# Patient Record
Sex: MALE | Race: ASIAN | Employment: OTHER | URBAN - METROPOLITAN AREA
[De-identification: names, ages, dates, MRNs, and addresses within clinical notes are randomized per-mention and may not be internally consistent; named-entity substitution may affect disease eponyms.]

---

## 2017-01-12 RX ORDER — TAMSULOSIN HYDROCHLORIDE 0.4 MG/1
CAPSULE ORAL
Qty: 90 CAP | Refills: 0 | Status: SHIPPED | OUTPATIENT
Start: 2017-01-12 | End: 2017-04-15 | Stop reason: SDUPTHER

## 2017-03-09 ENCOUNTER — OFFICE VISIT (OUTPATIENT)
Dept: FAMILY MEDICINE CLINIC | Age: 67
End: 2017-03-09

## 2017-03-09 VITALS
WEIGHT: 180.4 LBS | DIASTOLIC BLOOD PRESSURE: 72 MMHG | RESPIRATION RATE: 18 BRPM | TEMPERATURE: 97.6 F | HEIGHT: 70 IN | BODY MASS INDEX: 25.83 KG/M2 | OXYGEN SATURATION: 98 % | SYSTOLIC BLOOD PRESSURE: 109 MMHG | HEART RATE: 71 BPM

## 2017-03-09 DIAGNOSIS — I25.10 ATHEROSCLEROSIS OF NATIVE CORONARY ARTERY OF NATIVE HEART WITHOUT ANGINA PECTORIS: ICD-10-CM

## 2017-03-09 DIAGNOSIS — E11.9 TYPE 2 DIABETES MELLITUS WITHOUT COMPLICATION, WITHOUT LONG-TERM CURRENT USE OF INSULIN (HCC): Primary | ICD-10-CM

## 2017-03-09 DIAGNOSIS — Z96.659 INFECTION OF PROSTHETIC KNEE JOINT, SUBSEQUENT ENCOUNTER: ICD-10-CM

## 2017-03-09 DIAGNOSIS — E78.2 MIXED DYSLIPIDEMIA: ICD-10-CM

## 2017-03-09 DIAGNOSIS — T84.59XD INFECTION OF PROSTHETIC KNEE JOINT, SUBSEQUENT ENCOUNTER: ICD-10-CM

## 2017-03-09 DIAGNOSIS — I10 ESSENTIAL HYPERTENSION, BENIGN: ICD-10-CM

## 2017-03-09 RX ORDER — CEPHALEXIN 500 MG/1
500 CAPSULE ORAL 4 TIMES DAILY
COMMUNITY
End: 2017-05-16

## 2017-03-09 RX ORDER — MELOXICAM 15 MG/1
15 TABLET ORAL DAILY
COMMUNITY
End: 2017-05-16

## 2017-03-09 RX ORDER — CLOTRIMAZOLE AND BETAMETHASONE DIPROPIONATE 10; .64 MG/G; MG/G
CREAM TOPICAL
Qty: 45 G | Refills: 3 | Status: SHIPPED | OUTPATIENT
Start: 2017-03-09 | End: 2017-05-16

## 2017-03-09 NOTE — MR AVS SNAPSHOT
Visit Information Date & Time Provider Department Dept. Phone Encounter #  
 3/9/2017 11:20 AM Clem Novoa MD [address] [place] [address] [id number] Follow-up Instructions Return in about 4 months (around 7/9/2017) for Follow-up, Fasting, DM, HTN, CHOL, Arthritis. Your Appointments 4/20/2017 10:00 AM  
ESTABLISHED PATIENT with Jacinta Du MD  
[facility] ([facility]) Appt Note: 1 yr fu appt  sll  
 [address] [place] [address]  
[address] [id number] Upcoming Health Maintenance Date Due Hepatitis C Screening 1950 DTaP/Tdap/Td series (1 - Tdap) 5/30/1970 FOBT Q 1 YEAR AGE 50-75 5/30/1999 ZOSTER VACCINE AGE 60> 5/30/2009 Pneumococcal 65+ Low/Medium Risk (1 of 2 - PCV13) 5/30/2014 MEDICARE YEARLY EXAM 5/30/2014 FOOT EXAM Q1 2/12/2016 INFLUENZA AGE 9 TO ADULT 8/1/2016 MICROALBUMIN Q1 11/3/2016 EYE EXAM RETINAL OR DILATED Q1 11/25/2016 HEMOGLOBIN A1C Q6M 4/7/2017 LIPID PANEL Q1 10/7/2017 GLAUCOMA SCREENING Q2Y 11/25/2017 Allergies as of 3/9/2017  Review Complete On: 3/9/2017 By: Clem Novoa MD  
  
 Severity Noted Reaction Type Reactions Levofloxacin Medium 11/03/2015   Systemic Rash Pcn [Penicillins] Medium 06/23/2011   Systemic Rash Tape [Adhesive]  10/27/2016   Intolerance Rash Medipore tape caused large blisters when removed. Current Immunizations  Never Reviewed No immunizations on file. Not reviewed this visit You Were Diagnosed With   
  
 Codes Comments Type 2 diabetes mellitus without complication, without long-term current use of insulin (HCC)    -  Primary ICD-10-CM: E11.9 ICD-9-CM: 250.00 Essential hypertension, benign     ICD-10-CM: I10 
ICD-9-CM: 401.1 Mixed dyslipidemia     ICD-10-CM: E78.2 ICD-9-CM: 272.2 Infection of prosthetic knee joint, subsequent encounter     ICD-10-CM: T84.59XD, F07.972 ICD-9-CM: V58.89 Atherosclerosis of native coronary artery of native heart without angina pectoris     ICD-10-CM: I25.10 ICD-9-CM: 414.01 Vitals BP Pulse Temp Resp Height(growth percentile) Weight(growth percentile) 109/72 (BP 1 Location: Right arm, BP Patient Position: Sitting) 71 97.6 °F (36.4 °C) (Oral) 18 5' 10\" (1.778 m) 180 lb 6.4 oz (81.8 kg) SpO2 BMI Smoking Status 98% 25.88 kg/m2 Never Smoker Vitals History BMI and BSA Data Body Mass Index Body Surface Area  
 25.88 kg/m 2 2.01 m 2 Preferred Pharmacy Pharmacy Name Phone [facility] [address] [place] [address] [phone] Your Updated Medication List  
  
   
This list is accurate as of: 3/9/17 12:12 PM.  Always use your most recent med list.  
  
  
  
  
 acetaminophen 325 mg tablet Commonly known as:  TYLENOL Take 2 Tabs by mouth every six (6) hours as needed for Fever. amLODIPine 5 mg tablet Commonly known as:  Delight Jean TAKE ONE TABLET BY MOUTH ONCE DAILY  
  
 atorvastatin 10 mg tablet Commonly known as:  LIPITOR  
TAKE ONE TABLET BY MOUTH ONCE DAILY. (NEEDS APPOINTMENT FOR ADDITIONAL REFILLS) cephALEXin 500 mg capsule Commonly known as:  Clay Balboa Take 500 mg by mouth four (4) times daily. esomeprazole 20 mg capsule Commonly known as:  NexIUM Take 1 Cap by mouth daily. fenofibrate 160 mg tablet Commonly known as:  LOFIBRA TAKE ONE TABLET BY MOUTH ONCE DAILY * glucose blood VI test strips strip Commonly known as:  ACCU-CHEK RED Diagnosis E11.9 Testing  once a day * glucose blood VI test strips strip Commonly known as:  ACCU-CHEK RED PLUS TEST STRP Diagnosis E11.9 . Testing once daily  
  
 meloxicam 15 mg tablet Commonly known as:  MOBIC Take 15 mg by mouth daily. metFORMIN 850 mg tablet Commonly known as:  GLUCOPHAGE  
TAKE ONE TABLET BY MOUTH TWICE DAILY WITH MEALS  
  
 metoprolol succinate 50 mg XL tablet Commonly known as:  TOPROL XL Take 1 Tab by mouth daily. nitroglycerin 0.4 mg SL tablet Commonly known as:  NITROSTAT  
DISSOLVE ONE TABLET UNDER THE TONGUE EVERY 5 MINUTES AS NEEDED FOR CHEST PAIN. UP TO 3 DOSES  
  
 rifAMPin 300 mg capsule Commonly known as:  RIFADIN Take 2 Caps by mouth daily. tamsulosin 0.4 mg capsule Commonly known as:  FLOMAX TAKE ONE CAPSULE BY MOUTH ONCE DAILY * Notice: This list has 2 medication(s) that are the same as other medications prescribed for you. Read the directions carefully, and ask your doctor or other care provider to review them with you. We Performed the Following C REACTIVE PROTEIN, QT [73037 CPT(R)] CBC WITH AUTOMATED DIFF [34568 CPT(R)] HEMOGLOBIN A1C WITH EAG [65632 CPT(R)] LIPID PANEL [95930 CPT(R)] METABOLIC PANEL, COMPREHENSIVE [00727 CPT(R)] MICROALBUMIN, UR, RAND W/ MICROALBUMIN/CREA RATIO [id number] CPT(R)] SED RATE (ESR) [id number] CPT(R)] Follow-up Instructions Return in about 4 months (around 7/9/2017) for Follow-up, Fasting, DM, HTN, CHOL, Arthritis. Patient Instructions Learning About Diabetes Food Guidelines Your Care Instructions Meal planning is important to manage diabetes. It helps keep your blood sugar at a target level (which you set with your doctor). You don't have to eat special foods. You can eat what your family eats, including sweets once in a while. But you do have to pay attention to how often you eat and how much you eat of certain foods. You may want to work with a dietitian or a certified diabetes educator (CDE) to help you plan meals and snacks. A dietitian or CDE can also help you lose weight if that is one of your goals. What should you know about eating carbs? Managing the amount of carbohydrate (carbs) you eat is an important part of healthy meals when you have diabetes. Carbohydrate is found in many foods. · Learn which foods have carbs. And learn the amounts of carbs in different foods. ¨ Bread, cereal, pasta, and rice have about 15 grams of carbs in a serving. A serving is 1 slice of bread (1 ounce), ½ cup of cooked cereal, or 1/3 cup of cooked pasta or rice. ¨ Fruits have 15 grams of carbs in a serving. A serving is 1 small fresh fruit, such as an apple or orange; ½ of a banana; ½ cup of cooked or canned fruit; ½ cup of fruit juice; 1 cup of melon or raspberries; or 2 tablespoons of dried fruit. ¨ Milk and no-sugar-added yogurt have 15 grams of carbs in a serving. A serving is 1 cup of milk or 2/3 cup of no-sugar-added yogurt. ¨ Starchy vegetables have 15 grams of carbs in a serving. A serving is ½ cup of mashed potatoes or sweet potato; 1 cup winter squash; ½ of a small baked potato; ½ cup of cooked beans; or ½ cup cooked corn or green peas. · Learn how much carbs to eat each day and at each meal. A dietitian or CDE can teach you how to keep track of the amount of carbs you eat. This is called carbohydrate counting. · If you are not sure how to count carbohydrate grams, use the Plate Method to plan meals. It is a good, quick way to make sure that you have a balanced meal. It also helps you spread carbs throughout the day. ¨ Divide your plate by types of foods. Put non-starchy vegetables on half the plate, meat or other protein food on one-quarter of the plate, and a grain or starchy vegetable in the final quarter of the plate. To this you can add a small piece of fruit and 1 cup of milk or yogurt, depending on how many carbs you are supposed to eat at a meal. 
· Try to eat about the same amount of carbs at each meal. Do not \"save up\" your daily allowance of carbs to eat at one meal. 
· Proteins have very little or no carbs per serving.  Examples of proteins are beef, chicken, turkey, fish, eggs, tofu, cheese, cottage cheese, and peanut butter. A serving size of meat is 3 ounces, which is about the size of a deck of cards. Examples of meat substitute serving sizes (equal to 1 ounce of meat) are 1/4 cup of cottage cheese, 1 egg, 1 tablespoon of peanut butter, and ½ cup of tofu. How can you eat out and still eat healthy? · Learn to estimate the serving sizes of foods that have carbohydrate. If you measure food at home, it will be easier to estimate the amount in a serving of restaurant food. · If the meal you order has too much carbohydrate (such as potatoes, corn, or baked beans), ask to have a low-carbohydrate food instead. Ask for a salad or green vegetables. · If you use insulin, check your blood sugar before and after eating out to help you plan how much to eat in the future. · If you eat more carbohydrate at a meal than you had planned, take a walk or do other exercise. This will help lower your blood sugar. What else should you know? · Limit saturated fat, such as the fat from meat and dairy products. This is a healthy choice because people who have diabetes are at higher risk of heart disease. So choose lean cuts of meat and nonfat or low-fat dairy products. Use olive or canola oil instead of butter or shortening when cooking. · Don't skip meals. Your blood sugar may drop too low if you skip meals and take insulin or certain medicines for diabetes. · Check with your doctor before you drink alcohol. Alcohol can cause your blood sugar to drop too low. Alcohol can also cause a bad reaction if you take certain diabetes medicines. Follow-up care is a key part of your treatment and safety. Be sure to make and go to all appointments, and call your doctor if you are having problems. It's also a good idea to know your test results and keep a list of the medicines you take. Where can you learn more? Go to http://araseli-lv.info/. Enter O893 in the search box to learn more about \"Learning About Diabetes Food Guidelines. \" Current as of: May 23, 2016 Content Version: 11.1 © 9918-8489 [employer]. Care instructions adapted under license by [employer] (which disclaims liability or warranty for this information). If you have questions about a medical condition or this instruction, always ask your healthcare professional. [address] any warranty or liability for your use of this information. Introducing \[facility]\"" & HEALTH SERVICES! Karen Stephenson introduces Hashable patient portal. Now you can access parts of your medical record, email your doctor's office, and request medication refills online. 1. In your internet browser, go to [email or web address] 2. Click on the First Time User? Click Here link in the Sign In box. You will see the New Member Sign Up page. 3. Enter your Hashable Access Code exactly as it appears below. You will not need to use this code after youve completed the sign-up process. If you do not sign up before the expiration date, you must request a new code. · Hashable Access Code: [email or web address] Expires: 6/7/2017 11:21 AM 
 
4. Enter the last four digits of your Social Security Number (xxxx) and Date of Birth (mm/dd/yyyy) as indicated and click Submit. You will be taken to the next sign-up page. 5. Create a Hashable ID. This will be your Hashable login ID and cannot be changed, so think of one that is secure and easy to remember. 6. Create a Hashable password. You can change your password at any time. 7. Enter your Password Reset Question and Answer. This can be used at a later time if you forget your password. 8. Enter your e-mail address. You will receive e-mail notification when new information is available in [address] 9. Click Sign Up. You can now view and download portions of your medical record. 10. Click the Download Summary menu link to download a portable copy of your medical information. If you have questions, please visit the Frequently Asked Questions section of the Speedshape website. Remember, Speedshape is NOT to be used for urgent needs. For medical emergencies, dial 911. Now available from your iPhone and Android! Please provide this summary of care documentation to your next provider. Your primary care clinician is listed as Jackie Nuno. If you have any questions after today's visit, please call [phone].

## 2017-03-09 NOTE — PATIENT INSTRUCTIONS

## 2017-03-09 NOTE — PROGRESS NOTES
Hedy Brice is a 78 y.o. male  Chief Complaint   Patient presents with    Hypertension    Diabetes    Cholesterol Problem    Vitamin D Deficiency       1. Have you been to the ER, urgent care clinic since your last visit? Hospitalized since your last visit? No    2. Have you seen or consulted any other health care providers outside of the [address] [place] [address] since your last visit? Include any pap smears or colon screening. Angela Barraganmonakita, last seen 2/8/16.

## 2017-03-09 NOTE — LETTER
3/15/2017 10:28 AM 
 
Mr. Shanae Hudson [facility] [id number] Dear Shanae Hudson: Please find your most recent results below. Resulted Orders METABOLIC PANEL, COMPREHENSIVE Result Value Ref Range Glucose 112 (H) 65 - 99 mg/dL BUN 17 8 - 27 mg/dL Creatinine 1.11 0.76 - 1.27 mg/dL GFR est non-AA 68 >59 mL/min/1.73 GFR est AA 79 >59 mL/min/1.73  
 BUN/Creatinine ratio 15 10 - 22 Sodium 139 134 - 144 mmol/L Potassium 4.9 3.5 - 5.2 mmol/L Chloride 103 96 - 106 mmol/L  
 CO2 21 18 - 29 mmol/L Calcium 9.5 8.6 - 10.2 mg/dL Protein, total 7.1 6.0 - 8.5 g/dL Albumin 4.2 3.6 - 4.8 g/dL GLOBULIN, TOTAL 2.9 1.5 - 4.5 g/dL A-G Ratio 1.4 1.2 - 2.2 Comment: **Please note reference interval change** Bilirubin, total 0.7 0.0 - 1.2 mg/dL Alk. phosphatase 54 39 - 117 IU/L  
 AST (SGOT) 13 0 - 40 IU/L  
 ALT (SGPT) 8 0 - 44 IU/L Narrative Performed at:  [address]  [id number] : eLann Pickering MD, Phone:  [phone] CBC WITH AUTOMATED DIFF Result Value Ref Range WBC 5.5 3.4 - 10.8 x10E3/uL  
 RBC 4.64 4.14 - 5.80 x10E6/uL HGB 12.8 12.6 - 17.7 g/dL HCT 38.0 37.5 - 51.0 % MCV 82 79 - 97 fL  
 MCH 27.6 26.6 - 33.0 pg  
 MCHC 33.7 31.5 - 35.7 g/dL  
 RDW 15.6 (H) 12.3 - 15.4 % PLATELET 534 416 - 361 x10E3/uL NEUTROPHILS 46 % Lymphocytes 42 % MONOCYTES 8 % EOSINOPHILS 4 % BASOPHILS 0 %  
 ABS. NEUTROPHILS 2.5 1.4 - 7.0 x10E3/uL Abs Lymphocytes 2.3 0.7 - 3.1 x10E3/uL  
 ABS. MONOCYTES 0.5 0.1 - 0.9 x10E3/uL  
 ABS. EOSINOPHILS 0.2 0.0 - 0.4 x10E3/uL  
 ABS. BASOPHILS 0.0 0.0 - 0.2 x10E3/uL IMMATURE GRANULOCYTES 0 %  
 ABS. IMM. GRANS. 0.0 0.0 - 0.1 x10E3/uL Narrative Performed at:  [address]  [id number] : Leann Pickering MD, Phone:  [phone] LIPID PANEL  
 Result Value Ref Range Cholesterol, total 121 100 - 199 mg/dL Triglyceride 84 0 - 149 mg/dL HDL Cholesterol 52 >39 mg/dL VLDL, calculated 17 5 - 40 mg/dL LDL, calculated 52 0 - 99 mg/dL Narrative Performed at:  [address]  [id number] : Aurora Sharma MD, Phone:  [phone] HEMOGLOBIN A1C WITH EAG Result Value Ref Range Hemoglobin A1c 6.5 (H) 4.8 - 5.6 % Comment:  
            Pre-diabetes: 5.7 - 6.4 Diabetes: >6.4 Glycemic control for adults with diabetes: <7.0 Estimated average glucose 140 mg/dL Narrative Performed at:  [address]  [id number] : Aurora Sharma MD, Phone:  [phone] MICROALBUMIN, UR, RAND W/ MICROALBUMIN/CREA RATIO Result Value Ref Range Creatinine, urine 145.7 Not Estab. mg/dL Microalbumin, urine 29.5 Not Estab. ug/mL Microalb/Creat ratio (ug/mg creat.) 20.2 0.0 - 30.0 mg/g creat Narrative Performed at:  [address]  [id number] : Aurora Sharma MD, Phone:  [phone] SED RATE (ESR) Result Value Ref Range Sed rate (ESR) 2 0 - 30 mm/hr Narrative Performed at:  [address]  [id number] : Aurora Sharma MD, Phone:  [phone] C REACTIVE PROTEIN, QT Result Value Ref Range C-Reactive Protein, Qt 0.4 0.0 - 4.9 mg/L Narrative Performed at:  [address]  [id number] : Aurora Sharma MD, Phone:  [phone] CVD REPORT Result Value Ref Range INTERPRETATION Note Comment:  
   Supplement report is available. Narrative Performed at:  [address] [place] [address]  [id number] : Elsi Jones PhD, Phone:  [phone] DIABETES PATIENT EDUCATION Result Value Ref Range PDF Image Not applicable Narrative Performed at:  [address] [place] [address]  [id number] : Miranda Pereira PhD, Phone:  [phone] RECOMMENDATIONS: 
 
Complete Blood Count: OK Comprehensive Metabolic Panel: Blood sugar 112, liver and kidneys OK Hemoglobin A1C: 6.5 Diabetes under good control Microalb/Creat ratio:  Normal  
Sed rate:  Normal  
C Reactive protein:  Normal  
Cholesterol:  Normal  
 
Advice Stick to strict diabetic diet, exercise regularly and if on medications be compliant with your medications. Keep your follow-up appointment. Please call me if you have any questions: [phone] Sincerely, Clem Novoa MD

## 2017-03-10 NOTE — PROGRESS NOTES
HISTORY OF PRESENT ILLNESS  Lloyd Hare is a 78 y.o. male. Hypertension   The history is provided by the patient. This is a chronic problem. Progression since onset: BP under control . No headaches or dizziness. Compliant with medications. Diabetes   The history is provided by the patient. This is a chronic problem. Progression since onset: DM has been under control and his last HbA1C was 6.7 . Cholesterol Problem   The history is provided by the patient. This is a chronic problem. Progression since onset: He is on atorvastin and fenofibrate and he has been able to tolerate the medications without any notable side effects. Knee Pain   The history is provided by the patient. This is a chronic problem. Progression since onset: He is S/P  infection  of right knee replacement . He has been  on antibiotic . He still has some pain in knee . No fevers or chills. Heart Problem   The history is provided by the patient. This is a chronic problem. Progression since onset: No chest pain or shortness of breath . Review of Systems   Constitutional: Negative. HENT: Negative. Eyes: Negative. Respiratory: Negative. Cardiovascular: Negative. Gastrointestinal: Negative. Genitourinary: Negative. Musculoskeletal: Negative. Skin: Negative. Neurological: Negative. Endo/Heme/Allergies: Negative. Psychiatric/Behavioral: Negative. Physical Exam  General:   Head: Alert, cooperative, no distress, appears stated age. Normocephalic and atraumatic   Eyes:  Conjunctivae/corneas clear. PERRL, EOMs intact. Ears:  Normal TMs and external ear canals both ears. Nose: Nares normal. Septum midline. Mucosa normal. No drainage or sinus tenderness. Mouth:  Throat: Lips, mucosa, and tongue normal. Teeth and gums normal.  No lesions or exudates. Neck: Supple, symmetrical, trachea midline, no adenopathy, thyroid: no enlargement/tenderness/nodules. Back:   Symmetric, no curvature.  ROM normal. No CVA tenderness. Lungs:   Clear to auscultation bilaterally. Heart:  Regular rate and rhythm, S1, S2 normal, no murmur, click, rub or gallop. Abdomen:   Soft, non-tender. Bowel sounds normal. No masses,  No organomegaly. Extremities: Extremities normal, atraumatic, no cyanosis or edema. Right knee still shows some inflamation . Wound remains well healed . Good movements. Pulses: 2+ and symmetric all extremities. Skin: Skin color, texture, turgor normal. No rashes or lesions   Lymph nodes: Cervical & supraclavicular nodes normal.   Neurologic: CNII-XII intact. Normal strength, sensation and reflexes throughout. Psych:                      Normal mood and affect     ASSESSMENT and PLAN  Encounter Diagnoses   Name Primary?  Type 2 diabetes mellitus without complication, without long-term current use of insulin (HCC) Yes    Essential hypertension, benign     Mixed dyslipidemia     Infection of prosthetic knee joint, subsequent encounter     Atherosclerosis of native coronary artery of native heart without angina pectoris      Orders Placed This Encounter    METABOLIC PANEL, COMPREHENSIVE    CBC WITH AUTOMATED DIFF    LIPID PANEL    HEMOGLOBIN A1C WITH EAG    MICROALBUMIN, UR, RAND W/ MICROALBUMIN/CREA RATIO    SED RATE (ESR)    C REACTIVE PROTEIN, QT    cephALEXin (KEFLEX) 500 mg capsule    meloxicam (MOBIC) 15 mg tablet    clotrimazole-betamethasone (LOTRISONE) topical cream   Follow-up Disposition:  Return in about 4 months (around 7/9/2017) for Follow-up, Fasting, DM, HTN, CHOL, Arthritis. Reviewed and discussed previous lab results. Results of labs requested today will be notified when available. He was advised to continue with current medications. He was given an after visit summary which includes diagnoses, vital signs, current medications, &  authorized prescriptions. Patient verbalized understanding.

## 2017-03-13 ENCOUNTER — HOSPITAL ENCOUNTER (OUTPATIENT)
Dept: LAB | Age: 67
Discharge: HOME | End: 2017-03-13
Payer: MEDICARE

## 2017-03-13 PROCEDURE — 83036 HEMOGLOBIN GLYCOSYLATED A1C: CPT

## 2017-03-13 PROCEDURE — 36415 COLL VENOUS BLD VENIPUNCTURE: CPT

## 2017-03-13 PROCEDURE — 80053 COMPREHEN METABOLIC PANEL: CPT

## 2017-03-13 PROCEDURE — 80061 LIPID PANEL: CPT

## 2017-03-13 PROCEDURE — 85025 COMPLETE CBC W/AUTO DIFF WBC: CPT

## 2017-03-13 PROCEDURE — 85651 RBC SED RATE NONAUTOMATED: CPT

## 2017-03-13 PROCEDURE — 82043 UR ALBUMIN QUANTITATIVE: CPT

## 2017-03-13 PROCEDURE — 86140 C-REACTIVE PROTEIN: CPT

## 2017-03-14 LAB
ALBUMIN SERPL-MCNC: 4.2 G/DL (ref 3.6–4.8)
ALBUMIN/CREAT UR: 20.2 MG/G CREAT (ref 0–30)
ALBUMIN/GLOB SERPL: 1.4 {RATIO} (ref 1.2–2.2)
ALP SERPL-CCNC: 54 IU/L (ref 39–117)
ALT SERPL-CCNC: 8 IU/L (ref 0–44)
AST SERPL-CCNC: 13 IU/L (ref 0–40)
BASOPHILS # BLD AUTO: 0 X10E3/UL (ref 0–0.2)
BASOPHILS NFR BLD AUTO: 0 %
BILIRUB SERPL-MCNC: 0.7 MG/DL (ref 0–1.2)
BUN SERPL-MCNC: 17 MG/DL (ref 8–27)
BUN/CREAT SERPL: 15 (ref 10–22)
CALCIUM SERPL-MCNC: 9.5 MG/DL (ref 8.6–10.2)
CHLORIDE SERPL-SCNC: 103 MMOL/L (ref 96–106)
CHOLEST SERPL-MCNC: 121 MG/DL (ref 100–199)
CO2 SERPL-SCNC: 21 MMOL/L (ref 18–29)
CREAT SERPL-MCNC: 1.11 MG/DL (ref 0.76–1.27)
CREAT UR-MCNC: 145.7 MG/DL
CRP SERPL-MCNC: 0.4 MG/L (ref 0–4.9)
EOSINOPHIL # BLD AUTO: 0.2 X10E3/UL (ref 0–0.4)
EOSINOPHIL NFR BLD AUTO: 4 %
ERYTHROCYTE [DISTWIDTH] IN BLOOD BY AUTOMATED COUNT: 15.6 % (ref 12.3–15.4)
ERYTHROCYTE [SEDIMENTATION RATE] IN BLOOD BY WESTERGREN METHOD: 2 MM/HR (ref 0–30)
EST. AVERAGE GLUCOSE BLD GHB EST-MCNC: 140 MG/DL
GLOBULIN SER CALC-MCNC: 2.9 G/DL (ref 1.5–4.5)
GLUCOSE SERPL-MCNC: 112 MG/DL (ref 65–99)
HBA1C MFR BLD: 6.5 % (ref 4.8–5.6)
HCT VFR BLD AUTO: 38 % (ref 37.5–51)
HDLC SERPL-MCNC: 52 MG/DL
HGB BLD-MCNC: 12.8 G/DL (ref 12.6–17.7)
IMM GRANULOCYTES # BLD: 0 X10E3/UL (ref 0–0.1)
IMM GRANULOCYTES NFR BLD: 0 %
INTERPRETATION, 910389: NORMAL
LDLC SERPL CALC-MCNC: 52 MG/DL (ref 0–99)
LYMPHOCYTES # BLD AUTO: 2.3 X10E3/UL (ref 0.7–3.1)
LYMPHOCYTES NFR BLD AUTO: 42 %
Lab: NORMAL
MCH RBC QN AUTO: 27.6 PG (ref 26.6–33)
MCHC RBC AUTO-ENTMCNC: 33.7 G/DL (ref 31.5–35.7)
MCV RBC AUTO: 82 FL (ref 79–97)
MICROALBUMIN UR-MCNC: 29.5 UG/ML
MONOCYTES # BLD AUTO: 0.5 X10E3/UL (ref 0.1–0.9)
MONOCYTES NFR BLD AUTO: 8 %
NEUTROPHILS # BLD AUTO: 2.5 X10E3/UL (ref 1.4–7)
NEUTROPHILS NFR BLD AUTO: 46 %
PLATELET # BLD AUTO: 228 X10E3/UL (ref 150–379)
POTASSIUM SERPL-SCNC: 4.9 MMOL/L (ref 3.5–5.2)
PROT SERPL-MCNC: 7.1 G/DL (ref 6–8.5)
RBC # BLD AUTO: 4.64 X10E6/UL (ref 4.14–5.8)
SODIUM SERPL-SCNC: 139 MMOL/L (ref 134–144)
TRIGL SERPL-MCNC: 84 MG/DL (ref 0–149)
VLDLC SERPL CALC-MCNC: 17 MG/DL (ref 5–40)
WBC # BLD AUTO: 5.5 X10E3/UL (ref 3.4–10.8)

## 2017-03-15 NOTE — PROGRESS NOTES
Complete Blood Count : OK  Comprehensive Metabolic Panel : Blood sugar 112 , liver and kidneys OK  Hemoglobin A1C : 6.5 Diabetes under good control   Microalb/Creat ratio:  Normal   Sed rate :   Normal   C Reactive protein :   Normal   Cholesterol :  Normal   Adv:   Stick to strict diabetic diet , exercise regularly and if on medications be compliant with your medications. Keep your follow-up appointment.

## 2017-03-15 NOTE — PROGRESS NOTES
Patient came to office to p/u copy of results. Result letter printed with recommendations per Dr. Parish Rodriguez. Pt requested copy of results be faxed to Dr. Brigid Cote.  Results faxed, per request.

## 2017-04-15 RX ORDER — TAMSULOSIN HYDROCHLORIDE 0.4 MG/1
CAPSULE ORAL
Qty: 90 CAP | Refills: 0 | Status: SHIPPED | OUTPATIENT
Start: 2017-04-15 | End: 2017-05-02 | Stop reason: SDUPTHER

## 2017-05-02 RX ORDER — TAMSULOSIN HYDROCHLORIDE 0.4 MG/1
CAPSULE ORAL
Qty: 90 CAP | Refills: 0 | Status: SHIPPED | COMMUNITY
Start: 2017-05-02 | End: 2017-11-09 | Stop reason: SDUPTHER

## 2017-05-02 NOTE — TELEPHONE ENCOUNTER
----- Message from Andi Alejandre sent at 5/2/2017  1:26 PM EDT -----  Regarding: Saad/telephone  Pt is requesting a Rx for Metformin 500mg called to [facility]. Pts number is [phone].

## 2017-05-03 RX ORDER — METFORMIN HYDROCHLORIDE 500 MG/1
500 TABLET ORAL 2 TIMES DAILY WITH MEALS
Qty: 180 TAB | Refills: 1 | Status: SHIPPED | OUTPATIENT
Start: 2017-05-03 | End: 2017-11-09 | Stop reason: SDUPTHER

## 2017-05-03 RX ORDER — METFORMIN HYDROCHLORIDE 850 MG/1
TABLET ORAL
Qty: 180 TAB | Refills: 0 | Status: SHIPPED | OUTPATIENT
Start: 2017-05-03 | End: 2017-05-03 | Stop reason: DRUGHIGH

## 2017-05-03 RX ORDER — FENOFIBRATE 160 MG/1
TABLET ORAL
Qty: 90 TAB | Refills: 0 | Status: SHIPPED | OUTPATIENT
Start: 2017-05-03 | End: 2018-02-02 | Stop reason: SDUPTHER

## 2017-05-11 ENCOUNTER — OFFICE VISIT (OUTPATIENT)
Dept: CARDIOLOGY CLINIC | Age: 67
End: 2017-05-11

## 2017-05-11 VITALS
WEIGHT: 182 LBS | SYSTOLIC BLOOD PRESSURE: 122 MMHG | DIASTOLIC BLOOD PRESSURE: 64 MMHG | HEIGHT: 70 IN | HEART RATE: 72 BPM | OXYGEN SATURATION: 96 % | BODY MASS INDEX: 26.05 KG/M2

## 2017-05-11 DIAGNOSIS — I25.10 ATHEROSCLEROSIS OF NATIVE CORONARY ARTERY OF NATIVE HEART WITHOUT ANGINA PECTORIS: Primary | ICD-10-CM

## 2017-05-11 DIAGNOSIS — Z95.5 S/P CORONARY ARTERY STENT PLACEMENT: ICD-10-CM

## 2017-05-11 DIAGNOSIS — E78.2 MIXED DYSLIPIDEMIA: ICD-10-CM

## 2017-05-11 DIAGNOSIS — I10 ESSENTIAL HYPERTENSION, BENIGN: ICD-10-CM

## 2017-05-11 RX ORDER — AMLODIPINE BESYLATE 5 MG/1
TABLET ORAL
Qty: 90 TAB | Refills: 3 | Status: SHIPPED | OUTPATIENT
Start: 2017-05-11 | End: 2018-05-02 | Stop reason: SDUPTHER

## 2017-05-11 RX ORDER — GUAIFENESIN 100 MG/5ML
81 LIQUID (ML) ORAL DAILY
Qty: 90 TAB | Refills: 3
Start: 2017-05-11 | End: 2017-05-19

## 2017-05-11 RX ORDER — NITROGLYCERIN 0.4 MG/1
TABLET SUBLINGUAL
Qty: 25 TAB | Refills: 3 | Status: SHIPPED | OUTPATIENT
Start: 2017-05-11 | End: 2019-05-14 | Stop reason: SDUPTHER

## 2017-05-11 RX ORDER — ATORVASTATIN CALCIUM 10 MG/1
TABLET, FILM COATED ORAL
Qty: 90 TAB | Refills: 3 | Status: SHIPPED | OUTPATIENT
Start: 2017-05-11 | End: 2017-05-16

## 2017-05-11 RX ORDER — METOPROLOL SUCCINATE 50 MG/1
TABLET, EXTENDED RELEASE ORAL
Qty: 90 TAB | Refills: 3 | Status: SHIPPED | OUTPATIENT
Start: 2017-05-11 | End: 2018-08-02 | Stop reason: SDUPTHER

## 2017-05-11 NOTE — MR AVS SNAPSHOT
Visit Information Date & Time Provider Department Dept. Phone Encounter #  
 5/11/2017 10:40 AM Narayan Guadarrama MD [facility] Makayla Guzman [phone] [id number] Your Appointments 7/6/2017  9:20 AM  
ROUTINE CARE with Chris Blunt MD  
[facility]) Appt Note: 4 month fuv fasting  
 [address] [facility] [address] [address]  
[phone]  
  
   
 [address] [address] Upcoming Health Maintenance Date Due Hepatitis C Screening 1950 DTaP/Tdap/Td series (1 - Tdap) 5/30/1970 FOBT Q 1 YEAR AGE 50-75 5/30/1999 ZOSTER VACCINE AGE 60> 5/30/2009 Pneumococcal 65+ Low/Medium Risk (1 of 2 - PCV13) 5/30/2014 MEDICARE YEARLY EXAM 5/30/2014 FOOT EXAM Q1 2/12/2016 EYE EXAM RETINAL OR DILATED Q1 11/25/2016 INFLUENZA AGE 9 TO ADULT 8/1/2017 HEMOGLOBIN A1C Q6M 9/13/2017 GLAUCOMA SCREENING Q2Y 11/25/2017 MICROALBUMIN Q1 3/13/2018 LIPID PANEL Q1 3/13/2018 Allergies as of 5/11/2017  Review Complete On: 5/11/2017 By: Phyllis Patches, LPN Severity Noted Reaction Type Reactions Levofloxacin Medium 11/03/2015   Systemic Rash Pcn [Penicillins] Medium 06/23/2011   Systemic Rash Tape [Adhesive]  10/27/2016   Intolerance Rash Medipore tape caused large blisters when removed. Current Immunizations  Never Reviewed No immunizations on file. Not reviewed this visit You Were Diagnosed With   
  
 Codes Comments Essential hypertension, benign    -  Primary ICD-10-CM: I10 
ICD-9-CM: 401.1 Vitals BP Pulse Height(growth percentile) Weight(growth percentile) SpO2 BMI  
 122/64 (BP 1 Location: Left arm, BP Patient Position: Sitting) 72 5' 10\" (1.778 m) 182 lb (82.6 kg) 96% 26.11 kg/m2 Smoking Status Never Smoker BMI and BSA Data  Body Mass Index Body Surface Area  
 26.11 kg/m 2 2.02 m 2  
  
  
 Preferred Pharmacy Pharmacy Name Phone [facility] [address] [facility] [phone] Your Updated Medication List  
  
   
This list is accurate as of: 5/11/17 11:00 AM.  Always use your most recent med list.  
  
  
  
  
 acetaminophen 325 mg tablet Commonly known as:  TYLENOL Take 2 Tabs by mouth every six (6) hours as needed for Fever. amLODIPine 5 mg tablet Commonly known as:  Jane Rafter TAKE ONE TABLET BY MOUTH ONCE DAILY  
  
 atorvastatin 10 mg tablet Commonly known as:  LIPITOR  
TAKE ONE TABLET BY MOUTH ONCE DAILY. cephALEXin 500 mg capsule Commonly known as:  Brandon Mago Take 500 mg by mouth four (4) times daily. clotrimazole-betamethasone topical cream  
Commonly known as:  Heyd Ashford Apply two times daily  
  
 esomeprazole 20 mg capsule Commonly known as:  NexIUM Take 1 Cap by mouth daily. fenofibrate 160 mg tablet Commonly known as:  LOFIBRA TAKE ONE TABLET BY MOUTH ONCE DAILY * glucose blood VI test strips strip Commonly known as:  ACCU-CHEK RED PLUS TEST STRP Diagnosis E11.9 . Testing once daily * ACCU-CHEK RED strip Generic drug:  glucose blood VI test strips USE ONE STRIP TO CHECK GLUCOSE ONCE DAILY  
  
 meloxicam 15 mg tablet Commonly known as:  MOBIC Take 15 mg by mouth daily. metFORMIN 500 mg tablet Commonly known as:  GLUCOPHAGE Take 1 Tab by mouth two (2) times daily (with meals). metoprolol succinate 50 mg XL tablet Commonly known as:  TOPROL-XL  
TAKE ONE TABLET BY MOUTH ONCE DAILY  
  
 nitroglycerin 0.4 mg SL tablet Commonly known as:  NITROSTAT  
DISSOLVE ONE TABLET UNDER THE TONGUE EVERY 5 MINUTES AS NEEDED FOR CHEST PAIN. UP TO 3 DOSES  
  
 rifAMPin 300 mg capsule Commonly known as:  RIFADIN Take 2 Caps by mouth daily. tamsulosin 0.4 mg capsule Commonly known as:  FLOMAX TAKE ONE CAPSULE BY MOUTH ONCE DAILY * Notice: This list has 2 medication(s) that are the same as other medications prescribed for you. Read the directions carefully, and ask your doctor or other care provider to review them with you. Prescriptions Sent to Pharmacy Refills  
 atorvastatin (LIPITOR) 10 mg tablet 3 Sig: TAKE ONE TABLET BY MOUTH ONCE DAILY. Class: Normal  
 Pharmacy: [address] [place] [address]. Rd[address] [place] [address] Ph #: [phone]  
 metoprolol succinate (TOPROL-XL) 50 mg XL tablet 3 Sig: TAKE ONE TABLET BY MOUTH ONCE DAILY Class: Normal  
 Pharmacy: [address] [place] [address]. Rd[address] [place] [address] Ph #: [phone]  
 amLODIPine (NORVASC) 5 mg tablet 3 Sig: TAKE ONE TABLET BY MOUTH ONCE DAILY Class: Normal  
 Pharmacy: [address] [place] [address]. Rd[address] [place] [address] Ph #: [phone] We Performed the Following AMB POC EKG ROUTINE W/ 12 LEADS, INTER & REP [60863 CPT(R)] Patient Instructions Follow up with Dr. Monie Rashid in 1 year Introducing [facility]! Ángel Aspen introduces [employer] patient portal. Now you can access parts of your medical record, email your doctor's office, and request medication refills online. 1. In your internet browser, go to [email or web address] 2. Click on the First Time User? Click Here link in the Sign In box. You will see the New Member Sign Up page. 3. Enter your [employer] Access Code exactly as it appears below. You will not need to use this code after youve completed the sign-up process. If you do not sign up before the expiration date, you must request a new code. · [employer] Access Code: [email or web address] Expires: 6/7/2017 12:21 PM 
 
4. Enter the last four digits of your Social Security Number (xxxx) and Date of Birth (mm/dd/yyyy) as indicated and click Submit. You will be taken to the next sign-up page. 5. Create a [employer] ID.  This will be your [employer] login ID and cannot be changed, so think of one that is secure and easy to remember. 6. Create a [employer] password. You can change your password at any time. 7. Enter your Password Reset Question and Answer. This can be used at a later time if you forget your password. 8. Enter your e-mail address. You will receive e-mail notification when new information is available in [address] 9. Click Sign Up. You can now view and download portions of your medical record. 10. Click the Download Summary menu link to download a portable copy of your medical information. If you have questions, please visit the Frequently Asked Questions section of the [employer] website. Remember, [employer] is NOT to be used for urgent needs. For medical emergencies, dial 911. Now available from your iPhone and Android! Please provide this summary of care documentation to your next provider. Your primary care clinician is listed as Monserrat Guzman. If you have any questions after today's visit, please call [phone].

## 2017-05-11 NOTE — PROGRESS NOTES
Lyn Swenson     1950       Mary Cleary MD, [facility]  Date of Visit-5/11/2017   PCP is Antonio Shen MD   [facility] [place]  [facility]  HPI:  Lyn Swenson is a 78 y.o. male     Follow up of CAD. He reports intermittent short duration mild chest pain with shortness of breath occurring every other month. He denies any dizziness. Denies chest edema, syncope or shortness of breath at rest, has no tachycardia, palpitations or sense of arrhythmia. Assessment/Plan:       CAD   -stable  -rare angina  -instructed on use of nitroglycerin     HTN  -at goal on double therapy   He's under a lot of stress with his son's behavioral issues. His dizziness on standing has resolved, felt it was a blood pressure pill effect. He's personally stable from coronary disease, but I can tell that he's suffering mentally. I expressed sympathy to him and sat with him to see if there was anything further I could do for him. He's fairly stoic today. Follow up in 1 year      Impression:   1. Atherosclerosis of native coronary artery of native heart without angina pectoris    2. Essential hypertension, benign    3. Mixed dyslipidemia    4. S/P coronary artery stent placement       Cardiac History:   PCI 7- prox 75% LAD, D1 50% EF 60%; NAN 3 x 15 mm Xience to LAD  Stress echo 5-28-13=  Walked 3 minutes, Exercise stress echo is normal.      ROS-except as noted above. . A complete cardiac and respiratory are reviewed and negative except as above ; Resp-denies wheezing  or productive cough,. Const- No unusual weight loss or fever; Neuro-no recent seizure or CVA ; GI- No BRBPR, abdom pain, bloating ; - no  hematuria   see supplement sheet, initialed and to be scanned by staff  Past Medical History:   Diagnosis Date    Arthritis     KNEES & BACK.     Coronary atherosclerosis of native coronary artery     Diabetes ([address]     Hgb A1C 6-10-11 7.1%; NIDDM    Essential hypertension, benign     GERD (gastroesophageal reflux disease)     Hypertension     Mixed dyslipidemia     6-11:   HDL 33 LDL 95    S/P coronary artery stent placement July 29th, 2011    NAN to LAD      Social Hx= reports that he has never smoked. He has quit using smokeless tobacco. He reports that he drinks alcohol. He reports that he does not use illicit drugs. Exam and Labs:    Visit Vitals    /64 (BP 1 Location: Left arm, BP Patient Position: Sitting)    Pulse 72    Ht 5' 10\" (1.778 m)    Wt 182 lb (82.6 kg)    SpO2 96%    BMI 26.11 kg/m2      Constitutional:  NAD, comfortable  Head: NC,AT. Eyes: No scleral icterus. Neck:  Neck supple. No JVD present. Throat: moist mucous membranes. Chest: Effort normal & normal respiratory excursion . Neurological: alert, conversant and oriented . Skin: Skin is not cold. No obvious systemic rash noted. Not diaphoretic. No erythema. Psychiatric:  Grossly normal mood and affect. Behavior appears normal. Extremities:  no clubbing or cyanosis. Abdomen: non distended    Lungs:slight wheeze at base on left and some upper airway sounds. No stridor. distress, wheezes or  Rales. Heart:    normal rate, regular rhythm, normal S1, S2, no murmurs, rubs, clicks or gallops , PMI non displaced. Edema: Edema is none. Lab Results   Component Value Date/Time    Cholesterol, total 121 03/13/2017 08:51 AM    HDL Cholesterol 52 03/13/2017 08:51 AM    LDL, calculated 52 03/13/2017 08:51 AM    Triglyceride 84 03/13/2017 08:51 AM    CHOL/HDL Ratio 5.8 07/30/2011 04:31 AM     No results found for this or any previous visit.    Lab Results   Component Value Date/Time    Sodium 139 03/13/2017 08:51 AM    Potassium 4.9 03/13/2017 08:51 AM    Chloride 103 03/13/2017 08:51 AM    CO2 21 03/13/2017 08:51 AM    Anion gap 7 10/26/2016 11:43 AM    Glucose 112 03/13/2017 08:51 AM    BUN 17 03/13/2017 08:51 AM    Creatinine 1.11 03/13/2017 08:51 AM    BUN/Creatinine ratio 15 03/13/2017 08:51 AM    GFR est AA 79 03/13/2017 08:51 AM    GFR est non-AA 68 03/13/2017 08:51 AM    Calcium 9.5 03/13/2017 08:51 AM      Wt Readings from Last 3 Encounters:   05/11/17 182 lb (82.6 kg)   03/09/17 180 lb 6.4 oz (81.8 kg)   10/18/16 191 lb (86.6 kg)      BP Readings from Last 3 Encounters:   05/11/17 122/64   03/09/17 109/72   10/27/16 158/82        Current Outpatient Prescriptions   Medication Sig    atorvastatin (LIPITOR) 10 mg tablet TAKE ONE TABLET BY MOUTH ONCE DAILY.  metoprolol succinate (TOPROL-XL) 50 mg XL tablet TAKE ONE TABLET BY MOUTH ONCE DAILY    amLODIPine (NORVASC) 5 mg tablet TAKE ONE TABLET BY MOUTH ONCE DAILY    ACCU-CHEK RED strip USE ONE STRIP TO CHECK GLUCOSE ONCE DAILY    fenofibrate (LOFIBRA) 160 mg tablet TAKE ONE TABLET BY MOUTH ONCE DAILY    metFORMIN (GLUCOPHAGE) 500 mg tablet Take 1 Tab by mouth two (2) times daily (with meals).  tamsulosin (FLOMAX) 0.4 mg capsule TAKE ONE CAPSULE BY MOUTH ONCE DAILY    cephALEXin (KEFLEX) 500 mg capsule Take 500 mg by mouth four (4) times daily.  rifAMPin (RIFADIN) 300 mg capsule Take 2 Caps by mouth daily.  glucose blood VI test strips (ACCU-CHEK RED PLUS TEST STRP) strip Diagnosis E11.9 . Testing once daily    nitroglycerin (NITROSTAT) 0.4 mg SL tablet DISSOLVE ONE TABLET UNDER THE TONGUE EVERY 5 MINUTES AS NEEDED FOR CHEST PAIN. UP TO 3 DOSES    meloxicam (MOBIC) 15 mg tablet Take 15 mg by mouth daily.  clotrimazole-betamethasone (LOTRISONE) topical cream Apply two times daily    acetaminophen (TYLENOL) 325 mg tablet Take 2 Tabs by mouth every six (6) hours as needed for Fever.  esomeprazole (NEXIUM) 20 mg capsule Take 1 Cap by mouth daily. (Patient taking differently: Take 20 mg by mouth as needed.)     No current facility-administered medications for this visit. Impression see above.     Written by Rm Glasgow, as dictated by Ambar Locke MD.

## 2017-05-16 ENCOUNTER — ANESTHESIA EVENT (OUTPATIENT)
Dept: SURGERY | Age: 67
DRG: 465 | End: 2017-05-16
Payer: MEDICARE

## 2017-05-16 ENCOUNTER — HOSPITAL ENCOUNTER (INPATIENT)
Age: 67
LOS: 3 days | Discharge: HOME HEALTH | DRG: 465 | End: 2017-05-19
Attending: EMERGENCY MEDICINE | Admitting: ORTHOPAEDIC SURGERY
Payer: MEDICARE

## 2017-05-16 ENCOUNTER — ANESTHESIA (OUTPATIENT)
Dept: SURGERY | Age: 67
DRG: 465 | End: 2017-05-16
Payer: MEDICARE

## 2017-05-16 ENCOUNTER — APPOINTMENT (OUTPATIENT)
Dept: GENERAL RADIOLOGY | Age: 67
DRG: 465 | End: 2017-05-16
Attending: PHYSICIAN ASSISTANT
Payer: MEDICARE

## 2017-05-16 DIAGNOSIS — T84.50XA JOINT PROSTHESIS INFECTION OR INFLAMMATION, INITIAL ENCOUNTER (HCC): Primary | ICD-10-CM

## 2017-05-16 LAB
ABO + RH BLD: NORMAL
ANION GAP BLD CALC-SCNC: 10 MMOL/L (ref 5–15)
APPEARANCE FLD: ABNORMAL
BASOPHILS # BLD AUTO: 0 K/UL (ref 0–0.1)
BASOPHILS # BLD: 0 % (ref 0–1)
BLOOD GROUP ANTIBODIES SERPL: NORMAL
BODY FLD TYPE: NORMAL
BUN SERPL-MCNC: 10 MG/DL (ref 6–20)
BUN/CREAT SERPL: 8 (ref 12–20)
CALCIUM SERPL-MCNC: 9.3 MG/DL (ref 8.5–10.1)
CHLORIDE SERPL-SCNC: 107 MMOL/L (ref 97–108)
CO2 SERPL-SCNC: 23 MMOL/L (ref 21–32)
COLOR FLD: ABNORMAL
CREAT SERPL-MCNC: 1.28 MG/DL (ref 0.7–1.3)
CRP SERPL-MCNC: 8.68 MG/DL (ref 0–0.6)
CRYSTALS FLD MICRO: NORMAL
EOSINOPHIL # BLD: 0 K/UL (ref 0–0.4)
EOSINOPHIL NFR BLD: 0 % (ref 0–7)
ERYTHROCYTE [DISTWIDTH] IN BLOOD BY AUTOMATED COUNT: 14.8 % (ref 11.5–14.5)
ERYTHROCYTE [SEDIMENTATION RATE] IN BLOOD: 20 MM/HR (ref 0–20)
GLUCOSE BLD STRIP.AUTO-MCNC: 99 MG/DL (ref 65–100)
GLUCOSE BLD STRIP.AUTO-MCNC: 99 MG/DL (ref 65–100)
GLUCOSE SERPL-MCNC: 130 MG/DL (ref 65–100)
HCT VFR BLD AUTO: 40.6 % (ref 36.6–50.3)
HGB BLD-MCNC: 13.4 G/DL (ref 12.1–17)
INR PPP: 1.1 (ref 0.9–1.1)
LACTATE SERPL-SCNC: 1 MMOL/L (ref 0.4–2)
LYMPHOCYTES # BLD AUTO: 11 % (ref 12–49)
LYMPHOCYTES # BLD: 1.1 K/UL (ref 0.8–3.5)
LYMPHOCYTES NFR FLD: 6 %
MCH RBC QN AUTO: 28.6 PG (ref 26–34)
MCHC RBC AUTO-ENTMCNC: 33 G/DL (ref 30–36.5)
MCV RBC AUTO: 86.6 FL (ref 80–99)
MONOCYTES # BLD: 0.9 K/UL (ref 0–1)
MONOCYTES NFR BLD AUTO: 9 % (ref 5–13)
MONOS+MACROS NFR FLD: 8 %
NEUTS SEG # BLD: 7.9 K/UL (ref 1.8–8)
NEUTS SEG NFR BLD AUTO: 80 % (ref 32–75)
NEUTS SEG NFR FLD: 86 %
NUC CELL # FLD: ABNORMAL /CU MM (ref 0–5)
PLATELET # BLD AUTO: 218 K/UL (ref 150–400)
POTASSIUM SERPL-SCNC: 4.3 MMOL/L (ref 3.5–5.1)
PROTHROMBIN TIME: 11.6 SEC (ref 9–11.1)
RBC # BLD AUTO: 4.69 M/UL (ref 4.1–5.7)
RBC # FLD: >100 /CU MM
SERVICE CMNT-IMP: NORMAL
SERVICE CMNT-IMP: NORMAL
SODIUM SERPL-SCNC: 140 MMOL/L (ref 136–145)
SPECIMEN EXP DATE BLD: NORMAL
SPECIMEN SOURCE FLD: ABNORMAL
WBC # BLD AUTO: 9.9 K/UL (ref 4.1–11.1)

## 2017-05-16 PROCEDURE — 86140 C-REACTIVE PROTEIN: CPT | Performed by: EMERGENCY MEDICINE

## 2017-05-16 PROCEDURE — C1776 JOINT DEVICE (IMPLANTABLE): HCPCS | Performed by: ORTHOPAEDIC SURGERY

## 2017-05-16 PROCEDURE — 89050 BODY FLUID CELL COUNT: CPT | Performed by: EMERGENCY MEDICINE

## 2017-05-16 PROCEDURE — 36415 COLL VENOUS BLD VENIPUNCTURE: CPT | Performed by: EMERGENCY MEDICINE

## 2017-05-16 PROCEDURE — C1713 ANCHOR/SCREW BN/BN,TIS/BN: HCPCS | Performed by: ORTHOPAEDIC SURGERY

## 2017-05-16 PROCEDURE — 65270000029 HC RM PRIVATE

## 2017-05-16 PROCEDURE — 86900 BLOOD TYPING SEROLOGIC ABO: CPT | Performed by: PHYSICIAN ASSISTANT

## 2017-05-16 PROCEDURE — 74011250636 HC RX REV CODE- 250/636: Performed by: ANESTHESIOLOGY

## 2017-05-16 PROCEDURE — 77030010783 HC BOWL MX BN CEM J&J -B: Performed by: ORTHOPAEDIC SURGERY

## 2017-05-16 PROCEDURE — 77030035236 HC SUT PDS STRATFX BARB J&J -B: Performed by: ORTHOPAEDIC SURGERY

## 2017-05-16 PROCEDURE — 77030034850: Performed by: ORTHOPAEDIC SURGERY

## 2017-05-16 PROCEDURE — 94761 N-INVAS EAR/PLS OXIMETRY MLT: CPT

## 2017-05-16 PROCEDURE — 76010000171 HC OR TIME 2 TO 2.5 HR INTENSV-TIER 1: Performed by: ORTHOPAEDIC SURGERY

## 2017-05-16 PROCEDURE — 82962 GLUCOSE BLOOD TEST: CPT

## 2017-05-16 PROCEDURE — 74011250636 HC RX REV CODE- 250/636: Performed by: PHYSICIAN ASSISTANT

## 2017-05-16 PROCEDURE — 77030019908 HC STETH ESOPH SIMS -A: Performed by: ANESTHESIOLOGY

## 2017-05-16 PROCEDURE — 83605 ASSAY OF LACTIC ACID: CPT | Performed by: EMERGENCY MEDICINE

## 2017-05-16 PROCEDURE — 96375 TX/PRO/DX INJ NEW DRUG ADDON: CPT

## 2017-05-16 PROCEDURE — 80048 BASIC METABOLIC PNL TOTAL CA: CPT | Performed by: EMERGENCY MEDICINE

## 2017-05-16 PROCEDURE — 87186 SC STD MICRODIL/AGAR DIL: CPT | Performed by: ORTHOPAEDIC SURGERY

## 2017-05-16 PROCEDURE — 0SPC0JZ REMOVAL OF SYNTHETIC SUBSTITUTE FROM RIGHT KNEE JOINT, OPEN APPROACH: ICD-10-PCS | Performed by: ORTHOPAEDIC SURGERY

## 2017-05-16 PROCEDURE — 87205 SMEAR GRAM STAIN: CPT | Performed by: EMERGENCY MEDICINE

## 2017-05-16 PROCEDURE — 87205 SMEAR GRAM STAIN: CPT | Performed by: ORTHOPAEDIC SURGERY

## 2017-05-16 PROCEDURE — 77030026438 HC STYL ET INTUB CARD -A: Performed by: ANESTHESIOLOGY

## 2017-05-16 PROCEDURE — 85025 COMPLETE CBC W/AUTO DIFF WBC: CPT | Performed by: EMERGENCY MEDICINE

## 2017-05-16 PROCEDURE — 76060000035 HC ANESTHESIA 2 TO 2.5 HR: Performed by: ORTHOPAEDIC SURGERY

## 2017-05-16 PROCEDURE — 77030014077 HC TOWER MX CEM J&J -C: Performed by: ORTHOPAEDIC SURGERY

## 2017-05-16 PROCEDURE — 76210000016 HC OR PH I REC 1 TO 1.5 HR: Performed by: ORTHOPAEDIC SURGERY

## 2017-05-16 PROCEDURE — 77030002916 HC SUT ETHLN J&J -A: Performed by: ORTHOPAEDIC SURGERY

## 2017-05-16 PROCEDURE — 85610 PROTHROMBIN TIME: CPT | Performed by: EMERGENCY MEDICINE

## 2017-05-16 PROCEDURE — 77030008684 HC TU ET CUF COVD -B: Performed by: ANESTHESIOLOGY

## 2017-05-16 PROCEDURE — 71010 XR CHEST PORT: CPT

## 2017-05-16 PROCEDURE — 77030008467 HC STPLR SKN COVD -B: Performed by: ORTHOPAEDIC SURGERY

## 2017-05-16 PROCEDURE — 87186 SC STD MICRODIL/AGAR DIL: CPT | Performed by: EMERGENCY MEDICINE

## 2017-05-16 PROCEDURE — 74011000250 HC RX REV CODE- 250: Performed by: EMERGENCY MEDICINE

## 2017-05-16 PROCEDURE — 74011250637 HC RX REV CODE- 250/637: Performed by: PHYSICIAN ASSISTANT

## 2017-05-16 PROCEDURE — 99284 EMERGENCY DEPT VISIT MOD MDM: CPT

## 2017-05-16 PROCEDURE — 87147 CULTURE TYPE IMMUNOLOGIC: CPT | Performed by: EMERGENCY MEDICINE

## 2017-05-16 PROCEDURE — L1830 KO IMMOB CANVAS LONG PRE OTS: HCPCS | Performed by: ORTHOPAEDIC SURGERY

## 2017-05-16 PROCEDURE — 77030006822 HC BLD SAW SAG BRSM -B: Performed by: ORTHOPAEDIC SURGERY

## 2017-05-16 PROCEDURE — 87077 CULTURE AEROBIC IDENTIFY: CPT | Performed by: ORTHOPAEDIC SURGERY

## 2017-05-16 PROCEDURE — 77030033269 HC SLV COMPR SCD KNE2 CARD -B

## 2017-05-16 PROCEDURE — 74011250636 HC RX REV CODE- 250/636: Performed by: EMERGENCY MEDICINE

## 2017-05-16 PROCEDURE — 74011250636 HC RX REV CODE- 250/636: Performed by: ORTHOPAEDIC SURGERY

## 2017-05-16 PROCEDURE — 0SHC08Z INSERTION OF SPACER INTO RIGHT KNEE JOINT, OPEN APPROACH: ICD-10-PCS | Performed by: ORTHOPAEDIC SURGERY

## 2017-05-16 PROCEDURE — 77030012406 HC DRN WND PENRS BARD -A: Performed by: ORTHOPAEDIC SURGERY

## 2017-05-16 PROCEDURE — 96365 THER/PROPH/DIAG IV INF INIT: CPT

## 2017-05-16 PROCEDURE — 74011250636 HC RX REV CODE- 250/636

## 2017-05-16 PROCEDURE — 89060 EXAM SYNOVIAL FLUID CRYSTALS: CPT | Performed by: EMERGENCY MEDICINE

## 2017-05-16 PROCEDURE — 77030011640 HC PAD GRND REM COVD -A: Performed by: ORTHOPAEDIC SURGERY

## 2017-05-16 PROCEDURE — 85652 RBC SED RATE AUTOMATED: CPT | Performed by: EMERGENCY MEDICINE

## 2017-05-16 PROCEDURE — 77030013079 HC BLNKT BAIR HGGR 3M -A: Performed by: ANESTHESIOLOGY

## 2017-05-16 PROCEDURE — 77030018846 HC SOL IRR STRL H20 ICUM -A: Performed by: ORTHOPAEDIC SURGERY

## 2017-05-16 PROCEDURE — 75810000054 HC ARTHOCENTISIS JOINT

## 2017-05-16 PROCEDURE — 74011000250 HC RX REV CODE- 250: Performed by: ORTHOPAEDIC SURGERY

## 2017-05-16 PROCEDURE — 77030031139 HC SUT VCRL2 J&J -A: Performed by: ORTHOPAEDIC SURGERY

## 2017-05-16 PROCEDURE — 74011000250 HC RX REV CODE- 250

## 2017-05-16 PROCEDURE — 77030018836 HC SOL IRR NACL ICUM -A: Performed by: ORTHOPAEDIC SURGERY

## 2017-05-16 PROCEDURE — 87040 BLOOD CULTURE FOR BACTERIA: CPT | Performed by: EMERGENCY MEDICINE

## 2017-05-16 PROCEDURE — 87075 CULTR BACTERIA EXCEPT BLOOD: CPT | Performed by: ORTHOPAEDIC SURGERY

## 2017-05-16 PROCEDURE — 87077 CULTURE AEROBIC IDENTIFY: CPT | Performed by: EMERGENCY MEDICINE

## 2017-05-16 DEVICE — SPACER KNEE L 74X54X80X48X18MM HI REL INTERSPACE: Type: IMPLANTABLE DEVICE | Site: KNEE | Status: FUNCTIONAL

## 2017-05-16 DEVICE — CEMENT BNE 40GM FULL DOSE PMMA W/O ANTIBIO HI VISC N RADPQ: Type: IMPLANTABLE DEVICE | Site: KNEE | Status: FUNCTIONAL

## 2017-05-16 RX ORDER — SODIUM CHLORIDE, SODIUM LACTATE, POTASSIUM CHLORIDE, CALCIUM CHLORIDE 600; 310; 30; 20 MG/100ML; MG/100ML; MG/100ML; MG/100ML
125 INJECTION, SOLUTION INTRAVENOUS CONTINUOUS
Status: DISCONTINUED | OUTPATIENT
Start: 2017-05-16 | End: 2017-05-16 | Stop reason: HOSPADM

## 2017-05-16 RX ORDER — NALOXONE HYDROCHLORIDE 0.4 MG/ML
0.4 INJECTION, SOLUTION INTRAMUSCULAR; INTRAVENOUS; SUBCUTANEOUS AS NEEDED
Status: DISCONTINUED | OUTPATIENT
Start: 2017-05-16 | End: 2017-05-16 | Stop reason: SDUPTHER

## 2017-05-16 RX ORDER — MORPHINE SULFATE 2 MG/ML
2 INJECTION, SOLUTION INTRAMUSCULAR; INTRAVENOUS
Status: ACTIVE | OUTPATIENT
Start: 2017-05-16 | End: 2017-05-17

## 2017-05-16 RX ORDER — HYDROXYZINE HYDROCHLORIDE 10 MG/1
10 TABLET, FILM COATED ORAL
Status: DISCONTINUED | OUTPATIENT
Start: 2017-05-16 | End: 2017-05-19 | Stop reason: HOSPADM

## 2017-05-16 RX ORDER — VANCOMYCIN HYDROCHLORIDE
1250
Status: DISCONTINUED | OUTPATIENT
Start: 2017-05-17 | End: 2017-05-18

## 2017-05-16 RX ORDER — SODIUM CHLORIDE 0.9 % (FLUSH) 0.9 %
5-10 SYRINGE (ML) INJECTION AS NEEDED
Status: DISCONTINUED | OUTPATIENT
Start: 2017-05-16 | End: 2017-05-16 | Stop reason: HOSPADM

## 2017-05-16 RX ORDER — METOPROLOL TARTRATE 5 MG/5ML
INJECTION INTRAVENOUS AS NEEDED
Status: DISCONTINUED | OUTPATIENT
Start: 2017-05-16 | End: 2017-05-16 | Stop reason: HOSPADM

## 2017-05-16 RX ORDER — SODIUM CHLORIDE 0.9 % (FLUSH) 0.9 %
5-10 SYRINGE (ML) INJECTION AS NEEDED
Status: DISCONTINUED | OUTPATIENT
Start: 2017-05-16 | End: 2017-05-19 | Stop reason: HOSPADM

## 2017-05-16 RX ORDER — HYDROGEN PEROXIDE 3 %
20 SOLUTION, NON-ORAL MISCELLANEOUS DAILY
Status: DISCONTINUED | OUTPATIENT
Start: 2017-05-17 | End: 2017-05-16 | Stop reason: CLARIF

## 2017-05-16 RX ORDER — ONDANSETRON 2 MG/ML
4 INJECTION INTRAMUSCULAR; INTRAVENOUS
Status: ACTIVE | OUTPATIENT
Start: 2017-05-16 | End: 2017-05-17

## 2017-05-16 RX ORDER — SODIUM CHLORIDE 0.9 % (FLUSH) 0.9 %
5-10 SYRINGE (ML) INJECTION EVERY 8 HOURS
Status: DISCONTINUED | OUTPATIENT
Start: 2017-05-16 | End: 2017-05-16 | Stop reason: HOSPADM

## 2017-05-16 RX ORDER — FENTANYL CITRATE 50 UG/ML
25 INJECTION, SOLUTION INTRAMUSCULAR; INTRAVENOUS
Status: DISCONTINUED | OUTPATIENT
Start: 2017-05-16 | End: 2017-05-16 | Stop reason: HOSPADM

## 2017-05-16 RX ORDER — HYDROMORPHONE HYDROCHLORIDE 2 MG/ML
INJECTION, SOLUTION INTRAMUSCULAR; INTRAVENOUS; SUBCUTANEOUS AS NEEDED
Status: DISCONTINUED | OUTPATIENT
Start: 2017-05-16 | End: 2017-05-16 | Stop reason: HOSPADM

## 2017-05-16 RX ORDER — MIDAZOLAM HYDROCHLORIDE 1 MG/ML
INJECTION, SOLUTION INTRAMUSCULAR; INTRAVENOUS AS NEEDED
Status: DISCONTINUED | OUTPATIENT
Start: 2017-05-16 | End: 2017-05-16 | Stop reason: HOSPADM

## 2017-05-16 RX ORDER — AMOXICILLIN 250 MG
1 CAPSULE ORAL 2 TIMES DAILY
Status: DISCONTINUED | OUTPATIENT
Start: 2017-05-17 | End: 2017-05-19 | Stop reason: HOSPADM

## 2017-05-16 RX ORDER — SODIUM CHLORIDE 9 MG/ML
125 INJECTION, SOLUTION INTRAVENOUS CONTINUOUS
Status: DISPENSED | OUTPATIENT
Start: 2017-05-16 | End: 2017-05-17

## 2017-05-16 RX ORDER — OXYCODONE HYDROCHLORIDE 5 MG/1
5 TABLET ORAL
Status: DISCONTINUED | OUTPATIENT
Start: 2017-05-16 | End: 2017-05-19 | Stop reason: HOSPADM

## 2017-05-16 RX ORDER — MIDAZOLAM HYDROCHLORIDE 1 MG/ML
1 INJECTION, SOLUTION INTRAMUSCULAR; INTRAVENOUS AS NEEDED
Status: DISCONTINUED | OUTPATIENT
Start: 2017-05-16 | End: 2017-05-16 | Stop reason: HOSPADM

## 2017-05-16 RX ORDER — LIDOCAINE HYDROCHLORIDE 20 MG/ML
INJECTION, SOLUTION EPIDURAL; INFILTRATION; INTRACAUDAL; PERINEURAL AS NEEDED
Status: DISCONTINUED | OUTPATIENT
Start: 2017-05-16 | End: 2017-05-16 | Stop reason: HOSPADM

## 2017-05-16 RX ORDER — ONDANSETRON 4 MG/1
4 TABLET, ORALLY DISINTEGRATING ORAL
Status: DISCONTINUED | OUTPATIENT
Start: 2017-05-16 | End: 2017-05-19 | Stop reason: HOSPADM

## 2017-05-16 RX ORDER — MORPHINE SULFATE 10 MG/ML
2 INJECTION, SOLUTION INTRAMUSCULAR; INTRAVENOUS
Status: DISCONTINUED | OUTPATIENT
Start: 2017-05-16 | End: 2017-05-16 | Stop reason: HOSPADM

## 2017-05-16 RX ORDER — METFORMIN HYDROCHLORIDE 500 MG/1
500 TABLET ORAL 2 TIMES DAILY WITH MEALS
Status: DISCONTINUED | OUTPATIENT
Start: 2017-05-16 | End: 2017-05-19 | Stop reason: HOSPADM

## 2017-05-16 RX ORDER — SODIUM CHLORIDE 0.9 % (FLUSH) 0.9 %
5-10 SYRINGE (ML) INJECTION EVERY 8 HOURS
Status: DISCONTINUED | OUTPATIENT
Start: 2017-05-17 | End: 2017-05-19 | Stop reason: HOSPADM

## 2017-05-16 RX ORDER — OXYCODONE AND ACETAMINOPHEN 5; 325 MG/1; MG/1
1 TABLET ORAL
Status: DISCONTINUED | OUTPATIENT
Start: 2017-05-16 | End: 2017-05-16 | Stop reason: SDUPTHER

## 2017-05-16 RX ORDER — LIDOCAINE HYDROCHLORIDE AND EPINEPHRINE 10; 10 MG/ML; UG/ML
1.5 INJECTION, SOLUTION INFILTRATION; PERINEURAL ONCE
Status: COMPLETED | OUTPATIENT
Start: 2017-05-16 | End: 2017-05-16

## 2017-05-16 RX ORDER — OXYCODONE HYDROCHLORIDE 5 MG/1
10 TABLET ORAL
Status: DISCONTINUED | OUTPATIENT
Start: 2017-05-16 | End: 2017-05-19 | Stop reason: HOSPADM

## 2017-05-16 RX ORDER — CEFAZOLIN SODIUM 1 G/3ML
INJECTION, POWDER, FOR SOLUTION INTRAMUSCULAR; INTRAVENOUS AS NEEDED
Status: DISCONTINUED | OUTPATIENT
Start: 2017-05-16 | End: 2017-05-16 | Stop reason: HOSPADM

## 2017-05-16 RX ORDER — DEXTROSE, SODIUM CHLORIDE, SODIUM LACTATE, POTASSIUM CHLORIDE, AND CALCIUM CHLORIDE 5; .6; .31; .03; .02 G/100ML; G/100ML; G/100ML; G/100ML; G/100ML
125 INJECTION, SOLUTION INTRAVENOUS CONTINUOUS
Status: DISCONTINUED | OUTPATIENT
Start: 2017-05-16 | End: 2017-05-16 | Stop reason: HOSPADM

## 2017-05-16 RX ORDER — NEOSTIGMINE METHYLSULFATE 1 MG/ML
INJECTION INTRAVENOUS AS NEEDED
Status: DISCONTINUED | OUTPATIENT
Start: 2017-05-16 | End: 2017-05-16 | Stop reason: HOSPADM

## 2017-05-16 RX ORDER — ONDANSETRON 2 MG/ML
INJECTION INTRAMUSCULAR; INTRAVENOUS AS NEEDED
Status: DISCONTINUED | OUTPATIENT
Start: 2017-05-16 | End: 2017-05-16 | Stop reason: HOSPADM

## 2017-05-16 RX ORDER — NITROGLYCERIN 0.4 MG/1
0.4 TABLET SUBLINGUAL AS NEEDED
Status: DISCONTINUED | OUTPATIENT
Start: 2017-05-16 | End: 2017-05-19 | Stop reason: HOSPADM

## 2017-05-16 RX ORDER — ROCURONIUM BROMIDE 10 MG/ML
INJECTION, SOLUTION INTRAVENOUS AS NEEDED
Status: DISCONTINUED | OUTPATIENT
Start: 2017-05-16 | End: 2017-05-16 | Stop reason: HOSPADM

## 2017-05-16 RX ORDER — HYDROGEN PEROXIDE 3 %
20 SOLUTION, NON-ORAL MISCELLANEOUS
COMMUNITY
End: 2022-03-29 | Stop reason: SDUPTHER

## 2017-05-16 RX ORDER — SODIUM CHLORIDE 9 MG/ML
125 INJECTION, SOLUTION INTRAVENOUS CONTINUOUS
Status: DISCONTINUED | OUTPATIENT
Start: 2017-05-16 | End: 2017-05-19 | Stop reason: HOSPADM

## 2017-05-16 RX ORDER — POLYETHYLENE GLYCOL 3350 17 G/17G
17 POWDER, FOR SOLUTION ORAL DAILY
Status: DISCONTINUED | OUTPATIENT
Start: 2017-05-17 | End: 2017-05-19 | Stop reason: HOSPADM

## 2017-05-16 RX ORDER — VANCOMYCIN HYDROCHLORIDE 1 G/20ML
INJECTION, POWDER, LYOPHILIZED, FOR SOLUTION INTRAVENOUS AS NEEDED
Status: DISCONTINUED | OUTPATIENT
Start: 2017-05-16 | End: 2017-05-16 | Stop reason: HOSPADM

## 2017-05-16 RX ORDER — ATORVASTATIN CALCIUM 10 MG/1
10 TABLET, FILM COATED ORAL
COMMUNITY
End: 2018-08-02 | Stop reason: SDUPTHER

## 2017-05-16 RX ORDER — PROPOFOL 10 MG/ML
INJECTION, EMULSION INTRAVENOUS AS NEEDED
Status: DISCONTINUED | OUTPATIENT
Start: 2017-05-16 | End: 2017-05-16 | Stop reason: HOSPADM

## 2017-05-16 RX ORDER — VANCOMYCIN 1.75 GRAM/500 ML IN 0.9 % SODIUM CHLORIDE INTRAVENOUS
1750 ONCE
Status: DISCONTINUED | OUTPATIENT
Start: 2017-05-16 | End: 2017-05-16 | Stop reason: HOSPADM

## 2017-05-16 RX ORDER — SODIUM CHLORIDE 0.9 % (FLUSH) 0.9 %
5-10 SYRINGE (ML) INJECTION EVERY 8 HOURS
Status: DISCONTINUED | OUTPATIENT
Start: 2017-05-16 | End: 2017-05-19 | Stop reason: HOSPADM

## 2017-05-16 RX ORDER — FENTANYL CITRATE 50 UG/ML
50 INJECTION, SOLUTION INTRAMUSCULAR; INTRAVENOUS AS NEEDED
Status: DISCONTINUED | OUTPATIENT
Start: 2017-05-16 | End: 2017-05-16 | Stop reason: HOSPADM

## 2017-05-16 RX ORDER — FENTANYL CITRATE 50 UG/ML
INJECTION, SOLUTION INTRAMUSCULAR; INTRAVENOUS AS NEEDED
Status: DISCONTINUED | OUTPATIENT
Start: 2017-05-16 | End: 2017-05-16 | Stop reason: HOSPADM

## 2017-05-16 RX ORDER — WARFARIN 10 MG/1
10 TABLET ORAL ONCE
Status: DISCONTINUED | OUTPATIENT
Start: 2017-05-16 | End: 2017-05-16 | Stop reason: DRUGHIGH

## 2017-05-16 RX ORDER — ACETAMINOPHEN 325 MG/1
650 TABLET ORAL
Status: DISCONTINUED | OUTPATIENT
Start: 2017-05-16 | End: 2017-05-19 | Stop reason: HOSPADM

## 2017-05-16 RX ORDER — PHENYLEPHRINE HCL IN 0.9% NACL 0.4MG/10ML
SYRINGE (ML) INTRAVENOUS AS NEEDED
Status: DISCONTINUED | OUTPATIENT
Start: 2017-05-16 | End: 2017-05-16 | Stop reason: HOSPADM

## 2017-05-16 RX ORDER — ATORVASTATIN CALCIUM 10 MG/1
10 TABLET, FILM COATED ORAL
Status: DISCONTINUED | OUTPATIENT
Start: 2017-05-16 | End: 2017-05-19 | Stop reason: HOSPADM

## 2017-05-16 RX ORDER — DIPHENHYDRAMINE HYDROCHLORIDE 50 MG/ML
12.5 INJECTION, SOLUTION INTRAMUSCULAR; INTRAVENOUS AS NEEDED
Status: DISCONTINUED | OUTPATIENT
Start: 2017-05-16 | End: 2017-05-16 | Stop reason: HOSPADM

## 2017-05-16 RX ORDER — SUCCINYLCHOLINE CHLORIDE 20 MG/ML
INJECTION INTRAMUSCULAR; INTRAVENOUS AS NEEDED
Status: DISCONTINUED | OUTPATIENT
Start: 2017-05-16 | End: 2017-05-16 | Stop reason: HOSPADM

## 2017-05-16 RX ORDER — IBUPROFEN 200 MG
200 TABLET ORAL
COMMUNITY
End: 2017-05-19

## 2017-05-16 RX ORDER — TAMSULOSIN HYDROCHLORIDE 0.4 MG/1
0.4 CAPSULE ORAL DAILY
Status: DISCONTINUED | OUTPATIENT
Start: 2017-05-17 | End: 2017-05-19 | Stop reason: HOSPADM

## 2017-05-16 RX ORDER — HYDROMORPHONE HYDROCHLORIDE 1 MG/ML
0.2 INJECTION, SOLUTION INTRAMUSCULAR; INTRAVENOUS; SUBCUTANEOUS
Status: COMPLETED | OUTPATIENT
Start: 2017-05-16 | End: 2017-05-16

## 2017-05-16 RX ORDER — CEFAZOLIN SODIUM IN 0.9 % NACL 2 G/50 ML
2 INTRAVENOUS SOLUTION, PIGGYBACK (ML) INTRAVENOUS EVERY 8 HOURS
Status: COMPLETED | OUTPATIENT
Start: 2017-05-17 | End: 2017-05-17

## 2017-05-16 RX ORDER — MORPHINE SULFATE 4 MG/ML
4 INJECTION, SOLUTION INTRAMUSCULAR; INTRAVENOUS ONCE
Status: COMPLETED | OUTPATIENT
Start: 2017-05-16 | End: 2017-05-16

## 2017-05-16 RX ORDER — AMLODIPINE BESYLATE 5 MG/1
5 TABLET ORAL DAILY
Status: DISCONTINUED | OUTPATIENT
Start: 2017-05-17 | End: 2017-05-19 | Stop reason: HOSPADM

## 2017-05-16 RX ORDER — MIDAZOLAM HYDROCHLORIDE 1 MG/ML
1 INJECTION, SOLUTION INTRAMUSCULAR; INTRAVENOUS
Status: DISCONTINUED | OUTPATIENT
Start: 2017-05-16 | End: 2017-05-16 | Stop reason: HOSPADM

## 2017-05-16 RX ORDER — MORPHINE SULFATE 2 MG/ML
2 INJECTION, SOLUTION INTRAMUSCULAR; INTRAVENOUS
Status: DISCONTINUED | OUTPATIENT
Start: 2017-05-16 | End: 2017-05-16 | Stop reason: SDUPTHER

## 2017-05-16 RX ORDER — DEXAMETHASONE SODIUM PHOSPHATE 4 MG/ML
4 INJECTION, SOLUTION INTRA-ARTICULAR; INTRALESIONAL; INTRAMUSCULAR; INTRAVENOUS; SOFT TISSUE
Status: DISCONTINUED | OUTPATIENT
Start: 2017-05-16 | End: 2017-05-19 | Stop reason: HOSPADM

## 2017-05-16 RX ORDER — SODIUM CHLORIDE, SODIUM LACTATE, POTASSIUM CHLORIDE, CALCIUM CHLORIDE 600; 310; 30; 20 MG/100ML; MG/100ML; MG/100ML; MG/100ML
INJECTION, SOLUTION INTRAVENOUS
Status: DISCONTINUED | OUTPATIENT
Start: 2017-05-16 | End: 2017-05-16 | Stop reason: HOSPADM

## 2017-05-16 RX ORDER — OXYCODONE HYDROCHLORIDE 5 MG/1
5 TABLET ORAL AS NEEDED
Status: DISCONTINUED | OUTPATIENT
Start: 2017-05-16 | End: 2017-05-16 | Stop reason: HOSPADM

## 2017-05-16 RX ORDER — VANCOMYCIN 2 GRAM/500 ML IN 0.9 % SODIUM CHLORIDE INTRAVENOUS
2000
Status: COMPLETED | OUTPATIENT
Start: 2017-05-16 | End: 2017-05-16

## 2017-05-16 RX ORDER — DOCUSATE SODIUM 100 MG/1
100 CAPSULE, LIQUID FILLED ORAL 2 TIMES DAILY
Status: DISCONTINUED | OUTPATIENT
Start: 2017-05-16 | End: 2017-05-19 | Stop reason: HOSPADM

## 2017-05-16 RX ORDER — ATORVASTATIN CALCIUM 10 MG/1
10 TABLET, FILM COATED ORAL
Status: DISCONTINUED | OUTPATIENT
Start: 2017-05-16 | End: 2017-05-16 | Stop reason: SDUPTHER

## 2017-05-16 RX ORDER — FENOFIBRATE 145 MG/1
145 TABLET, COATED ORAL EVERY EVENING
Status: DISCONTINUED | OUTPATIENT
Start: 2017-05-16 | End: 2017-05-19 | Stop reason: HOSPADM

## 2017-05-16 RX ORDER — METOPROLOL SUCCINATE 50 MG/1
50 TABLET, EXTENDED RELEASE ORAL DAILY
Status: DISCONTINUED | OUTPATIENT
Start: 2017-05-17 | End: 2017-05-19 | Stop reason: HOSPADM

## 2017-05-16 RX ORDER — NALOXONE HYDROCHLORIDE 0.4 MG/ML
0.4 INJECTION, SOLUTION INTRAMUSCULAR; INTRAVENOUS; SUBCUTANEOUS AS NEEDED
Status: DISCONTINUED | OUTPATIENT
Start: 2017-05-16 | End: 2017-05-19 | Stop reason: HOSPADM

## 2017-05-16 RX ORDER — ACETAMINOPHEN 325 MG/1
650 TABLET ORAL EVERY 6 HOURS
Status: DISCONTINUED | OUTPATIENT
Start: 2017-05-17 | End: 2017-05-19 | Stop reason: HOSPADM

## 2017-05-16 RX ORDER — DIPHENHYDRAMINE HYDROCHLORIDE 50 MG/ML
12.5 INJECTION, SOLUTION INTRAMUSCULAR; INTRAVENOUS
Status: DISCONTINUED | OUTPATIENT
Start: 2017-05-16 | End: 2017-05-19 | Stop reason: HOSPADM

## 2017-05-16 RX ORDER — LIDOCAINE HYDROCHLORIDE 10 MG/ML
0.5 INJECTION, SOLUTION EPIDURAL; INFILTRATION; INTRACAUDAL; PERINEURAL AS NEEDED
Status: DISCONTINUED | OUTPATIENT
Start: 2017-05-16 | End: 2017-05-16 | Stop reason: HOSPADM

## 2017-05-16 RX ORDER — ONDANSETRON 2 MG/ML
4 INJECTION INTRAMUSCULAR; INTRAVENOUS AS NEEDED
Status: DISCONTINUED | OUTPATIENT
Start: 2017-05-16 | End: 2017-05-16 | Stop reason: HOSPADM

## 2017-05-16 RX ORDER — GLYCOPYRROLATE 0.2 MG/ML
INJECTION INTRAMUSCULAR; INTRAVENOUS AS NEEDED
Status: DISCONTINUED | OUTPATIENT
Start: 2017-05-16 | End: 2017-05-16 | Stop reason: HOSPADM

## 2017-05-16 RX ORDER — PANTOPRAZOLE SODIUM 40 MG/1
40 TABLET, DELAYED RELEASE ORAL
Status: DISCONTINUED | OUTPATIENT
Start: 2017-05-17 | End: 2017-05-19 | Stop reason: HOSPADM

## 2017-05-16 RX ORDER — FACIAL-BODY WIPES
10 EACH TOPICAL DAILY PRN
Status: DISCONTINUED | OUTPATIENT
Start: 2017-05-18 | End: 2017-05-19 | Stop reason: HOSPADM

## 2017-05-16 RX ADMIN — SUCCINYLCHOLINE CHLORIDE 160 MG: 20 INJECTION INTRAMUSCULAR; INTRAVENOUS at 19:30

## 2017-05-16 RX ADMIN — LIDOCAINE HYDROCHLORIDE,EPINEPHRINE BITARTRATE 15 MG: 10; .01 INJECTION, SOLUTION INFILTRATION; PERINEURAL at 10:10

## 2017-05-16 RX ADMIN — ROCURONIUM BROMIDE 10 MG: 10 INJECTION, SOLUTION INTRAVENOUS at 19:30

## 2017-05-16 RX ADMIN — Medication 4 MG: at 10:17

## 2017-05-16 RX ADMIN — METOPROLOL TARTRATE 1 MG: 5 INJECTION INTRAVENOUS at 19:21

## 2017-05-16 RX ADMIN — VANCOMYCIN HYDROCHLORIDE 2000 MG: 10 INJECTION, POWDER, LYOPHILIZED, FOR SOLUTION INTRAVENOUS at 13:32

## 2017-05-16 RX ADMIN — ACETAMINOPHEN 650 MG: 325 TABLET, FILM COATED ORAL at 18:50

## 2017-05-16 RX ADMIN — SODIUM CHLORIDE 125 ML/HR: 900 INJECTION, SOLUTION INTRAVENOUS at 22:08

## 2017-05-16 RX ADMIN — HYDROMORPHONE HYDROCHLORIDE 0.5 MG: 2 INJECTION, SOLUTION INTRAMUSCULAR; INTRAVENOUS; SUBCUTANEOUS at 20:01

## 2017-05-16 RX ADMIN — ONDANSETRON 4 MG: 2 INJECTION INTRAMUSCULAR; INTRAVENOUS at 20:30

## 2017-05-16 RX ADMIN — SODIUM CHLORIDE, SODIUM LACTATE, POTASSIUM CHLORIDE, CALCIUM CHLORIDE: 600; 310; 30; 20 INJECTION, SOLUTION INTRAVENOUS at 20:01

## 2017-05-16 RX ADMIN — FENTANYL CITRATE 100 MCG: 50 INJECTION, SOLUTION INTRAMUSCULAR; INTRAVENOUS at 19:30

## 2017-05-16 RX ADMIN — MIDAZOLAM HYDROCHLORIDE 1 MG: 1 INJECTION, SOLUTION INTRAMUSCULAR; INTRAVENOUS at 22:02

## 2017-05-16 RX ADMIN — ROCURONIUM BROMIDE 30 MG: 10 INJECTION, SOLUTION INTRAVENOUS at 19:40

## 2017-05-16 RX ADMIN — GLYCOPYRROLATE 0.4 MG: 0.2 INJECTION INTRAMUSCULAR; INTRAVENOUS at 20:52

## 2017-05-16 RX ADMIN — Medication 80 MCG: at 19:38

## 2017-05-16 RX ADMIN — CEFAZOLIN SODIUM 2 G: 1 INJECTION, POWDER, FOR SOLUTION INTRAMUSCULAR; INTRAVENOUS at 19:57

## 2017-05-16 RX ADMIN — Medication 80 MCG: at 19:35

## 2017-05-16 RX ADMIN — MIDAZOLAM HYDROCHLORIDE 2 MG: 1 INJECTION, SOLUTION INTRAMUSCULAR; INTRAVENOUS at 19:21

## 2017-05-16 RX ADMIN — HYDROMORPHONE HYDROCHLORIDE 0.5 MG: 2 INJECTION, SOLUTION INTRAMUSCULAR; INTRAVENOUS; SUBCUTANEOUS at 20:53

## 2017-05-16 RX ADMIN — FENTANYL CITRATE 50 MCG: 50 INJECTION, SOLUTION INTRAMUSCULAR; INTRAVENOUS at 20:12

## 2017-05-16 RX ADMIN — WARFARIN SODIUM 3 MG: 1 TABLET ORAL at 23:00

## 2017-05-16 RX ADMIN — ATORVASTATIN CALCIUM 10 MG: 10 TABLET, FILM COATED ORAL at 23:00

## 2017-05-16 RX ADMIN — Medication 80 MCG: at 19:33

## 2017-05-16 RX ADMIN — LIDOCAINE HYDROCHLORIDE 60 MG: 20 INJECTION, SOLUTION EPIDURAL; INFILTRATION; INTRACAUDAL; PERINEURAL at 19:30

## 2017-05-16 RX ADMIN — HYDROMORPHONE HYDROCHLORIDE 0.2 MG: 1 INJECTION, SOLUTION INTRAMUSCULAR; INTRAVENOUS; SUBCUTANEOUS at 22:40

## 2017-05-16 RX ADMIN — FENTANYL CITRATE 50 MCG: 50 INJECTION, SOLUTION INTRAMUSCULAR; INTRAVENOUS at 20:56

## 2017-05-16 RX ADMIN — FENTANYL CITRATE 50 MCG: 50 INJECTION, SOLUTION INTRAMUSCULAR; INTRAVENOUS at 20:29

## 2017-05-16 RX ADMIN — PROPOFOL 150 MG: 10 INJECTION, EMULSION INTRAVENOUS at 19:30

## 2017-05-16 RX ADMIN — HYDROMORPHONE HYDROCHLORIDE 0.2 MG: 1 INJECTION, SOLUTION INTRAMUSCULAR; INTRAVENOUS; SUBCUTANEOUS at 22:22

## 2017-05-16 RX ADMIN — SODIUM CHLORIDE 125 ML/HR: 900 INJECTION, SOLUTION INTRAVENOUS at 13:32

## 2017-05-16 RX ADMIN — HYDROMORPHONE HYDROCHLORIDE 0.2 MG: 1 INJECTION, SOLUTION INTRAMUSCULAR; INTRAVENOUS; SUBCUTANEOUS at 22:01

## 2017-05-16 RX ADMIN — HYDROMORPHONE HYDROCHLORIDE 0.5 MG: 2 INJECTION, SOLUTION INTRAMUSCULAR; INTRAVENOUS; SUBCUTANEOUS at 20:19

## 2017-05-16 RX ADMIN — HYDROMORPHONE HYDROCHLORIDE 0.5 MG: 2 INJECTION, SOLUTION INTRAMUSCULAR; INTRAVENOUS; SUBCUTANEOUS at 20:36

## 2017-05-16 RX ADMIN — NEOSTIGMINE METHYLSULFATE 3 MG: 1 INJECTION INTRAVENOUS at 20:52

## 2017-05-16 RX ADMIN — HYDROMORPHONE HYDROCHLORIDE 0.2 MG: 1 INJECTION, SOLUTION INTRAMUSCULAR; INTRAVENOUS; SUBCUTANEOUS at 22:30

## 2017-05-16 NOTE — ANESTHESIA PREPROCEDURE EVALUATION
Anesthetic History   No history of anesthetic complications            Review of Systems / Medical History  Patient summary reviewed, nursing notes reviewed and pertinent labs reviewed    Pulmonary  Within defined limits                 Neuro/Psych   Within defined limits           Cardiovascular    Hypertension          CAD         GI/Hepatic/Renal     GERD           Endo/Other    Diabetes    Arthritis     Other Findings              Physical Exam    Airway  Mallampati: II  TM Distance: > 6 cm  Neck ROM: normal range of motion   Mouth opening: Normal     Cardiovascular  Regular rate and rhythm,  S1 and S2 normal,  no murmur, click, rub, or gallop             Dental  No notable dental hx       Pulmonary  Breath sounds clear to auscultation               Abdominal  GI exam deferred       Other Findings            Anesthetic Plan    ASA: 3  Anesthesia type: general          Induction: Intravenous  Anesthetic plan and risks discussed with: Patient

## 2017-05-16 NOTE — ED PROVIDER NOTES
HPI Comments: 78 y.o. male with past medical history significant for coronary stent placement, hypertension, arthritis, DM, GERD and knee replacement who presents from home with chief complaint of knee pain. Patient underwent a knee replacement in October of last year (7 months ago). He states that shortly after the surgery he developed an infection and since then has been on Keflex QID until last Thursday when he finished his course (5 minutes ago). Patient arrives today after developing right knee swelling, pain and warmth over the past 2 days. He rates his knee pain at 3-4/10 at rest and 7-8/10 with any movement. He believes he had a fever last night. He denies chills or diaphoresis. He denies taking any Tylenol, ibuprofen or ASA. Patient denies change in appetite, nausea or vomiting. He is not on blood thinners. There are no other acute medical concerns at this time. Social hx: Nonsmoker. Alcohol use. PCP: Nery Covarrubias MD    Review of records:  Admission to Dr. Kathleen Gross for 10 days in October 2016 for prosthetic knee infection. Sinovial fluid grew out MSSA. Sees Dr. Keira Linn for PCP, last saw 3/9/17. Note written by sachin Shukla, as dictated by Isabel Gilman, DO 10:20 AM      The history is provided by the patient. Past Medical History:   Diagnosis Date    Arthritis     KNEES & BACK.     Coronary atherosclerosis of native coronary artery     [address] [address]     Hgb A1C 6-10-11 7.1%; NIDDM    Essential hypertension, benign     GERD (gastroesophageal reflux disease)     Hypertension     Mixed dyslipidemia     6-11:   HDL 33 LDL 95    S/P coronary artery stent placement July 29th, 2011    NAN to LAD       Past Surgical History:   Procedure Laterality Date    CARDIAC SURG PROCEDURE UNLIST  2009    CATHERIZATION WITH STENT X 1 PLACEMENT    HX GI  2010    COLONOSCOPY    HX GI  2010    ENDOSCOPY    HX HEENT      SEPTOPLASTY    HX HEENT      TONSILLECTOMY    HX KNEE REPLACEMENT Left 2008    DR ROSEN    HX ORTHOPAEDIC Right 2010    KNEE REPLACEMENT    HX ORTHOPAEDIC  2007    RIGHT SHOULDER ROTATOR CUFF REPAIR. Family History:   Problem Relation Age of Onset    Diabetes Mother     Heart Disease Mother     Diabetes Son     Anesth Problems Neg Hx        Social History     Social History    Marital status:      Spouse name: N/A    Number of children: 1    Years of education: N/A     Occupational History    Not on file. Social History Main Topics    Smoking status: Never Smoker    Smokeless tobacco: Former User      Comment: Patient states on and off for chewing tobacco.      Alcohol use Yes      Comment: NOT DRINKING AT THIS TIME    Drug use: No    Sexual activity: Yes     Partners: Female     Other Topics Concern    Not on file     Social History Narrative         ALLERGIES: Levofloxacin; Pcn [penicillins]; and Tape [adhesive]    Review of Systems   Constitutional: Positive for fever. Negative for appetite change, chills and diaphoresis. Gastrointestinal: Negative for nausea and vomiting. Musculoskeletal: Positive for arthralgias and joint swelling. All other systems reviewed and are negative. Vitals:    05/16/17 0944   BP: 148/79   Pulse: 95   Resp: 16   Temp: 98.2 °F (36.8 °C)   SpO2: 99%   Weight: 81.6 kg (180 lb)   Height: 5' 10\" (1.778 m)            Physical Exam      Constitutional: Pt is awake and alert. Pt appears well-developed and well-nourished. NAD. HENT:   Head: Normocephalic and atraumatic. Nose: Nose normal.   Mouth/Throat: Oropharynx is clear and moist. No oropharyngeal exudate. Eyes: Conjunctivae and extraocular motions are normal. Pupils are equal, round, and reactive to light. Right eye exhibits no discharge. Left eye exhibits no discharge. No scleral icterus. Neck: No tracheal deviation present. Supple neck. Cardiovascular: Normal rate, regular rhythm, normal heart sounds and intact distal pulses.   Exam reveals no gallop and no friction rub. No murmur heard. Pulmonary/Chest: Effort normal and breath sounds normal.  Pt  has no wheezes. Pt  has no rales. Abdominal: Soft. Pt  exhibits no distension and no mass. No tenderness. Pt  has no rebound and no guarding. Musculoskeletal:    Ext: Normal ROM in all four extremities;distal pulses are normal. Very large right knee effusion; warm and tender, no cellulitis. Ltd ROM. Neurological:  Pt is alert. nonfocal neuro exam.  Skin: Skin is warm and dry. Pt  is not diaphoretic. Psychiatric:  Pt  has a normal mood and affect. Behavior is normal.               Note written by sachin Adair, as dictated by Julius Mckeon DO 10:20 AM      [facility]  ED Course       Arthrocentesis  Date/Time: 5/16/2017 10:30 AM  Performed by: Braden Abraham  Authorized by: Braden Abraham     Consent:     Consent obtained:  Verbal    Consent given by:  Patient    Risks discussed:  Bleeding, infection, nerve damage, incomplete drainage and pain    Alternatives discussed:  No treatment  Location:     Location:  Knee    Knee:  R knee  Anesthesia (see MAR for exact dosages): Anesthesia method:  Local infiltration    Local anesthetic:  Lidocaine 1% WITH epi  Procedure details:     Preparation: Patient was prepped and draped in usual sterile fashion      Needle gauge:  18 G    Ultrasound guidance: no      Approach:  Lateral    Aspirate amount:  75    Aspirate characteristics:  Bloody and cloudy    Steroid injected: no      Specimen collected: yes    Post-procedure details:     Dressing:  Sterile dressing and adhesive bandage    Patient tolerance of procedure: Tolerated well, no immediate complications        38:43 AM  lucas Bryson, will come and see the patient. Discussed case with him. 11:59 AM - d/w Medhat raman. Lab is delayed due to volume of testing currently at the main lab. Told pt, brother-in-law and friend this. Awaiting results.     CONSULT NOTE:  2:46 PM Bettye Rawls DO spoke with Dr. Hannah Duncan, Consult for Infectious disease. Discussed available diagnostic tests and clinical findings. He is in agreement with care plans as outlined. Dr. Hannah Duncan knows the patient well and will see him in the ED. Ordered iv vanco    Labs reviewed  Pain controlled  Admit to hospital  OR today.                   Labs Reviewed   CBC WITH AUTOMATED DIFF - Abnormal; Notable for the following:        Result Value    RDW 14.8 (*)     NEUTROPHILS 80 (*)     LYMPHOCYTES 11 (*)     All other components within normal limits   METABOLIC PANEL, BASIC - Abnormal; Notable for the following:     Glucose 130 (*)     BUN/Creatinine ratio 8 (*)     GFR est non-AA 56 (*)     All other components within normal limits   C REACTIVE PROTEIN, QT - Abnormal; Notable for the following:     C-Reactive protein 8.68 (*)     All other components within normal limits   CELL COUNT, BODY FLUID - Abnormal; Notable for the following:     FLD NUCLEATED CELLS 07490 (*)     All other components within normal limits   PROTHROMBIN TIME + INR - Abnormal; Notable for the following:     Prothrombin time 11.6 (*)     All other components within normal limits   CULTURE, BODY FLUID W GRAM STAIN   CULTURE, BLOOD, PAIRED   CULTURE, BLOOD   CRYSTALS, SYNOVIAL FLUID   SED RATE (ESR)   LACTIC ACID, PLASMA   SAMPLES BEING HELD   POC GLUCOSE   TYPE & SCREEN

## 2017-05-16 NOTE — CONSULTS
ORTHOPAEDIC H&P    Subjective:     Date of Consultation:  May 16, 2017    Chelsey De Leon is a 78 y.o. male with PMH significant for CAD s/p stent, HTN and DM presents today with complaints of 3 days if increased pain and swelling in his right knee. He states the pain is only in his knee and does not radiate anywhere else. He has had a somewhat complicated history regarding this knee to date. In 2012 he had a primary TKA which subsequently required a patellar scar removal and poly exchange in 9/2016. A month following this he sustained a joint infection in the right knee which was treated with a washout and additional poly exchange. He was given a PICC line for IV abx and transitioned to PO Abx by Dr Gurinder Basurto which he states he just completed last week. He has had a history of pseudogout in this knee over time as well. He indicates that his knee started swelling very soon after stopping his oral abx. He was scheduled to see another orthopedic provider on Thursday of this week but could not bear the pain or swelling any longer and came to the ED. ED provider aspirated the joint pulling off blood tinged cloudy fluid which was sent for lab testing. We have been asked to see the patient regarding his ongoing pain and swelling.     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 ([facility] 09/27/2016    Type 2 diabetes mellitus without complication ([facility] 19/45/7296    Right knee DJD 07/17/2012    Coronary atherosclerosis of native coronary artery     S/P coronary artery stent placement     Essential hypertension, benign     Mixed dyslipidemia      Family History   Problem Relation Age of Onset    Diabetes Mother     Heart Disease Mother     Diabetes Son     Anesth Problems Neg Hx       Social History   Substance Use Topics    Smoking status: Never Smoker    Smokeless tobacco: Former User Comment: Patient states on and off for chewing tobacco.      Alcohol use Yes      Comment: NOT DRINKING AT THIS TIME     Past Medical History:   Diagnosis Date    Arthritis     KNEES & BACK.  Coronary atherosclerosis of native coronary artery     Diabetes ([address]     Hgb A1C 6-10-11 7.1%; NIDDM    Essential hypertension, benign     GERD (gastroesophageal reflux disease)     Hypertension     Mixed dyslipidemia     6-11:   HDL 33 LDL 95    S/P coronary artery stent placement July 29th, 2011    NAN to LAD      Past Surgical History:   Procedure Laterality Date    CARDIAC SURG PROCEDURE UNLIST  2009    CATHERIZATION WITH STENT X 1 PLACEMENT    HX GI  2010    COLONOSCOPY    HX GI  2010    ENDOSCOPY    HX HEENT      SEPTOPLASTY    HX HEENT      TONSILLECTOMY    HX KNEE REPLACEMENT Left 2008    DR ROSEN    HX ORTHOPAEDIC Right 2010    KNEE REPLACEMENT    HX ORTHOPAEDIC  2007    RIGHT SHOULDER ROTATOR CUFF REPAIR. Prior to Admission medications    Medication Sig Start Date End Date Taking? Authorizing Provider   atorvastatin (LIPITOR) 10 mg tablet Take 10 mg by mouth nightly. Yes Historical Provider   esomeprazole (NEXIUM) 20 mg capsule Take 20 mg by mouth daily as needed. Yes Historical Provider   ibuprofen (ADVIL) 200 mg tablet Take 200 mg by mouth every eight (8) hours as needed for Pain. Yes Historical Provider   metoprolol succinate (TOPROL-XL) 50 mg XL tablet TAKE ONE TABLET BY MOUTH ONCE DAILY 5/11/17  Yes Ab Porter MD   amLODIPine (NORVASC) 5 mg tablet TAKE ONE TABLET BY MOUTH ONCE DAILY 5/11/17  Yes Ab Porter MD   aspirin 81 mg chewable tablet Take 1 Tab by mouth daily. 5/11/17  Yes Ab Porter MD   nitroglycerin (NITROSTAT) 0.4 mg SL tablet DISSOLVE ONE TABLET UNDER THE TONGUE EVERY 5 MINUTES AS NEEDED FOR CHEST PAIN.  UP TO 3 DOSES 5/11/17  Yes Ab Porter MD   fenofibrate (LOFIBRA) 160 mg tablet TAKE ONE TABLET BY MOUTH ONCE DAILY 5/3/17  Yes Juani Ian Camarena MD   metFORMIN (GLUCOPHAGE) 500 mg tablet Take 1 Tab by mouth two (2) times daily (with meals). 5/3/17  Yes Philomena Jeffries MD   tamsulosin (FLOMAX) 0.4 mg capsule TAKE ONE CAPSULE BY MOUTH ONCE DAILY 5/2/17  Yes Philomena Jeffries MD     Current Facility-Administered Medications   Medication Dose Route Frequency    vancomycin (VANCOCIN) 2000 mg in  ml infusion  2,000 mg IntraVENous NOW    0.9% sodium chloride infusion  125 mL/hr IntraVENous CONTINUOUS    [START ON 5/17/2017] amLODIPine (NORVASC) tablet 5 mg  5 mg Oral DAILY    atorvastatin (LIPITOR) tablet 10 mg  10 mg Oral QHS    [START ON 5/17/2017] metoprolol succinate (TOPROL-XL) XL tablet 50 mg  50 mg Oral DAILY    nitroglycerin (NITROSTAT) tablet 0.4 mg  0.4 mg SubLINGual PRN    metFORMIN (GLUCOPHAGE) tablet 500 mg  500 mg Oral BID WITH MEALS    [START ON 5/17/2017] tamsulosin (FLOMAX) capsule 0.4 mg  0.4 mg Oral DAILY    . PHARMACY TO SUBSTITUTE PER PROTOCOL    Per Protocol    sodium chloride (NS) flush 5-10 mL  5-10 mL IntraVENous Q8H    sodium chloride (NS) flush 5-10 mL  5-10 mL IntraVENous PRN    acetaminophen (TYLENOL) tablet 650 mg  650 mg Oral Q4H PRN    oxyCODONE-acetaminophen (PERCOCET) 5-325 mg per tablet 1 Tab  1 Tab Oral Q4H PRN    morphine injection 2 mg  2 mg IntraVENous Q4H PRN    naloxone (NARCAN) injection 0.4 mg  0.4 mg IntraVENous PRN    diphenhydrAMINE (BENADRYL) injection 12.5 mg  12.5 mg IntraVENous Q4H PRN    ondansetron (ZOFRAN ODT) tablet 4 mg  4 mg Oral Q6H PRN    docusate sodium (COLACE) capsule 100 mg  100 mg Oral BID     Current Outpatient Prescriptions   Medication Sig    atorvastatin (LIPITOR) 10 mg tablet Take 10 mg by mouth nightly.  esomeprazole (NEXIUM) 20 mg capsule Take 20 mg by mouth daily as needed.  ibuprofen (ADVIL) 200 mg tablet Take 200 mg by mouth every eight (8) hours as needed for Pain.     metoprolol succinate (TOPROL-XL) 50 mg XL tablet TAKE ONE TABLET BY MOUTH ONCE DAILY  amLODIPine (NORVASC) 5 mg tablet TAKE ONE TABLET BY MOUTH ONCE DAILY    aspirin 81 mg chewable tablet Take 1 Tab by mouth daily.  nitroglycerin (NITROSTAT) 0.4 mg SL tablet DISSOLVE ONE TABLET UNDER THE TONGUE EVERY 5 MINUTES AS NEEDED FOR CHEST PAIN. UP TO 3 DOSES    fenofibrate (LOFIBRA) 160 mg tablet TAKE ONE TABLET BY MOUTH ONCE DAILY    metFORMIN (GLUCOPHAGE) 500 mg tablet Take 1 Tab by mouth two (2) times daily (with meals).  tamsulosin (FLOMAX) 0.4 mg capsule TAKE ONE CAPSULE BY MOUTH ONCE DAILY      Allergies   Allergen Reactions    Levofloxacin Rash    Pcn [Penicillins] Rash    Tape [Adhesive] Rash     Medipore tape caused large blisters when removed. Review of Systems:  Pertinent items are noted in HPI. Mental Status: no dementia    Objective:     Patient Vitals for the past 8 hrs:   BP Temp Pulse Resp SpO2 Height Weight   17 0944 148/79 98.2 °F (36.8 °C) 95 16 99 % 5' 10\" (1.778 m) 81.6 kg (180 lb)     Temp (24hrs), Av.2 °F (36.8 °C), Min:98.2 °F (36.8 °C), Max:98.2 °F (36.8 °C)      EXAM: Awake and alert lying on stretcher; NAD; agreeable to exam  Right knee bandaged following aspiration but old surgical wounds are well healed without significant erythema region still covered with betadine prep however; large effusion evident and palpable;  Unable to actively range joint freely due to pain and swelling; joint stable to varus and valgus stress testing  5/5 strength for ankle plantar and dorsiflexion  Distal pulses palpable    Imaging Review: none obtained    Labs:   Recent Results (from the past 24 hour(s))   CBC WITH AUTOMATED DIFF    Collection Time: 17 10:11 AM   Result Value Ref Range    WBC 9.9 4.1 - 11.1 K/uL    RBC 4.69 4.10 - 5.70 M/uL    HGB 13.4 12.1 - 17.0 g/dL    HCT 40.6 36.6 - 50.3 %    MCV 86.6 80.0 - 99.0 FL    MCH 28.6 26.0 - 34.0 PG    MCHC 33.0 30.0 - 36.5 g/dL    RDW 14.8 (H) 11.5 - 14.5 %    PLATELET 162 375 - 817 K/uL    NEUTROPHILS 80 (H) 32 - 75 %    LYMPHOCYTES 11 (L) 12 - 49 %    MONOCYTES 9 5 - 13 %    EOSINOPHILS 0 0 - 7 %    BASOPHILS 0 0 - 1 %    ABS. NEUTROPHILS 7.9 1.8 - 8.0 K/UL    ABS. LYMPHOCYTES 1.1 0.8 - 3.5 K/UL    ABS. MONOCYTES 0.9 0.0 - 1.0 K/UL    ABS. EOSINOPHILS 0.0 0.0 - 0.4 K/UL    ABS.  BASOPHILS 0.0 0.0 - 0.1 K/UL   METABOLIC PANEL, BASIC    Collection Time: 05/16/17 10:11 AM   Result Value Ref Range    Sodium 140 136 - 145 mmol/L    Potassium 4.3 3.5 - 5.1 mmol/L    Chloride 107 97 - 108 mmol/L    CO2 23 21 - 32 mmol/L    Anion gap 10 5 - 15 mmol/L    Glucose 130 (H) 65 - 100 mg/dL    BUN 10 6 - 20 MG/DL    Creatinine 1.28 0.70 - 1.30 MG/DL    BUN/Creatinine ratio 8 (L) 12 - 20      GFR est AA >60 >60 ml/min/1.73m2    GFR est non-AA 56 (L) >60 ml/min/1.73m2    Calcium 9.3 8.5 - 10.1 MG/DL   CRYSTALS, SYNOVIAL FLUID    Collection Time: 05/16/17 10:11 AM   Result Value Ref Range    FLUID TYPE(7) KNEE FLUID      Crystals, body fluid NO CRYSTALS SEEN WITH POLARIZED LIGHT     SED RATE (ESR)    Collection Time: 05/16/17 10:11 AM   Result Value Ref Range    Sed rate, automated 20 0 - 20 mm/hr   C REACTIVE PROTEIN, QT    Collection Time: 05/16/17 10:11 AM   Result Value Ref Range    C-Reactive protein 8.68 (H) 0.00 - 0.60 mg/dL   CULTURE, BODY FLUID W GRAM STAIN    Collection Time: 05/16/17 10:11 AM   Result Value Ref Range    Special Requests: NO SPECIAL REQUESTS      GRAM STAIN 4+  WBCS SEEN        GRAM STAIN        OCCASIONAL  GRAM POSITIVE COCCI  SEEN INTRACELLULARY      GRAM STAIN       RESULTS  CALLED TO AND READ BACK BY  ROWENA BELTRE AT 1240, 5/16/17 DB      Culture result: PENDING    CELL COUNT, BODY FLUID    Collection Time: 05/16/17 10:11 AM   Result Value Ref Range    BODY FLUID TYPE KNEE FLUID      FLUID COLOR DARK YELLOW      FLUID APPEARANCE CLOUDY      FLUID RBC COUNT >100 /cu mm    FLD NUCLEATED CELLS 33637 (H) 0 - 5 /cu mm    FLD SEGS 86 %    FLD LYMPHS 6 %    FLD MONO/MACROPHAGE 8 %   LACTIC ACID, PLASMA    Collection Time: 05/16/17 10:11 AM   Result Value Ref Range    Lactic acid 1.0 0.4 - 2.0 MMOL/L   PROTHROMBIN TIME + INR    Collection Time: 05/16/17 10:11 AM   Result Value Ref Range    INR 1.1 0.9 - 1.1      Prothrombin time 11.6 (H) 9.0 - 11.1 sec         Impression:     Active Problems:    * No active hospital problems. 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   Will Admit to Ortho (Kathleen Gross)  Consent for right knee resection arthroplasty with antibiotic spacer placement  Vanc started following aspiration  NPO at this time  Friend placement  To OR later this afternoon    Dr Naila Rosales aware of patient and agrees with current plan of care      Patria Ferrell PA-C  [address]

## 2017-05-16 NOTE — PROGRESS NOTES
TRANSFER - IN REPORT:    Verbal report received from Ginette Chong RN(name) on Marvin Ellis  being received from ED(unit) for routine progression of care      Report consisted of patients Situation, Background, Assessment and   Recommendations(SBAR). Information from the following report(s) SBAR, Kardex, ED Summary, Procedure Summary, Intake/Output, MAR and Recent Results was reviewed with the receiving nurse. Opportunity for questions and clarification was provided. Assessment completed upon patients arrival to unit and care assumed.

## 2017-05-16 NOTE — ED TRIAGE NOTES
C/o right knee swelling, increased warmth & pain. Dr. Pilar Garcia did knee surgery October.  Has been on antibiotics \"for 6 months for an infection\"

## 2017-05-16 NOTE — CONSULTS
[address]   [address]   [address] [facility]       Name:  James Rodriguez   MR#:  [id number]   :  1950   Account #:  [de-identified]    Date of Consultation:  2017   Date of Adm:  2017       REQUESTING PHYSICIAN: Dr. Renée Mariee. REASON FOR CONSULTATION: Right prosthetic knee infection. HISTORY OF PRESENT ILLNESS: The patient is a 69-year-old man   who had a right knee excision of a prepatellar scar with revision of the   tibial insert on 2016. This eventually got infected. An aspirate of   the knee at that time showed 97,000 white cells and the cultures were   growing Staphylococcus aureus. Dr. Renée Mariee brought him to the   operating room where he performed an irrigation and debridement of   the right knee total arthroplasty with poly exchange. He was then   placed on cefazolin, which he took for a little over 6 weeks. He was   then placed on oral therapy with Keflex and rifampin, which he took for   6 months. He had two aspirates done when he was getting intravenous   antibiotics and both did not grow any bacteria. He actually stopped the   oral antibiotics last Thursday. Prior to this he had a number of normal   ESRs and CRPs. Last , he stated the right knee started   swelling, it became warm and painful. He could not really bend it. The   patient said that there was no wound dehiscence. He did not have any   fever, but he did have some chills. He eventually went to the   emergency room where arthrocentesis was done and it showed more   than 27,000 white cells. There are gram-positive cocci on the Gram   stain. He received vancomycin. Dr. Renée Mariee will take him to the   operating room [facility] to perform a resection arthroplasty. We are now   being asked to see him in consult. ALLERGIES:   1. LEVOFLOXACIN. 2. PENICILLIN, BUT HE CAN TOLERATE CEFAZOLIN. 3. HE IS ALSO ALLERGIC TO ADHESIVES. CURRENT MEDICATIONS:   1. Norvasc. 2. Lipitor. 3. Colace. 4. Glucophage. 5. Toprol. 6. Flomax. 7. Vancomycin. REVIEW OF SYSTEMS   He does not have any shortness of breath, belly pain, headaches, sore   throat, diarrhea, dysuria. The rest of review of systems is negative. PAST MEDICAL HISTORY:   1. Arthritis. 2. Coronary artery disease. 3. Diabetes. 4. Hypertension. 5. Dyslipidemia. 6. Prosthetic infection of the right knee. PAST SURGICAL HISTORY:   1. Cardiac stent placement. 2. Colonoscopy. 3. Endoscopy. 4. Septoplasty. 5. Tonsillectomy. 6. Bilateral knee replacement. 7. Right rotator cuff repair. 8. Right knee excision of the prepatellar scar with a revision of the tibial   insert. 9. Irrigation and debridement of the right total knee arthroplasty with   poly exchange. SOCIAL HISTORY: Does not smoke tobacco, drink alcohol or engage   in recreational drug use. FAMILY HISTORY: Parents did not have any medical problems. PHYSICAL EXAMINATION   GENERAL: He is not in respiratory distress. VITAL SIGNS: Temperature 98.8, pulse 86, blood pressure 171/82,   saturating at 99% on room air. Respirations 16. HEENT: He has pink conjunctivae, anicteric sclerae. NECK: No JVD. No cervical lymphadenopathy. External ears are   normal.   LUNGS: Clear to auscultation. No rales, wheezes or rhonchi. HEART: No murmurs, rubs or clicks. ABDOMEN: Soft, nontender, no guarding or rebound. GENITOURINARY: No CVA tenderness. MUSCULOSKELETAL: The right knee is bandaged. PSYCHIATRIC: No disturbance in thought. Mood and affect are   appropriate. INTEGUMENT: I did not notice any rash. NEUROLOGIC: He has full use of his extraocular muscles. Tongue   midline. No facial asymmetry. Muscle strength is 5/5. LABORATORY DATA: White blood cell count 9.9. Creatinine 1.28. CRP 8.6. Total nucleated cells from the arthrocentesis fluid shows   27,023 white cells. There are no crystals.  Chest x-ray shows a normal   chest. Gram stain of the synovial fluid is showing gram-positive cocci. IMPRESSION:   1. 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 2. History of MSSA bacteremia with a negative MILAN. 3. Diabetes. 4. Coronary artery disease. 5. Hypertension. PLAN:   1. I agree with resection arthroplasty and removal of the hardware. He   received more than 6 weeks of IV antibiotics and around 6 months of   Keflex and rifampin. 2. He is on vancomycin already. I would continue this pending   cultures. Thank you for the consult.           MD DILIP Graves / PILLO   D:  05/16/2017   15:17   T:  05/16/2017   16:00   Job #:  [id number]

## 2017-05-16 NOTE — H&P
Expand All Collapse All      ORTHOPAEDIC H&P     Subjective:      Date of Consultation: May 16, 2017     Ed Wyatt is a 78 y.o. male with PMH significant for CAD s/p stent, HTN and DM presents today with complaints of 3 days if increased pain and swelling in his right knee. He states the pain is only in his knee and does not radiate anywhere else. He has had a somewhat complicated history regarding this knee to date. In 2012 he had a primary TKA which subsequently required a patellar scar removal and poly exchange in 9/2016. A month following this he sustained a joint infection in the right knee which was treated with a washout and additional poly exchange. He was given a PICC line for IV abx and transitioned to PO Abx by Dr Servando Jaime which he states he just completed last week. He has had a history of pseudogout in this knee over time as well. He indicates that his knee started swelling very soon after stopping his oral abx. He was scheduled to see another orthopedic provider on Thursday of this week but could not bear the pain or swelling any longer and came to the ED. ED provider aspirated the joint pulling off blood tinged cloudy fluid which was sent for lab testing.  We have been asked to see the patient regarding his ongoing pain and swelling.          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 ([facility] [address] 09/27/2016    Type 2 diabetes mellitus without complication ([facility] [address] 02/14/2791    Right knee DJD 07/17/2012    Coronary atherosclerosis of native coronary artery      S/P coronary artery stent placement      Essential hypertension, benign      Mixed dyslipidemia              Family History   Problem Relation Age of Onset    Diabetes Mother      Heart Disease Mother      Diabetes Son      Anesth Problems Neg Hx                Social History    Substance Use Topics  Smoking status: Never Smoker     Smokeless tobacco: Former User          Comment: Patient states on and off for chewing tobacco.      Alcohol use Yes         Comment: NOT DRINKING AT THIS TIME            Past Medical History:   Diagnosis Date    Arthritis       KNEES & BACK.  Coronary atherosclerosis of native coronary artery      Diabetes ([facility] [address]       Hgb A1C 6-10-11 7.1%; NIDDM    Essential hypertension, benign      GERD (gastroesophageal reflux disease)      Hypertension      Mixed dyslipidemia       6-11:   HDL 33 LDL 95    S/P coronary artery stent placement July 29th, 2011     NAN to LAD            Past Surgical History:   Procedure Laterality Date    CARDIAC SURG PROCEDURE UNLIST   2009     CATHERIZATION WITH STENT X 1 PLACEMENT    HX GI   2010     COLONOSCOPY    HX GI   2010     ENDOSCOPY    HX HEENT         SEPTOPLASTY    HX HEENT         TONSILLECTOMY    HX KNEE REPLACEMENT Left 2008     DR Elle Baez    HX ORTHOPAEDIC Right 2010     KNEE REPLACEMENT    HX ORTHOPAEDIC   2007     RIGHT SHOULDER ROTATOR CUFF REPAIR. Prior to Admission medications    Medication Sig Start Date End Date Taking? Authorizing Provider   atorvastatin (LIPITOR) 10 mg tablet Take 10 mg by mouth nightly.     Yes Historical Provider   esomeprazole (NEXIUM) 20 mg capsule Take 20 mg by mouth daily as needed.     Yes Historical Provider   ibuprofen (ADVIL) 200 mg tablet Take 200 mg by mouth every eight (8) hours as needed for Pain.     Yes Historical Provider   metoprolol succinate (TOPROL-XL) 50 mg XL tablet TAKE ONE TABLET BY MOUTH ONCE DAILY 5/11/17   Yes Owen Summers MD   amLODIPine (NORVASC) 5 mg tablet TAKE ONE TABLET BY MOUTH ONCE DAILY 5/11/17   Yes Owen Summers MD   aspirin 81 mg chewable tablet Take 1 Tab by mouth daily. 5/11/17   Yes Owen Summers MD   nitroglycerin (NITROSTAT) 0.4 mg SL tablet DISSOLVE ONE TABLET UNDER THE TONGUE EVERY 5 MINUTES AS NEEDED FOR CHEST PAIN. UP TO 3 DOSES 5/11/17   Yes Ab Porter MD   fenofibrate (LOFIBRA) 160 mg tablet TAKE ONE TABLET BY MOUTH ONCE DAILY 5/3/17   Yes Becka Li MD   metFORMIN (GLUCOPHAGE) 500 mg tablet Take 1 Tab by mouth two (2) times daily (with meals). 5/3/17   Yes Becka Li MD   tamsulosin (FLOMAX) 0.4 mg capsule TAKE ONE CAPSULE BY MOUTH ONCE DAILY 5/2/17   Yes Becka Li MD             Current Facility-Administered Medications   Medication Dose Route Frequency    vancomycin (VANCOCIN) 2000 mg in  ml infusion 2,000 mg IntraVENous NOW    0.9% sodium chloride infusion 125 mL/hr IntraVENous CONTINUOUS    [START ON 5/17/2017] amLODIPine (NORVASC) tablet 5 mg 5 mg Oral DAILY    atorvastatin (LIPITOR) tablet 10 mg 10 mg Oral QHS    [START ON 5/17/2017] metoprolol succinate (TOPROL-XL) XL tablet 50 mg 50 mg Oral DAILY    nitroglycerin (NITROSTAT) tablet 0.4 mg 0.4 mg SubLINGual PRN    metFORMIN (GLUCOPHAGE) tablet 500 mg 500 mg Oral BID WITH MEALS    [START ON 5/17/2017] tamsulosin (FLOMAX) capsule 0.4 mg 0.4 mg Oral DAILY    . PHARMACY TO SUBSTITUTE PER PROTOCOL      Per Protocol    sodium chloride (NS) flush 5-10 mL 5-10 mL IntraVENous Q8H    sodium chloride (NS) flush 5-10 mL 5-10 mL IntraVENous PRN    acetaminophen (TYLENOL) tablet 650 mg 650 mg Oral Q4H PRN    oxyCODONE-acetaminophen (PERCOCET) 5-325 mg per tablet 1 Tab 1 Tab Oral Q4H PRN    morphine injection 2 mg 2 mg IntraVENous Q4H PRN    naloxone (NARCAN) injection 0.4 mg 0.4 mg IntraVENous PRN    diphenhydrAMINE (BENADRYL) injection 12.5 mg 12.5 mg IntraVENous Q4H PRN    ondansetron (ZOFRAN ODT) tablet 4 mg 4 mg Oral Q6H PRN    docusate sodium (COLACE) capsule 100 mg 100 mg Oral BID           Current Outpatient Prescriptions   Medication Sig    atorvastatin (LIPITOR) 10 mg tablet Take 10 mg by mouth nightly.  esomeprazole (NEXIUM) 20 mg capsule Take 20 mg by mouth daily as needed.     ibuprofen (ADVIL) 200 mg tablet Take 200 mg by mouth every eight (8) hours as needed for Pain.  metoprolol succinate (TOPROL-XL) 50 mg XL tablet TAKE ONE TABLET BY MOUTH ONCE DAILY    amLODIPine (NORVASC) 5 mg tablet TAKE ONE TABLET BY MOUTH ONCE DAILY    aspirin 81 mg chewable tablet Take 1 Tab by mouth daily.  nitroglycerin (NITROSTAT) 0.4 mg SL tablet DISSOLVE ONE TABLET UNDER THE TONGUE EVERY 5 MINUTES AS NEEDED FOR CHEST PAIN. UP TO 3 DOSES    fenofibrate (LOFIBRA) 160 mg tablet TAKE ONE TABLET BY MOUTH ONCE DAILY    metFORMIN (GLUCOPHAGE) 500 mg tablet Take 1 Tab by mouth two (2) times daily (with meals).  tamsulosin (FLOMAX) 0.4 mg capsule TAKE ONE CAPSULE BY MOUTH ONCE DAILY            Allergies   Allergen Reactions    Levofloxacin Rash    Pcn [Penicillins] Rash    Tape [Adhesive] Rash       Medipore tape caused large blisters when removed.         Review of Systems: Pertinent items are noted in HPI.     Mental Status: no dementia     Objective:      Patient Vitals for the past 8 hrs:    BP Temp Pulse Resp SpO2 Height Weight   17 0944 148/79 98.2 °F (36.8 °C) 95 16 99 % 5' 10\" (1.778 m) 81.6 kg (180 lb)      Temp (24hrs), Av.2 °F (36.8 °C), Min:98.2 °F (36.8 °C), Max:98.2 °F (36.8 °C)        EXAM: Awake and alert lying on stretcher; NAD; agreeable to exam  Right knee bandaged following aspiration but old surgical wounds are well healed without significant erythema region still covered with betadine prep however; large effusion evident and palpable;  Unable to actively range joint freely due to pain and swelling; joint stable to varus and valgus stress testing  5/5 strength for ankle plantar and dorsiflexion  Distal pulses palpable     Imaging Review: none obtained     Labs:    Recent Results           Recent Results (from the past 24 hour(s))   CBC WITH AUTOMATED DIFF     Collection Time: 17 10:11 AM   Result Value Ref Range     WBC 9.9 4.1 - 11.1 K/uL     RBC 4.69 4.10 - 5.70 M/uL     HGB 13.4 12.1 - 17.0 g/dL     HCT 40.6 36.6 - 50.3 %     MCV 86.6 80.0 - 99.0 FL     MCH 28.6 26.0 - 34.0 PG     MCHC 33.0 30.0 - 36.5 g/dL     RDW 14.8 (H) 11.5 - 14.5 %     PLATELET 988 932 - 399 K/uL     NEUTROPHILS 80 (H) 32 - 75 %     LYMPHOCYTES 11 (L) 12 - 49 %     MONOCYTES 9 5 - 13 %     EOSINOPHILS 0 0 - 7 %     BASOPHILS 0 0 - 1 %     ABS. NEUTROPHILS 7.9 1.8 - 8.0 K/UL     ABS. LYMPHOCYTES 1.1 0.8 - 3.5 K/UL     ABS. MONOCYTES 0.9 0.0 - 1.0 K/UL     ABS. EOSINOPHILS 0.0 0.0 - 0.4 K/UL     ABS.  BASOPHILS 0.0 0.0 - 0.1 K/UL   METABOLIC PANEL, BASIC     Collection Time: 05/16/17 10:11 AM   Result Value Ref Range     Sodium 140 136 - 145 mmol/L     Potassium 4.3 3.5 - 5.1 mmol/L     Chloride 107 97 - 108 mmol/L     CO2 23 21 - 32 mmol/L     Anion gap 10 5 - 15 mmol/L     Glucose 130 (H) 65 - 100 mg/dL     BUN 10 6 - 20 MG/DL     Creatinine 1.28 0.70 - 1.30 MG/DL     BUN/Creatinine ratio 8 (L) 12 - 20      GFR est AA >60 >60 ml/min/1.73m2     GFR est non-AA 56 (L) >60 ml/min/1.73m2     Calcium 9.3 8.5 - 10.1 MG/DL   CRYSTALS, SYNOVIAL FLUID     Collection Time: 05/16/17 10:11 AM   Result Value Ref Range     FLUID TYPE(7) KNEE FLUID        Crystals, body fluid NO CRYSTALS SEEN WITH POLARIZED LIGHT      SED RATE (ESR)     Collection Time: 05/16/17 10:11 AM   Result Value Ref Range     Sed rate, automated 20 0 - 20 mm/hr   C REACTIVE PROTEIN, QT     Collection Time: 05/16/17 10:11 AM   Result Value Ref Range     C-Reactive protein 8.68 (H) 0.00 - 0.60 mg/dL   CULTURE, BODY FLUID W GRAM STAIN     Collection Time: 05/16/17 10:11 AM   Result Value Ref Range     Special Requests: NO SPECIAL REQUESTS        GRAM STAIN 4+  WBCS SEEN        GRAM STAIN            OCCASIONAL  GRAM POSITIVE COCCI  SEEN INTRACELLULARY     GRAM STAIN           RESULTS  CALLED TO AND READ BACK BY  ROWENA BELTRE AT 1240, 5/16/17 DB     Culture result: PENDING     CELL COUNT, BODY FLUID     Collection Time: 05/16/17 10:11 AM   Result Value Ref Range     BODY FLUID TYPE KNEE FLUID        FLUID COLOR DARK YELLOW        FLUID APPEARANCE CLOUDY        FLUID RBC COUNT >100 /cu mm     FLD NUCLEATED CELLS 88010 (H) 0 - 5 /cu mm     FLD SEGS 86 %     FLD LYMPHS 6 %     FLD MONO/MACROPHAGE 8 %   LACTIC ACID, PLASMA     Collection Time: 05/16/17 10:11 AM   Result Value Ref Range     Lactic acid 1.0 0.4 - 2.0 MMOL/L   PROTHROMBIN TIME + INR     Collection Time: 05/16/17 10:11 AM   Result Value Ref Range     INR 1.1 0.9 - 1.1      Prothrombin time 11.6 (H) 9.0 - 11.1 sec               Impression:      Active Problems:  * No active hospital problems. 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   Will Admit to Ortho (Fide Abdi)  Consent for right knee resection arthroplasty with antibiotic spacer placement  Vanc started following aspiration  NPO at this time  Friend placement  To OR later this afternoon     Dr Maggie Hampton aware of patient and agrees with current plan of care        Mya Nowak PA-C  [address] [facility]

## 2017-05-16 NOTE — PROGRESS NOTES
Admission Medication Reconciliation:    Information obtained from: patient, Rx Query    Significant PMH/Disease States:   Past Medical History:   Diagnosis Date    Arthritis     KNEES & BACK.  Coronary atherosclerosis of native coronary artery     Diabetes ([address]     Hgb A1C 6-10-11 7.1%; NIDDM    Essential hypertension, benign     GERD (gastroesophageal reflux disease)     Hypertension     Mixed dyslipidemia     6-11:   HDL 33 LDL 95    S/P coronary artery stent placement July 29th, 2011    NAN to LAD       Chief Complaint for this Admission:    Chief Complaint   Patient presents with    Knee Pain       Allergies:  Levofloxacin; Pcn [penicillins]; and Tape [adhesive]    Prior to Admission Medications:   Prior to Admission Medications   Prescriptions Last Dose Informant Patient Reported? Taking? amLODIPine (NORVASC) 5 mg tablet 5/15/2017  No Yes   Sig: TAKE ONE TABLET BY MOUTH ONCE DAILY   aspirin 81 mg chewable tablet 5/15/2017  No Yes   Sig: Take 1 Tab by mouth daily. atorvastatin (LIPITOR) 10 mg tablet 5/15/2017  Yes Yes   Sig: Take 10 mg by mouth nightly. esomeprazole (NEXIUM) 20 mg capsule 5/15/2017  Yes Yes   Sig: Take 20 mg by mouth daily as needed. fenofibrate (LOFIBRA) 160 mg tablet 5/15/2017  No Yes   Sig: TAKE ONE TABLET BY MOUTH ONCE DAILY   ibuprofen (ADVIL) 200 mg tablet 5/15/2017  Yes Yes   Sig: Take 200 mg by mouth every eight (8) hours as needed for Pain.   metFORMIN (GLUCOPHAGE) 500 mg tablet 5/15/2017  No Yes   Sig: Take 1 Tab by mouth two (2) times daily (with meals). metoprolol succinate (TOPROL-XL) 50 mg XL tablet 5/15/2017  No Yes   Sig: TAKE ONE TABLET BY MOUTH ONCE DAILY   nitroglycerin (NITROSTAT) 0.4 mg SL tablet   No Yes   Sig: DISSOLVE ONE TABLET UNDER THE TONGUE EVERY 5 MINUTES AS NEEDED FOR CHEST PAIN.  UP TO 3 DOSES   tamsulosin (FLOMAX) 0.4 mg capsule 5/15/2017  No Yes   Sig: TAKE ONE CAPSULE BY MOUTH ONCE DAILY      Facility-Administered Medications: None         Comments/Recommendations: Reviewed PTA meds.  Updated with following changes:  1) Removed: Tylenol, Lotrisone, meloxicam  2) Added: Advil  3) Removed: Keflex and rifampin - patient states he finished last Thursday or Friday

## 2017-05-16 NOTE — PROGRESS NOTES
Primary Nurse Alka Nicholas RN and Pati Meade RN performed a dual skin assessment on this patient No impairment noted  Nikolay score is 21

## 2017-05-16 NOTE — IP AVS SNAPSHOT
Current Discharge Medication List  
  
START taking these medications Dose & Instructions Dispensing Information Comments Morning Noon Evening Bedtime  
 acetaminophen 325 mg tablet Commonly known as:  TYLENOL Your last dose was: Your next dose is:    
   
   
 Dose:  650 mg Take 2 Tabs by mouth every six (6) hours. Quantity:  90 Tab Refills:  0  
     
   
   
   
  
 ceFAZolin in 0.9% NS Soln IVPB Commonly known as:  ANCEF Your last dose was: Your next dose is:    
   
   
 Dose:  2 g  
2 g by IntraVENous route every eight (8) hours. Quantity:  300 mL Refills:  0  
     
   
   
   
  
 oxyCODONE IR 5 mg immediate release tablet Commonly known as:  Edwin Miranda Your last dose was: Your next dose is:    
   
   
 Dose:  5 mg Take 1 Tab by mouth every three (3) hours as needed. Max Daily Amount: 40 mg.  
 Quantity:  60 Tab Refills:  0  
     
   
   
   
  
 warfarin 3 mg tablet Commonly known as:  COUMADIN Your last dose was: Your next dose is:    
   
   
 Dose:  3 mg Take 1 Tab by mouth daily. Quantity:  40 Tab Refills:  0 CONTINUE these medications which have NOT CHANGED Dose & Instructions Dispensing Information Comments Morning Noon Evening Bedtime  
 amLODIPine 5 mg tablet Commonly known as:  Hertad Pupa Your last dose was: Your next dose is: TAKE ONE TABLET BY MOUTH ONCE DAILY Quantity:  90 Tab Refills:  3  
     
   
   
   
  
 atorvastatin 10 mg tablet Commonly known as:  LIPITOR Your last dose was: Your next dose is:    
   
   
 Dose:  10 mg Take 10 mg by mouth nightly. Refills:  0  
     
   
   
   
  
 fenofibrate 160 mg tablet Commonly known as:  LOFIBRA Your last dose was: Your next dose is: TAKE ONE TABLET BY MOUTH ONCE DAILY Quantity:  90 Tab Refills:  0 metFORMIN 500 mg tablet Commonly known as:  GLUCOPHAGE Your last dose was: Your next dose is:    
   
   
 Dose:  500 mg Take 1 Tab by mouth two (2) times daily (with meals). Quantity:  180 Tab Refills:  1  
     
   
   
   
  
 metoprolol succinate 50 mg XL tablet Commonly known as:  TOPROL-XL Your last dose was: Your next dose is: TAKE ONE TABLET BY MOUTH ONCE DAILY Quantity:  90 Tab Refills:  3 NexIUM 20 mg capsule Generic drug:  esomeprazole Your last dose was: Your next dose is:    
   
   
 Dose:  20 mg Take 20 mg by mouth daily as needed. Refills:  0  
     
   
   
   
  
 nitroglycerin 0.4 mg SL tablet Commonly known as:  NITROSTAT Your last dose was: Your next dose is:    
   
   
 DISSOLVE ONE TABLET UNDER THE TONGUE EVERY 5 MINUTES AS NEEDED FOR CHEST PAIN. UP TO 3 DOSES Quantity:  25 Tab Refills:  3  
     
   
   
   
  
 tamsulosin 0.4 mg capsule Commonly known as:  FLOMAX Your last dose was: Your next dose is: TAKE ONE CAPSULE BY MOUTH ONCE DAILY Quantity:  90 Cap Refills:  0 STOP taking these medications ADVIL 200 mg tablet Generic drug:  ibuprofen  
   
  
 aspirin 81 mg chewable tablet Where to Get Your Medications Information on where to get these meds will be given to you by the nurse or doctor. ! Ask your nurse or doctor about these medications  
  acetaminophen 325 mg tablet ceFAZolin in 0.9% NS Soln IVPB  
 oxyCODONE IR 5 mg immediate release tablet  
 warfarin 3 mg tablet

## 2017-05-16 NOTE — PROGRESS NOTES
Patient seen and examined. IMPRESSION    1. 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 History of MSSA bacteremia with a negative MILAN     3. DM    4. CAD    5. HTN     PLAN    1. Agree with resection arthoplasty and removal of hardware. He received more than 6 weeks of IV abx and around 6 months of keflex and rifampin. 2. He has gotten vancomycin already. i would continue this pending cultures.

## 2017-05-16 NOTE — IP AVS SNAPSHOT
[address] 
[phone] Patient: Aidan Kelly MRN: [id number] ETJ:[id number] You are allergic to the following Allergen Reactions Levofloxacin Rash Pcn (Penicillins) Rash Tape (Adhesive) Rash Medipore tape caused large blisters when removed. Recent Documentation Height Weight BMI Smoking Status 1.778 m 82.3 kg 26.03 kg/m2 Never Smoker Unresulted Labs Order Current Status CULTURE, BLOOD, PAIRED In process CULTURE, BLOOD Preliminary result Emergency Contacts Name Discharge Info Relation Home Work Mobile MikieSundluis eduardo DISCHARGE CAREGIVER [3] Child [2] [phone] About your hospitalization You were admitted on:  May 16, 2017 [facility] on[facility]  May 19, 2017 Unit phone number:  [phone] Why you were hospitalized Your primary diagnosis was:  Not on File Your diagnoses also included:  Joint Prosthesis Infection Or Inflammation (Hcc) Providers Seen During Your Hospitalizations Provider Role Specialty Primary office phone Brandon Garcia DO Attending Provider Emergency Medicine [phone] Aisha Luis MD Attending Provider Orthopedic Surgery [phone] Your Primary Care Physician (PCP) Primary Care Physician Office Phone Office Fax Ama Spar [phone] Follow-up Information Follow up With Details Comments Contact Info [address] [place] [address] Rd. 
1st Floor [facility] [address] 
[phone] Conchis Krishnamurthy MD On 7/19/2017 For discharge follow up at 4:00PM  [address] [place] [address] 110 [address] [place] [address] 
[phone] [address], [address] [facility]        Phone: [phone] Toll Free: [phone] Current Discharge Medication List  
  
START taking these medications Dose & Instructions Dispensing Information Comments Morning Noon Evening Bedtime  
 acetaminophen 325 mg tablet Commonly known as:  TYLENOL Your last dose was: Your next dose is:    
   
   
 Dose:  650 mg Take 2 Tabs by mouth every six (6) hours. Quantity:  90 Tab Refills:  0  
     
   
   
   
  
 ceFAZolin in 0.9% NS Soln IVPB Commonly known as:  ANCEF Your last dose was: Your next dose is:    
   
   
 Dose:  2 g  
2 g by IntraVENous route every eight (8) hours. Quantity:  300 mL Refills:  0  
     
   
   
   
  
 oxyCODONE IR 5 mg immediate release tablet Commonly known as:  Tia Flatter Your last dose was: Your next dose is:    
   
   
 Dose:  5 mg Take 1 Tab by mouth every three (3) hours as needed. Max Daily Amount: 40 mg.  
 Quantity:  60 Tab Refills:  0  
     
   
   
   
  
 warfarin 3 mg tablet Commonly known as:  COUMADIN Your last dose was: Your next dose is:    
   
   
 Dose:  3 mg Take 1 Tab by mouth daily. Quantity:  40 Tab Refills:  0 CONTINUE these medications which have NOT CHANGED Dose & Instructions Dispensing Information Comments Morning Noon Evening Bedtime  
 amLODIPine 5 mg tablet Commonly known as:  Kiet Hector Your last dose was: Your next dose is: TAKE ONE TABLET BY MOUTH ONCE DAILY Quantity:  90 Tab Refills:  3  
     
   
   
   
  
 atorvastatin 10 mg tablet Commonly known as:  LIPITOR Your last dose was: Your next dose is:    
   
   
 Dose:  10 mg Take 10 mg by mouth nightly. Refills:  0  
     
   
   
   
  
 fenofibrate 160 mg tablet Commonly known as:  LOFIBRA Your last dose was: Your next dose is: TAKE ONE TABLET BY MOUTH ONCE DAILY Quantity:  90 Tab Refills:  0  
     
   
   
   
  
 metFORMIN 500 mg tablet Commonly known as:  GLUCOPHAGE Your last dose was: Your next dose is:    
   
   
 Dose:  500 mg Take 1 Tab by mouth two (2) times daily (with meals). Quantity:  180 Tab Refills:  1  
     
   
   
   
  
 metoprolol succinate 50 mg XL tablet Commonly known as:  TOPROL-XL Your last dose was: Your next dose is: TAKE ONE TABLET BY MOUTH ONCE DAILY Quantity:  90 Tab Refills:  3 NexIUM 20 mg capsule Generic drug:  esomeprazole Your last dose was: Your next dose is:    
   
   
 Dose:  20 mg Take 20 mg by mouth daily as needed. Refills:  0  
     
   
   
   
  
 nitroglycerin 0.4 mg SL tablet Commonly known as:  NITROSTAT Your last dose was: Your next dose is:    
   
   
 DISSOLVE ONE TABLET UNDER THE TONGUE EVERY 5 MINUTES AS NEEDED FOR CHEST PAIN. UP TO 3 DOSES Quantity:  25 Tab Refills:  3  
     
   
   
   
  
 tamsulosin 0.4 mg capsule Commonly known as:  FLOMAX Your last dose was: Your next dose is: TAKE ONE CAPSULE BY MOUTH ONCE DAILY Quantity:  90 Cap Refills:  0 STOP taking these medications ADVIL 200 mg tablet Generic drug:  ibuprofen  
   
  
 aspirin 81 mg chewable tablet Where to Get Your Medications Information on where to get these meds will be given to you by the nurse or doctor. ! Ask your nurse or doctor about these medications  
  acetaminophen 325 mg tablet ceFAZolin in 0.9% NS Soln IVPB  
 oxyCODONE IR 5 mg immediate release tablet  
 warfarin 3 mg tablet Discharge Instructions After Hospital Care Plan:   
Discharge Instructions Knee Replacement-Dr. Latasha Rojas Patient Name  Ana Hannon Date of procedure  5/16/2017 Procedure  Procedure(s): RESECTION ARTHROPLASTY RIGHT KNEE WITH PLACEMENT OF ANTIBIOTIC SPACER Surgeon  Surgeon(s) and Role: Sabrina Junior MD - Primary Date of discharge: No discharge date for patient encounter. PCP: Ekaterina Rivas MD 
 
Follow up appointments 
-follow up with Dr. Hugh Funes in 2 weeks. Call [phone] to make an appointment. [facility]: _________________________   phone: ___________________ The agency will contact you to arrange dates/times for visits. Please call them if you do not hear from them within 24 hours after you are discharged. When to call your Orthopaedic Surgeon: 
-unrelieved pain 
-Signs of infection-if your incision is red; continues to have drainage; drainage has a foul odor or if you have a persistent fever over 101 degrees 
-Signs of a blood clot in your leg-calf pain, tenderness, redness, swelling of lower leg When to call your Primary Care Physician: 
-Concerns about medical conditions such as diabetes, high blood pressure, asthma, congestive heart failure 
-Call if blood sugars are elevated, persistent headache or dizziness, coughing or congestion, constipation or diarrhea, burning with urination, abnormal heart rate When to call 911and go to the nearest emergency room 
-acute onset of chest pain, shortness of breath, difficulty breathing Activity 
-weight bearing as tolerated with your walker or crutches. Refer to pages 23-31 of your handbook for instructions and pictures.  
-20 repetitions of each exercise as instructed by therapist 3 times each day. Refer to pages 32-40 of your handbook for exercise instructions and pictures 
-get up every one hour and walk (except at night when sleeping) 
-do not drive or operate heavy machinery Incision Care 
-you will have staples in your knee incision; they will be removed at the surgeons office visit in 2 weeks 
-Keep a dressing (ABD and paper tape) on your incision and change daily. Wash your hands thoroughly before changing the dressing. Once you incision is not draining, you may leave it open to air 
-You may shower and get your incision wet but do not submerge your incision under water in a bath tub, hot tub or swimming pool for 6 weeks after surgery. Preventing blood clots 
-take Coumadin daily as prescribed by Dr. Kathleen Gross Pain management 
-take pain medication as prescribed; decrease the amount you use as your pain lessens 
-avoid alcoholic beverages while taking pain medication 
-Please be aware that many medications contain Tylenol. We do not want you to over medicate so please read the information below as a guide. Do not take more than 4 Grams of Tylenol in a 24 hour period. (There are 1000 milligrams in one Gram) -You may place an ice bag on your knee for 15-20 minutes after exercising Diet 
-resume usual diet; drink plenty of fluids; eat foods high in fiber 
-you may want to take a stool softener (such as Senokot-S or Colace) to prevent constipation while you are taking pain medication. If constipation occurs, take a laxative (such as Dulcolax tablets, Milk of Magnesia, or a suppository) [facility] Health Care [facility] Nursing-per physicians order 
-complete head to toe assessment, vital signs 
-staples will be removed in the surgeons office at 2 week visit 
-medication reconciliation 
-review pain management 
-manage chronic medical conditions Physical Therapy-per physicians order Weight bearing status: 
Precautions at Admission: Fall, WBAT Right Side Weight Bearing: As tolerated (with immob. donned) ? Mobility Status: 
Supine to Sit: Supervision Sit to Stand: Supervision Sit to Supine:  (remained on commode) Bed to Chair: Supervision Gait: 
Distance (ft): 120 Feet (ft) Ambulation - Level of Assistance: Stand-by asssistance Assistive Device: Gait belt, Walker, rolling, Brace/Splint Gait Abnormalities: Antalgic, Decreased step clearance, Step to gait, Trunk sway increased ADL status overall composite: Toilet Transfer : Supervision 
 
-Home Therapy 
-assessment and evaluation-bed mobility; functional transfers (bed, chair, bathroom, stairs); ambulation with equipment, car transfers, shower transfers, safety and ability to get out of house in the event of an emergency 
-review weight bearing as tolerated, wean from walker or crutches as tolerated 
-discuss pain management 
-review how to do ADLs 
 
-Home Exercise Program-refer to [id number] of the patient handbook for exercises 
-supine exercises-ankle pumps, hamstring sets, quad sets, heel slides, short arc quad sets, long arc quads-prop heel on pillow for stretch, straight leg raise-sitting exercises-active knee flexion, passive knee flexion, active knee extension, ankle pumps, bicep curls (use weights as appropriate), triceps pushups, shoulder flexion (use weights as appropriate) -standing exercises holding onto supportive counter-toe raises, heel raises, mini-squats, heel/toe touches with knee bend, marching in place, hamstring curls Discharge Orders None ACO Transitions of Care Introducing [facility] [address] Gordon offers a voluntary care coordination program to provide high quality service and care to [facility] fee-for-service beneficiaries. Jumana Arauz was designed to help you enhance your health and well-being through the following services: ? Transitions of Care  support for individuals who are transitioning from one care setting to another (example: Hospital to home). ? Chronic and Complex Care Coordination  support for individuals and caregivers of those with serious or chronic illnesses or with more than one chronic (ongoing) condition and those who take a number of different medications. If you meet specific medical criteria, a [facility] [address] may call you directly to coordinate your care with your primary care physician and your other care providers. For questions about the [facility] programs, please, contact your physicians office. For general questions or additional information about Accountable Care Organizations: 
Please visit www.medicare.gov/acos. html or call 1-800-MEDICARE (9-650.216.4810) TTY users should call 8-296.244.3550. [employer] Announcement We are excited to announce that we are making your provider's discharge notes available to you in [employer]. You will see these notes when they are completed and signed by the physician that discharged you from your recent hospital stay. If you have any questions or concerns about any information you see in [employer], please call the Health Information Department where you were seen or reach out to your Primary Care Provider for more information about your plan of care. Introducing [facility]! Kellen Hill introduces [employer] patient portal. Now you can access parts of your medical record, email your doctor's office, and request medication refills online. 1. In your internet browser, go to [email or web address] 2. Click on the First Time User? Click Here link in the Sign In box. You will see the New Member Sign Up page. 3. Enter your [employer] Access Code exactly as it appears below. You will not need to use this code after youve completed the sign-up process. If you do not sign up before the expiration date, you must request a new code. · [employer] Access Code: [email or web address] Expires: 6/7/2017 12:21 PM 
 
4. Enter the last four digits of your Social Security Number (xxxx) and Date of Birth (mm/dd/yyyy) as indicated and click Submit. You will be taken to the next sign-up page. 5. Create a [employer] ID.  This will be your [employer] login ID and cannot be changed, so think of one that is secure and easy to remember. 6. Create a [employer] password. You can change your password at any time. 7. Enter your Password Reset Question and Answer. This can be used at a later time if you forget your password. 8. Enter your e-mail address. You will receive e-mail notification when new information is available in [address] 9. Click Sign Up. You can now view and download portions of your medical record. 10. Click the Download Summary menu link to download a portable copy of your medical information. If you have questions, please visit the Frequently Asked Questions section of the [employer] website. Remember, [employer] is NOT to be used for urgent needs. For medical emergencies, dial 911. Now available from your iPhone and Android! General Information Please provide this summary of care documentation to your next provider. Patient Signature:  ____________________________________________________________ Date:  ____________________________________________________________  
  
Jacklyn Tovar Provider Signature:  ____________________________________________________________ Date:  ____________________________________________________________

## 2017-05-16 NOTE — PROGRESS NOTES
TRANSFER - OUT REPORT:    Verbal report given to Goran Marley RN(name) on Kamala Conner  being transferred to OR(unit) for routine progression of care       Report consisted of patients Situation, Background, Assessment and   Recommendations(SBAR). Information from the following report(s) SBAR, Kardex, ED Summary, Intake/Output, MAR and Recent Results was reviewed with the receiving nurse. Lines:   PICC Single Lumen 77/52/98 Right;Basilic (Active)       Peripheral IV 10/24/16 Right Hand (Active)       Peripheral IV 05/16/17 Right Antecubital (Active)   Site Assessment Clean, dry, & intact 5/16/2017  4:51 PM   Phlebitis Assessment 0 5/16/2017  4:51 PM   Infiltration Assessment 0 5/16/2017  4:51 PM   Dressing Status Clean, dry, & intact 5/16/2017  4:51 PM   Dressing Type Transparent 5/16/2017  4:51 PM   Hub Color/Line Status Infusing 5/16/2017  4:51 PM   Alcohol Cap Used Yes 5/16/2017  4:51 PM        Opportunity for questions and clarification was provided.       Patient transported with:   [employer]

## 2017-05-17 ENCOUNTER — HOME HEALTH ADMISSION (OUTPATIENT)
Dept: HOME HEALTH SERVICES | Facility: HOME HEALTH | Age: 67
End: 2017-05-17
Payer: MEDICARE

## 2017-05-17 LAB
ANION GAP BLD CALC-SCNC: 8 MMOL/L (ref 5–15)
BUN SERPL-MCNC: 13 MG/DL (ref 6–20)
BUN/CREAT SERPL: 11 (ref 12–20)
CALCIUM SERPL-MCNC: 8.2 MG/DL (ref 8.5–10.1)
CHLORIDE SERPL-SCNC: 108 MMOL/L (ref 97–108)
CO2 SERPL-SCNC: 20 MMOL/L (ref 21–32)
CREAT SERPL-MCNC: 1.14 MG/DL (ref 0.7–1.3)
GLUCOSE BLD STRIP.AUTO-MCNC: 113 MG/DL (ref 65–100)
GLUCOSE BLD STRIP.AUTO-MCNC: 149 MG/DL (ref 65–100)
GLUCOSE SERPL-MCNC: 129 MG/DL (ref 65–100)
HGB BLD-MCNC: 10.6 G/DL (ref 12.1–17)
INR PPP: 1.4 (ref 0.9–1.1)
POTASSIUM SERPL-SCNC: 4 MMOL/L (ref 3.5–5.1)
PROTHROMBIN TIME: 14 SEC (ref 9–11.1)
SERVICE CMNT-IMP: ABNORMAL
SERVICE CMNT-IMP: ABNORMAL
SODIUM SERPL-SCNC: 136 MMOL/L (ref 136–145)

## 2017-05-17 PROCEDURE — 97161 PT EVAL LOW COMPLEX 20 MIN: CPT

## 2017-05-17 PROCEDURE — 80048 BASIC METABOLIC PNL TOTAL CA: CPT | Performed by: PHYSICIAN ASSISTANT

## 2017-05-17 PROCEDURE — 97116 GAIT TRAINING THERAPY: CPT

## 2017-05-17 PROCEDURE — 74011250637 HC RX REV CODE- 250/637: Performed by: ORTHOPAEDIC SURGERY

## 2017-05-17 PROCEDURE — 77010033678 HC OXYGEN DAILY

## 2017-05-17 PROCEDURE — 65270000029 HC RM PRIVATE

## 2017-05-17 PROCEDURE — 82962 GLUCOSE BLOOD TEST: CPT

## 2017-05-17 PROCEDURE — 74011250637 HC RX REV CODE- 250/637: Performed by: PHYSICIAN ASSISTANT

## 2017-05-17 PROCEDURE — 85018 HEMOGLOBIN: CPT | Performed by: PHYSICIAN ASSISTANT

## 2017-05-17 PROCEDURE — 85610 PROTHROMBIN TIME: CPT | Performed by: PHYSICIAN ASSISTANT

## 2017-05-17 PROCEDURE — 36415 COLL VENOUS BLD VENIPUNCTURE: CPT | Performed by: PHYSICIAN ASSISTANT

## 2017-05-17 PROCEDURE — 74011250636 HC RX REV CODE- 250/636: Performed by: PHYSICIAN ASSISTANT

## 2017-05-17 RX ADMIN — METFORMIN HYDROCHLORIDE 500 MG: 500 TABLET, FILM COATED ORAL at 17:02

## 2017-05-17 RX ADMIN — ACETAMINOPHEN 650 MG: 325 TABLET ORAL at 06:39

## 2017-05-17 RX ADMIN — ACETAMINOPHEN 650 MG: 325 TABLET ORAL at 17:02

## 2017-05-17 RX ADMIN — CEFAZOLIN 2 G: 1 INJECTION, POWDER, FOR SOLUTION INTRAMUSCULAR; INTRAVENOUS; PARENTERAL at 11:48

## 2017-05-17 RX ADMIN — PANTOPRAZOLE SODIUM 40 MG: 40 TABLET, DELAYED RELEASE ORAL at 06:40

## 2017-05-17 RX ADMIN — Medication 10 ML: at 21:05

## 2017-05-17 RX ADMIN — WARFARIN SODIUM 3 MG: 2 TABLET ORAL at 12:03

## 2017-05-17 RX ADMIN — OXYCODONE HYDROCHLORIDE 5 MG: 5 TABLET ORAL at 06:40

## 2017-05-17 RX ADMIN — VANCOMYCIN HYDROCHLORIDE 1250 MG: 10 INJECTION, POWDER, LYOPHILIZED, FOR SOLUTION INTRAVENOUS at 21:07

## 2017-05-17 RX ADMIN — ACETAMINOPHEN 650 MG: 325 TABLET ORAL at 23:24

## 2017-05-17 RX ADMIN — DOCUSATE SODIUM AND SENNOSIDES 1 TABLET: 8.6; 5 TABLET, FILM COATED ORAL at 17:02

## 2017-05-17 RX ADMIN — OXYCODONE HYDROCHLORIDE 5 MG: 5 TABLET ORAL at 21:05

## 2017-05-17 RX ADMIN — DOCUSATE SODIUM 100 MG: 100 CAPSULE, LIQUID FILLED ORAL at 17:01

## 2017-05-17 RX ADMIN — ACETAMINOPHEN 650 MG: 325 TABLET ORAL at 11:48

## 2017-05-17 RX ADMIN — POLYETHYLENE GLYCOL 3350 17 G: 17 POWDER, FOR SOLUTION ORAL at 09:20

## 2017-05-17 RX ADMIN — METFORMIN HYDROCHLORIDE 500 MG: 500 TABLET, FILM COATED ORAL at 09:21

## 2017-05-17 RX ADMIN — Medication 10 ML: at 06:40

## 2017-05-17 RX ADMIN — CEFAZOLIN 2 G: 1 INJECTION, POWDER, FOR SOLUTION INTRAMUSCULAR; INTRAVENOUS; PARENTERAL at 04:26

## 2017-05-17 RX ADMIN — DOCUSATE SODIUM AND SENNOSIDES 1 TABLET: 8.6; 5 TABLET, FILM COATED ORAL at 09:20

## 2017-05-17 RX ADMIN — VANCOMYCIN HYDROCHLORIDE 1250 MG: 10 INJECTION, POWDER, LYOPHILIZED, FOR SOLUTION INTRAVENOUS at 05:28

## 2017-05-17 RX ADMIN — DOCUSATE SODIUM 100 MG: 100 CAPSULE, LIQUID FILLED ORAL at 09:20

## 2017-05-17 RX ADMIN — ATORVASTATIN CALCIUM 10 MG: 10 TABLET, FILM COATED ORAL at 21:05

## 2017-05-17 RX ADMIN — FENOFIBRATE 145 MG: 145 TABLET ORAL at 17:02

## 2017-05-17 RX ADMIN — TAMSULOSIN HYDROCHLORIDE 0.4 MG: 0.4 CAPSULE ORAL at 09:20

## 2017-05-17 RX ADMIN — ACETAMINOPHEN 650 MG: 325 TABLET ORAL at 00:00

## 2017-05-17 NOTE — PROGRESS NOTES
ID Progress Note  2017    vanco     Subjective:     Afebrile. Had right knee resection arthoplasty last night. No complaints. Objective:     Vitals:   Visit Vitals    BP 94/51 (BP 1 Location: Right arm, BP Patient Position: At rest)    Pulse 76    Temp 98.2 °F (36.8 °C)    Resp 16    Ht 5' 10\" (1.778 m)    Wt 82.1 kg (181 lb)    SpO2 98%    BMI 25.97 kg/m2        Tmax:  Temp (24hrs), Av.8 °F (37.1 °C), Min:97.9 °F (36.6 °C), Max:102.3 °F (39.1 °C)      Exam:    Not in distress  Lungs: clear to auscultation  Heart: s1, s2, no murmurs   Abdomen: soft, nontender  Extremities: right knee bandaged     Labs:   Lab Results   Component Value Date/Time    WBC 9.9 2017 10:11 AM    HGB 10.6 2017 06:33 AM    HCT 40.6 2017 10:11 AM    PLATELET 635  10:11 AM    MCV 86.6 2017 10:11 AM     Lab Results   Component Value Date/Time    Sodium 136 2017 06:33 AM    Potassium 4.0 2017 06:33 AM    Chloride 108 2017 06:33 AM    CO2 20 2017 06:33 AM    Anion gap 8 2017 06:33 AM    Glucose 129 2017 06:33 AM    BUN 13 2017 06:33 AM    Creatinine 1.14 2017 06:33 AM    BUN/Creatinine ratio 11 2017 06:33 AM    GFR est AA >60 2017 06:33 AM    GFR est non-AA >60 2017 06:33 AM    Calcium 8.2 2017 06:33 AM    Bilirubin, total 0.7 2017 08:51 AM    AST (SGOT) 13 2017 08:51 AM    Alk. phosphatase 54 2017 08:51 AM    Protein, total 7.1 2017 08:51 AM    Albumin 4.2 2017 08:51 AM    Globulin 4.5 10/22/2016 09:39 AM    A-G Ratio 1.4 2017 08:51 AM    ALT (SGPT) 8 2017 08:51 AM             Assessment:     1. 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 2. History of MSSA bacteremia with a negative MILAN. 3. Diabetes. 4. Coronary artery disease. 5. Hypertension. Recommendations:     Continue vancomycin. Ff up cultures.      Kristel Ramey MD [place]

## 2017-05-17 NOTE — ANESTHESIA POSTPROCEDURE EVALUATION
Post-Anesthesia Evaluation and Assessment    Patient: Vernon Juárez MRN: [id number]  SSN: [id number]    YOB: 1950  Age: 78 y.o. Sex: male       Cardiovascular Function/Vital Signs  Visit Vitals    /61    Pulse 69    Temp 36.8 °C (98.2 °F)    Resp 15    Ht 5' 10\" (1.778 m)    Wt 81.6 kg (180 lb)    SpO2 98%    BMI 25.83 kg/m2       Patient is status post general anesthesia for Procedure(s):  RESECTION ARTHROPLASTY RIGHT KNEE WITH PLACEMENT OF ANTIBIOTIC SPACER. Nausea/Vomiting: None    Postoperative hydration reviewed and adequate. Pain:  Pain Scale 1: Numeric (0 - 10) (05/16/17 2200)  Pain Intensity 1: 6 (05/16/17 2200)   Managed    Neurological Status:   Neuro (WDL): Exceptions to WDL (05/16/17 2200)  Neuro  Neurologic State: Drowsy (05/16/17 2200)  Orientation Level: Oriented X4 (05/16/17 1649)  Cognition: Appropriate decision making; Appropriate for age attention/concentration; Appropriate safety awareness; Follows commands (05/16/17 1649)  Speech: Clear (05/16/17 1649)  LUE Motor Response: Purposeful (05/16/17 2200)  LLE Motor Response: Purposeful (05/16/17 2200)  RUE Motor Response: Purposeful (05/16/17 2200)  RLE Motor Response: Purposeful (05/16/17 2200)   At baseline    Mental Status and Level of Consciousness: Arousable    Pulmonary Status:   O2 Device: Nasal cannula (05/16/17 2200)   Adequate oxygenation and airway patent    Complications related to anesthesia: None    Post-anesthesia assessment completed.  No concerns    Signed By: Helen Mcclure MD     May 16, 2017

## 2017-05-17 NOTE — PROGRESS NOTES
Bedside and Verbal shift change report given to Elisa MichelRN (oncoming nurse) by Wilbert Alvarado RN (offgoing nurse). Report given with SBAR and Kardex.

## 2017-05-17 NOTE — PROGRESS NOTES
Orders received, chart reviewed, and patient currently not arousing to introduction nor touch. Patient resting in bed. Will f/u tomorrow for OT evaluation. Recommend with nursing patient to complete as able in order to maintain strength, endurance and independence: ADLs with supervision/setup, OOB to chair 3x/day and mobilizing to the bathroom for toileting with 1 assist. Thank you for your assistance.

## 2017-05-17 NOTE — PROGRESS NOTES
Primary Nurse Marily Fernando and Alfonso RN performed a dual skin assessment on this patient No impairment noted  Nikolay score is 21

## 2017-05-17 NOTE — OP NOTES
[address] [facility] [place] [address], [address]   OP NOTE       Name:  Pauly Osborn   MR#:  [id number]   :  1950   Account #:  [de-identified]    Surgery Date:  2017   Date of Adm:  2017       ATTENDING PHYSICIAN: Lexy Santana MD    ASSISTANT: Jessy Sutherland PA-C    PREOPERATIVE DIAGNOSIS: Chronic infection, right total knee   replacement. POSTOPERATIVE DIAGNOSIS: Chronic infection, right total knee   replacement. PROCEDURES PERFORMED: Resection arthroplasty with placement   of antibiotic-impregnated articulating cement spacer knee. ANESTHESIA: General.    ESTIMATED BLOOD LOSS: 100 mL. SPECIMENS REMOVED: Old components. INDICATIONS: A 48-year-old gentleman who underwent a right total   knee replacement many years ago. He presented to my office   approximately 6 months ago with evidence of polyethylene wear and   instability. I took him to the operating room and did revision of his   polyethylene liner. He developed an early postop infection and I   proceeded with irrigation, debridement and exchange of polyethylene   liner. This was followed by a 6-week course of IV antibiotics followed   by p.o. antibiotics. His inflammatory markers gradually normalized. His   postop course was complicated by development of pseudogout. It was   felt that we had resolved his infection. He was discontinued on his p.o.   antibiotics approximately 1 week ago and presents today with fever   and increased knee pain and swelling with aspiration confirming gram-  positive cocci. He is now taken to the operating room for resection   arthroplasty. OPERATION: The patient taken to the operating room and underwent   general inhalational anesthesia. The right knee and leg were prepped   and draped in the usual fashion. The leg was elevated for several   minutes and tourniquet inflated to 300 mmHg.  Original incision was   utilized, taken down through skin and subcutaneous tissue. Medial   parapatellar arthrotomy was performed and extended proximal along   the medial border of the quadriceps tendon, distally along the medial   border of the insertion of the patellar tendon. Gross purulent fluid was   noted. Cultures were obtained. Scar tissue was excised. Medial   release was performed. The knee was flexed to 90 degrees and the   tibia was retracted anteriorly. The polyethylene liner was removed. I   did an extensive synovectomy. The tibial component was not grossly   loose. Osteotomes were used to go around the tibial component. This   was removed. All remaining bone cement was removed with   osteotomes. The femoral component was grossly loose and was   removed with ease. Any remaining cement was removed. All bony   surfaces were carefully debrided with a rongeur, removing all fibrous   tissue. All bony surfaces were cleansed using pulsatile lavage system   with antibiotic solution. I utilized the [employer] antibiotic cement spacer   in the knee. A size large was chosen. Antibiotic cement was mixed with   4 grams of vancomycin and 4 grams of ceftazidime. Both components   were cemented in place with the knee in full extension while the   cement hardened. The patellar component was removed using the   saw. All excess cement was removed from the patella as well. The   tourniquet was let down after a total tourniquet time of 52 minutes. The   joint was extensively irrigated with antibiotic solution. A medium   Hemovac drain was placed. The arthrotomy was repaired using #1   Stratafix suture in a running fashion. Skin edges were approximated   using 2-0 nylon in a horizontal mattress fashion. Bulky sterile dressing   was applied, and the patient was awakened, extubated and transferred   to the recovery room in stable condition. There were no complications.         MD MISHA Nolasco / CD   D:  05/16/2017   21:01   T:  05/17/2017   05:18   Job #:  [id number]

## 2017-05-17 NOTE — PROGRESS NOTES
Bedside shift change report given to Cristi Clement (oncoming nurse) by Yamilet Gonzalez (offgoing nurse). Report included the following information SBAR, Kardex, Intake/Output and MAR.

## 2017-05-17 NOTE — PROGRESS NOTES
Bedside and Verbal shift change report given to Cookie Colmenares  (oncoming nurse) by Steven Tyson  (offgoing nurse). Report included the following information SBAR and Kardex.

## 2017-05-17 NOTE — PROGRESS NOTES
Pharmacist Note - Vancomycin Dosing    Consult provided for this 78 y.o. male for indication of infected knee replacement. Recent Labs      17   1011   WBC  9.9   CREA  1.28   BUN  10     Frequency of BMP: daily  Height: 178 cm  Weight: 82 kg  Est CrCl: ~60 ml/min   Temp (24hrs), Av.9 °F (37.7 °C), Min:98.2 °F (36.8 °C), Max:102.3 °F (39.1 °C)    Blood & wound cultures have been drawn    Goal trough = 15 - 20 mcg/mL    A 2000 mg loading dose was given at 13:32 today; follow with a maintenance dose of 1250 mg IV every 16 hours. Pharmacy to follow patient daily and order levels / make dose adjustments as appropriate.

## 2017-05-17 NOTE — PROGRESS NOTES
Ortho Progress Note  1 Day Post-Op  Procedure(s):  RESECTION ARTHROPLASTY RIGHT KNEE WITH PLACEMENT OF ANTIBIOTIC SPACER    Subjective: No acute events overnight. Pt doing well this am, no pain. Pt denies N/V, lightheadedness, dizziness, SOB, or abdominal pain. Physical Exam:   Visit Vitals    /68 (BP 1 Location: Right arm, BP Patient Position: At rest)    Pulse 63    Temp 98.1 °F (36.7 °C)    Resp 16    Ht 5' 10\" (1.778 m)    Wt 81.6 kg (180 lb)    SpO2 100%    BMI 25.83 kg/m2     General appearance: alert, cooperative, no distress, appears stated age  Abdomen: soft, non-tender. Bowel sounds normal. No masses,  no organomegaly  Extremities: Homans sign is negative, no sign of DVT, pt in knee immobilizer, so ROM not assessed, no calf pain or swelling  Pulses: 2+ and symmetric  Wound: bulky dressing c/d/i, hemovac drain with 25cc sanguinous output  Neurologic: Grossly normal, ankle dorsi/plantar flexion intact, limited heel raise    Recent Results (from the past 24 hour(s))   CBC WITH AUTOMATED DIFF    Collection Time: 05/16/17 10:11 AM   Result Value Ref Range    WBC 9.9 4.1 - 11.1 K/uL    RBC 4.69 4.10 - 5.70 M/uL    HGB 13.4 12.1 - 17.0 g/dL    HCT 40.6 36.6 - 50.3 %    MCV 86.6 80.0 - 99.0 FL    MCH 28.6 26.0 - 34.0 PG    MCHC 33.0 30.0 - 36.5 g/dL    RDW 14.8 (H) 11.5 - 14.5 %    PLATELET 977 335 - 780 K/uL    NEUTROPHILS 80 (H) 32 - 75 %    LYMPHOCYTES 11 (L) 12 - 49 %    MONOCYTES 9 5 - 13 %    EOSINOPHILS 0 0 - 7 %    BASOPHILS 0 0 - 1 %    ABS. NEUTROPHILS 7.9 1.8 - 8.0 K/UL    ABS. LYMPHOCYTES 1.1 0.8 - 3.5 K/UL    ABS. MONOCYTES 0.9 0.0 - 1.0 K/UL    ABS. EOSINOPHILS 0.0 0.0 - 0.4 K/UL    ABS.  BASOPHILS 0.0 0.0 - 0.1 K/UL   METABOLIC PANEL, BASIC    Collection Time: 05/16/17 10:11 AM   Result Value Ref Range    Sodium 140 136 - 145 mmol/L    Potassium 4.3 3.5 - 5.1 mmol/L    Chloride 107 97 - 108 mmol/L    CO2 23 21 - 32 mmol/L    Anion gap 10 5 - 15 mmol/L    Glucose 130 (H) 65 - 100 mg/dL    BUN 10 6 - 20 MG/DL    Creatinine 1.28 0.70 - 1.30 MG/DL    BUN/Creatinine ratio 8 (L) 12 - 20      GFR est AA >60 >60 ml/min/1.73m2    GFR est non-AA 56 (L) >60 ml/min/1.73m2    Calcium 9.3 8.5 - 10.1 MG/DL   CRYSTALS, SYNOVIAL FLUID    Collection Time: 05/16/17 10:11 AM   Result Value Ref Range    FLUID TYPE(7) KNEE FLUID      Crystals, body fluid NO CRYSTALS SEEN WITH POLARIZED LIGHT     SED RATE (ESR)    Collection Time: 05/16/17 10:11 AM   Result Value Ref Range    Sed rate, automated 20 0 - 20 mm/hr   C REACTIVE PROTEIN, QT    Collection Time: 05/16/17 10:11 AM   Result Value Ref Range    C-Reactive protein 8.68 (H) 0.00 - 0.60 mg/dL   CULTURE, BODY FLUID W GRAM STAIN    Collection Time: 05/16/17 10:11 AM   Result Value Ref Range    Special Requests: NO SPECIAL REQUESTS      GRAM STAIN 4+  WBCS SEEN        GRAM STAIN        OCCASIONAL  GRAM POSITIVE COCCI  SEEN INTRACELLULARY      GRAM STAIN       RESULTS  CALLED TO AND READ BACK BY  ROWENA BELTRE AT 1240, 5/16/17 DB      Culture result: PENDING    CELL COUNT, BODY FLUID    Collection Time: 05/16/17 10:11 AM   Result Value Ref Range    BODY FLUID TYPE KNEE FLUID      FLUID COLOR DARK YELLOW      FLUID APPEARANCE CLOUDY      FLUID RBC COUNT >100 /cu mm    FLD NUCLEATED CELLS 31757 (H) 0 - 5 /cu mm    FLD SEGS 86 %    FLD LYMPHS 6 %    FLD MONO/MACROPHAGE 8 %   LACTIC ACID, PLASMA    Collection Time: 05/16/17 10:11 AM   Result Value Ref Range    Lactic acid 1.0 0.4 - 2.0 MMOL/L   PROTHROMBIN TIME + INR    Collection Time: 05/16/17 10:11 AM   Result Value Ref Range    INR 1.1 0.9 - 1.1      Prothrombin time 11.6 (H) 9.0 - 11.1 sec   CULTURE, BLOOD    Collection Time: 05/16/17 10:20 AM   Result Value Ref Range    Special Requests: NO SPECIAL REQUESTS      Culture result: NO GROWTH AFTER 15 HOURS     TYPE & SCREEN    Collection Time: 05/16/17  3:21 PM   Result Value Ref Range    Crossmatch Expiration 05/19/2017     ABO/Rh(D) B POSITIVE     Antibody screen NEG GLUCOSE, POC    Collection Time: 05/16/17  6:00 PM   Result Value Ref Range    Glucose (POC) 99 65 - 100 mg/dL    Performed by [address] [place] [address], [facility] Boss STAIN    Collection Time: 05/16/17  7:58 PM   Result Value Ref Range    Special Requests: RIGHT JOINT FLUID     GRAM STAIN FEW  WBCS SEEN        GRAM STAIN NO ORGANISMS SEEN      Culture result: PENDING    GLUCOSE, POC    Collection Time: 05/16/17 10:25 PM   Result Value Ref Range    Glucose (POC) 99 65 - 100 mg/dL    Performed by [address], BASIC    Collection Time: 05/17/17  6:33 AM   Result Value Ref Range    Sodium 136 136 - 145 mmol/L    Potassium 4.0 3.5 - 5.1 mmol/L    Chloride 108 97 - 108 mmol/L    CO2 20 (L) 21 - 32 mmol/L    Anion gap 8 5 - 15 mmol/L    Glucose 129 (H) 65 - 100 mg/dL    BUN 13 6 - 20 MG/DL    Creatinine 1.14 0.70 - 1.30 MG/DL    BUN/Creatinine ratio 11 (L) 12 - 20      GFR est AA >60 >60 ml/min/1.73m2    GFR est non-AA >60 >60 ml/min/1.73m2    Calcium 8.2 (L) 8.5 - 10.1 MG/DL   PROTHROMBIN TIME + INR    Collection Time: 05/17/17  6:33 AM   Result Value Ref Range    INR 1.4 (H) 0.9 - 1.1      Prothrombin time 14.0 (H) 9.0 - 11.1 sec   GLUCOSE, POC    Collection Time: 05/17/17  6:48 AM   Result Value Ref Range    Glucose (POC) 149 (H) 65 - 100 mg/dL    Performed by Mike Matthews        Assessment:  Stable Post Op    Plan:  DVT Prophylaxis: Coumadin, pt received 10mg 5/16, pharmacy to dose today                               INR: 1.4  Physical Therapy: WBAT with immobilizer  Discharge Planning  Infected R knee replacement: initial asp growing Gram + cocci                                                   Dr. Alise Leyva on board                                                   Pt on vanc and ancef                                                   hemovac drain with 380cc output overnight                                                   Pt with previous MSSA bacteremia  Pain control: tylenol, oxycodone 5mg

## 2017-05-17 NOTE — PROGRESS NOTES
TRANSFER - OUT REPORT:    Verbal report given to Alfonso RN(name) on Jaun Saunders  being transferred to (unit) for routine post - op       Report consisted of patients Situation, Background, Assessment and   Recommendations(SBAR). Time Pre op antibiotic given:1957  Anesthesia Stop time: 2134  Friend Present on Transfer to floor:y  Order for Friend on Chart:y    Information from the following report(s) SBAR, Kardex, OR Summary, Procedure Summary, Intake/Output and MAR was reviewed with the receiving nurse. Opportunity for questions and clarification was provided. Is the patient on 02? YES       L/Min 2          Is the patient on a monitor? NO    Is the nurse transporting with the patient? NO    Surgical Waiting Area notified of patient's transfer from PACU?  NO      The following personal items collected during your admission accompanied patient upon transfer:   Dental Appliance: Dental Appliances: None  Vision: Visual Aid: Glasses, With patient  Hearing Aid:    Jewelry:    Clothing:    Other Valuables:    Valuables sent to safe:

## 2017-05-17 NOTE — PROGRESS NOTES
Verbal shift change report given to ROWENA Hamilton (oncoming nurse) by ROWENA Fisher (offgoing nurse). Report included the following information SBAR, Kardex, Procedure Summary, Intake/Output, MAR and Recent Results.

## 2017-05-17 NOTE — BRIEF OP NOTE
BRIEF OPERATIVE NOTE    Date of Procedure: 5/16/2017   Preoperative Diagnosis: INFECTED RIGHT TOTAL KNEE REPLACEMENT  Postoperative Diagnosis: INFECTED RIGHT TOTAL KNEE REPLACEMENT    Procedure(s):  RESECTION ARTHROPLASTY RIGHT KNEE WITH PLACEMENT OF ANTIBIOTIC SPACER  Surgeon(s) and Role:     * Shayy Gonzalez MD - Primary         Assistant Staff:  Physician Assistant: Saadia Gupta PA-C    Surgical Staff:  Circ-1: Tessie Ambrocio RN  Circ-2: Cathy Brown  Physician Assistant: Saadia Gupta PA-C  Scrub RN-1: Shannon Sheridan RN  Surg Asst-1: Andrew Suero  Surg Asst-Relief: Cam Pea  Event Time In   Incision Start 1952   Incision Close      Anesthesia: General   Estimated Blood Loss: 100cc  Specimens:   ID Type Source Tests Collected by Time Destination   1 : Right knee joint fluid Joint Fluid Knee  ANAEROBIC/AEROBIC/GRAM STAIN Shayy Gonzalez MD 5/16/2017 1958 Microbiology      Findings: gross infection   Complications: none  Implants:   Implant Name Type Inv.  Item Serial No.  Lot No. LRB No. Used Action   CEMENT BNE SMARTSET HV 40GM -- SMARTSET - SNA  CEMENT BNE SMARTSET HV 40GM -- SMARTSET NA [facility] [employer] [id number] Right 2 Implanted   SPACER KNE IMPREGN HI-REL LG -- INTERSPACE - SNA   SPACER KNE IMPREGN HI-REL LG -- INTERSPACE NA [employer] [id number] Right [id number] Implanted

## 2017-05-17 NOTE — PROGRESS NOTES
Patient had low BP 94/51. Called Fern about holding Metoprolol she stated with that low of a blood pressure to hold the Metoprolol.

## 2017-05-17 NOTE — PROGRESS NOTES
Care Management Interventions  PCP Verified by CM: Yes (Dr. Ly Castaneda)  Palliative Care Consult (Criteria: CHF and RRAT>21): No  Reason for No Palliative Care Consult: Other (see comment) (no needs)  Mode of Transport at Discharge: Other (see comment) (family/car)  Transition of Care Consult (CM Consult): [address]: Yes  MyChart Signup: No  Discharge Durable Medical Equipment: No  Physical Therapy Consult: Yes  Occupational Therapy Consult: Yes  Speech Therapy Consult: No  Current Support Network: Own Home, Other (other family memebers live with him)  Confirm Follow Up Transport: Family  Plan discussed with Pt/Family/Caregiver: Yes  Freedom of Choice Offered: Yes  Discharge Location  Discharge Placement: Home with home health    Home care orders noted. CRM met with the patient regarding agency choice. The patient chose [facility]. Referral sent via [facility]. Will follow.  LANNY

## 2017-05-17 NOTE — PROGRESS NOTES
Problem: Mobility Impaired (Adult and Pediatric)  Goal: *Acute Goals and Plan of Care (Insert Text)  Physical Therapy Goals  Initiated 5/17/2017  1. Patient will move from supine to sit and sit to supine in bed with modified independence within 5 day(s). 2. Patient will transfer from bed to chair and chair to bed with modified independence using the least restrictive device within 5 day(s). 3. Patient will perform sit to stand with modified independence within 5 day(s). 4. Patient will ambulate with modified independence for 125 feet with the least restrictive device within 5 day(s). PHYSICAL THERAPY EVALUATION  Patient: Radha Paz (10 y.o. male)  Date: 5/17/2017  Primary Diagnosis: Joint prosthesis infection or inflammation ([address]  unknown  Joint prosthesis infection or inflammation ([address]  Procedure(s) (LRB):  RESECTION ARTHROPLASTY RIGHT KNEE WITH PLACEMENT OF ANTIBIOTIC SPACER (Right) 1 Day Post-Op   Precautions:   WBAT, Fall (immobilizer)      ASSESSMENT :  Based on the objective data described below, the patient presents with generally decreased strength, balance, ROM, increased pain, and instability at walker following spacer placement and immobilizer. Pt not very interactive with this therapist though agreeable to session (may be cultural). Pt demonstrates ability to complete bed mobility, tranfers, and gait training with assist x1. He does display some impulsiveness though no overt LOB with external support for recovery. Anticipate pt will progress well and return home once medically stable with HHPT and family. Pt in chair in NAD when left and RN notified of session. Patient will benefit from skilled intervention to address the above impairments.   Patients rehabilitation potential is considered to be Fair  Factors which may influence rehabilitation potential include:   [ ]         None noted  [ ]         Mental ability/status  [ ]         Medical condition  [ ]         Home/family situation and support systems  [X]         Safety awareness  [ ]         Pain tolerance/management  [ ]         Other:        PLAN :  Recommendations and Planned Interventions:  [X]           Bed Mobility Training             [ ]    Neuromuscular Re-Education  [X]           Transfer Training                   [ ]    Orthotic/Prosthetic Training  [X]           Gait Training                         [ ]    Modalities  [X]           Therapeutic Exercises           [ ]    Edema Management/Control  [X]           Therapeutic Activities            [X]    Patient and Family Training/Education  [ ]           Other (comment):     Frequency/Duration: Patient will be followed by physical therapy  4 times a week to address goals. Discharge Recommendations: Home Health  Further Equipment Recommendations for Discharge: NA       SUBJECTIVE:   Patient stated I have none. ..  Pt with no stairs to complete for return home. OBJECTIVE DATA SUMMARY:   HISTORY:    Past Medical History:   Diagnosis Date    Arthritis       KNEES & BACK.  Coronary atherosclerosis of native coronary artery      Diabetes [address]       Hgb A1C 6-10-11 7.1%; NIDDM    Essential hypertension, benign      GERD (gastroesophageal reflux disease)      Hypertension      Mixed dyslipidemia       6-11:   HDL 33 LDL 95    S/P coronary artery stent placement July 29th, 2011     NAN to LAD     Past Surgical History:   Procedure Laterality Date    CARDIAC SURG PROCEDURE UNLIST   2009     CATHERIZATION WITH STENT X 1 PLACEMENT    HX GI   2010     COLONOSCOPY    HX GI   2010     ENDOSCOPY    HX HEENT         SEPTOPLASTY    HX HEENT         TONSILLECTOMY    HX KNEE REPLACEMENT Left 2008     DR ROSEN    HX ORTHOPAEDIC Right 2010     KNEE REPLACEMENT    HX ORTHOPAEDIC   2007     RIGHT SHOULDER ROTATOR CUFF REPAIR.      Prior Level of Function/Home Situation: pt previously using cane vs walker post TKA  Personal factors and/or comorbidities impacting plan of care: supportive family     Home Situation  Home Environment: Private residence  # Steps to Enter: 0  One/Two Story Residence: One story  Living Alone: No  Support Systems: Family member(s)  Patient Expects to be Discharged to[de-identified] Private residence  Current DME Used/Available at Home: Walker, rolling, Raised toilet seat     EXAMINATION/PRESENTATION/DECISION MAKING:   Critical Behavior:  Neurologic State: Alert  Orientation Level: Appropriate for age  Cognition: Follows commands     Hearing: Auditory  Auditory Impairment: None  Range Of Motion:  AROM: Generally decreased, functional (RLE immobilizer donned)                       Strength:    Strength: Generally decreased, functional                    Tone & Sensation:   Tone: Normal                              Coordination:  Coordination: Within functional limits  Functional Mobility:  Bed Mobility:     Supine to Sit: Additional time; Moderate assistance (RLE support; HOB elevated, rail provided)  Sit to Supine:  (NT; OOB to chair)  Scooting: Moderate assistance; Additional time (cues for technique)  Transfers:  Sit to Stand: Minimum assistance; Additional time (cues for safety; EOB elevated)  Stand to Sit: Moderate assistance; Additional time (support for controlled descent and cues to extend RLE)                       Balance:   Sitting: Impaired; With support  Sitting - Static: Good (unsupported)  Sitting - Dynamic: Fair (occasional)  Standing: Impaired; With support  Standing - Static: Fair  Standing - Dynamic : Fair  Ambulation/Gait Training:  Distance (ft): 15 Feet (ft) (x2)  Assistive Device: Gait belt;Walker, rolling;Brace/Splint  Ambulation - Level of Assistance: Contact guard assistance (safety concerns)     Gait Description (WDL): Exceptions to WDL  Gait Abnormalities: Antalgic;Decreased step clearance; Step to gait;Trunk sway increased  Right Side Weight Bearing: As tolerated (with immob. donned)        Stance: Right decreased  Speed/January: Fluctuations (pt impulsive)  Step Length: Right shortened;Left shortened                 Stairs:     Stairs - Level of Assistance:  (NT-none per home needs accoding to pt)  Functional Measure:  Tinetti test:      Sitting Balance: 0  Arises: 1  Attempts to Rise: 1  Immediate Standing Balance: 0  Standing Balance: 0  Nudged: 0  Eyes Closed: 0  Turn 360 Degrees - Continuous/Discontinuous: 0  Turn 360 Degrees - Steady/Unsteady: 0  Sitting Down: 1  Balance Score: 3  Indication of Gait: 0  R Step Length/Height: 0  L Step Length/Height: 0  R Foot Clearance: 1  L Foot Clearance: 1  Step Symmetry: 0  Step Continuity: 0  Path: 1  Trunk: 0  Walking Time: 0  Gait Score: 3  Total Score: 6         Tinetti Test and G-code impairment scale:  Percentage of Impairment CH     0%    CI     1-19% CJ     20-39% CK     40-59% CL     60-79% CM     80-99% CN      100%   Tinetti  Score 0-28 28 23-27 17-22 12-16 6-11 1-5 0          Tinetti Tool Score Risk of Falls  <19 = High Fall Risk  19-24 = Moderate Fall Risk  25-28 = Low Fall Risk  Tinetti ME. Performance-Oriented Assessment of Mobility Problems in Elderly Patients. [address]; [id number]. (Scoring Description: PT Bulletin Feb. 10, 1993)     Older adults: Chapito Johnston et al, 2009; n = 1000 [facility] elderly evaluated with ABC, COLLINS, ADL, and IADL)  · Mean COLLINS score for males aged 69-68 years = 26.21(3.40)  · Mean COLLINS score for females age 69-68 years = 25.16(4.30)  · Mean COLLINS score for males over 80 years = 23.29(6.02)  · Mean COLLINS score for females over 80 years = 17.20(8.32)            G codes: In compliance with CMSs Claims Based Outcome Reporting, the following G-code set was chosen for this patient based on their primary functional limitation being treated: The outcome measure chosen to determine the severity of the functional limitation was the Tinetti with a score of 6/28 which was correlated with the impairment scale.       · Mobility - Walking and Moving Around:               - CURRENT STATUS:    CL - 60%-79% impaired, limited or restricted               - GOAL STATUS:           CK - 40%-59% impaired, limited or restricted               - D/C STATUS:                       ---------------To be determined---------------   Pain:  Pain Scale 1: Numeric (0 - 10)  Pain Intensity 1: 3  Pain Location 1: Knee  Pain Orientation 1: Right  Pain Description 1: Aching  Pain Intervention(s) 1: Medication (see MAR)  Activity Tolerance:   VSS supine to sit and asymptomatic with activity  Please refer to the flowsheet for vital signs taken during this treatment. After treatment:   [X]         Patient left in no apparent distress sitting up in chair  [ ]         Patient left in no apparent distress in bed  [X]         Call bell left within reach  [X]         Nursing notified  [ ]         Caregiver present  [ ]         Bed alarm activated      COMMUNICATION/EDUCATION:   The patients plan of care was discussed with: Registered Nurse.  [X]         Fall prevention education was provided and the patient/caregiver indicated understanding. [X]         Patient/family have participated as able in goal setting and plan of care. [X]         Patient/family agree to work toward stated goals and plan of care. [ ]         Patient understands intent and goals of therapy, but is neutral about his/her participation. [ ]         Patient is unable to participate in goal setting and plan of care.      Thank you for this referral.  Ples Snellen   Time Calculation: 18 mins

## 2017-05-18 LAB
ANION GAP BLD CALC-SCNC: 8 MMOL/L (ref 5–15)
BACTERIA SPEC CULT: ABNORMAL
BACTERIA SPEC CULT: ABNORMAL
BACTERIA SPEC CULT: NORMAL
BUN SERPL-MCNC: 10 MG/DL (ref 6–20)
BUN/CREAT SERPL: 11 (ref 12–20)
CALCIUM SERPL-MCNC: 8.3 MG/DL (ref 8.5–10.1)
CHLORIDE SERPL-SCNC: 111 MMOL/L (ref 97–108)
CO2 SERPL-SCNC: 20 MMOL/L (ref 21–32)
CREAT SERPL-MCNC: 0.93 MG/DL (ref 0.7–1.3)
GLUCOSE BLD STRIP.AUTO-MCNC: 109 MG/DL (ref 65–100)
GLUCOSE BLD STRIP.AUTO-MCNC: 142 MG/DL (ref 65–100)
GLUCOSE BLD STRIP.AUTO-MCNC: 154 MG/DL (ref 65–100)
GLUCOSE SERPL-MCNC: 98 MG/DL (ref 65–100)
GRAM STN SPEC: ABNORMAL
HGB BLD-MCNC: 9.9 G/DL (ref 12.1–17)
INR PPP: 1.6 (ref 0.9–1.1)
POTASSIUM SERPL-SCNC: 3.9 MMOL/L (ref 3.5–5.1)
PROTHROMBIN TIME: 16.5 SEC (ref 9–11.1)
SERVICE CMNT-IMP: ABNORMAL
SERVICE CMNT-IMP: NORMAL
SODIUM SERPL-SCNC: 139 MMOL/L (ref 136–145)

## 2017-05-18 PROCEDURE — 76937 US GUIDE VASCULAR ACCESS: CPT

## 2017-05-18 PROCEDURE — G8988 SELF CARE GOAL STATUS: HCPCS

## 2017-05-18 PROCEDURE — 74011250637 HC RX REV CODE- 250/637: Performed by: PHYSICIAN ASSISTANT

## 2017-05-18 PROCEDURE — 36415 COLL VENOUS BLD VENIPUNCTURE: CPT | Performed by: PHYSICIAN ASSISTANT

## 2017-05-18 PROCEDURE — 36569 INSJ PICC 5 YR+ W/O IMAGING: CPT | Performed by: INTERNAL MEDICINE

## 2017-05-18 PROCEDURE — C1751 CATH, INF, PER/CENT/MIDLINE: HCPCS

## 2017-05-18 PROCEDURE — 74011250636 HC RX REV CODE- 250/636: Performed by: PHYSICIAN ASSISTANT

## 2017-05-18 PROCEDURE — 77030020847 HC STATLOK BARD -A

## 2017-05-18 PROCEDURE — 02HV33Z INSERTION OF INFUSION DEVICE INTO SUPERIOR VENA CAVA, PERCUTANEOUS APPROACH: ICD-10-PCS | Performed by: ORTHOPAEDIC SURGERY

## 2017-05-18 PROCEDURE — 85018 HEMOGLOBIN: CPT | Performed by: PHYSICIAN ASSISTANT

## 2017-05-18 PROCEDURE — 97165 OT EVAL LOW COMPLEX 30 MIN: CPT

## 2017-05-18 PROCEDURE — G8987 SELF CARE CURRENT STATUS: HCPCS

## 2017-05-18 PROCEDURE — 85610 PROTHROMBIN TIME: CPT | Performed by: PHYSICIAN ASSISTANT

## 2017-05-18 PROCEDURE — 82962 GLUCOSE BLOOD TEST: CPT

## 2017-05-18 PROCEDURE — 97535 SELF CARE MNGMENT TRAINING: CPT

## 2017-05-18 PROCEDURE — 74011250636 HC RX REV CODE- 250/636: Performed by: INTERNAL MEDICINE

## 2017-05-18 PROCEDURE — 77030018719 HC DRSG PTCH ANTIMIC J&J -A

## 2017-05-18 PROCEDURE — 77030020365 HC SOL INJ SOD CL 0.9% 50ML

## 2017-05-18 PROCEDURE — 65270000029 HC RM PRIVATE

## 2017-05-18 PROCEDURE — 80048 BASIC METABOLIC PNL TOTAL CA: CPT | Performed by: PHYSICIAN ASSISTANT

## 2017-05-18 PROCEDURE — 97116 GAIT TRAINING THERAPY: CPT

## 2017-05-18 RX ORDER — VANCOMYCIN HYDROCHLORIDE
1250 EVERY 12 HOURS
Status: DISCONTINUED | OUTPATIENT
Start: 2017-05-18 | End: 2017-05-18

## 2017-05-18 RX ORDER — SODIUM CHLORIDE 0.9 % (FLUSH) 0.9 %
10 SYRINGE (ML) INJECTION EVERY 8 HOURS
Status: DISCONTINUED | OUTPATIENT
Start: 2017-05-18 | End: 2017-05-19 | Stop reason: HOSPADM

## 2017-05-18 RX ORDER — SODIUM CHLORIDE 0.9 % (FLUSH) 0.9 %
10 SYRINGE (ML) INJECTION AS NEEDED
Status: DISCONTINUED | OUTPATIENT
Start: 2017-05-18 | End: 2017-05-19 | Stop reason: HOSPADM

## 2017-05-18 RX ORDER — CEFAZOLIN SODIUM IN 0.9 % NACL 2 G/50 ML
2 INTRAVENOUS SOLUTION, PIGGYBACK (ML) INTRAVENOUS EVERY 8 HOURS
Status: DISCONTINUED | OUTPATIENT
Start: 2017-05-18 | End: 2017-05-19 | Stop reason: HOSPADM

## 2017-05-18 RX ORDER — BACITRACIN 500 UNIT/G
1 PACKET (EA) TOPICAL AS NEEDED
Status: DISCONTINUED | OUTPATIENT
Start: 2017-05-18 | End: 2017-05-19 | Stop reason: HOSPADM

## 2017-05-18 RX ORDER — SODIUM CHLORIDE 0.9 % (FLUSH) 0.9 %
10 SYRINGE (ML) INJECTION EVERY 24 HOURS
Status: DISCONTINUED | OUTPATIENT
Start: 2017-05-18 | End: 2017-05-19 | Stop reason: HOSPADM

## 2017-05-18 RX ORDER — SODIUM CHLORIDE 0.9 % (FLUSH) 0.9 %
20 SYRINGE (ML) INJECTION AS NEEDED
Status: DISCONTINUED | OUTPATIENT
Start: 2017-05-18 | End: 2017-05-19 | Stop reason: HOSPADM

## 2017-05-18 RX ADMIN — VANCOMYCIN HYDROCHLORIDE 1250 MG: 10 INJECTION, POWDER, LYOPHILIZED, FOR SOLUTION INTRAVENOUS at 10:07

## 2017-05-18 RX ADMIN — CEFAZOLIN 2 G: 1 INJECTION, POWDER, FOR SOLUTION INTRAMUSCULAR; INTRAVENOUS; PARENTERAL at 23:11

## 2017-05-18 RX ADMIN — CEFAZOLIN 2 G: 1 INJECTION, POWDER, FOR SOLUTION INTRAMUSCULAR; INTRAVENOUS; PARENTERAL at 14:39

## 2017-05-18 RX ADMIN — ACETAMINOPHEN 650 MG: 325 TABLET ORAL at 06:38

## 2017-05-18 RX ADMIN — OXYCODONE HYDROCHLORIDE 5 MG: 5 TABLET ORAL at 21:44

## 2017-05-18 RX ADMIN — PANTOPRAZOLE SODIUM 40 MG: 40 TABLET, DELAYED RELEASE ORAL at 06:37

## 2017-05-18 RX ADMIN — DOCUSATE SODIUM 100 MG: 100 CAPSULE, LIQUID FILLED ORAL at 08:11

## 2017-05-18 RX ADMIN — WARFARIN SODIUM 3 MG: 2 TABLET ORAL at 12:59

## 2017-05-18 RX ADMIN — METOPROLOL SUCCINATE 50 MG: 50 TABLET, EXTENDED RELEASE ORAL at 08:10

## 2017-05-18 RX ADMIN — FENOFIBRATE 145 MG: 145 TABLET ORAL at 17:34

## 2017-05-18 RX ADMIN — TAMSULOSIN HYDROCHLORIDE 0.4 MG: 0.4 CAPSULE ORAL at 08:10

## 2017-05-18 RX ADMIN — METFORMIN HYDROCHLORIDE 500 MG: 500 TABLET, FILM COATED ORAL at 17:35

## 2017-05-18 RX ADMIN — AMLODIPINE BESYLATE 5 MG: 5 TABLET ORAL at 08:10

## 2017-05-18 RX ADMIN — ACETAMINOPHEN 650 MG: 325 TABLET ORAL at 23:11

## 2017-05-18 RX ADMIN — Medication 10 ML: at 06:38

## 2017-05-18 RX ADMIN — POLYETHYLENE GLYCOL 3350 17 G: 17 POWDER, FOR SOLUTION ORAL at 08:11

## 2017-05-18 RX ADMIN — DIPHENHYDRAMINE HYDROCHLORIDE 12.5 MG: 50 INJECTION, SOLUTION INTRAMUSCULAR; INTRAVENOUS at 02:53

## 2017-05-18 RX ADMIN — Medication 10 ML: at 22:00

## 2017-05-18 RX ADMIN — ACETAMINOPHEN 650 MG: 325 TABLET ORAL at 12:59

## 2017-05-18 RX ADMIN — METFORMIN HYDROCHLORIDE 500 MG: 500 TABLET, FILM COATED ORAL at 08:11

## 2017-05-18 RX ADMIN — OXYCODONE HYDROCHLORIDE 5 MG: 5 TABLET ORAL at 08:43

## 2017-05-18 RX ADMIN — DOCUSATE SODIUM AND SENNOSIDES 1 TABLET: 8.6; 5 TABLET, FILM COATED ORAL at 08:11

## 2017-05-18 RX ADMIN — DOCUSATE SODIUM 100 MG: 100 CAPSULE, LIQUID FILLED ORAL at 17:34

## 2017-05-18 RX ADMIN — ATORVASTATIN CALCIUM 10 MG: 10 TABLET, FILM COATED ORAL at 21:44

## 2017-05-18 RX ADMIN — DOCUSATE SODIUM AND SENNOSIDES 1 TABLET: 8.6; 5 TABLET, FILM COATED ORAL at 17:34

## 2017-05-18 RX ADMIN — ACETAMINOPHEN 650 MG: 325 TABLET ORAL at 17:35

## 2017-05-18 NOTE — PROGRESS NOTES
Problem: Mobility Impaired (Adult and Pediatric)  Goal: *Acute Goals and Plan of Care (Insert Text)  Physical Therapy Goals  Initiated 5/17/2017  1. Patient will move from supine to sit and sit to supine in bed with modified independence within 5 day(s). 2. Patient will transfer from bed to chair and chair to bed with modified independence using the least restrictive device within 5 day(s). 3. Patient will perform sit to stand with modified independence within 5 day(s). 4. Patient will ambulate with modified independence for 125 feet with the least restrictive device within 5 day(s). PHYSICAL THERAPY TREATMENT  Patient: Vernon Juárez (82 y.o. male)  Date: 5/18/2017  Diagnosis: Joint prosthesis infection or inflammation ([address]  unknown  Joint prosthesis infection or inflammation ([address] <principal problem not specified>  Procedure(s) (LRB):  RESECTION ARTHROPLASTY RIGHT KNEE WITH PLACEMENT OF ANTIBIOTIC SPACER (Right) 2 Days Post-Op  Precautions: WBAT, Fall      ASSESSMENT:  Pt progressing significantly with increased mobility bed transfers and gait. Pt needing only supervision and amb 120' with RW. Pt does note continued pain 7/10 and displays impulsive behavior and needs verbal cuing to keep RW closer and slow his movements. Will continue PT. Progression toward goals:  [ ]    Improving appropriately and progressing toward goals  [ ]    Improving slowly and progressing toward goals  [ ]    Not making progress toward goals and plan of care will be adjusted       PLAN:  Patient continues to benefit from skilled intervention to address the above impairments. Continue treatment per established plan of care. Discharge Recommendations:  Home Health  Further Equipment Recommendations for Discharge:  None       SUBJECTIVE:   Patient stated I am doing fair. Margo Ho      OBJECTIVE DATA SUMMARY:   Critical Behavior:  Neurologic State: Alert  Orientation Level: Oriented X4  Cognition: Appropriate decision making, Appropriate for age attention/concentration, Appropriate safety awareness     Functional Mobility Training:  Bed Mobility:                    Transfers:  Sit to Stand: Supervision  Stand to Sit: Stand-by asssistance                             Balance:  Sitting: Intact  Ambulation/Gait Training:  Distance (ft): 120 Feet (ft)  Assistive Device: Gait belt;Walker, rolling;Brace/Splint  Ambulation - Level of Assistance: Stand-by asssistance        Gait Abnormalities: Antalgic;Decreased step clearance; Step to gait;Trunk sway increased                                               Cueing on RW placement (pt hold too far from body and has quick movements)  Stairs:  Number of Stairs Trained: 4  Stairs - Level of Assistance: Stand-by asssistance              Rail Use: Both  Neuro Re-Education:   NA  Therapeutic Exercises:   AP, QS and GS  Pain:  Pain Scale 1: Numeric (0 - 10)  Pain Intensity 1: 7  Pain Location 1: Knee        Pain Intervention(s) 1: Medication (see MAR)  Activity Tolerance: WNL  Please refer to the flowsheet for vital signs taken during this treatment.   After treatment:   [X]    Patient left in no apparent distress sitting up in chair  [ ]    Patient left in no apparent distress in bed  [ ]    Call bell left within reach  [X]    Nursing notified  [ ]    Caregiver present  [ ]    Bed alarm activated      COMMUNICATION/COLLABORATION:   The patients plan of care was discussed with: Registered Nurse     Dana Garnica, PT, DPT   Time Calculation: 15 mins

## 2017-05-18 NOTE — PROGRESS NOTES
Pharmacist Note  Warfarin Dosing  Consult provided for this 78 y.o. male to manage warfarin for Orthopedic Surgery (VTE prophylaxis), right total knee   replacement. INR Goal: 1.7- 2.2  Therapy Day: 3    Drugs that may increase INR:None  Drugs that may decrease INR: None  Other current anticoagulants/ drugs that may increase bleeding risk: None  Risk factors: Age > 65  Daily INR ordered: YES  Recent Labs      05/18/17   0650  05/17/17   0633  05/16/17   1011   HGB  9.9*  10.6*  13.4   INR  1.6*  1.4*  1.1     Date               INR                 Dose  5/16                1.1                   3 mg   5/17                1.4                   3 mg     5/18  1.6  3 mg           Assessment/ Plan: Will order warfarin 3 mg PO x 1 dose. Pharmacy will continue to monitor daily and adjust therapy as indicated. Please contact the pharmacist at  or  for discharge recommendations if needed.

## 2017-05-18 NOTE — PROGRESS NOTES
ID Progress Note  2017    vanco     Subjective:     Afebrile. Had right knee resection arthoplasty 2 days ago. No complaints. Objective:     Vitals:   Visit Vitals    /75    Pulse 75    Temp 97.9 °F (36.6 °C)    Resp 16    Ht 5' 10\" (1.778 m)    Wt 82.3 kg (181 lb 6.4 oz)    SpO2 99%    BMI 26.03 kg/m2        Tmax:  Temp (24hrs), Av.1 °F (36.7 °C), Min:97.9 °F (36.6 °C), Max:98.3 °F (36.8 °C)      Exam:    Not in distress  Lungs: clear to auscultation  Heart: s1, s2, no murmurs   Abdomen: soft, nontender  Extremities: right knee bandaged     Labs:   Lab Results   Component Value Date/Time    WBC 9.9 2017 10:11 AM    HGB 9.9 2017 06:50 AM    HCT 40.6 2017 10:11 AM    PLATELET 858  10:11 AM    MCV 86.6 2017 10:11 AM     Lab Results   Component Value Date/Time    Sodium 139 2017 06:50 AM    Potassium 3.9 2017 06:50 AM    Chloride 111 2017 06:50 AM    CO2 20 2017 06:50 AM    Anion gap 8 2017 06:50 AM    Glucose 98 2017 06:50 AM    BUN 10 2017 06:50 AM    Creatinine 0.93 2017 06:50 AM    BUN/Creatinine ratio 11 2017 06:50 AM    GFR est AA >60 2017 06:50 AM    GFR est non-AA >60 2017 06:50 AM    Calcium 8.3 2017 06:50 AM    Bilirubin, total 0.7 2017 08:51 AM    AST (SGOT) 13 2017 08:51 AM    Alk. phosphatase 54 2017 08:51 AM    Protein, total 7.1 2017 08:51 AM    Albumin 4.2 2017 08:51 AM    Globulin 4.5 10/22/2016 09:39 AM    A-G Ratio 1.4 2017 08:51 AM    ALT (SGPT) 8 2017 08:51 AM             Assessment:     1. 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 2. History of MSSA bacteremia with a negative MILAN. 3. Diabetes. 4. Coronary artery disease. 5. Hypertension. Recommendations:     Cultures growing MSSA. Will switch to cefazolin. I will order a PICC line. I will write orders.      [id number] [facility]  Jacek Marin MD

## 2017-05-18 NOTE — PROGRESS NOTES
Bedside shift change report given to [facility] Mateo DO (oncoming nurse) by Bruce Adame (offgoing nurse). Report included the following information SBAR, Kardex, Intake/Output and MAR.

## 2017-05-18 NOTE — PROGRESS NOTES
CM noted home IV ABX order in progress notes. CM met with pt and would like to use who he used in the past for home infusion.   CM sent referral via [employer] to Shauna Dumont RN CRM

## 2017-05-18 NOTE — PROGRESS NOTES
Day #2 of Vancomycin  Indication: Chronic infection, R TKR with antibiotic-impregnated spacer; h/o MSSA bacteremia  Current regimen:  1250 mg IV Q16H  ID Following ?: YES  Concomitant nephrotoxic drugs (requires more frequent monitoring): None  Frequency of BMP?: Daily  Recent Labs      17   0650  17   0633  17   1011   WBC   --    --   9.9   CREA  0.93  1.14  1.28   BUN  10  13  10   Est CrCl: 83.9 ml/min (AdjBW)  Temp (24hrs), Av.1 °F (36.7 °C), Min:97.9 °F (36.6 °C), Max:98.3 °F (36.8 °C)    Cultures:    Blood, NG after 15 hours   Blood, paired, pending   Body fluid, probable S. aureus (Negative for antigen detection) - pending   Wound (R Knee), light probable S. aureus - pending   Anaerobic, pending    Goal trough = 15 - 20 mcg/mL    Recent trough history (date/time/level/dose/action taken):  None    Plan: Renal function improved again today, now afebrile. Change to 1250 mg Q 12 hours for est trough of 15 mcg/mL .   Trough prior to 1000 dose tomorrow

## 2017-05-18 NOTE — PROGRESS NOTES
Ortho Progress Note  2 Days Post-Op  Procedure(s):  RESECTION ARTHROPLASTY RIGHT KNEE WITH PLACEMENT OF ANTIBIOTIC SPACER    Subjective: No acute events overnight. Pt doing well this am, mild discomfort in knee, but otherwise no complaints. Denies F/Ch. Denies SOB, lightheadedness, dizziness. Physical Exam:   Visit Vitals    /82 (BP 1 Location: Right arm, BP Patient Position: At rest)    Pulse 76    Temp 98.2 °F (36.8 °C)    Resp 16    Ht 5' 10\" (1.778 m)    Wt 82.1 kg (181 lb)    SpO2 99%    BMI 25.97 kg/m2     General appearance: alert, cooperative, no distress, appears stated age  Abdomen: soft, non-tender.  Bowel sounds normal. No masses, no organomegaly  Extremities: Homans sign is negative, no sign of DVT, pt in knee immobilizer, so ROM not assessed, no calf pain or swelling, mild swelling R foot  Pulses: 2+ and symmetric  Wound: dressing c/d/i, hemovac drain with 10cc sanguinous output  Neurologic: Grossly normal, ankle dorsi/plantar flexion intact, limited heel raise    Recent Results (from the past 24 hour(s))   GLUCOSE, POC    Collection Time: 05/17/17 11:27 AM   Result Value Ref Range    Glucose (POC) 113 (H) 65 - 100 mg/dL    Performed by Michael Sevilla    HEMOGLOBIN    Collection Time: 05/18/17  6:50 AM   Result Value Ref Range    HGB 9.9 (L) 12.1 - 17.0 g/dL   PROTHROMBIN TIME + INR    Collection Time: 05/18/17  6:50 AM   Result Value Ref Range    INR 1.6 (H) 0.9 - 1.1      Prothrombin time 16.5 (H) 9.0 - 38.1 sec   METABOLIC PANEL, BASIC    Collection Time: 05/18/17  6:50 AM   Result Value Ref Range    Sodium 139 136 - 145 mmol/L    Potassium 3.9 3.5 - 5.1 mmol/L    Chloride 111 (H) 97 - 108 mmol/L    CO2 20 (L) 21 - 32 mmol/L    Anion gap 8 5 - 15 mmol/L    Glucose 98 65 - 100 mg/dL    BUN 10 6 - 20 MG/DL    Creatinine 0.93 0.70 - 1.30 MG/DL    BUN/Creatinine ratio 11 (L) 12 - 20      GFR est AA >60 >60 ml/min/1.73m2    GFR est non-AA >60 >60 ml/min/1.73m2    Calcium 8.3 (L) 8.5 - 10.1 MG/DL       Assessment:  Stable Post Op    Plan:  DVT Prophylaxis: Coumadin, pt received 10mg 5/16, pharmacy to dose today                                INR: 1.6 (goal 1.7-2.2)  Physical Therapy: WBAT with immobilizer (pt amb 30 5/17)  Discharge Planning  Infected R knee replacement: initial asp growing Gram + cocci                                              ID on board                                              Pt on vanc                                               hemovac drain with 145cc output overnight                                              Pt with previous MSSA bacteremia  Pain control: tylenol, oxycodone 5mg  Acute blood loss anemia: hgb 9.9, normal post op finding, pt asymptomatic, will continue to monitor

## 2017-05-18 NOTE — PROCEDURES
PICC Placement Note    PRE-PROCEDURE VERIFICATION  Correct Procedure: yes  Correct Site:  yes  Temperature: Temp: 97.9 °F (36.6 °C), Temperature Source: Temp Source: Oral  Recent Labs      05/18/17   0650   05/16/17   1011   BUN  10   < >  10   CREA  0.93   < >  1.28   PLT   --    --   218   INR  1.6*   < >  1.1   WBC   --    --   9.9    < > = values in this interval not displayed. Allergies: Levofloxacin; Pcn [penicillins]; and Tape [adhesive]  Education materials, including PICC Booklet, for PICC Care given to patient: yes. See Patient Education activity for further details. PROCEDURE DETAIL  A single lumen PICC line was started for Home IV Therapy. The following documentation is in addition to the PICC properties in the lines/airways flowsheet :  Lot #: [id number]  Was xylocaine 1% used intradermally:  yes  Catheter Length: 38 at 1cm fernando (cm)  Vein Selection for PICC:right basilic  Central Line Bundle followed yes  Complication Related to Insertion: none    The placement was verified by ECG/Sapiens technology: The  tip location is on the right side and the tip is in the  superior vena cava. See ECG results for PICC tip placement. Report given to nurse Mary Menezes. Line is okay to use.     Sukumar Perez RN

## 2017-05-18 NOTE — DISCHARGE INSTRUCTIONS
After Hospital Care Plan:    Discharge Instructions Knee Replacement-Dr. Giuliana Acosta    Patient Name  Kaycee Ferris  Date of procedure  5/16/2017    Procedure  Procedure(s):  RESECTION ARTHROPLASTY RIGHT KNEE WITH PLACEMENT OF ANTIBIOTIC SPACER  Surgeon  Surgeon(s) and Role:     * Karson Luciano MD - Primary  Date of discharge: No discharge date for patient encounter. PCP: Stephanie Butler MD    Follow up appointments  -follow up with Dr. Giuliana Acosta in 2 weeks. Call [phone] to make an appointment. [facility]: _________________________   phone: ___________________  The agency will contact you to arrange dates/times for visits. Please call them if you do not hear from them within 24 hours after you are discharged. When to call your Orthopaedic Surgeon:  -unrelieved pain  -Signs of infection-if your incision is red; continues to have drainage; drainage has a foul odor or if you have a persistent fever over 101 degrees  -Signs of a blood clot in your leg-calf pain, tenderness, redness, swelling of lower leg  When to call your Primary Care Physician:  -Concerns about medical conditions such as diabetes, high blood pressure, asthma, congestive heart failure  -Call if blood sugars are elevated, persistent headache or dizziness, coughing or congestion, constipation or diarrhea, burning with urination, abnormal heart rate  When to call 911and go to the nearest emergency room  -acute onset of chest pain, shortness of breath, difficulty breathing    Activity  -weight bearing as tolerated with your walker or crutches. Refer to pages 23-31 of your handbook for instructions and pictures.   -20 repetitions of each exercise as instructed by therapist 3 times each day.   Refer to pages 32-40 of your handbook for exercise instructions and pictures  -get up every one hour and walk (except at night when sleeping)  -do not drive or operate heavy machinery    Incision Care  -you will have staples in your knee incision; they will be removed at the surgeons office visit in 2 weeks  -Keep a dressing (ABD and paper tape) on your incision and change daily. Wash your hands thoroughly before changing the dressing. Once you incision is not draining, you may leave it open to air  -You may shower and get your incision wet but do not submerge your incision under water in a bath tub, hot tub or swimming pool for 6 weeks after surgery. Preventing blood clots  -take Coumadin daily as prescribed by Dr. Flanagan Poisson    Pain management  -take pain medication as prescribed; decrease the amount you use as your pain lessens  -avoid alcoholic beverages while taking pain medication  -Please be aware that many medications contain Tylenol. We do not want you to over medicate so please read the information below as a guide. Do not take more than 4 Grams of Tylenol in a 24 hour period. (There are 1000 milligrams in one Gram)    -You may place an ice bag on your knee for 15-20 minutes after exercising    Diet  -resume usual diet; drink plenty of fluids; eat foods high in fiber  -you may want to take a stool softener (such as Senokot-S or Colace) to prevent constipation while you are taking pain medication. If constipation occurs, take a laxative (such as Dulcolax tablets, Milk of Magnesia, or a suppository)    [facility] Protocol (to be followed by [address])  Nursing-per physicians order  -complete head to toe assessment, vital signs  -staples will be removed in the surgeons office at 2 week visit  -medication reconciliation  -review pain management  -manage chronic medical conditions    Physical Therapy-per physicians order  Weight bearing status:  Precautions at Admission: Fall, WBAT     Right Side Weight Bearing: As tolerated (with immob. donned)  ?   Mobility Status:  Supine to Sit: Supervision  Sit to Stand: Supervision  Sit to Supine:  (remained on commode)  Bed to Chair: Supervision  Gait:  Distance (ft): 120 Feet (ft)  Ambulation - Level of Assistance: Stand-by asssistance  Assistive Device: Gait belt, Walker, rolling, Brace/Splint  Gait Abnormalities: Antalgic, Decreased step clearance, Step to gait, Trunk sway increased  ADL status overall composite:            Toilet Transfer : Supervision    -Home Therapy  -assessment and evaluation-bed mobility; functional transfers (bed, chair, bathroom, stairs); ambulation with equipment, car transfers, shower transfers, safety and ability to get out of house in the event of an emergency  -review weight bearing as tolerated, wean from walker or crutches as tolerated  -discuss pain management  -review how to do ADLs    -Home Exercise Program-refer to [id number] of the patient handbook for exercises  -supine exercises-ankle pumps, hamstring sets, quad sets, heel slides, short arc quad sets, long arc quads-prop heel on pillow for stretch, straight leg raise-sitting exercises-active knee flexion, passive knee flexion, active knee extension, ankle pumps, bicep curls (use weights as appropriate), triceps pushups, shoulder flexion (use weights as appropriate)  -standing exercises holding onto supportive counter-toe raises, heel raises, mini-squats, heel/toe touches with knee bend, marching in place, hamstring curls

## 2017-05-18 NOTE — PROGRESS NOTES
Home IV Orders  1. Diagnosis:  Right knee prosthetic infection with MSSA   2. Routine PICC Care  3. Antibiotic: cefazolin 2 gm q8 hours IV  4. Lab each Monday:   CBC/diff/platelets   CMP   ESR   CRP     5. Lab each Thursday:   CBC/diff/platelets   BUN   Creatinine    6. Fax lab to Dr. Tish Fortune @ [phone].  7.  Call Dr. Tish Fortune @ [phone] for fever >100.5, infection at PICC site, diarrhea, WBC > 15 or < 3, cr > 1.8  8. Duration of therapy: last day of therapy should be June 27, 2017   Please call Dr. Tish Fortune @ [phone] before stopping therapy. 9.  Allergies: Allergies   Allergen Reactions    Levofloxacin Rash    Pcn [Penicillins] Rash    Tape [Adhesive] Rash     Medipore tape caused large blisters when removed. 10. Make appointment in 2 weeks with Dr. Yumiko Dunne.      Manuel Salas MD

## 2017-05-18 NOTE — PROGRESS NOTES
Problem: Self Care Deficits Care Plan (Adult)  Goal: *Acute Goals and Plan of Care (Insert Text)  Occupational Therapy Goals  Initiated: 5/18/2017    1. Patient will perform grooming with supervision/set-up standing at sink within 3 day(s). 2. Patient will perform bathing with supervision/set-up from chair within 3 day(s). 3. Patient will perform upper body dressing and lower body dressing with supervision/set-up within 3 day(s). 4. Patient will perform toilet transfers with supervision/set-up within 3 day(s). 5. Patient will perform all aspects of toileting with supervision/set-up within 3 day(s). OCCUPATIONAL THERAPY EVALUATION  Patient: Renate Argueta (61 y.o. male)  Date: 5/18/2017  Primary Diagnosis: Joint prosthesis infection or inflammation ([address]  unknown  Joint prosthesis infection or inflammation ([address]  Procedure(s) (LRB):  RESECTION ARTHROPLASTY RIGHT KNEE WITH PLACEMENT OF ANTIBIOTIC SPACER (Right) 2 Days Post-Op   Precautions:   Fall, WBAT; with knee immobilizer in place      ASSESSMENT :  Based on the objective data described below, the patient presents with decreased interdependence with lower body access following revision and resection of R TKR with antibiotic spacer placement. Pt now with knee immobilizer in place and progressing slowing with lower body ADL tasks. Pt does have reacher for home and introduced to AE for lower body dressing. Pt donned socks and did well with activity but hesitant to commit to AE for home. Will continue to offer training as appropriate in preparation for discharge. Will follow for reacher training for functional activity and shower transfer training. Recommend home with family support and assistance. Patient will benefit from skilled intervention to address the above impairments.   Patients rehabilitation potential is considered to be Good  Factors which may influence rehabilitation potential include:   [X]             None noted  [ ] Mental ability/status  [ ]             Medical condition  [ ]             Home/family situation and support systems  [ ]             Safety awareness  [ ]             Pain tolerance/management  [ ]             Other:        PLAN :  Recommendations and Planned Interventions:  [X]               Self Care Training                  [X]        Therapeutic Activities  [X]               Functional Mobility Training    [ ]        Cognitive Retraining  [X]               Therapeutic Exercises           [X]        Endurance Activities  [X]               Balance Training                   [ ]        Neuromuscular Re-Education  [ ]               Visual/Perceptual Training     [X]   Home Safety Training  [X]               Patient Education                 [X]        Family Training/Education  [ ]               Other (comment):     Frequency/Duration: Patient will be followed by occupational therapy 5 times a week to address goals. Discharge Recommendations: None  Further Equipment Recommendations for Discharge: possible hip kit       SUBJECTIVE:   Patient stated I need to go to the bathroom.       OBJECTIVE DATA SUMMARY:   HISTORY:   Past Medical History:   Diagnosis Date    Arthritis       KNEES & BACK.     Coronary atherosclerosis of native coronary artery      Diabetes ([facility] [address]       Hgb A1C 6-10-11 7.1%; NIDDM    Essential hypertension, benign      GERD (gastroesophageal reflux disease)      Hypertension      Mixed dyslipidemia       6-11:   HDL 33 LDL 95    S/P coronary artery stent placement July 29th, 2011     NAN to LAD     Past Surgical History:   Procedure Laterality Date    CARDIAC SURG PROCEDURE UNLIST   2009     CATHERIZATION WITH STENT X 1 PLACEMENT    HX GI   2010     COLONOSCOPY    HX GI   2010     ENDOSCOPY    HX HEENT         SEPTOPLASTY    HX HEENT         TONSILLECTOMY    HX KNEE REPLACEMENT Left 2008     DR Melinda Saeed    HX ORTHOPAEDIC Right 2010     KNEE REPLACEMENT    HX ORTHOPAEDIC 2007     RIGHT SHOULDER ROTATOR CUFF REPAIR. Prior Level of Function/Home Situation: pt was independent at home prior to surgery. Expanded or extensive additional review of patient history:      Home Situation  Home Environment: Private residence  # Steps to Enter: 0  One/Two Story Residence: One story  Living Alone: No  Support Systems: Family member(s)  Patient Expects to be Discharged to[de-identified] Private residence  Current DME Used/Available at Home: Yaquelin Plummer, katiuska  [X]  Right hand dominant             [ ]  Left hand dominant     EXAMINATION OF PERFORMANCE DEFICITS:  Cognitive/Behavioral Status:  Neurologic State: Alert  Orientation Level: Oriented X4  Cognition: Appropriate for age attention/concentration  Perception: Appears intact  Perseveration: No perseveration noted  Safety/Judgement: Good awareness of safety precautions     Skin: see nursing notes     Edema: none noted     Hearing: Auditory  Auditory Impairment: None     Vision/Perceptual:                           Acuity: Within Defined Limits          Range of Motion:     AROM: Within functional limits  PROM: Within functional limits                       Strength:     Strength: Within functional limits                 Coordination:  Coordination: Within functional limits  Fine Motor Skills-Upper: Right Intact; Left Intact    Gross Motor Skills-Upper: Right Intact; Left Intact     Tone & Sensation:     Tone: Normal  Sensation: Intact                       Balance:  Sitting: Intact  Sitting - Static: Good (unsupported)  Sitting - Dynamic: Good (unsupported)  Standing: Intact  Standing - Static: Good  Standing - Dynamic : Good     Functional Mobility and Transfers for ADLs:  Bed Mobility:  Supine to Sit: Supervision  Sit to Supine:  (remained on commode)     Transfers:  Sit to Stand: Supervision  Stand to Sit: Supervision  Bed to Chair: Supervision  Toilet Transfer : Supervision     ADL Assessment:  Feeding: Supervision     Oral Facial Hygiene/Grooming: Supervision     Bathing: Moderate assistance     Upper Body Dressing: Supervision     Lower Body Dressing: Minimum assistance     Toileting: Supervision                 ADL Intervention and task modifications:     AE training from EOB and did well with tasks. Assisted to bathroom following left on commode with call light within reach. Cognitive Retraining  Safety/Judgement: Good awareness of safety precautions        Functional Measure:  Barthel Index:      Bathin  Bladder: 10  Bowels: 10  Groomin  Dressin  Feeding: 10  Mobility: 10  Stairs: 5  Toilet Use: 5  Transfer (Bed to Chair and Back): 10  Total: 70         Barthel and G-code impairment scale:  Percentage of impairment CH  0% CI  1-19% CJ  20-39% CK  40-59% CL  60-79% CM  80-99% CN  100%   Barthel Score 0-100 100 99-80 79-60 59-40 20-39 1-19    0   Barthel Score 0-20 20 17-19 13-16 9-12 5-8 1-4 0      The Barthel ADL Index: Guidelines  1. The index should be used as a record of what a patient does, not as a record of what a patient could do. 2. The main aim is to establish degree of independence from any help, physical or verbal, however minor and for whatever reason. 3. The need for supervision renders the patient not independent. 4. A patient's performance should be established using the best available evidence. Asking the patient, friends/relatives and nurses are the usual sources, but direct observation and common sense are also important. However direct testing is not needed. 5. Usually the patient's performance over the preceding 24-48 hours is important, but occasionally longer periods will be relevant. 6. Middle categories imply that the patient supplies over 50 per cent of the effort. 7. Use of aids to be independent is allowed. Alva Dong., Barthel, D.W. (7856). Functional evaluation: the Barthel Index. [facility] (14)2.   AKIN Rangel, Melly Stern., Evelio Rocha., Saira Quiroga, CAROL. (1999). Measuring the change indisability after inpatient rehabilitation; comparison of the responsiveness of the Barthel Index and Functional [place] Measure. Journal of Neurology, Neurosurgery, and Psychiatry, 66(4), 339-624. JORGE De La Torre, DESMOND Viramontes, & Essence Oropeza M.A. (2004.) Assessment of post-stroke quality of life in cost-effectiveness studies: The usefulness of the Barthel Index and the EuroQoL-5D. Quality of Life Research, 13, 838-67      G codes: In compliance with CMSs Claims Based Outcome Reporting, the following G-code set was chosen for this patient based on their primary functional limitation being treated: The outcome measure chosen to determine the severity of the functional limitation was the Barthel Index with a score of 70/100 which was correlated with the impairment scale. · Self Care:               - CURRENT STATUS:    CJ - 20%-39% impaired, limited or restricted               - GOAL STATUS:           CI - 1%-19% impaired, limited or restricted               - D/C STATUS:                       ---------------To be determined---------------      Occupational Therapy Evaluation Charge Determination   History Examination Decision-Making   LOW Complexity : Brief history review  MEDIUM Complexity : 3-5 performance deficits relating to physical, cognitive , or psychosocial skils that result in activity limitations and / or participation restrictions MEDIUM Complexity : Patient may present with comorbidities that affect occupational performnce.  Miniml to moderate modification of tasks or assistance (eg, physical or verbal ) with assesment(s) is necessary to enable patient to complete evaluation       Based on the above components, the patient evaluation is determined to be of the following complexity level: LOW   Pain:  Pain Scale 1: Numeric (0 - 10)  Pain Intensity 1: 7  Pain Location 1: Knee        Pain Intervention(s) 1: Medication (see MAR)  Activity Tolerance:   VSS throughout session. After treatment:   [X] Patient left in no apparent distress sitting up in chair  [ ] Patient left in no apparent distress in bed  [X] Call bell left within reach  [X] Nursing notified  [ ] Caregiver present  [ ] Bed alarm activated      COMMUNICATION/EDUCATION:   The patients plan of care was discussed with: Physical Therapist and Registered Nurse.  [X] Home safety education was provided and the patient/caregiver indicated understanding. [X] Patient/family have participated as able in goal setting and plan of care. [ ] Patient/family agree to work toward stated goals and plan of care. [ ] Patient understands intent and goals of therapy, but is neutral about his/her participation. [ ] Patient is unable to participate in goal setting and plan of care. This patients plan of care is appropriate for delegation to [facility] [place].      Thank you for this referral.  Buddy Ramires OT  Time Calculation: 20 mins

## 2017-05-19 VITALS
BODY MASS INDEX: 25.97 KG/M2 | HEART RATE: 74 BPM | TEMPERATURE: 98.4 F | SYSTOLIC BLOOD PRESSURE: 149 MMHG | WEIGHT: 181.4 LBS | OXYGEN SATURATION: 99 % | HEIGHT: 70 IN | RESPIRATION RATE: 15 BRPM | DIASTOLIC BLOOD PRESSURE: 95 MMHG

## 2017-05-19 LAB
ANION GAP BLD CALC-SCNC: 7 MMOL/L (ref 5–15)
BUN SERPL-MCNC: 9 MG/DL (ref 6–20)
BUN/CREAT SERPL: 10 (ref 12–20)
CALCIUM SERPL-MCNC: 8.5 MG/DL (ref 8.5–10.1)
CHLORIDE SERPL-SCNC: 111 MMOL/L (ref 97–108)
CO2 SERPL-SCNC: 22 MMOL/L (ref 21–32)
CREAT SERPL-MCNC: 0.91 MG/DL (ref 0.7–1.3)
GLUCOSE BLD STRIP.AUTO-MCNC: 111 MG/DL (ref 65–100)
GLUCOSE BLD STRIP.AUTO-MCNC: 128 MG/DL (ref 65–100)
GLUCOSE SERPL-MCNC: 102 MG/DL (ref 65–100)
INR PPP: 1.9 (ref 0.9–1.1)
POTASSIUM SERPL-SCNC: 3.8 MMOL/L (ref 3.5–5.1)
PROTHROMBIN TIME: 19.1 SEC (ref 9–11.1)
SERVICE CMNT-IMP: ABNORMAL
SERVICE CMNT-IMP: ABNORMAL
SODIUM SERPL-SCNC: 140 MMOL/L (ref 136–145)

## 2017-05-19 PROCEDURE — 97116 GAIT TRAINING THERAPY: CPT

## 2017-05-19 PROCEDURE — 74011250637 HC RX REV CODE- 250/637: Performed by: PHYSICIAN ASSISTANT

## 2017-05-19 PROCEDURE — 80048 BASIC METABOLIC PNL TOTAL CA: CPT | Performed by: PHYSICIAN ASSISTANT

## 2017-05-19 PROCEDURE — 36415 COLL VENOUS BLD VENIPUNCTURE: CPT | Performed by: PHYSICIAN ASSISTANT

## 2017-05-19 PROCEDURE — 85610 PROTHROMBIN TIME: CPT | Performed by: PHYSICIAN ASSISTANT

## 2017-05-19 PROCEDURE — 74011250636 HC RX REV CODE- 250/636: Performed by: INTERNAL MEDICINE

## 2017-05-19 PROCEDURE — 74011250637 HC RX REV CODE- 250/637: Performed by: ORTHOPAEDIC SURGERY

## 2017-05-19 PROCEDURE — 82962 GLUCOSE BLOOD TEST: CPT

## 2017-05-19 PROCEDURE — 97535 SELF CARE MNGMENT TRAINING: CPT

## 2017-05-19 RX ORDER — OXYCODONE HYDROCHLORIDE 5 MG/1
5 TABLET ORAL
Qty: 60 TAB | Refills: 0 | Status: SHIPPED | OUTPATIENT
Start: 2017-05-19 | End: 2017-07-11

## 2017-05-19 RX ORDER — WARFARIN 2 MG/1
2 TABLET ORAL ONCE
Status: COMPLETED | OUTPATIENT
Start: 2017-05-19 | End: 2017-05-19

## 2017-05-19 RX ORDER — WARFARIN 3 MG/1
3 TABLET ORAL DAILY
Qty: 40 TAB | Refills: 0 | Status: SHIPPED | OUTPATIENT
Start: 2017-05-19 | End: 2017-07-11

## 2017-05-19 RX ORDER — ACETAMINOPHEN 325 MG/1
650 TABLET ORAL EVERY 6 HOURS
Qty: 90 TAB | Refills: 0 | Status: SHIPPED
Start: 2017-05-19 | End: 2017-08-02

## 2017-05-19 RX ORDER — CEFAZOLIN SODIUM IN 0.9 % NACL 2 G/50 ML
2 INTRAVENOUS SOLUTION, PIGGYBACK (ML) INTRAVENOUS EVERY 8 HOURS
Qty: 300 ML | Refills: 0 | Status: SHIPPED
Start: 2017-05-19 | End: 2017-07-11

## 2017-05-19 RX ADMIN — CEFAZOLIN 2 G: 1 INJECTION, POWDER, FOR SOLUTION INTRAMUSCULAR; INTRAVENOUS; PARENTERAL at 14:29

## 2017-05-19 RX ADMIN — PANTOPRAZOLE SODIUM 40 MG: 40 TABLET, DELAYED RELEASE ORAL at 07:27

## 2017-05-19 RX ADMIN — OXYCODONE HYDROCHLORIDE 5 MG: 5 TABLET ORAL at 07:28

## 2017-05-19 RX ADMIN — AMLODIPINE BESYLATE 5 MG: 5 TABLET ORAL at 08:26

## 2017-05-19 RX ADMIN — METFORMIN HYDROCHLORIDE 500 MG: 500 TABLET, FILM COATED ORAL at 08:27

## 2017-05-19 RX ADMIN — DOCUSATE SODIUM 100 MG: 100 CAPSULE, LIQUID FILLED ORAL at 08:27

## 2017-05-19 RX ADMIN — Medication 10 ML: at 15:18

## 2017-05-19 RX ADMIN — Medication 10 ML: at 14:29

## 2017-05-19 RX ADMIN — POLYETHYLENE GLYCOL 3350 17 G: 17 POWDER, FOR SOLUTION ORAL at 08:26

## 2017-05-19 RX ADMIN — WARFARIN SODIUM 2 MG: 2 TABLET ORAL at 12:10

## 2017-05-19 RX ADMIN — ACETAMINOPHEN 650 MG: 325 TABLET ORAL at 12:10

## 2017-05-19 RX ADMIN — CEFAZOLIN 2 G: 1 INJECTION, POWDER, FOR SOLUTION INTRAMUSCULAR; INTRAVENOUS; PARENTERAL at 07:27

## 2017-05-19 RX ADMIN — TAMSULOSIN HYDROCHLORIDE 0.4 MG: 0.4 CAPSULE ORAL at 08:27

## 2017-05-19 RX ADMIN — METOPROLOL SUCCINATE 50 MG: 50 TABLET, EXTENDED RELEASE ORAL at 08:27

## 2017-05-19 RX ADMIN — Medication 10 ML: at 06:00

## 2017-05-19 RX ADMIN — ACETAMINOPHEN 650 MG: 325 TABLET ORAL at 07:27

## 2017-05-19 RX ADMIN — DOCUSATE SODIUM AND SENNOSIDES 1 TABLET: 8.6; 5 TABLET, FILM COATED ORAL at 08:26

## 2017-05-19 RX ADMIN — OXYCODONE HYDROCHLORIDE 5 MG: 5 TABLET ORAL at 15:19

## 2017-05-19 NOTE — PROGRESS NOTES
Problem: Mobility Impaired (Adult and Pediatric)  Goal: *Acute Goals and Plan of Care (Insert Text)  Physical Therapy Goals  Initiated 5/17/2017  1. Patient will move from supine to sit and sit to supine in bed with modified independence within 5 day(s). 2. Patient will transfer from bed to chair and chair to bed with modified independence using the least restrictive device within 5 day(s). 3. Patient will perform sit to stand with modified independence within 5 day(s). 4. Patient will ambulate with modified independence for 125 feet with the least restrictive device within 5 day(s). PHYSICAL THERAPY TREATMENT  Patient: Radha Paz (60 y.o. male)  Date: 5/19/2017  Diagnosis: Joint prosthesis infection or inflammation ([address]  unknown  Joint prosthesis infection or inflammation ([facility] [address] <principal problem not specified>  Procedure(s) (LRB):  RESECTION ARTHROPLASTY RIGHT KNEE WITH PLACEMENT OF ANTIBIOTIC SPACER (Right) 3 Days Post-Op  Precautions: Fall, WBAT  Chart, physical therapy assessment, plan of care and goals were reviewed. ASSESSMENT:  Pt received supine in bed w/ knee immobilizer donned. Pt reminded to make sure RW is close by as pt stood from bed prior to RW being placed closer to pt, otherwise pt w/ good standing immediate standing balance. Pt agreeable to gait training w/ therapy, amb 350 FT w/ RW, SBA-Supervision. Pt reported 5/6-10 pain w/ gait w/ WBing, noted minimal circumduction of RLE d/t donned knee immobilizer limiting knee flexion. Pt practiced stairs once more (4steps, both rails) w/ CGA-SBA. Pt cued periodically throughout session to \"slow down\" w/ gait and stair training. Pt requested to use bathroom prior to returning to bed, Independent w/ self care, Supervision-Mod I for standing at sink and transfers on/off toilet. Pt cleared from PT standpoint for d/c home w/ HHPT (RN aware);owns RW for home use.  Reviewed icing schedule w/ pt and suggested pt opening knee immobilizer to ice and to reapply immobilizer straps prior to mobility; pt verbalized understanding. Progression toward goals:  [X]      Improving appropriately and progressing toward goals  [ ]      Improving slowly and progressing toward goals  [ ]      Not making progress toward goals and plan of care will be adjusted       PLAN:  Patient continues to benefit from skilled intervention to address the above impairments. Continue treatment per established plan of care. Discharge Recommendations:  Home Health  Further Equipment Recommendations for Discharge: Owns RW       SUBJECTIVE:   Patient stated I could work if you want me to.      OBJECTIVE DATA SUMMARY:   Critical Behavior:  Neurologic State: Alert  Orientation Level: Oriented X4  Cognition: Appropriate for age attention/concentration  Safety/Judgement: Good awareness of safety precautions                             Functional Mobility Training:  Bed Mobility:     Supine to Sit: Modified independent  Sit to Supine: Modified independent                       Transfers:  Sit to Stand: Supervision  Stand to Sit: Supervision                             Balance:  Sitting: Intact  Standing: Intact; With support  Ambulation/Gait Training:  Distance (ft): 350 Feet (ft)  Assistive Device: Gait belt;Walker, rolling  Ambulation - Level of Assistance: Supervision;Stand-by asssistance        Gait Abnormalities: Circumduction (RLE d/t knee immobilizer)  Right Side Weight Bearing: As tolerated        Stance: Right decreased  Speed/January: Pace decreased (<100 feet/min)                                  Stairs:  Number of Stairs Trained: 4  Stairs - Level of Assistance: Stand-by asssistance  Rail Use: Both           Pain:  Pain Scale 1: Numeric (0 - 10)  Pain Intensity 1: 1  Pain Location 1: Knee  Pain Orientation 1: Right  Pain Description 1: Aching  Pain Intervention(s) 1: Repositioned  Activity Tolerance:   Good  Please refer to the flowsheet for vital signs taken during this treatment.   After treatment:   [ ] Patient left in no apparent distress sitting up in chair  [X] Patient left in no apparent distress in bed  [X] Call bell left within reach  [X] Nursing notified  [ ] Caregiver present  [ ] Bed alarm activated      COMMUNICATION/COLLABORATION:   The patients plan of care was discussed with: Registered Nurse Alissa Garrison PTA   Time Calculation: 9 mins

## 2017-05-19 NOTE — PROGRESS NOTES
I have reviewed discharge instructions with the patient. The patient verbalized understanding. Pt was dc home with [employer] [facility] via wheelchair by volunteers. Pt's antibiotics are already at his house.

## 2017-05-19 NOTE — ROUTINE PROCESS
Bedside shift change report given to [address] [place] [address] (oncoming nurse) by Cayla Spencer RN(offgoing nurse). Report given with SBAR.

## 2017-05-19 NOTE — PROGRESS NOTES
Problem: Self Care Deficits Care Plan (Adult)  Goal: *Acute Goals and Plan of Care (Insert Text)  Occupational Therapy Goals  Initiated: 5/18/2017    1. Patient will perform grooming with supervision/set-up standing at sink within 3 day(s). 2. Patient will perform bathing with supervision/set-up from chair within 3 day(s). 3. Patient will perform upper body dressing and lower body dressing with supervision/set-up within 3 day(s). 4. Patient will perform toilet transfers with supervision/set-up within 3 day(s). 5. Patient will perform all aspects of toileting with supervision/set-up within 3 day(s). OCCUPATIONAL THERAPY TREATMENT/DISCHARGE  Patient: Lois Powell (15 y.o. male)  Date: 5/19/2017  Diagnosis: Joint prosthesis infection or inflammation ([address]  unknown  Joint prosthesis infection or inflammation ([address] <principal problem not specified>  Procedure(s) (LRB):  RESECTION ARTHROPLASTY RIGHT KNEE WITH PLACEMENT OF ANTIBIOTIC SPACER (Right) 3 Days Post-Op  Precautions: Fall, WBAT      ASSESSMENT:  Pt did great with training and education at this time. He was able to complete bathing and dressing from chair level and overall did very good. He does have a reacher for home and educated on how to use reacher to maximize independence with ADL activity. He will have support from family at discharge. No further OT needs. Progression toward goals:  [X]            Improving appropriately and progressing toward goals  [ ]            Improving slowly and progressing toward goals  [ ]            Not making progress toward goals and plan of care will be adjusted       PLAN:  Patient will be discharged from occupational therapy at this time.   Rationale for discharge:  [X]   Goals Achieved  [ ]   Edilia Woo  [ ]   Patient not participating in therapy  [ ]   Other:  Discharge Recommendations:  None  Further Equipment Recommendations for Discharge:  none       SUBJECTIVE:   Patient stated I am feeling good.      OBJECTIVE DATA SUMMARY:   Cognitive/Behavioral Status:  Neurologic State: Alert  Orientation Level: Oriented X4  Cognition: Appropriate for age attention/concentration  Perception: Appears intact  Perseveration: No perseveration noted  Safety/Judgement: Good awareness of safety precautions     Functional Mobility and Transfers for ADLs:  Bed Mobility:  Supine to Sit: Modified independent  Sit to Supine: Modified independent     Transfers:  Sit to Stand: Modified independent        Balance:  Sitting: Intact  Sitting - Static: Good (unsupported)  Sitting - Dynamic: Good (unsupported)  Standing: Intact; With support  Standing - Static: Good  Standing - Dynamic : Good     ADL Intervention:              Upper Body Bathing  Bathing Assistance: Supervision/set-up (setup for CHG bathing and completed )     Lower Body Bathing  Bathing Assistance: Supervision/set-up  Perineal  : Supervision/set-up     Upper Body Dressing Assistance  Dressing Assistance: Supervision/set-up     Lower Body Dressing Assistance  Dressing Assistance: Supervision/set up  Underpants: Supervision/set-up  Pants With Elastic Waist: Supervision/set-up  Leg Crossed Method Used: No  Position Performed: Seated in chair  Cues: Verbal cues provided  Adaptive Equipment Used: Reacher           Cognitive Retraining  Safety/Judgement: Good awareness of safety precautions     Pain:  Pain Scale 1: Numeric (0 - 10)  Pain Intensity 1: 5  Pain Location 1: Knee  Pain Orientation 1: Right  Pain Description 1: Aching  Pain Intervention(s) 1: Medication (see MAR)  Activity Tolerance:   VSS throughout session.       After treatment:   [X] Patient left in no apparent distress sitting up in chair  [ ] Patient left in no apparent distress in bed  [X] Call bell left within reach  [X] Nursing notified  [ ] Caregiver present  [ ] Bed alarm activated      COMMUNICATION/COLLABORATION:   The patients plan of care was discussed with: Physical Therapist and Registered Nurse     Pan Bishop, OT  Time Calculation: 15 mins

## 2017-05-19 NOTE — PROGRESS NOTES
Pharmacist Note  Warfarin Dosing  Consult provided for this 78 y.o. male to manage warfarin for Orthopedic Surgery (VTE prophylaxis)    INR Goal: 1.7- 2.2    Therapy Day: 4    Drugs that may increase INR:None  Drugs that may decrease INR: None  Other current anticoagulants/ drugs that may increase bleeding risk: None  Risk factors: Age > 65  Daily INR ordered: YES    Recent Labs      05/19/17   0417  05/18/17   0650  05/17/17   0633   HGB   --   9.9*  10.6*   INR  1.9*  1.6*  1.4*       Date               INR                 Dose  5/16                1.1                   3 mg   5/17                1.4                   3 mg     5/18  1.6  3 mg  5/19  1.9  3 mg            Assessment/ Plan: Will order warfarin 2 mg PO x 1 dose. Pharmacy will continue to monitor daily and adjust therapy as indicated. Please contact the pharmacist at  or  for discharge recommendations if needed.

## 2017-05-19 NOTE — PROGRESS NOTES
Bedside shift change report given to [facility] (oncoming nurse) by Elba Herman (offgoing nurse). Report included the following information SBAR and Kardex.

## 2017-05-19 NOTE — PROGRESS NOTES
ID Progress Note  2017    vanco     Subjective:     Afebrile. Had right knee resection arthoplasty 3 days ago. No complaints. Objective:     Vitals:   Visit Vitals    /65 (BP 1 Location: Right arm, BP Patient Position: At rest)    Pulse 77    Temp 98.2 °F (36.8 °C)    Resp 16    Ht 5' 10\" (1.778 m)    Wt 82.3 kg (181 lb 6.4 oz)    SpO2 99%    BMI 26.03 kg/m2        Tmax:  Temp (24hrs), Av.3 °F (36.8 °C), Min:98.2 °F (36.8 °C), Max:98.3 °F (36.8 °C)      Exam:    Not in distress  Lungs: clear to auscultation  Heart: s1, s2, no murmurs   Abdomen: soft, nontender  Extremities: right knee bandaged     Labs:   Lab Results   Component Value Date/Time    WBC 9.9 2017 10:11 AM    HGB 9.9 2017 06:50 AM    HCT 40.6 2017 10:11 AM    PLATELET 238 5972 10:11 AM    MCV 86.6 2017 10:11 AM     Lab Results   Component Value Date/Time    Sodium 140 2017 04:17 AM    Potassium 3.8 2017 04:17 AM    Chloride 111 2017 04:17 AM    CO2 22 2017 04:17 AM    Anion gap 7 2017 04:17 AM    Glucose 102 2017 04:17 AM    BUN 9 2017 04:17 AM    Creatinine 0.91 2017 04:17 AM    BUN/Creatinine ratio 10 2017 04:17 AM    GFR est AA >60 2017 04:17 AM    GFR est non-AA >60 2017 04:17 AM    Calcium 8.5 2017 04:17 AM    Bilirubin, total 0.7 2017 08:51 AM    AST (SGOT) 13 2017 08:51 AM    Alk. phosphatase 54 2017 08:51 AM    Protein, total 7.1 2017 08:51 AM    Albumin 4.2 2017 08:51 AM    Globulin 4.5 10/22/2016 09:39 AM    A-G Ratio 1.4 2017 08:51 AM    ALT (SGPT) 8 2017 08:51 AM             Assessment:     1. Right prosthetic knee infection, with methicillin-sensitive Staphylococcus aureus. 2. History of MSSA bacteremia with a negative MILAN. 3. Diabetes. 4. Coronary artery disease. 5. Hypertension. Recommendations:     Cultures growing MSSA. On cefazolin. Orders written.  Team available over the weekend if needed.      Prudence MD Dwayne

## 2017-05-19 NOTE — DISCHARGE SUMMARY
Orthopedic Service Discharge Summary    Patient ID:  Candice Joya  [id number]  male  78 y.o.  1950    Admit date: 5/16/2017    Discharge date and time: 5/19/2017  7:00 PM     Admitting Physician: Cassie Leos MD     Discharge Physician: Cassie Leos MD    Consulting Physician(s): Treatment Team: Consulting Provider: HETAL Mckeon; Consulting Provider: Cliff Mitchell MD; Utilization Review: [place], RN; Care Manager: DELANEY Mosher    Date of Surgery: 5/16/2017     Preoperative Diagnosis:  INFECTED RIGHT TOTAL KNEE REPLACEMENT    Postoperative Diagnosis: INFECTED RIGHT TOTAL KNEE REPLACEMENT    Procedure(s): Procedure(s):  RESECTION ARTHROPLASTY RIGHT KNEE WITH PLACEMENT OF ANTIBIOTIC SPACER    Surgeon: Surgeon(s) and Role:     Mayuri Singh MD - Primary      Anesthesia:  General    Preoperative Medical Clearance:                           HPI:  Pt is a 78 y.o. male who has a history of Joint prosthesis infection or inflammation ([address]  unknown  Joint prosthesis infection or inflammation ([address]  with pain and limitations of activities of daily living who presents at this time for a right knee resection arthroplasty and placement of antibiotic spacer following the failure of conservative management. PMH:   Past Medical History:   Diagnosis Date    Arthritis     KNEES & BACK.  Coronary atherosclerosis of native coronary artery     Diabetes ([address]     Hgb A1C 6-10-11 7.1%; NIDDM    Essential hypertension, benign     GERD (gastroesophageal reflux disease)     Hypertension     Mixed dyslipidemia     6-11:   HDL 33 LDL 95    S/P coronary artery stent placement July 29th, 2011    NAN to LAD       Medications upon admission :   Prior to Admission Medications   Prescriptions Last Dose Informant Patient Reported? Taking?    amLODIPine (NORVASC) 5 mg tablet 5/15/2017  No Yes   Sig: TAKE ONE TABLET BY MOUTH ONCE DAILY   aspirin 81 mg chewable tablet 5/15/2017  No Yes   Sig: Take 1 Tab by mouth daily. atorvastatin (LIPITOR) 10 mg tablet 5/15/2017  Yes Yes   Sig: Take 10 mg by mouth nightly. esomeprazole (NEXIUM) 20 mg capsule 5/15/2017  Yes Yes   Sig: Take 20 mg by mouth daily as needed. fenofibrate (LOFIBRA) 160 mg tablet 5/15/2017  No Yes   Sig: TAKE ONE TABLET BY MOUTH ONCE DAILY   ibuprofen (ADVIL) 200 mg tablet 5/15/2017  Yes Yes   Sig: Take 200 mg by mouth every eight (8) hours as needed for Pain.   metFORMIN (GLUCOPHAGE) 500 mg tablet 5/15/2017  No Yes   Sig: Take 1 Tab by mouth two (2) times daily (with meals). metoprolol succinate (TOPROL-XL) 50 mg XL tablet 5/15/2017  No Yes   Sig: TAKE ONE TABLET BY MOUTH ONCE DAILY   nitroglycerin (NITROSTAT) 0.4 mg SL tablet   No Yes   Sig: DISSOLVE ONE TABLET UNDER THE TONGUE EVERY 5 MINUTES AS NEEDED FOR CHEST PAIN. UP TO 3 DOSES   tamsulosin (FLOMAX) 0.4 mg capsule 5/15/2017  No Yes   Sig: TAKE ONE CAPSULE BY MOUTH ONCE DAILY      Facility-Administered Medications: None        Allergies: Allergies   Allergen Reactions    Levofloxacin Rash    Pcn [Penicillins] Rash    Tape [Adhesive] Rash     Medipore tape caused large blisters when removed. Hospital Course: The patient underwent surgery. Complications:  None; patient tolerated the procedure well. Was taken to the PACU in stable condition and then transferred to the Orthopedics floor. Condition on discharge: good    Perioperative Antibiotics:  Cefazolin, vancomycin     Postoperative Pain Management:  acetaminophen and oxycodone    DVT Prophylaxis: warfarin     Postoperative transfusions:     none Banked PRBCs     Post Op complications: none    Hemoglobin at discharge:    Lab Results   Component Value Date/Time    HGB 9.9 (L) 05/18/2017 06:50 AM    INR 3.0 (A) 05/22/2017 11:22 AM    INR 1.9 (H) 05/19/2017 04:17 AM       Dressing was changed on POD # 2. Incision - clean, dry and intact. No significant erythema or swelling.  Neurovascular exam within normal limits. Wound appears to be healing without any evidence of infection. Pt had a HVAC drain the was removed on day 2. Physical Therapy started on the day following surgery and progressed to ambulation with the aid of a rolling walker for distances of 350 feet with supervision. Range of motion  limited by pain. At the time of discharge, able to go up and down stairs and had understanding of precautions needed following surgery. Discharged to: Home with home health. Discharge instructions:  -See Full Summary of discharge instructions attached  -Anticoagulate with warfarin  -Resume pre hospital diet            -Resume home medications   Discharge Medication List as of 5/19/2017  2:58 PM      START taking these medications    Details   acetaminophen (TYLENOL) 325 mg tablet Take 2 Tabs by mouth every six (6) hours. , No Print, Disp-90 Tab, R-0      ceFAZolin in 0.9% NS (ANCEF) soln IVPB 2 g by IntraVENous route every eight (8) hours. , No Print, Disp-300 mL, R-0      oxyCODONE IR (ROXICODONE) 5 mg immediate release tablet Take 1 Tab by mouth every three (3) hours as needed. Max Daily Amount: 40 mg., Print, Disp-60 Tab, R-0      warfarin (COUMADIN) 3 mg tablet Take 1 Tab by mouth daily. , Print, Disp-40 Tab, R-0         CONTINUE these medications which have NOT CHANGED    Details   atorvastatin (LIPITOR) 10 mg tablet Take 10 mg by mouth nightly., Historical Med      esomeprazole (NEXIUM) 20 mg capsule Take 20 mg by mouth daily as needed., Historical Med      metoprolol succinate (TOPROL-XL) 50 mg XL tablet TAKE ONE TABLET BY MOUTH ONCE DAILY, Normal, Disp-90 Tab, R-3      amLODIPine (NORVASC) 5 mg tablet TAKE ONE TABLET BY MOUTH ONCE DAILY, Normal, Disp-90 Tab, R-3      nitroglycerin (NITROSTAT) 0.4 mg SL tablet DISSOLVE ONE TABLET UNDER THE TONGUE EVERY 5 MINUTES AS NEEDED FOR CHEST PAIN.  UP TO 3 DOSES, Normal, Disp-25 Tab, R-3      fenofibrate (LOFIBRA) 160 mg tablet TAKE ONE TABLET BY MOUTH ONCE DAILY, Normal, Disp-90 Tab, R-0      metFORMIN (GLUCOPHAGE) 500 mg tablet Take 1 Tab by mouth two (2) times daily (with meals). , Normal, Disp-180 Tab, R-1      tamsulosin (FLOMAX) 0.4 mg capsule TAKE ONE CAPSULE BY MOUTH ONCE DAILY, Mail Order, Disp-90 Cap, R-0         STOP taking these medications       ibuprofen (ADVIL) 200 mg tablet Comments:   Reason for Stopping:         aspirin 81 mg chewable tablet Comments:   Reason for Stopping:            per medical continuation form  -Discharge activity: Activity as tolerated  -Ambulate with Walkers, Type: Rolling Walker, appropriate total joint protocol  -Wound Care Keep wound clean and dry, Reinforce dressing PRN, Ice to area for comfort and As directed  -Follow up in office in 2 weeks      Signed:  Igor Jessica PA-C  5/23/2017  8:05 AM        No att. providers found

## 2017-05-19 NOTE — PROGRESS NOTES
5/19/17 0955:  CM s/w [facility] liasaud Bills, confirmed that patient has copay which is close to that he had for previous IV ABX course (October 2016). Loli to be at [facility] at 1130hrs to s/w patient and provide teaching for discharge home. CM visited patient bedside, advised him that record shows (and [facility] confirms) patient had copays for last infusion course. Patient states that he does not recall writing checks. He states that he will pay whatever copays needed, however still with questions regarding how Valeri has run his insurance benefits and how his copays were structured last year (as he cannot remember). CM advised patient that he must s/w [facility] directly for additional billing/payment questions, as [facility] unable to intervene with IV Infusion claim. Patient verbalized understanding, is calling his wife to review his check history and will disucss further billing matters with [facility]. No further CM needs. DELANEY Loredo/[facility]    ----------------  5/19/17 0958  JACKIE approached by patient's nurse, advised that patient upset and asking to s/w CM because [facility] called him requesting up-front copay for home IV infusion and patient does not recall having copays for last IV ABX course (in Oct 2016 per record). CM called [facility] Bills [phone], left voicemail requesting return call. Also called [place] office [phone] and sent message through [employer] to request clarification. Will not meet with patient until response received from [place], also expect [facility] to visit patient on-site for teaching 2/2 planned discharge today. Will follow.  DELANEY Loredo/[facility]

## 2017-05-19 NOTE — PROGRESS NOTES
Bedside and Verbal shift change report given to Patria Tanner RN (oncoming nurse) by Herbert Dumas RN (offgoing nurse). Report included the following information SBAR.

## 2017-05-19 NOTE — PROGRESS NOTES
Ortho Progress Note  3 Days Post-Op  Procedure(s):  RESECTION ARTHROPLASTY RIGHT KNEE WITH PLACEMENT OF ANTIBIOTIC SPACER    Subjective: No acute events overnight. Pt doing well today, not much pain. Pt has PICC line in place and is ready to d/c home. Pt denies F/Ch, SOB, lightheadedness, dizziness, or abdominal pain. Physical Exam:   Visit Vitals    /86 (BP 1 Location: Right arm, BP Patient Position: At rest)    Pulse 73    Temp 98.3 °F (36.8 °C)    Resp 15    Ht 5' 10\" (1.778 m)    Wt 82.3 kg (181 lb 6.4 oz)    SpO2 99%    BMI 26.03 kg/m2     General appearance: alert, cooperative, no distress, appears stated age  Abdomen: soft, non-tender.  Bowel sounds normal. No masses, no organomegaly  Extremities: Homans sign is negative, no sign of DVT, pt in knee immobilizer, so ROM not assessed, no calf pain or swelling, mild swelling R foot  Pulses: 2+ and symmetric  Wound: dressing c/d/i, no evidence of drainage  Neurologic: Grossly normal, ankle dorsi/plantar flexion intact, limited heel raise    Recent Results (from the past 24 hour(s))   GLUCOSE, POC    Collection Time: 05/18/17 11:09 AM   Result Value Ref Range    Glucose (POC) 109 (H) 65 - 100 mg/dL    Performed by Brayden Walker    GLUCOSE, POC    Collection Time: 05/18/17  4:09 PM   Result Value Ref Range    Glucose (POC) 142 (H) 65 - 100 mg/dL    Performed by [address] [place] [address], POC    Collection Time: 05/18/17  8:57 PM   Result Value Ref Range    Glucose (POC) 154 (H) 65 - 100 mg/dL    Performed by Laura Lloyd    PROTHROMBIN TIME + INR    Collection Time: 05/19/17  4:17 AM   Result Value Ref Range    INR 1.9 (H) 0.9 - 1.1      Prothrombin time 19.1 (H) 9.0 - 24.4 sec   METABOLIC PANEL, BASIC    Collection Time: 05/19/17  4:17 AM   Result Value Ref Range    Sodium 140 136 - 145 mmol/L    Potassium 3.8 3.5 - 5.1 mmol/L    Chloride 111 (H) 97 - 108 mmol/L    CO2 22 21 - 32 mmol/L    Anion gap 7 5 - 15 mmol/L    Glucose 102 (H) 65 - 100 mg/dL BUN 9 6 - 20 MG/DL    Creatinine 0.91 0.70 - 1.30 MG/DL    BUN/Creatinine ratio 10 (L) 12 - 20      GFR est AA >60 >60 ml/min/1.73m2    GFR est non-AA >60 >60 ml/min/1.73m2    Calcium 8.5 8.5 - 10.1 MG/DL   GLUCOSE, POC    Collection Time: 05/19/17  6:11 AM   Result Value Ref Range    Glucose (POC) 111 (H) 65 - 100 mg/dL    Performed by Gwen Parker        Assessment:  Stable Post Op    Plan:  DVT Prophylaxis: Coumadin, pt received 10mg 5/16, pharmacy to dose                                INR: 1.9 (goal 1.7-2.2)  Physical Therapy: WBAT with immobilizer (pt amb 30 5/17)  Discharge Planning: discharge orders from ID in, PICC line in place                                    D/c with home health PT and nursing                                    F/u in office in 2 weeks  Infected R knee replacement: initial asp growing Gram + cocci                                ID on board                                Pt on IV cefazolin                                hemovac drain d/c 5/18                               Pt with previous MSSA bacteremia  Pain control: tylenol, oxycodone 5mg  Acute blood loss anemia: hgb 9.9, normal post op finding, pt asymptomatic, will continue to monitor

## 2017-05-20 ENCOUNTER — HOME CARE VISIT (OUTPATIENT)
Dept: SCHEDULING | Facility: HOME HEALTH | Age: 67
End: 2017-05-20
Payer: MEDICARE

## 2017-05-20 VITALS
RESPIRATION RATE: 16 BRPM | TEMPERATURE: 98.6 F | DIASTOLIC BLOOD PRESSURE: 70 MMHG | SYSTOLIC BLOOD PRESSURE: 132 MMHG | OXYGEN SATURATION: 99 % | HEART RATE: 77 BPM

## 2017-05-20 VITALS
OXYGEN SATURATION: 99 % | TEMPERATURE: 98.6 F | WEIGHT: 180 LBS | SYSTOLIC BLOOD PRESSURE: 132 MMHG | DIASTOLIC BLOOD PRESSURE: 70 MMHG | BODY MASS INDEX: 25.77 KG/M2 | HEIGHT: 70 IN | RESPIRATION RATE: 16 BRPM | HEART RATE: 77 BPM

## 2017-05-20 PROCEDURE — 3331090002 HH PPS REVENUE DEBIT

## 2017-05-20 PROCEDURE — G0151 HHCP-SERV OF PT,EA 15 MIN: HCPCS

## 2017-05-20 PROCEDURE — G0299 HHS/HOSPICE OF RN EA 15 MIN: HCPCS

## 2017-05-20 PROCEDURE — 400013 HH SOC

## 2017-05-20 PROCEDURE — 3331090001 HH PPS REVENUE CREDIT

## 2017-05-21 PROCEDURE — 3331090001 HH PPS REVENUE CREDIT

## 2017-05-21 PROCEDURE — 3331090002 HH PPS REVENUE DEBIT

## 2017-05-22 ENCOUNTER — HOME CARE VISIT (OUTPATIENT)
Dept: HOME HEALTH SERVICES | Facility: HOME HEALTH | Age: 67
End: 2017-05-22
Payer: MEDICARE

## 2017-05-22 ENCOUNTER — HOME CARE VISIT (OUTPATIENT)
Dept: SCHEDULING | Facility: HOME HEALTH | Age: 67
End: 2017-05-22
Payer: MEDICARE

## 2017-05-22 VITALS
TEMPERATURE: 97.7 F | OXYGEN SATURATION: 98 % | SYSTOLIC BLOOD PRESSURE: 134 MMHG | DIASTOLIC BLOOD PRESSURE: 70 MMHG | RESPIRATION RATE: 18 BRPM | HEART RATE: 68 BPM

## 2017-05-22 LAB
BACTERIA SPEC CULT: NORMAL
INR, POC: 3 (ref 0.7–1.2)
PROTHROMBIN TIME, POC: 36 SECONDS (ref 6.5–11.9)
SERVICE CMNT-IMP: NORMAL

## 2017-05-22 PROCEDURE — 3331090002 HH PPS REVENUE DEBIT

## 2017-05-22 PROCEDURE — 3331090001 HH PPS REVENUE CREDIT

## 2017-05-22 PROCEDURE — G0300 HHS/HOSPICE OF LPN EA 15 MIN: HCPCS

## 2017-05-23 ENCOUNTER — HOME CARE VISIT (OUTPATIENT)
Dept: HOME HEALTH SERVICES | Facility: HOME HEALTH | Age: 67
End: 2017-05-23
Payer: MEDICARE

## 2017-05-23 ENCOUNTER — HOME CARE VISIT (OUTPATIENT)
Dept: SCHEDULING | Facility: HOME HEALTH | Age: 67
End: 2017-05-23
Payer: MEDICARE

## 2017-05-23 VITALS — SYSTOLIC BLOOD PRESSURE: 128 MMHG | DIASTOLIC BLOOD PRESSURE: 70 MMHG

## 2017-05-23 PROCEDURE — G0151 HHCP-SERV OF PT,EA 15 MIN: HCPCS

## 2017-05-23 PROCEDURE — 3331090002 HH PPS REVENUE DEBIT

## 2017-05-23 PROCEDURE — 3331090001 HH PPS REVENUE CREDIT

## 2017-05-24 PROCEDURE — 3331090001 HH PPS REVENUE CREDIT

## 2017-05-24 PROCEDURE — 3331090002 HH PPS REVENUE DEBIT

## 2017-05-25 ENCOUNTER — HOME CARE VISIT (OUTPATIENT)
Dept: HOME HEALTH SERVICES | Facility: HOME HEALTH | Age: 67
End: 2017-05-25
Payer: MEDICARE

## 2017-05-25 ENCOUNTER — HOME CARE VISIT (OUTPATIENT)
Dept: SCHEDULING | Facility: HOME HEALTH | Age: 67
End: 2017-05-25
Payer: MEDICARE

## 2017-05-25 VITALS
TEMPERATURE: 97.6 F | DIASTOLIC BLOOD PRESSURE: 70 MMHG | HEART RATE: 77 BPM | RESPIRATION RATE: 16 BRPM | SYSTOLIC BLOOD PRESSURE: 118 MMHG | OXYGEN SATURATION: 97 %

## 2017-05-25 LAB
INR BLD: 2.1 (ref 0.9–1.1)
PT POC: 25.4 SECONDS (ref 11.8–14.9)

## 2017-05-25 PROCEDURE — 3331090002 HH PPS REVENUE DEBIT

## 2017-05-25 PROCEDURE — 3331090001 HH PPS REVENUE CREDIT

## 2017-05-25 PROCEDURE — G0300 HHS/HOSPICE OF LPN EA 15 MIN: HCPCS

## 2017-05-26 PROCEDURE — 3331090002 HH PPS REVENUE DEBIT

## 2017-05-26 PROCEDURE — 3331090001 HH PPS REVENUE CREDIT

## 2017-05-27 PROCEDURE — 3331090002 HH PPS REVENUE DEBIT

## 2017-05-27 PROCEDURE — 3331090001 HH PPS REVENUE CREDIT

## 2017-05-28 PROCEDURE — 3331090002 HH PPS REVENUE DEBIT

## 2017-05-28 PROCEDURE — 3331090001 HH PPS REVENUE CREDIT

## 2017-05-29 PROCEDURE — 3331090001 HH PPS REVENUE CREDIT

## 2017-05-29 PROCEDURE — 3331090002 HH PPS REVENUE DEBIT

## 2017-05-30 ENCOUNTER — HOME CARE VISIT (OUTPATIENT)
Dept: SCHEDULING | Facility: HOME HEALTH | Age: 67
End: 2017-05-30
Payer: MEDICARE

## 2017-05-30 VITALS
SYSTOLIC BLOOD PRESSURE: 119 MMHG | OXYGEN SATURATION: 99 % | DIASTOLIC BLOOD PRESSURE: 77 MMHG | TEMPERATURE: 97.3 F | HEART RATE: 76 BPM | RESPIRATION RATE: 16 BRPM

## 2017-05-30 LAB
INR BLD: 2.6 (ref 0.9–1.1)
PT POC: 31 SECONDS (ref 11.8–14.9)

## 2017-05-30 PROCEDURE — 3331090001 HH PPS REVENUE CREDIT

## 2017-05-30 PROCEDURE — G0299 HHS/HOSPICE OF RN EA 15 MIN: HCPCS

## 2017-05-30 PROCEDURE — 3331090002 HH PPS REVENUE DEBIT

## 2017-05-31 PROCEDURE — 3331090001 HH PPS REVENUE CREDIT

## 2017-05-31 PROCEDURE — 3331090002 HH PPS REVENUE DEBIT

## 2017-06-01 ENCOUNTER — HOME CARE VISIT (OUTPATIENT)
Dept: SCHEDULING | Facility: HOME HEALTH | Age: 67
End: 2017-06-01
Payer: MEDICARE

## 2017-06-01 VITALS
DIASTOLIC BLOOD PRESSURE: 70 MMHG | TEMPERATURE: 97.7 F | SYSTOLIC BLOOD PRESSURE: 120 MMHG | RESPIRATION RATE: 16 BRPM | OXYGEN SATURATION: 98 % | HEART RATE: 73 BPM

## 2017-06-01 LAB
INR BLD: 2.7 (ref 0.9–1.1)
PT POC: 31.9 SECONDS (ref 11.8–14.9)

## 2017-06-01 PROCEDURE — G0300 HHS/HOSPICE OF LPN EA 15 MIN: HCPCS

## 2017-06-01 PROCEDURE — 3331090002 HH PPS REVENUE DEBIT

## 2017-06-01 PROCEDURE — 3331090001 HH PPS REVENUE CREDIT

## 2017-06-02 PROCEDURE — 3331090001 HH PPS REVENUE CREDIT

## 2017-06-02 PROCEDURE — 3331090002 HH PPS REVENUE DEBIT

## 2017-06-03 PROCEDURE — 3331090001 HH PPS REVENUE CREDIT

## 2017-06-03 PROCEDURE — 3331090002 HH PPS REVENUE DEBIT

## 2017-06-04 PROCEDURE — 3331090002 HH PPS REVENUE DEBIT

## 2017-06-04 PROCEDURE — 3331090001 HH PPS REVENUE CREDIT

## 2017-06-04 NOTE — TELEPHONE ENCOUNTER
Patient called today for medication follow up appointment. Scheduled appointment on 7/6/17 however patient states they will be out of their medication before that date. Please refill enough medication until scheduled appointment. No significant past surgical history

## 2017-06-05 ENCOUNTER — HOME CARE VISIT (OUTPATIENT)
Dept: SCHEDULING | Facility: HOME HEALTH | Age: 67
End: 2017-06-05
Payer: MEDICARE

## 2017-06-05 VITALS
TEMPERATURE: 97.5 F | RESPIRATION RATE: 16 BRPM | SYSTOLIC BLOOD PRESSURE: 118 MMHG | DIASTOLIC BLOOD PRESSURE: 90 MMHG | OXYGEN SATURATION: 98 % | HEART RATE: 61 BPM

## 2017-06-05 LAB
INR BLD: 2.9 (ref 0.9–1.1)
PT POC: 35.1 SECONDS (ref 11.8–14.9)

## 2017-06-05 PROCEDURE — G0300 HHS/HOSPICE OF LPN EA 15 MIN: HCPCS

## 2017-06-05 PROCEDURE — 3331090001 HH PPS REVENUE CREDIT

## 2017-06-05 PROCEDURE — 3331090002 HH PPS REVENUE DEBIT

## 2017-06-06 PROCEDURE — 3331090001 HH PPS REVENUE CREDIT

## 2017-06-06 PROCEDURE — 3331090002 HH PPS REVENUE DEBIT

## 2017-06-07 PROCEDURE — 3331090002 HH PPS REVENUE DEBIT

## 2017-06-07 PROCEDURE — 3331090001 HH PPS REVENUE CREDIT

## 2017-06-08 ENCOUNTER — HOME CARE VISIT (OUTPATIENT)
Dept: SCHEDULING | Facility: HOME HEALTH | Age: 67
End: 2017-06-08
Payer: MEDICARE

## 2017-06-08 VITALS
DIASTOLIC BLOOD PRESSURE: 68 MMHG | OXYGEN SATURATION: 99 % | RESPIRATION RATE: 16 BRPM | HEART RATE: 65 BPM | TEMPERATURE: 97.5 F | SYSTOLIC BLOOD PRESSURE: 114 MMHG

## 2017-06-08 LAB
INR BLD: 2.6 (ref 0.9–1.1)
PT POC: 31.3 SECONDS (ref 11.8–14.9)

## 2017-06-08 PROCEDURE — 3331090002 HH PPS REVENUE DEBIT

## 2017-06-08 PROCEDURE — 3331090001 HH PPS REVENUE CREDIT

## 2017-06-08 PROCEDURE — G0300 HHS/HOSPICE OF LPN EA 15 MIN: HCPCS

## 2017-06-09 PROCEDURE — 3331090001 HH PPS REVENUE CREDIT

## 2017-06-09 PROCEDURE — 3331090002 HH PPS REVENUE DEBIT

## 2017-06-10 PROCEDURE — 3331090001 HH PPS REVENUE CREDIT

## 2017-06-10 PROCEDURE — 3331090002 HH PPS REVENUE DEBIT

## 2017-06-11 PROCEDURE — 3331090001 HH PPS REVENUE CREDIT

## 2017-06-11 PROCEDURE — 3331090002 HH PPS REVENUE DEBIT

## 2017-06-12 ENCOUNTER — HOME CARE VISIT (OUTPATIENT)
Dept: SCHEDULING | Facility: HOME HEALTH | Age: 67
End: 2017-06-12
Payer: MEDICARE

## 2017-06-12 VITALS
TEMPERATURE: 98 F | HEART RATE: 64 BPM | DIASTOLIC BLOOD PRESSURE: 68 MMHG | RESPIRATION RATE: 16 BRPM | SYSTOLIC BLOOD PRESSURE: 106 MMHG | OXYGEN SATURATION: 98 %

## 2017-06-12 LAB
INR BLD: 1.8 (ref 0.9–1.1)
PT POC: 21.1 SECONDS (ref 11.8–14.9)

## 2017-06-12 PROCEDURE — G0300 HHS/HOSPICE OF LPN EA 15 MIN: HCPCS

## 2017-06-12 PROCEDURE — 3331090002 HH PPS REVENUE DEBIT

## 2017-06-12 PROCEDURE — 3331090001 HH PPS REVENUE CREDIT

## 2017-06-13 PROCEDURE — 3331090002 HH PPS REVENUE DEBIT

## 2017-06-13 PROCEDURE — 3331090001 HH PPS REVENUE CREDIT

## 2017-06-14 PROCEDURE — 3331090002 HH PPS REVENUE DEBIT

## 2017-06-14 PROCEDURE — 3331090001 HH PPS REVENUE CREDIT

## 2017-06-15 ENCOUNTER — HOME CARE VISIT (OUTPATIENT)
Dept: SCHEDULING | Facility: HOME HEALTH | Age: 67
End: 2017-06-15
Payer: MEDICARE

## 2017-06-15 VITALS
OXYGEN SATURATION: 98 % | SYSTOLIC BLOOD PRESSURE: 120 MMHG | HEART RATE: 65 BPM | DIASTOLIC BLOOD PRESSURE: 62 MMHG | TEMPERATURE: 98 F

## 2017-06-15 PROCEDURE — 3331090002 HH PPS REVENUE DEBIT

## 2017-06-15 PROCEDURE — G0299 HHS/HOSPICE OF RN EA 15 MIN: HCPCS

## 2017-06-15 PROCEDURE — 3331090001 HH PPS REVENUE CREDIT

## 2017-06-16 PROCEDURE — 3331090002 HH PPS REVENUE DEBIT

## 2017-06-16 PROCEDURE — 3331090001 HH PPS REVENUE CREDIT

## 2017-06-17 PROCEDURE — 3331090002 HH PPS REVENUE DEBIT

## 2017-06-17 PROCEDURE — 3331090001 HH PPS REVENUE CREDIT

## 2017-06-18 PROCEDURE — 3331090001 HH PPS REVENUE CREDIT

## 2017-06-18 PROCEDURE — 3331090002 HH PPS REVENUE DEBIT

## 2017-06-19 ENCOUNTER — HOME CARE VISIT (OUTPATIENT)
Dept: SCHEDULING | Facility: HOME HEALTH | Age: 67
End: 2017-06-19
Payer: MEDICARE

## 2017-06-19 VITALS
OXYGEN SATURATION: 98 % | DIASTOLIC BLOOD PRESSURE: 70 MMHG | SYSTOLIC BLOOD PRESSURE: 120 MMHG | TEMPERATURE: 97.4 F | RESPIRATION RATE: 16 BRPM | HEART RATE: 67 BPM

## 2017-06-19 LAB
INR BLD: 2.5 (ref 0.9–1.1)
PT POC: 30.1 SECONDS (ref 11.8–14.9)

## 2017-06-19 PROCEDURE — 3331090001 HH PPS REVENUE CREDIT

## 2017-06-19 PROCEDURE — G0300 HHS/HOSPICE OF LPN EA 15 MIN: HCPCS

## 2017-06-19 PROCEDURE — 3331090002 HH PPS REVENUE DEBIT

## 2017-06-20 PROCEDURE — 3331090002 HH PPS REVENUE DEBIT

## 2017-06-20 PROCEDURE — 3331090001 HH PPS REVENUE CREDIT

## 2017-06-21 PROCEDURE — 3331090001 HH PPS REVENUE CREDIT

## 2017-06-21 PROCEDURE — 3331090002 HH PPS REVENUE DEBIT

## 2017-06-22 ENCOUNTER — HOME CARE VISIT (OUTPATIENT)
Dept: SCHEDULING | Facility: HOME HEALTH | Age: 67
End: 2017-06-22
Payer: MEDICARE

## 2017-06-22 VITALS
SYSTOLIC BLOOD PRESSURE: 110 MMHG | HEART RATE: 67 BPM | RESPIRATION RATE: 16 BRPM | TEMPERATURE: 98 F | OXYGEN SATURATION: 99 % | DIASTOLIC BLOOD PRESSURE: 64 MMHG

## 2017-06-22 LAB
INR BLD: 3.3 (ref 0.9–1.1)
PT POC: 40.1 SECONDS (ref 11.8–14.9)

## 2017-06-22 PROCEDURE — 3331090001 HH PPS REVENUE CREDIT

## 2017-06-22 PROCEDURE — 3331090002 HH PPS REVENUE DEBIT

## 2017-06-22 PROCEDURE — G0300 HHS/HOSPICE OF LPN EA 15 MIN: HCPCS

## 2017-06-23 PROCEDURE — 3331090001 HH PPS REVENUE CREDIT

## 2017-06-23 PROCEDURE — 3331090002 HH PPS REVENUE DEBIT

## 2017-06-24 ENCOUNTER — HOME CARE VISIT (OUTPATIENT)
Dept: SCHEDULING | Facility: HOME HEALTH | Age: 67
End: 2017-06-24
Payer: MEDICARE

## 2017-06-24 PROCEDURE — G0299 HHS/HOSPICE OF RN EA 15 MIN: HCPCS

## 2017-06-24 PROCEDURE — 3331090002 HH PPS REVENUE DEBIT

## 2017-06-24 PROCEDURE — 3331090001 HH PPS REVENUE CREDIT

## 2017-06-25 PROCEDURE — 3331090001 HH PPS REVENUE CREDIT

## 2017-06-25 PROCEDURE — 3331090002 HH PPS REVENUE DEBIT

## 2017-06-26 ENCOUNTER — HOME CARE VISIT (OUTPATIENT)
Dept: SCHEDULING | Facility: HOME HEALTH | Age: 67
End: 2017-06-26
Payer: MEDICARE

## 2017-06-26 VITALS
HEART RATE: 68 BPM | DIASTOLIC BLOOD PRESSURE: 66 MMHG | TEMPERATURE: 97.8 F | OXYGEN SATURATION: 98 % | RESPIRATION RATE: 16 BRPM | SYSTOLIC BLOOD PRESSURE: 118 MMHG

## 2017-06-26 LAB
INR BLD: 2.8 (ref 0.9–1.1)
PT POC: 34.2 SECONDS (ref 11.8–14.9)

## 2017-06-26 PROCEDURE — 3331090002 HH PPS REVENUE DEBIT

## 2017-06-26 PROCEDURE — 3331090001 HH PPS REVENUE CREDIT

## 2017-06-26 PROCEDURE — G0299 HHS/HOSPICE OF RN EA 15 MIN: HCPCS

## 2017-06-27 ENCOUNTER — HOME CARE VISIT (OUTPATIENT)
Dept: HOME HEALTH SERVICES | Facility: HOME HEALTH | Age: 67
End: 2017-06-27
Payer: MEDICARE

## 2017-06-27 PROCEDURE — G0299 HHS/HOSPICE OF RN EA 15 MIN: HCPCS

## 2017-06-27 PROCEDURE — 3331090002 HH PPS REVENUE DEBIT

## 2017-06-27 PROCEDURE — 3331090001 HH PPS REVENUE CREDIT

## 2017-06-28 PROCEDURE — 3331090001 HH PPS REVENUE CREDIT

## 2017-06-28 PROCEDURE — 3331090002 HH PPS REVENUE DEBIT

## 2017-06-29 ENCOUNTER — HOME CARE VISIT (OUTPATIENT)
Dept: SCHEDULING | Facility: HOME HEALTH | Age: 67
End: 2017-06-29
Payer: MEDICARE

## 2017-06-29 PROCEDURE — 3331090002 HH PPS REVENUE DEBIT

## 2017-06-29 PROCEDURE — G0300 HHS/HOSPICE OF LPN EA 15 MIN: HCPCS

## 2017-06-29 PROCEDURE — 3331090001 HH PPS REVENUE CREDIT

## 2017-06-30 ENCOUNTER — HOME CARE VISIT (OUTPATIENT)
Dept: HOME HEALTH SERVICES | Facility: HOME HEALTH | Age: 67
End: 2017-06-30
Payer: MEDICARE

## 2017-06-30 VITALS
SYSTOLIC BLOOD PRESSURE: 120 MMHG | HEART RATE: 98 BPM | RESPIRATION RATE: 18 BRPM | DIASTOLIC BLOOD PRESSURE: 80 MMHG | TEMPERATURE: 98.9 F | OXYGEN SATURATION: 98 %

## 2017-06-30 PROCEDURE — 3331090001 HH PPS REVENUE CREDIT

## 2017-06-30 PROCEDURE — 3331090002 HH PPS REVENUE DEBIT

## 2017-07-01 PROCEDURE — 3331090002 HH PPS REVENUE DEBIT

## 2017-07-01 PROCEDURE — 3331090001 HH PPS REVENUE CREDIT

## 2017-07-02 PROCEDURE — 3331090001 HH PPS REVENUE CREDIT

## 2017-07-02 PROCEDURE — 3331090002 HH PPS REVENUE DEBIT

## 2017-07-03 PROCEDURE — 3331090002 HH PPS REVENUE DEBIT

## 2017-07-03 PROCEDURE — 3331090001 HH PPS REVENUE CREDIT

## 2017-07-04 PROCEDURE — 3331090001 HH PPS REVENUE CREDIT

## 2017-07-04 PROCEDURE — 3331090002 HH PPS REVENUE DEBIT

## 2017-07-05 PROCEDURE — 3331090002 HH PPS REVENUE DEBIT

## 2017-07-05 PROCEDURE — 3331090001 HH PPS REVENUE CREDIT

## 2017-07-06 VITALS
HEART RATE: 78 BPM | TEMPERATURE: 98.1 F | DIASTOLIC BLOOD PRESSURE: 64 MMHG | RESPIRATION RATE: 18 BRPM | OXYGEN SATURATION: 98 % | SYSTOLIC BLOOD PRESSURE: 122 MMHG

## 2017-07-06 PROCEDURE — 3331090002 HH PPS REVENUE DEBIT

## 2017-07-06 PROCEDURE — 3331090001 HH PPS REVENUE CREDIT

## 2017-07-07 PROCEDURE — 3331090002 HH PPS REVENUE DEBIT

## 2017-07-07 PROCEDURE — 3331090001 HH PPS REVENUE CREDIT

## 2017-07-08 PROCEDURE — 3331090002 HH PPS REVENUE DEBIT

## 2017-07-08 PROCEDURE — 3331090001 HH PPS REVENUE CREDIT

## 2017-07-09 PROCEDURE — 3331090002 HH PPS REVENUE DEBIT

## 2017-07-09 PROCEDURE — 3331090001 HH PPS REVENUE CREDIT

## 2017-07-10 PROCEDURE — 3331090002 HH PPS REVENUE DEBIT

## 2017-07-10 PROCEDURE — 3331090001 HH PPS REVENUE CREDIT

## 2017-07-10 PROCEDURE — 99283 EMERGENCY DEPT VISIT LOW MDM: CPT

## 2017-07-11 ENCOUNTER — HOSPITAL ENCOUNTER (EMERGENCY)
Age: 67
Discharge: HOME | End: 2017-07-11
Attending: EMERGENCY MEDICINE
Payer: MEDICARE

## 2017-07-11 ENCOUNTER — HOSPITAL ENCOUNTER (OUTPATIENT)
Dept: PREADMISSION TESTING | Age: 67
Discharge: HOME | End: 2017-07-11
Payer: MEDICARE

## 2017-07-11 ENCOUNTER — APPOINTMENT (OUTPATIENT)
Dept: GENERAL RADIOLOGY | Age: 67
End: 2017-07-11
Attending: PHYSICIAN ASSISTANT
Payer: MEDICARE

## 2017-07-11 VITALS
SYSTOLIC BLOOD PRESSURE: 133 MMHG | RESPIRATION RATE: 18 BRPM | HEART RATE: 76 BPM | OXYGEN SATURATION: 99 % | HEIGHT: 70 IN | DIASTOLIC BLOOD PRESSURE: 78 MMHG | BODY MASS INDEX: 25.34 KG/M2 | WEIGHT: 177 LBS | TEMPERATURE: 98.6 F

## 2017-07-11 VITALS
BODY MASS INDEX: 25.2 KG/M2 | TEMPERATURE: 97.9 F | SYSTOLIC BLOOD PRESSURE: 127 MMHG | WEIGHT: 176 LBS | HEART RATE: 60 BPM | DIASTOLIC BLOOD PRESSURE: 73 MMHG | HEIGHT: 70 IN

## 2017-07-11 DIAGNOSIS — M25.561 RIGHT KNEE PAIN, UNSPECIFIED CHRONICITY: Primary | ICD-10-CM

## 2017-07-11 LAB
ABO + RH BLD: NORMAL
ANION GAP BLD CALC-SCNC: 8 MMOL/L (ref 5–15)
APPEARANCE UR: CLEAR
BACTERIA URNS QL MICRO: NEGATIVE /HPF
BILIRUB UR QL: NEGATIVE
BLOOD GROUP ANTIBODIES SERPL: NORMAL
BUN SERPL-MCNC: 18 MG/DL (ref 6–20)
BUN/CREAT SERPL: 15 (ref 12–20)
CALCIUM SERPL-MCNC: 9.6 MG/DL (ref 8.5–10.1)
CHLORIDE SERPL-SCNC: 108 MMOL/L (ref 97–108)
CO2 SERPL-SCNC: 24 MMOL/L (ref 21–32)
COLOR UR: ABNORMAL
CREAT SERPL-MCNC: 1.22 MG/DL (ref 0.7–1.3)
EPITH CASTS URNS QL MICRO: ABNORMAL /LPF
ERYTHROCYTE [DISTWIDTH] IN BLOOD BY AUTOMATED COUNT: 13.5 % (ref 11.5–14.5)
EST. AVERAGE GLUCOSE BLD GHB EST-MCNC: 117 MG/DL
GLUCOSE SERPL-MCNC: 104 MG/DL (ref 65–100)
GLUCOSE UR STRIP.AUTO-MCNC: NEGATIVE MG/DL
HBA1C MFR BLD: 5.7 % (ref 4.2–6.3)
HCT VFR BLD AUTO: 35.8 % (ref 36.6–50.3)
HGB BLD-MCNC: 11.3 G/DL (ref 12.1–17)
HGB UR QL STRIP: ABNORMAL
HYALINE CASTS URNS QL MICRO: ABNORMAL /LPF (ref 0–5)
INR PPP: 1.1 (ref 0.9–1.1)
KETONES UR QL STRIP.AUTO: NEGATIVE MG/DL
LEUKOCYTE ESTERASE UR QL STRIP.AUTO: NEGATIVE
MCH RBC QN AUTO: 27.2 PG (ref 26–34)
MCHC RBC AUTO-ENTMCNC: 31.6 G/DL (ref 30–36.5)
MCV RBC AUTO: 86.1 FL (ref 80–99)
NITRITE UR QL STRIP.AUTO: NEGATIVE
PH UR STRIP: 5 [PH] (ref 5–8)
PLATELET # BLD AUTO: 315 K/UL (ref 150–400)
POTASSIUM SERPL-SCNC: 4.5 MMOL/L (ref 3.5–5.1)
PROT UR STRIP-MCNC: NEGATIVE MG/DL
PROTHROMBIN TIME: 11.3 SEC (ref 9–11.1)
RBC # BLD AUTO: 4.16 M/UL (ref 4.1–5.7)
RBC #/AREA URNS HPF: ABNORMAL /HPF (ref 0–5)
SODIUM SERPL-SCNC: 140 MMOL/L (ref 136–145)
SP GR UR REFRACTOMETRY: 1.03 (ref 1–1.03)
SPECIMEN EXP DATE BLD: NORMAL
UA: UC IF INDICATED,UAUC: ABNORMAL
UROBILINOGEN UR QL STRIP.AUTO: 0.2 EU/DL (ref 0.2–1)
WBC # BLD AUTO: 5.2 K/UL (ref 4.1–11.1)
WBC URNS QL MICRO: ABNORMAL /HPF (ref 0–4)

## 2017-07-11 PROCEDURE — 3331090002 HH PPS REVENUE DEBIT

## 2017-07-11 PROCEDURE — 74011250637 HC RX REV CODE- 250/637: Performed by: PHYSICIAN ASSISTANT

## 2017-07-11 PROCEDURE — 3331090001 HH PPS REVENUE CREDIT

## 2017-07-11 PROCEDURE — 73562 X-RAY EXAM OF KNEE 3: CPT

## 2017-07-11 RX ORDER — OXYCODONE AND ACETAMINOPHEN 5; 325 MG/1; MG/1
2 TABLET ORAL
Status: COMPLETED | OUTPATIENT
Start: 2017-07-11 | End: 2017-07-11

## 2017-07-11 RX ORDER — OXYCODONE AND ACETAMINOPHEN 5; 325 MG/1; MG/1
1 TABLET ORAL
Qty: 20 TAB | Refills: 0 | Status: SHIPPED | OUTPATIENT
Start: 2017-07-11 | End: 2017-08-02

## 2017-07-11 RX ADMIN — OXYCODONE HYDROCHLORIDE AND ACETAMINOPHEN 2 TABLET: 5; 325 TABLET ORAL at 00:35

## 2017-07-11 NOTE — ED PROVIDER NOTES
HPI Comments: 74 yo male with hx of knee replacement by Dr Sulma Trimble here for evaluation of right knee pain after being stuck in knee accidentally this afternoon. States knee has been progressively sore this evening. Denies warmth to area. Merna fever, chills, CP, SOB, abd pain, flank pain. Pt requesting xrays and plans to follow up with Dr Sulma Trimble. Non smoker. Patient is a 75 y.o. male presenting with knee pain. The history is provided by the patient. Knee Pain    This is a new problem. The current episode started 3 to 5 hours ago. The problem occurs constantly. The pain is present in the right knee. The quality of the pain is described as aching. The pain is at a severity of 4/10. The pain is moderate. The symptoms are aggravated by palpation and activity. Past Medical History:   Diagnosis Date    Arthritis     KNEES & BACK.  Coronary atherosclerosis of native coronary artery     Diabetes [address] [address] [facility]     Hgb A1C 6-10-11 7.1%; NIDDM    Essential hypertension, benign     GERD (gastroesophageal reflux disease)     Hypertension     Mixed dyslipidemia     6-11:   HDL 33 LDL 95    S/P coronary artery stent placement July 29th, 2011    NAN to LAD       Past Surgical History:   Procedure Laterality Date    CARDIAC SURG PROCEDURE UNLIST  2009    CATHERIZATION WITH STENT X 1 PLACEMENT    HX GI  2010    COLONOSCOPY    HX GI  2010    ENDOSCOPY    HX HEENT      SEPTOPLASTY    HX HEENT      TONSILLECTOMY    HX KNEE REPLACEMENT Left 2008    DR ROSEN    HX ORTHOPAEDIC Right 2010    KNEE REPLACEMENT    HX ORTHOPAEDIC  2007    RIGHT SHOULDER ROTATOR CUFF REPAIR.          Family History:   Problem Relation Age of Onset    Diabetes Mother     Heart Disease Mother     Diabetes Son     Anesth Problems Neg Hx        Social History     Social History    Marital status:      Spouse name: N/A    Number of children: 1    Years of education: N/A     Occupational History    Not on file.     Social History Main Topics    Smoking status: Never Smoker    Smokeless tobacco: Former User      Comment: Patient states on and off for chewing tobacco.      Alcohol use Yes      Comment: NOT DRINKING AT THIS TIME    Drug use: No    Sexual activity: Yes     Partners: Female     Other Topics Concern    Not on file     Social History Narrative         ALLERGIES: Levofloxacin; Pcn [penicillins]; and Tape [adhesive]    Review of Systems   Constitutional: Negative for activity change and fever. HENT: Negative for facial swelling. Eyes: Negative for discharge. Respiratory: Negative for cough. Cardiovascular: Negative for leg swelling. Gastrointestinal: Negative for abdominal distention. Musculoskeletal: Positive for joint swelling and myalgias. Skin: Negative for color change. Neurological: Negative for seizures and syncope. Psychiatric/Behavioral: Negative for behavioral problems. Vitals:    07/10/17 2241   BP: 134/78   Pulse: 73   Resp: 20   Temp: 98.8 °F (37.1 °C)   SpO2: 98%   Weight: 80.3 kg (177 lb)   Height: 5' 10\" (1.778 m)            Physical Exam   Constitutional: He is oriented to person, place, and time. He appears well-nourished. HENT:   Head: Normocephalic and atraumatic. Eyes: Pupils are equal, round, and reactive to light. Neck: Normal range of motion. Cardiovascular: Normal rate and regular rhythm. Pulmonary/Chest: Effort normal and breath sounds normal.   Abdominal: Soft. There is no tenderness. Musculoskeletal: Normal range of motion. He exhibits tenderness. Right knee: He exhibits swelling. Tenderness found. Legs:  Neurological: He is alert and oriented to person, place, and time. Skin: Skin is warm and dry. Psychiatric: He has a normal mood and affect. Nursing note and vitals reviewed.        MDM  Number of Diagnoses or Management Options  Right knee pain, unspecified chronicity:      Amount and/or Complexity of Data Reviewed  Tests in the radiology section of CPT®: ordered and reviewed  Discuss the patient with other providers: yes  Independent visualization of images, tracings, or specimens: yes      ED Course       Procedures    Patient has been reassessed. Feeling better. Reviewed medications and radiographics with patient. Ready to discharge home. Discussed case with attending Physician. Agrees with care and will D/C with follow up. Patient's results have been reviewed with them. Patient and/or family have verbally conveyed their understanding and agreement of the patient's signs, symptoms, diagnosis, treatment and prognosis and additionally agree to follow up as recommended or return to the Emergency Room should their condition change prior to follow-up. Discharge instructions have also been provided to the patient with some educational information regarding their diagnosis as well a list of reasons why they would want to return to the ER prior to their follow-up appointment should their condition change.   HETAL Hair

## 2017-07-11 NOTE — ED NOTES
PA reviewed discharge instructions and options with patient and patient verbalized understanding. RN reviewed discharge instructions using teachback method. Pt wheeled to exit without difficulty and in no signs of acute distress escorted by family, and they  will drive home. No complaints or needs expressed at this time. Patient was counseled on medications prescribed at discharge. Patient to call PCP in the morning for appointment.

## 2017-07-11 NOTE — CALL BACK NOTE
[facility] Emergency Department Follow Up Call Record    Discharged to : Home/Family Home/Home Health/Skilled Facility/Rehab/Assisted Living/Other___home____  1) Did you receive your discharge instructions? Yes        2) Do you understand them? Yes         3) Are you able to follow them? Yes          If NO, what can I clarify for you? 4) Do you understand your diagnosis? Yes         5) Do you know which symptoms should prompt you to call the doctor? Yes     6) Were you able to fill and  any medications that were prescribed? Yes     7) You were prescribed __oxycodone_________for Maylon Founds pain___________________. Common side effects of this medication are__drowsiness, rash, constipation__________________. This is not a complete list so please review the forms given from the pharmacy for a complete list.      8) Are there any questions about your medications? No            Have you scheduled any recommended doctors appointments (specialty, PCP) YES Follow up with Denise De La Paz (Ortho) 07/12/17. If NO, what barriers are you encountering (transportation/lost contact info/cost/  didnt think necessary/no PCP  9) If discharged with Home Health, has the agency contacted you to schedule visit? Not applicable  10) Is there anyone available to help you at home (meals, errands, transportation    monitoring) (adult children, neighbors, private duty companions) Yes    11) Are you on a special diet? No         If YES, do you understand the requirements for this diet? Education provided? 12) If presented with cough, bronchitis, COPD, asthma, is it ok to ask that the   respiratory disease management educator call you? Not applicable      13)  A) If presented with fall, were you issued an assistive device in the ED    Are you using? No  B) If given RX for device, have you obtained? Not applicable       If NO, barriers? C) Therapist recommended: No   Are you able to implement the suggestions?  Not applicable        If NO, barriers to implementation? D) Are you having any difficulties with mobility inside your home?     (steps, bed, tub)Yes Due to pain of the knee. If YES, ask if the [facility] PT can contact patient and good time and number?  14)  At the end of your discharge instructions, there is information about accessing [facility], have you had a chance to review those? No         Do you have any questions about signing up for this service? No   We encourage our patients to be active participants in their healthcare and this site is one of the ways to do that. It will allow you to access parts of your medical record, email your doctors office, schedule appointments, and request medications refills . 15) Are there any other questions that I can answer for you regarding    your Emergency department visit?  NO             Estimated Call Time:_____2:09 PM  ______________ Date/Time:_______________

## 2017-07-11 NOTE — ED TRIAGE NOTES
Pt arrives via EMS from home c/o RIGHT knee pain. Pt reports he currently has a cement block in his RIGHT knee awaiting to hardware placement. Per pt his son grabbed his knee earlier tonight. Since that time the pt reports pain when trying to straighten leg or bear weight.

## 2017-07-11 NOTE — ADVANCED PRACTICE NURSE
Preoperative instructions reviewed with patient. Patient given 2-6 packs of CHG wipes. Instructions reviewed on use of CHG wipes. Patient given SSI infection FAQS sheet, as well as a  MRSA/MSSA treatment instruction sheet  With an explanation to patient that they will be notified if treatment instructions need to be initiated. Patient was given the opportunity to ask questions on the information provided.

## 2017-07-11 NOTE — DISCHARGE INSTRUCTIONS
Knee Pain or Injury: Care Instructions  Your Care Instructions    Injuries are a common cause of knee problems. Sudden (acute) injuries may be caused by a direct blow to the knee. They can also be caused by abnormal twisting, bending, or falling on the knee. Pain, bruising, or swelling may be severe, and may start within minutes of the injury. Overuse is another cause of knee pain. Other causes are climbing stairs, kneeling, and other activities that use the knee. Everyday wear and tear, especially as you get older, also can cause knee pain. Rest, along with home treatment, often relieves pain and allows your knee to heal. If you have a serious knee injury, you may need tests and treatment. Follow-up care is a key part of your treatment and safety. Be sure to make and go to all appointments, and call your doctor if you are having problems. It's also a good idea to know your test results and keep a list of the medicines you take. How can you care for yourself at home? · Be safe with medicines. Read and follow all instructions on the label. ¨ If the doctor gave you a prescription medicine for pain, take it as prescribed. ¨ If you are not taking a prescription pain medicine, ask your doctor if you can take an over-the-counter medicine. · Rest and protect your knee. Take a break from any activity that may cause pain. · Put ice or a cold pack on your knee for 10 to 20 minutes at a time. Put a thin cloth between the ice and your skin. · Prop up a sore knee on a pillow when you ice it or anytime you sit or lie down for the next 3 days. Try to keep it above the level of your heart. This will help reduce swelling. · If your knee is not swollen, you can put moist heat, a heating pad, or a warm cloth on your knee. · If your doctor recommends an elastic bandage, sleeve, or other type of support for your knee, wear it as directed.   · Follow your doctor's instructions about how much weight you can put on your leg. Use a cane, crutches, or a walker as instructed. · Follow your doctor's instructions about activity during your healing process. If you can do mild exercise, slowly increase your activity. · Reach and stay at a healthy weight. Extra weight can strain the joints, especially the knees and hips, and make the pain worse. Losing even a few pounds may help. When should you call for help? Call 911 anytime you think you may need emergency care. For example, call if:  · You have symptoms of a blood clot in your lung (called a pulmonary embolism). These may include:  ¨ Sudden chest pain. ¨ Trouble breathing. ¨ Coughing up blood. Call your doctor now or seek immediate medical care if:  · You have severe or increasing pain. · Your leg or foot turns cold or changes color. · You cannot stand or put weight on your knee. · Your knee looks twisted or bent out of shape. · You cannot move your knee. · You have signs of infection, such as:  ¨ Increased pain, swelling, warmth, or redness. ¨ Red streaks leading from the knee. ¨ Pus draining from a place on your knee. ¨ A fever. · You have signs of a blood clot in your leg (called a deep vein thrombosis), such as:  ¨ Pain in your calf, back of the knee, thigh, or groin. ¨ Redness and swelling in your leg or groin. Watch closely for changes in your health, and be sure to contact your doctor if:  · You have tingling, weakness, or numbness in your knee. · You have any new symptoms, such as swelling. · You have bruises from a knee injury that last longer than 2 weeks. · You do not get better as expected. Where can you learn more? Go to http://araseli-vl.info/. Enter K195 in the search box to learn more about \"Knee Pain or Injury: Care Instructions. \"  Current as of: March 20, 2017  Content Version: 11.3  © 4863-8238 [employer].  Care instructions adapted under license by [employer] (which disclaims liability or warranty for this information). If you have questions about a medical condition or this instruction, always ask your healthcare professional. [address] any warranty or liability for your use of this information. We hope that we have addressed all of your medical concerns. The examination and treatment you received in the Emergency Department were for an emergent problem and were not intended as complete care. It is important that you follow up with your healthcare provider(s) for ongoing care. If your symptoms worsen or do not improve as expected, and you are unable to reach your usual health care provider(s), you should return to the Emergency Department. Today's healthcare is undergoing tremendous change, and patient satisfaction surveys are one of the many tools to assess the quality of medical care. You may receive a survey from the [employer] regarding your experience in the Emergency Department. I hope that your experience has been completely positive, particularly the medical care that I provided. As such, please participate in the survey; anything less than excellent does not meet my expectations or intentions. [facility] and [facility] participate in nationally recognized quality of care measures. If your blood pressure is greater than 120/80, as reported below, we urge that you seek medical care to address the potential of high blood pressure, commonly known as hypertension. Hypertension can be hereditary or can be caused by certain medical conditions, pain, stress, or \"white coat syndrome. \"       Please make an appointment with your health care provider(s) for follow up of your Emergency Department visit. VITALS:   Patient Vitals for the past 8 hrs:   Temp Pulse Resp BP SpO2   07/10/17 2241 98.8 °F (37.1 °C) 73 20 134/78 98 %          Thank you for allowing us to provide you with medical care today.   We realize that you have many choices for your emergency care needs. Please choose us in the future for any continued health care needs. Nat Graham, [address]: [phone]            No results found for this or any previous visit (from the past 24 hour(s)). Xr Knee Rt 3 V    Result Date: 7/11/2017  EXAM:  XR KNEE RT 3 V INDICATION:   Trauma. Right knee pain. COMPARISON: None. FINDINGS: Three views of the right knee demonstrate no fracture or other acute osseous or articular abnormality. Postoperative changes are present in the distal femur and proximal tibia, with an intra-articular spacer in place. There is a small knee joint effusion. IMPRESSION:  No acute osseous or articular abnormality. Small knee joint effusion.

## 2017-07-12 ENCOUNTER — HOSPITAL ENCOUNTER (OUTPATIENT)
Dept: LAB | Age: 67
Discharge: HOME | End: 2017-07-12
Payer: COMMERCIAL

## 2017-07-12 LAB
APPEARANCE FLD: ABNORMAL
BACTERIA SPEC CULT: NORMAL
BACTERIA SPEC CULT: NORMAL
COLOR FLD: ABNORMAL
LYMPHOCYTES NFR FLD: 5 %
MONOS+MACROS NFR FLD: 13 %
NEUTS SEG NFR FLD: 70 %
NUC CELL # FLD: 3180 /CU MM (ref 0–5)
OTHER CELL,FOTHC: 12 %
RBC # FLD: >100 /CU MM
SERVICE CMNT-IMP: NORMAL
SPECIMEN SOURCE FLD: ABNORMAL

## 2017-07-12 PROCEDURE — 3331090002 HH PPS REVENUE DEBIT

## 2017-07-12 PROCEDURE — 3331090001 HH PPS REVENUE CREDIT

## 2017-07-12 PROCEDURE — 87205 SMEAR GRAM STAIN: CPT | Performed by: PHYSICIAN ASSISTANT

## 2017-07-12 PROCEDURE — 89050 BODY FLUID CELL COUNT: CPT | Performed by: PHYSICIAN ASSISTANT

## 2017-07-13 PROCEDURE — 3331090001 HH PPS REVENUE CREDIT

## 2017-07-13 PROCEDURE — 3331090002 HH PPS REVENUE DEBIT

## 2017-07-14 PROCEDURE — 3331090001 HH PPS REVENUE CREDIT

## 2017-07-14 PROCEDURE — 3331090002 HH PPS REVENUE DEBIT

## 2017-07-15 PROCEDURE — 3331090001 HH PPS REVENUE CREDIT

## 2017-07-15 PROCEDURE — 3331090002 HH PPS REVENUE DEBIT

## 2017-07-16 PROCEDURE — 3331090001 HH PPS REVENUE CREDIT

## 2017-07-16 PROCEDURE — 3331090002 HH PPS REVENUE DEBIT

## 2017-07-17 PROCEDURE — 3331090001 HH PPS REVENUE CREDIT

## 2017-07-17 PROCEDURE — 3331090002 HH PPS REVENUE DEBIT

## 2017-07-18 PROCEDURE — 3331090002 HH PPS REVENUE DEBIT

## 2017-07-18 PROCEDURE — 3331090001 HH PPS REVENUE CREDIT

## 2017-07-24 NOTE — H&P
Expand All Collapse All    PCP: Jesika Mack MD      Problem List Items Addressed This Visit      None               Visit Diagnoses      Infection associated with internal right knee prosthesis, subsequent encounter - Primary     Relevant Orders     Gram stain     Aerobic culture     Anaerobic culture     Body fluid cell count     Status post total right knee replacement              Subjective   Subjective:   Chief Complaint: Follow-up of the Right Knee        HPI: Alyssa Etienne is a 75 y.o. male who comes back follow up of his right knee antibiotic impregnated cement spacer placement. He denies any fevers or chills. He did suffer a relatively mild injury the other day and went to the emergency room and had x-rays. He complains of inability to actively extend the knee since that time. He is having some pain.               Objective   Objective:          Vitals:     07/12/17 1002   BP: 123/62   Pulse: 67      Body mass index is 25.11 kg/(m^2).     Musculoskeletal : Believe his extensor mechanism is intact. His incision is well-healed. He has a moderate effusion. He has decreased range of motion however when I bring his knee into extension he is able to maintain extension. Do not feel a defect. He has normal neurovascular exam.     Procedures:    Large Joint Arthrocentesis  Date/Time: 7/12/2017 11:42 AM  Supporting Documentation  Indications: joint swelling and diagnostic evaluation   Procedure Details  Location: knee - R knee  Needle size: 18 G  Aspirate: blood-tinged  Patient tolerance: patient tolerated the procedure well with no immediate complications              Radiographs:        No results found. Assessment   Assessment:      1. Infection associated with internal right knee prosthesis, subsequent encounter    2. Status post total right knee replacement           Plan   Plan:   I reviewed x-rays on the [employer] [facility] which show apparent loosening of the spacer knee.  His patella appears to be normal height. I aspirated about 40 cc of clear serosanguineous fluid which we sent for culture as well as Gram stain and cell count. We discussed revision surgery. We will contact Dr. Fabiana Velazquez and get him to recheck a sed rate. We will plan on proceeding with revision surgery in the next 2-3 weeks.         Orders Placed This Encounter    Gram stain    Aerobic culture    Anaerobic culture    Body fluid cell count      No Follow-up on file.          Ernst Vuong MD   7/12/2017  11:42 AM     Date of Surgery Update:  Alyssa Etienne was seen and examined. Past Medical History:   Diagnosis Date    Arthritis     KNEES & BACK.  Coronary atherosclerosis of native coronary artery     Diabetes ([facility] [address]     Hgb A1C 6-10-11 7.1%; NIDDM    Essential hypertension, benign     GERD (gastroesophageal reflux disease)     Hypertension     Mixed dyslipidemia     6-11:   HDL 33 LDL 95    S/P coronary artery stent placement July 29th, 2011    NAN to LAD     Prior to Admission Medications   Prescriptions Last Dose Informant Patient Reported? Taking?   acetaminophen (TYLENOL) 325 mg tablet   No No   Sig: Take 2 Tabs by mouth every six (6) hours. amLODIPine (NORVASC) 5 mg tablet   No No   Sig: TAKE ONE TABLET BY MOUTH ONCE DAILY   atorvastatin (LIPITOR) 10 mg tablet   Yes No   Sig: Take 10 mg by mouth nightly. esomeprazole (NEXIUM) 20 mg capsule   Yes No   Sig: Take 20 mg by mouth daily as needed. fenofibrate (LOFIBRA) 160 mg tablet   No No   Sig: TAKE ONE TABLET BY MOUTH ONCE DAILY   metFORMIN (GLUCOPHAGE) 500 mg tablet   No No   Sig: Take 1 Tab by mouth two (2) times daily (with meals). metoprolol succinate (TOPROL-XL) 50 mg XL tablet   No No   Sig: TAKE ONE TABLET BY MOUTH ONCE DAILY   nitroglycerin (NITROSTAT) 0.4 mg SL tablet   No No   Sig: DISSOLVE ONE TABLET UNDER THE TONGUE EVERY 5 MINUTES AS NEEDED FOR CHEST PAIN.  UP TO 3 DOSES   oxyCODONE-acetaminophen (PERCOCET) 5-325 mg per tablet   No No Sig: Take 1 Tab by mouth every four (4) hours as needed for Pain. Max Daily Amount: 6 Tabs. tamsulosin (FLOMAX) 0.4 mg capsule   No No   Sig: TAKE ONE CAPSULE BY MOUTH ONCE DAILY   Patient taking differently: TAKE ONE CAPSULE BY MOUTH HS      Facility-Administered Medications: None      Allergy to:Levofloxacin; Pcn [penicillins]; and Tape [adhesive]  Physical Examination: General appearance - alert, well appearing, and in no distress  Chest - clear to auscultation, no wheezes, rales or rhonchi, symmetric air entry  Heart - normal rate and regular rhythm  Abdomen - soft, nontender, nondistended, no masses or organomegaly  History and physical has been reviewed. The patient has been examined.  There have been no significant clinical changes since the completion of the originally dated History and Physical.    Signed By: Jose Luis Saunders PA-C     July 31, 2017 7:40 AM

## 2017-07-26 LAB
BACTERIA SPEC CULT: NORMAL
BACTERIA SPEC CULT: NORMAL
GRAM STN SPEC: NORMAL
GRAM STN SPEC: NORMAL
SERVICE CMNT-IMP: NORMAL

## 2017-07-31 ENCOUNTER — HOSPITAL ENCOUNTER (INPATIENT)
Age: 67
LOS: 2 days | Discharge: HOME HEALTH | DRG: 468 | End: 2017-08-02
Attending: ORTHOPAEDIC SURGERY | Admitting: ORTHOPAEDIC SURGERY
Payer: MEDICARE

## 2017-07-31 ENCOUNTER — ANESTHESIA (OUTPATIENT)
Dept: SURGERY | Age: 67
DRG: 468 | End: 2017-07-31
Payer: MEDICARE

## 2017-07-31 ENCOUNTER — ANESTHESIA EVENT (OUTPATIENT)
Dept: SURGERY | Age: 67
DRG: 468 | End: 2017-07-31
Payer: MEDICARE

## 2017-07-31 PROBLEM — Z89.529 ACQUIRED ABSENCE OF KNEE JOINT FOLLOWING EXPLANTATION OF JOINT PROSTHESIS WITH PRESENCE OF ANTIBIOTIC-IMPREGNATED CEMENT SPACER: Status: ACTIVE | Noted: 2017-07-31

## 2017-07-31 LAB
ABO + RH BLD: NORMAL
BLOOD GROUP ANTIBODIES SERPL: NORMAL
GLUCOSE BLD STRIP.AUTO-MCNC: 106 MG/DL (ref 65–100)
GLUCOSE BLD STRIP.AUTO-MCNC: 159 MG/DL (ref 65–100)
GLUCOSE BLD STRIP.AUTO-MCNC: 172 MG/DL (ref 65–100)
GLUCOSE BLD STRIP.AUTO-MCNC: 184 MG/DL (ref 65–100)
GLUCOSE BLD STRIP.AUTO-MCNC: 98 MG/DL (ref 65–100)
SERVICE CMNT-IMP: ABNORMAL
SERVICE CMNT-IMP: NORMAL
SPECIMEN EXP DATE BLD: NORMAL

## 2017-07-31 PROCEDURE — 36415 COLL VENOUS BLD VENIPUNCTURE: CPT | Performed by: ORTHOPAEDIC SURGERY

## 2017-07-31 PROCEDURE — 74011250636 HC RX REV CODE- 250/636: Performed by: ANESTHESIOLOGY

## 2017-07-31 PROCEDURE — C1713 ANCHOR/SCREW BN/BN,TIS/BN: HCPCS | Performed by: ORTHOPAEDIC SURGERY

## 2017-07-31 PROCEDURE — 76010000172 HC OR TIME 2.5 TO 3 HR INTENSV-TIER 1: Performed by: ORTHOPAEDIC SURGERY

## 2017-07-31 PROCEDURE — G8979 MOBILITY GOAL STATUS: HCPCS

## 2017-07-31 PROCEDURE — 77030003601 HC NDL NRV BLK BBMI -A

## 2017-07-31 PROCEDURE — 77030006822 HC BLD SAW SAG BRSM -B: Performed by: ORTHOPAEDIC SURGERY

## 2017-07-31 PROCEDURE — 77030033269 HC SLV COMPR SCD KNE2 CARD -B

## 2017-07-31 PROCEDURE — 97161 PT EVAL LOW COMPLEX 20 MIN: CPT

## 2017-07-31 PROCEDURE — 74011250636 HC RX REV CODE- 250/636: Performed by: ORTHOPAEDIC SURGERY

## 2017-07-31 PROCEDURE — 74011000272 HC RX REV CODE- 272: Performed by: ORTHOPAEDIC SURGERY

## 2017-07-31 PROCEDURE — 77030034850: Performed by: ORTHOPAEDIC SURGERY

## 2017-07-31 PROCEDURE — 74011250637 HC RX REV CODE- 250/637: Performed by: PHYSICIAN ASSISTANT

## 2017-07-31 PROCEDURE — 76060000036 HC ANESTHESIA 2.5 TO 3 HR: Performed by: ORTHOPAEDIC SURGERY

## 2017-07-31 PROCEDURE — 77030011640 HC PAD GRND REM COVD -A: Performed by: ORTHOPAEDIC SURGERY

## 2017-07-31 PROCEDURE — 77010033678 HC OXYGEN DAILY

## 2017-07-31 PROCEDURE — 77030019908 HC STETH ESOPH SIMS -A: Performed by: ANESTHESIOLOGY

## 2017-07-31 PROCEDURE — 0SRC0J9 REPLACEMENT OF RIGHT KNEE JOINT WITH SYNTHETIC SUBSTITUTE, CEMENTED, OPEN APPROACH: ICD-10-PCS | Performed by: ORTHOPAEDIC SURGERY

## 2017-07-31 PROCEDURE — 77030008467 HC STPLR SKN COVD -B: Performed by: ORTHOPAEDIC SURGERY

## 2017-07-31 PROCEDURE — 94762 N-INVAS EAR/PLS OXIMTRY CONT: CPT

## 2017-07-31 PROCEDURE — 77030035236 HC SUT PDS STRATFX BARB J&J -B: Performed by: ORTHOPAEDIC SURGERY

## 2017-07-31 PROCEDURE — 87205 SMEAR GRAM STAIN: CPT | Performed by: ORTHOPAEDIC SURGERY

## 2017-07-31 PROCEDURE — 77030018836 HC SOL IRR NACL ICUM -A: Performed by: ORTHOPAEDIC SURGERY

## 2017-07-31 PROCEDURE — G8978 MOBILITY CURRENT STATUS: HCPCS

## 2017-07-31 PROCEDURE — 82962 GLUCOSE BLOOD TEST: CPT

## 2017-07-31 PROCEDURE — 77030008684 HC TU ET CUF COVD -B: Performed by: ANESTHESIOLOGY

## 2017-07-31 PROCEDURE — 74011250636 HC RX REV CODE- 250/636

## 2017-07-31 PROCEDURE — 87075 CULTR BACTERIA EXCEPT BLOOD: CPT | Performed by: ORTHOPAEDIC SURGERY

## 2017-07-31 PROCEDURE — 74011250636 HC RX REV CODE- 250/636: Performed by: PHYSICIAN ASSISTANT

## 2017-07-31 PROCEDURE — 77030014077 HC TOWER MX CEM J&J -C: Performed by: ORTHOPAEDIC SURGERY

## 2017-07-31 PROCEDURE — 86900 BLOOD TYPING SEROLOGIC ABO: CPT | Performed by: ORTHOPAEDIC SURGERY

## 2017-07-31 PROCEDURE — 77030026438 HC STYL ET INTUB CARD -A: Performed by: ANESTHESIOLOGY

## 2017-07-31 PROCEDURE — 97116 GAIT TRAINING THERAPY: CPT

## 2017-07-31 PROCEDURE — 74011000250 HC RX REV CODE- 250

## 2017-07-31 PROCEDURE — 77030020782 HC GWN BAIR PAWS FLX 3M -B

## 2017-07-31 PROCEDURE — 65270000029 HC RM PRIVATE

## 2017-07-31 PROCEDURE — 0SPC08Z REMOVAL OF SPACER FROM RIGHT KNEE JOINT, OPEN APPROACH: ICD-10-PCS | Performed by: ORTHOPAEDIC SURGERY

## 2017-07-31 PROCEDURE — 76210000006 HC OR PH I REC 0.5 TO 1 HR: Performed by: ORTHOPAEDIC SURGERY

## 2017-07-31 PROCEDURE — 74011000250 HC RX REV CODE- 250: Performed by: ORTHOPAEDIC SURGERY

## 2017-07-31 PROCEDURE — 74011636637 HC RX REV CODE- 636/637: Performed by: PHYSICIAN ASSISTANT

## 2017-07-31 PROCEDURE — C1776 JOINT DEVICE (IMPLANTABLE): HCPCS | Performed by: ORTHOPAEDIC SURGERY

## 2017-07-31 PROCEDURE — 77030018846 HC SOL IRR STRL H20 ICUM -A: Performed by: ORTHOPAEDIC SURGERY

## 2017-07-31 DEVICE — SLEEVE TIB H40MM AP31MM ML53MM MTPHSEAL KNEE TI PORCOAT POR: Type: IMPLANTABLE DEVICE | Site: KNEE | Status: FUNCTIONAL

## 2017-07-31 DEVICE — CEMENT BNE 40GM FULL DOSE PMMA W/ GENT HI VISC RADPQ LNG: Type: IMPLANTABLE DEVICE | Site: KNEE | Status: FUNCTIONAL

## 2017-07-31 DEVICE — ADAPTER FEM 5DEG KNEE PFC SIG: Type: IMPLANTABLE DEVICE | Site: KNEE | Status: FUNCTIONAL

## 2017-07-31 DEVICE — IMPLANTABLE DEVICE: Type: IMPLANTABLE DEVICE | Site: KNEE | Status: FUNCTIONAL

## 2017-07-31 DEVICE — ADAPTER FEM +2/-2MM OFFSET BOLT PFC SIG: Type: IMPLANTABLE DEVICE | Site: KNEE | Status: FUNCTIONAL

## 2017-07-31 DEVICE — AUGMENT FEM SZ 4 THK4MM POST KNEE TI BLK PRI CEM PFC SIG: Type: IMPLANTABLE DEVICE | Site: KNEE | Status: FUNCTIONAL

## 2017-07-31 DEVICE — STEM FEM L75MM DIA18MM UNIV KNEE PRESSFIT FLUT FOR ROT HNG: Type: IMPLANTABLE DEVICE | Site: KNEE | Status: FUNCTIONAL

## 2017-07-31 DEVICE — INSERT TIB SZ 4 THK12.5MM KNEE GVF POLYETH ROT PLATFRM PRI: Type: IMPLANTABLE DEVICE | Site: KNEE | Status: FUNCTIONAL

## 2017-07-31 DEVICE — COMPONENT PAT DIA41MM DST POLY OVL DOME 3 PEG NP CEM MOD: Type: IMPLANTABLE DEVICE | Site: KNEE | Status: FUNCTIONAL

## 2017-07-31 RX ORDER — NITROGLYCERIN 0.4 MG/1
0.4 TABLET SUBLINGUAL AS NEEDED
Status: DISCONTINUED | OUTPATIENT
Start: 2017-07-31 | End: 2017-08-02 | Stop reason: HOSPADM

## 2017-07-31 RX ORDER — HYDROMORPHONE HYDROCHLORIDE 1 MG/ML
0.2 INJECTION, SOLUTION INTRAMUSCULAR; INTRAVENOUS; SUBCUTANEOUS
Status: DISCONTINUED | OUTPATIENT
Start: 2017-07-31 | End: 2017-07-31 | Stop reason: HOSPADM

## 2017-07-31 RX ORDER — POLYETHYLENE GLYCOL 3350 17 G/17G
17 POWDER, FOR SOLUTION ORAL DAILY
Status: DISCONTINUED | OUTPATIENT
Start: 2017-08-01 | End: 2017-08-02 | Stop reason: HOSPADM

## 2017-07-31 RX ORDER — ROCURONIUM BROMIDE 10 MG/ML
INJECTION, SOLUTION INTRAVENOUS AS NEEDED
Status: DISCONTINUED | OUTPATIENT
Start: 2017-07-31 | End: 2017-07-31 | Stop reason: HOSPADM

## 2017-07-31 RX ORDER — DEXTROSE 50 % IN WATER (D50W) INTRAVENOUS SYRINGE
12.5-25 AS NEEDED
Status: DISCONTINUED | OUTPATIENT
Start: 2017-07-31 | End: 2017-07-31 | Stop reason: RX

## 2017-07-31 RX ORDER — MAGNESIUM SULFATE 100 %
4 CRYSTALS MISCELLANEOUS AS NEEDED
Status: DISCONTINUED | OUTPATIENT
Start: 2017-07-31 | End: 2017-08-02 | Stop reason: HOSPADM

## 2017-07-31 RX ORDER — OXYCODONE HCL 10 MG/1
10 TABLET, FILM COATED, EXTENDED RELEASE ORAL ONCE
Status: COMPLETED | OUTPATIENT
Start: 2017-07-31 | End: 2017-07-31

## 2017-07-31 RX ORDER — ONDANSETRON 2 MG/ML
INJECTION INTRAMUSCULAR; INTRAVENOUS AS NEEDED
Status: DISCONTINUED | OUTPATIENT
Start: 2017-07-31 | End: 2017-07-31 | Stop reason: HOSPADM

## 2017-07-31 RX ORDER — TAMSULOSIN HYDROCHLORIDE 0.4 MG/1
0.4 CAPSULE ORAL
Status: DISCONTINUED | OUTPATIENT
Start: 2017-07-31 | End: 2017-08-02 | Stop reason: HOSPADM

## 2017-07-31 RX ORDER — DEXAMETHASONE SODIUM PHOSPHATE 4 MG/ML
INJECTION, SOLUTION INTRA-ARTICULAR; INTRALESIONAL; INTRAMUSCULAR; INTRAVENOUS; SOFT TISSUE AS NEEDED
Status: DISCONTINUED | OUTPATIENT
Start: 2017-07-31 | End: 2017-07-31 | Stop reason: HOSPADM

## 2017-07-31 RX ORDER — KETAMINE HYDROCHLORIDE 10 MG/ML
INJECTION, SOLUTION INTRAMUSCULAR; INTRAVENOUS AS NEEDED
Status: DISCONTINUED | OUTPATIENT
Start: 2017-07-31 | End: 2017-07-31 | Stop reason: HOSPADM

## 2017-07-31 RX ORDER — GLYCOPYRROLATE 0.2 MG/ML
INJECTION INTRAMUSCULAR; INTRAVENOUS AS NEEDED
Status: DISCONTINUED | OUTPATIENT
Start: 2017-07-31 | End: 2017-07-31 | Stop reason: HOSPADM

## 2017-07-31 RX ORDER — SODIUM CHLORIDE 9 MG/ML
25 INJECTION, SOLUTION INTRAVENOUS CONTINUOUS
Status: DISCONTINUED | OUTPATIENT
Start: 2017-07-31 | End: 2017-07-31 | Stop reason: HOSPADM

## 2017-07-31 RX ORDER — SODIUM CHLORIDE 9 MG/ML
125 INJECTION, SOLUTION INTRAVENOUS CONTINUOUS
Status: DISPENSED | OUTPATIENT
Start: 2017-07-31 | End: 2017-08-01

## 2017-07-31 RX ORDER — SODIUM CHLORIDE 0.9 % (FLUSH) 0.9 %
5-10 SYRINGE (ML) INJECTION AS NEEDED
Status: DISCONTINUED | OUTPATIENT
Start: 2017-07-31 | End: 2017-08-02 | Stop reason: HOSPADM

## 2017-07-31 RX ORDER — SODIUM CHLORIDE, SODIUM LACTATE, POTASSIUM CHLORIDE, CALCIUM CHLORIDE 600; 310; 30; 20 MG/100ML; MG/100ML; MG/100ML; MG/100ML
125 INJECTION, SOLUTION INTRAVENOUS CONTINUOUS
Status: DISCONTINUED | OUTPATIENT
Start: 2017-07-31 | End: 2017-07-31 | Stop reason: HOSPADM

## 2017-07-31 RX ORDER — MORPHINE SULFATE 10 MG/ML
2 INJECTION, SOLUTION INTRAMUSCULAR; INTRAVENOUS
Status: DISCONTINUED | OUTPATIENT
Start: 2017-07-31 | End: 2017-07-31 | Stop reason: HOSPADM

## 2017-07-31 RX ORDER — ONDANSETRON 2 MG/ML
4 INJECTION INTRAMUSCULAR; INTRAVENOUS AS NEEDED
Status: DISCONTINUED | OUTPATIENT
Start: 2017-07-31 | End: 2017-07-31 | Stop reason: HOSPADM

## 2017-07-31 RX ORDER — ROPIVACAINE HYDROCHLORIDE 5 MG/ML
30 INJECTION, SOLUTION EPIDURAL; INFILTRATION; PERINEURAL ONCE
Status: DISCONTINUED | OUTPATIENT
Start: 2017-07-31 | End: 2017-07-31 | Stop reason: HOSPADM

## 2017-07-31 RX ORDER — FENTANYL CITRATE 50 UG/ML
INJECTION, SOLUTION INTRAMUSCULAR; INTRAVENOUS AS NEEDED
Status: DISCONTINUED | OUTPATIENT
Start: 2017-07-31 | End: 2017-07-31 | Stop reason: HOSPADM

## 2017-07-31 RX ORDER — MIDAZOLAM HYDROCHLORIDE 1 MG/ML
1 INJECTION, SOLUTION INTRAMUSCULAR; INTRAVENOUS AS NEEDED
Status: DISCONTINUED | OUTPATIENT
Start: 2017-07-31 | End: 2017-07-31 | Stop reason: HOSPADM

## 2017-07-31 RX ORDER — NEOSTIGMINE METHYLSULFATE 1 MG/ML
INJECTION INTRAVENOUS AS NEEDED
Status: DISCONTINUED | OUTPATIENT
Start: 2017-07-31 | End: 2017-07-31 | Stop reason: HOSPADM

## 2017-07-31 RX ORDER — METOPROLOL SUCCINATE 50 MG/1
50 TABLET, EXTENDED RELEASE ORAL DAILY
Status: DISCONTINUED | OUTPATIENT
Start: 2017-08-01 | End: 2017-08-02 | Stop reason: HOSPADM

## 2017-07-31 RX ORDER — LIDOCAINE HYDROCHLORIDE 20 MG/ML
INJECTION, SOLUTION EPIDURAL; INFILTRATION; INTRACAUDAL; PERINEURAL AS NEEDED
Status: DISCONTINUED | OUTPATIENT
Start: 2017-07-31 | End: 2017-07-31 | Stop reason: HOSPADM

## 2017-07-31 RX ORDER — FENOFIBRATE 145 MG/1
145 TABLET, COATED ORAL DAILY
Status: DISCONTINUED | OUTPATIENT
Start: 2017-08-01 | End: 2017-08-01

## 2017-07-31 RX ORDER — SODIUM CHLORIDE 0.9 % (FLUSH) 0.9 %
5-10 SYRINGE (ML) INJECTION AS NEEDED
Status: DISCONTINUED | OUTPATIENT
Start: 2017-07-31 | End: 2017-07-31 | Stop reason: HOSPADM

## 2017-07-31 RX ORDER — ACETAMINOPHEN 500 MG
1000 TABLET ORAL ONCE
Status: COMPLETED | OUTPATIENT
Start: 2017-07-31 | End: 2017-07-31

## 2017-07-31 RX ORDER — FENTANYL CITRATE 50 UG/ML
25 INJECTION, SOLUTION INTRAMUSCULAR; INTRAVENOUS
Status: DISCONTINUED | OUTPATIENT
Start: 2017-07-31 | End: 2017-07-31 | Stop reason: HOSPADM

## 2017-07-31 RX ORDER — FACIAL-BODY WIPES
10 EACH TOPICAL DAILY PRN
Status: DISCONTINUED | OUTPATIENT
Start: 2017-08-02 | End: 2017-08-02 | Stop reason: HOSPADM

## 2017-07-31 RX ORDER — OXYCODONE HYDROCHLORIDE 5 MG/1
10 TABLET ORAL
Status: DISCONTINUED | OUTPATIENT
Start: 2017-07-31 | End: 2017-08-01

## 2017-07-31 RX ORDER — SODIUM CHLORIDE, SODIUM LACTATE, POTASSIUM CHLORIDE, CALCIUM CHLORIDE 600; 310; 30; 20 MG/100ML; MG/100ML; MG/100ML; MG/100ML
INJECTION, SOLUTION INTRAVENOUS
Status: DISCONTINUED | OUTPATIENT
Start: 2017-07-31 | End: 2017-07-31 | Stop reason: HOSPADM

## 2017-07-31 RX ORDER — PREGABALIN 75 MG/1
75 CAPSULE ORAL ONCE
Status: COMPLETED | OUTPATIENT
Start: 2017-07-31 | End: 2017-07-31

## 2017-07-31 RX ORDER — DIPHENHYDRAMINE HYDROCHLORIDE 50 MG/ML
12.5 INJECTION, SOLUTION INTRAMUSCULAR; INTRAVENOUS AS NEEDED
Status: DISCONTINUED | OUTPATIENT
Start: 2017-07-31 | End: 2017-07-31 | Stop reason: HOSPADM

## 2017-07-31 RX ORDER — HYDROXYZINE HYDROCHLORIDE 10 MG/1
10 TABLET, FILM COATED ORAL
Status: COMPLETED | OUTPATIENT
Start: 2017-07-31 | End: 2017-08-01

## 2017-07-31 RX ORDER — CELECOXIB 200 MG/1
200 CAPSULE ORAL ONCE
Status: COMPLETED | OUTPATIENT
Start: 2017-07-31 | End: 2017-07-31

## 2017-07-31 RX ORDER — FENTANYL CITRATE 50 UG/ML
50 INJECTION, SOLUTION INTRAMUSCULAR; INTRAVENOUS AS NEEDED
Status: DISCONTINUED | OUTPATIENT
Start: 2017-07-31 | End: 2017-07-31 | Stop reason: HOSPADM

## 2017-07-31 RX ORDER — SODIUM CHLORIDE, SODIUM LACTATE, POTASSIUM CHLORIDE, CALCIUM CHLORIDE 600; 310; 30; 20 MG/100ML; MG/100ML; MG/100ML; MG/100ML
75 INJECTION, SOLUTION INTRAVENOUS CONTINUOUS
Status: DISCONTINUED | OUTPATIENT
Start: 2017-07-31 | End: 2017-07-31 | Stop reason: HOSPADM

## 2017-07-31 RX ORDER — AMLODIPINE BESYLATE 5 MG/1
5 TABLET ORAL DAILY
Status: DISCONTINUED | OUTPATIENT
Start: 2017-08-01 | End: 2017-08-02 | Stop reason: HOSPADM

## 2017-07-31 RX ORDER — SODIUM CHLORIDE 0.9 % (FLUSH) 0.9 %
5-10 SYRINGE (ML) INJECTION EVERY 8 HOURS
Status: DISCONTINUED | OUTPATIENT
Start: 2017-07-31 | End: 2017-07-31 | Stop reason: HOSPADM

## 2017-07-31 RX ORDER — CEFAZOLIN SODIUM IN 0.9 % NACL 2 G/50 ML
2 INTRAVENOUS SOLUTION, PIGGYBACK (ML) INTRAVENOUS ONCE
Status: COMPLETED | OUTPATIENT
Start: 2017-07-31 | End: 2017-07-31

## 2017-07-31 RX ORDER — PANTOPRAZOLE SODIUM 40 MG/1
40 TABLET, DELAYED RELEASE ORAL DAILY
Status: DISCONTINUED | OUTPATIENT
Start: 2017-08-01 | End: 2017-08-02 | Stop reason: HOSPADM

## 2017-07-31 RX ORDER — MIDAZOLAM HYDROCHLORIDE 1 MG/ML
INJECTION, SOLUTION INTRAMUSCULAR; INTRAVENOUS AS NEEDED
Status: DISCONTINUED | OUTPATIENT
Start: 2017-07-31 | End: 2017-07-31 | Stop reason: HOSPADM

## 2017-07-31 RX ORDER — SUCCINYLCHOLINE CHLORIDE 20 MG/ML
INJECTION INTRAMUSCULAR; INTRAVENOUS AS NEEDED
Status: DISCONTINUED | OUTPATIENT
Start: 2017-07-31 | End: 2017-07-31 | Stop reason: HOSPADM

## 2017-07-31 RX ORDER — DEXAMETHASONE SODIUM PHOSPHATE 4 MG/ML
4 INJECTION, SOLUTION INTRA-ARTICULAR; INTRALESIONAL; INTRAMUSCULAR; INTRAVENOUS; SOFT TISSUE
Status: DISCONTINUED | OUTPATIENT
Start: 2017-07-31 | End: 2017-08-02 | Stop reason: HOSPADM

## 2017-07-31 RX ORDER — ATORVASTATIN CALCIUM 10 MG/1
10 TABLET, FILM COATED ORAL
Status: DISCONTINUED | OUTPATIENT
Start: 2017-07-31 | End: 2017-08-02 | Stop reason: HOSPADM

## 2017-07-31 RX ORDER — MORPHINE SULFATE 2 MG/ML
2 INJECTION, SOLUTION INTRAMUSCULAR; INTRAVENOUS
Status: DISCONTINUED | OUTPATIENT
Start: 2017-07-31 | End: 2017-08-01

## 2017-07-31 RX ORDER — HYDROMORPHONE HYDROCHLORIDE 1 MG/ML
INJECTION, SOLUTION INTRAMUSCULAR; INTRAVENOUS; SUBCUTANEOUS AS NEEDED
Status: DISCONTINUED | OUTPATIENT
Start: 2017-07-31 | End: 2017-07-31 | Stop reason: HOSPADM

## 2017-07-31 RX ORDER — MIDAZOLAM HYDROCHLORIDE 1 MG/ML
0.5 INJECTION, SOLUTION INTRAMUSCULAR; INTRAVENOUS
Status: DISCONTINUED | OUTPATIENT
Start: 2017-07-31 | End: 2017-07-31 | Stop reason: HOSPADM

## 2017-07-31 RX ORDER — ONDANSETRON 2 MG/ML
4 INJECTION INTRAMUSCULAR; INTRAVENOUS
Status: ACTIVE | OUTPATIENT
Start: 2017-07-31 | End: 2017-08-01

## 2017-07-31 RX ORDER — MELOXICAM 7.5 MG/1
15 TABLET ORAL DAILY
Status: DISCONTINUED | OUTPATIENT
Start: 2017-08-01 | End: 2017-08-01

## 2017-07-31 RX ORDER — INSULIN LISPRO 100 [IU]/ML
INJECTION, SOLUTION INTRAVENOUS; SUBCUTANEOUS
Status: DISCONTINUED | OUTPATIENT
Start: 2017-07-31 | End: 2017-08-02 | Stop reason: HOSPADM

## 2017-07-31 RX ORDER — SODIUM CHLORIDE 0.9 % (FLUSH) 0.9 %
5-10 SYRINGE (ML) INJECTION EVERY 8 HOURS
Status: DISCONTINUED | OUTPATIENT
Start: 2017-08-01 | End: 2017-08-02 | Stop reason: HOSPADM

## 2017-07-31 RX ORDER — CEFAZOLIN SODIUM IN 0.9 % NACL 2 G/50 ML
2 INTRAVENOUS SOLUTION, PIGGYBACK (ML) INTRAVENOUS EVERY 8 HOURS
Status: COMPLETED | OUTPATIENT
Start: 2017-07-31 | End: 2017-08-01

## 2017-07-31 RX ORDER — METFORMIN HYDROCHLORIDE 500 MG/1
500 TABLET ORAL 2 TIMES DAILY WITH MEALS
Status: DISCONTINUED | OUTPATIENT
Start: 2017-07-31 | End: 2017-08-01

## 2017-07-31 RX ORDER — PROPOFOL 10 MG/ML
INJECTION, EMULSION INTRAVENOUS AS NEEDED
Status: DISCONTINUED | OUTPATIENT
Start: 2017-07-31 | End: 2017-07-31 | Stop reason: HOSPADM

## 2017-07-31 RX ORDER — ACETAMINOPHEN 500 MG
1000 TABLET ORAL EVERY 6 HOURS
Status: DISCONTINUED | OUTPATIENT
Start: 2017-07-31 | End: 2017-08-02 | Stop reason: HOSPADM

## 2017-07-31 RX ORDER — LIDOCAINE HYDROCHLORIDE 10 MG/ML
0.1 INJECTION, SOLUTION EPIDURAL; INFILTRATION; INTRACAUDAL; PERINEURAL AS NEEDED
Status: DISCONTINUED | OUTPATIENT
Start: 2017-07-31 | End: 2017-07-31 | Stop reason: HOSPADM

## 2017-07-31 RX ORDER — OXYCODONE HYDROCHLORIDE 5 MG/1
5 TABLET ORAL
Status: DISCONTINUED | OUTPATIENT
Start: 2017-07-31 | End: 2017-08-01

## 2017-07-31 RX ORDER — NALOXONE HYDROCHLORIDE 0.4 MG/ML
0.4 INJECTION, SOLUTION INTRAMUSCULAR; INTRAVENOUS; SUBCUTANEOUS AS NEEDED
Status: DISCONTINUED | OUTPATIENT
Start: 2017-07-31 | End: 2017-08-02 | Stop reason: HOSPADM

## 2017-07-31 RX ORDER — AMOXICILLIN 250 MG
1 CAPSULE ORAL 2 TIMES DAILY
Status: DISCONTINUED | OUTPATIENT
Start: 2017-07-31 | End: 2017-08-02 | Stop reason: HOSPADM

## 2017-07-31 RX ADMIN — DOCUSATE SODIUM AND SENNOSIDES 1 TABLET: 8.6; 5 TABLET, FILM COATED ORAL at 17:37

## 2017-07-31 RX ADMIN — SODIUM CHLORIDE, SODIUM LACTATE, POTASSIUM CHLORIDE, CALCIUM CHLORIDE: 600; 310; 30; 20 INJECTION, SOLUTION INTRAVENOUS at 09:00

## 2017-07-31 RX ADMIN — KETAMINE HYDROCHLORIDE 40 MG: 10 INJECTION, SOLUTION INTRAMUSCULAR; INTRAVENOUS at 09:25

## 2017-07-31 RX ADMIN — HYDROMORPHONE HYDROCHLORIDE 0.5 MG: 1 INJECTION, SOLUTION INTRAMUSCULAR; INTRAVENOUS; SUBCUTANEOUS at 09:43

## 2017-07-31 RX ADMIN — SODIUM CHLORIDE, SODIUM LACTATE, POTASSIUM CHLORIDE, CALCIUM CHLORIDE: 600; 310; 30; 20 INJECTION, SOLUTION INTRAVENOUS at 11:13

## 2017-07-31 RX ADMIN — METFORMIN HYDROCHLORIDE 500 MG: 500 TABLET, FILM COATED ORAL at 16:52

## 2017-07-31 RX ADMIN — MIDAZOLAM HYDROCHLORIDE 4 MG: 1 INJECTION, SOLUTION INTRAMUSCULAR; INTRAVENOUS at 08:53

## 2017-07-31 RX ADMIN — ACETAMINOPHEN 1000 MG: 500 TABLET, FILM COATED ORAL at 08:24

## 2017-07-31 RX ADMIN — LIDOCAINE HYDROCHLORIDE 100 MG: 20 INJECTION, SOLUTION EPIDURAL; INFILTRATION; INTRACAUDAL; PERINEURAL at 09:13

## 2017-07-31 RX ADMIN — MIDAZOLAM HYDROCHLORIDE 2 MG: 1 INJECTION, SOLUTION INTRAMUSCULAR; INTRAVENOUS at 09:05

## 2017-07-31 RX ADMIN — HYDROMORPHONE HYDROCHLORIDE 0.5 MG: 1 INJECTION, SOLUTION INTRAMUSCULAR; INTRAVENOUS; SUBCUTANEOUS at 09:35

## 2017-07-31 RX ADMIN — TAMSULOSIN HYDROCHLORIDE 0.4 MG: 0.4 CAPSULE ORAL at 21:20

## 2017-07-31 RX ADMIN — ATORVASTATIN CALCIUM 10 MG: 10 TABLET, FILM COATED ORAL at 21:20

## 2017-07-31 RX ADMIN — NEOSTIGMINE METHYLSULFATE 3 MG: 1 INJECTION INTRAVENOUS at 11:11

## 2017-07-31 RX ADMIN — FENTANYL CITRATE 50 MCG: 50 INJECTION, SOLUTION INTRAMUSCULAR; INTRAVENOUS at 11:11

## 2017-07-31 RX ADMIN — SODIUM CHLORIDE 125 ML/HR: 900 INJECTION, SOLUTION INTRAVENOUS at 12:26

## 2017-07-31 RX ADMIN — RIVAROXABAN 10 MG: 10 TABLET, FILM COATED ORAL at 21:00

## 2017-07-31 RX ADMIN — CELECOXIB 200 MG: 200 CAPSULE ORAL at 08:24

## 2017-07-31 RX ADMIN — DEXAMETHASONE SODIUM PHOSPHATE 10 MG: 4 INJECTION, SOLUTION INTRA-ARTICULAR; INTRALESIONAL; INTRAMUSCULAR; INTRAVENOUS; SOFT TISSUE at 09:20

## 2017-07-31 RX ADMIN — INSULIN LISPRO 2 UNITS: 100 INJECTION, SOLUTION INTRAVENOUS; SUBCUTANEOUS at 16:30

## 2017-07-31 RX ADMIN — FENTANYL CITRATE 100 MCG: 50 INJECTION, SOLUTION INTRAMUSCULAR; INTRAVENOUS at 09:13

## 2017-07-31 RX ADMIN — ROCURONIUM BROMIDE 10 MG: 10 INJECTION, SOLUTION INTRAVENOUS at 09:13

## 2017-07-31 RX ADMIN — PREGABALIN 75 MG: 75 CAPSULE ORAL at 08:24

## 2017-07-31 RX ADMIN — ACETAMINOPHEN 1000 MG: 500 TABLET, FILM COATED ORAL at 14:17

## 2017-07-31 RX ADMIN — SODIUM CHLORIDE, SODIUM LACTATE, POTASSIUM CHLORIDE, AND CALCIUM CHLORIDE 125 ML/HR: 600; 310; 30; 20 INJECTION, SOLUTION INTRAVENOUS at 08:26

## 2017-07-31 RX ADMIN — PROPOFOL 125 MG: 10 INJECTION, EMULSION INTRAVENOUS at 09:13

## 2017-07-31 RX ADMIN — GLYCOPYRROLATE 0.4 MG: 0.2 INJECTION INTRAMUSCULAR; INTRAVENOUS at 11:11

## 2017-07-31 RX ADMIN — FENTANYL CITRATE 50 MCG: 50 INJECTION, SOLUTION INTRAMUSCULAR; INTRAVENOUS at 08:54

## 2017-07-31 RX ADMIN — ROCURONIUM BROMIDE 30 MG: 10 INJECTION, SOLUTION INTRAVENOUS at 09:37

## 2017-07-31 RX ADMIN — CEFAZOLIN 2 G: 1 INJECTION, POWDER, FOR SOLUTION INTRAMUSCULAR; INTRAVENOUS; PARENTERAL at 17:37

## 2017-07-31 RX ADMIN — OXYCODONE HYDROCHLORIDE 10 MG: 10 TABLET, FILM COATED, EXTENDED RELEASE ORAL at 08:24

## 2017-07-31 RX ADMIN — ACETAMINOPHEN 1000 MG: 500 TABLET, FILM COATED ORAL at 21:20

## 2017-07-31 RX ADMIN — CEFAZOLIN 2 G: 1 INJECTION, POWDER, FOR SOLUTION INTRAMUSCULAR; INTRAVENOUS; PARENTERAL at 09:30

## 2017-07-31 RX ADMIN — FENTANYL CITRATE 100 MCG: 50 INJECTION, SOLUTION INTRAMUSCULAR; INTRAVENOUS at 09:50

## 2017-07-31 RX ADMIN — SUCCINYLCHOLINE CHLORIDE 140 MG: 20 INJECTION INTRAMUSCULAR; INTRAVENOUS at 09:13

## 2017-07-31 RX ADMIN — ONDANSETRON 4 MG: 2 INJECTION INTRAMUSCULAR; INTRAVENOUS at 11:10

## 2017-07-31 NOTE — PERIOP NOTES
TRANSFER - OUT REPORT:    Verbal report given to marina melendez(name) on Miguel A Estimable  being transferred to 57(unit) for routine progression of care       Report consisted of patients Situation, Background, Assessment and   Recommendations(SBAR). Time Pre op antibiotic given:0930  Anesthesia Stop time: 3570  Friend Present on Transfer to floor:yes  Order for Friend on Chart:yes    Information from the following report(s) Intake/Output and MAR was reviewed with the receiving nurse. Opportunity for questions and clarification was provided. Is the patient on 02? YES       L/Min 2       Other no    Is the patient on a monitor? NO    Is the nurse transporting with the patient? NO    Surgical Waiting Area notified of patient's transfer from PACU? YES      The following personal items collected during your admission accompanied patient upon transfer:   Dental Appliance:    Vision: Visual Aid: Glasses  Hearing Aid:    Jewelry: Jewelry: None  Clothing: Clothing: At bedside  Other Valuables:  Other Valuables: [id number] sent to safe:

## 2017-07-31 NOTE — ADDENDUM NOTE
Addendum  created 07/31/17 1441 by Mary Flores CRNA    Anesthesia Intra LDAs edited, LDA properties accepted

## 2017-07-31 NOTE — DIABETES MGMT
DTC Progress Note    Recommendations/ Comments: s/p arthroplasty right knee today. BG's in range, 98 - 159 mg/dL  A1c is excellent 5.7%    If appropriate, please consider  Changing lispro correction scale to high sensitivity due to elevated creatinine  Once pt is eating solid foods, consider re-starting home medication Metformin 500 mg BID. Chart reviewed on [facility]. Patient is a 75 y.o. male with known DM on Metformin 500 mg BID at home. A1c:   Lab Results   Component Value Date/Time    Hemoglobin A1c 5.7 07/11/2017 10:53 AM    Hemoglobin A1c 6.5 03/13/2017 08:51 AM       Recent Glucose Results:   Lab Results   Component Value Date/Time    GLUCPOC 159 (H) 07/31/2017 11:52 AM    GLUCPOC 106 (H) 07/31/2017 09:02 AM    GLUCPOC 98 07/31/2017 08:15 AM        Lab Results   Component Value Date/Time    Creatinine 1.22 07/11/2017 10:53 AM     Estimated Creatinine Clearance: 59.8 mL/min (based on Cr of 1.22). Active Orders   Diet    DIET CLEAR LIQUID        PO intake: No data found. Current hospital DM medication: lispro normal sensitivity correction scale    Will continue to follow as needed.     Thank you  Migdalia Bal RN, CDE

## 2017-07-31 NOTE — ANESTHESIA PROCEDURE NOTES
Peripheral Block    Start time: 7/31/2017 8:44 AM  End time: 7/31/2017 8:47 AM  Performed by: Lynette Rojas  Authorized by: Lynette Rojas       Pre-procedure: Indications: at surgeon's request and post-op pain management    Preanesthetic Checklist: patient identified, risks and benefits discussed, site marked, timeout performed and patient being monitored    Timeout Time: 08:44          Block Type:   Block Type:   Adductor canal  Laterality:  Right  Monitoring:  Standard ASA monitoring, continuous pulse ox, frequent vital sign checks, heart rate, responsive to questions and oxygen  Injection Technique:  Single shot  Procedures: ultrasound guided    Patient Position: supine  Prep: DuraPrep    Needle Type:  Stimuplex  Needle Gauge:  22 G  Needle Localization:  Ultrasound guidance  Medication Injected:  0.5%  ropivacaine  Volume (mL):  30    Assessment:  Number of attempts:  1  Injection Assessment:  Incremental injection every 5 mL, local visualized surrounding nerve on ultrasound, negative aspiration for blood, no paresthesia and no intravascular symptoms  Patient tolerance:  Patient tolerated the procedure well with no immediate complications

## 2017-07-31 NOTE — IP AVS SNAPSHOT
[address] 
[phone] Patient: Yusuf Lam MRN: [id number] [facility]:[birth year] Current Discharge Medication List  
  
START taking these medications Dose & Instructions Dispensing Information Comments Morning Noon Evening Bedtime  
 meloxicam 7.5 mg tablet Commonly known as:  MOBIC Your last dose was: Your next dose is:    
   
   
 Dose:  7.5 mg Take 1 Tab by mouth daily. Quantity:  30 Tab Refills:  0  
     
   
   
   
  
 oxyCODONE IR 5 mg immediate release tablet Commonly known as:  Zeke Pack Your last dose was: Your next dose is:    
   
   
 Dose:  5 mg Take 1 Tab by mouth every four (4) hours as needed. Max Daily Amount: 30 mg.  
 Quantity:  40 Tab Refills:  0  
     
   
   
   
  
 rivaroxaban 10 mg tablet Commonly known as:  Eura Dame Your last dose was: Your next dose is:    
   
   
 Dose:  10 mg Take 1 Tab by mouth daily (with lunch). You should have your prescription at home. Indications: KNEE REPLACEMENT DEEP VEIN THROMBOSIS PREVENTION Quantity:  14 Tab Refills:  0  
     
   
   
   
  
 zolpidem 5 mg tablet Commonly known as:  AMBIEN Your last dose was: Your next dose is:    
   
   
 Dose:  5 mg Take 1 Tab by mouth nightly as needed for Sleep. Max Daily Amount: 5 mg. Quantity:  30 Tab Refills:  0 CONTINUE these medications which have CHANGED Dose & Instructions Dispensing Information Comments Morning Noon Evening Bedtime  
 acetaminophen 500 mg tablet Commonly known as:  TYLENOL What changed:   
- medication strength 
- how much to take - when to take this Your last dose was: Your next dose is:    
   
   
 Dose:  1000 mg Take 2 Tabs by mouth every six (6) hours. Quantity:  120 Tab Refills:  0  
     
   
   
   
  
 tamsulosin 0.4 mg capsule Commonly known as:  FLOMAX What changed:  See the new instructions. Your last dose was: Your next dose is: TAKE ONE CAPSULE BY MOUTH ONCE DAILY Quantity:  90 Cap Refills:  0 CONTINUE these medications which have NOT CHANGED Dose & Instructions Dispensing Information Comments Morning Noon Evening Bedtime  
 amLODIPine 5 mg tablet Commonly known as:  Remonia Grates Your last dose was: Your next dose is: TAKE ONE TABLET BY MOUTH ONCE DAILY Quantity:  90 Tab Refills:  3  
     
   
   
   
  
 atorvastatin 10 mg tablet Commonly known as:  LIPITOR Your last dose was: Your next dose is:    
   
   
 Dose:  10 mg Take 10 mg by mouth nightly. Refills:  0  
     
   
   
   
  
 fenofibrate 160 mg tablet Commonly known as:  LOFIBRA Your last dose was: Your next dose is: TAKE ONE TABLET BY MOUTH ONCE DAILY Quantity:  90 Tab Refills:  0  
     
   
   
   
  
 metFORMIN 500 mg tablet Commonly known as:  GLUCOPHAGE Your last dose was: Your next dose is:    
   
   
 Dose:  500 mg Take 1 Tab by mouth two (2) times daily (with meals). Quantity:  180 Tab Refills:  1  
     
   
   
   
  
 metoprolol succinate 50 mg XL tablet Commonly known as:  TOPROL-XL Your last dose was: Your next dose is: TAKE ONE TABLET BY MOUTH ONCE DAILY Quantity:  90 Tab Refills:  3 NexIUM 20 mg capsule Generic drug:  esomeprazole Your last dose was: Your next dose is:    
   
   
 Dose:  20 mg Take 20 mg by mouth daily as needed. Refills:  0  
     
   
   
   
  
 nitroglycerin 0.4 mg SL tablet Commonly known as:  NITROSTAT Your last dose was: Your next dose is:    
   
   
 DISSOLVE ONE TABLET UNDER THE TONGUE EVERY 5 MINUTES AS NEEDED FOR CHEST PAIN. UP TO 3 DOSES Quantity:  25 Tab Refills:  3 STOP taking these medications   
 oxyCODONE-acetaminophen 5-325 mg per tablet Commonly known as:  PERCOCET Where to Get Your Medications Information on where to get these meds will be given to you by the nurse or doctor. ! Ask your nurse or doctor about these medications  
  acetaminophen 500 mg tablet  
 meloxicam 7.5 mg tablet  
 oxyCODONE IR 5 mg immediate release tablet  
 rivaroxaban 10 mg tablet  
 zolpidem 5 mg tablet

## 2017-07-31 NOTE — PROGRESS NOTES
Problem: Mobility Impaired (Adult and Pediatric)  Goal: *Acute Goals and Plan of Care (Insert Text)  Physical Therapy Goals  Initiated 7/31/2017    1. Patient will move from supine to sit and sit to supine , scoot up and down and roll side to side in bed with independence within 4 days. 2. Patient will perform sit to stand with independence within 4 days. 3. Patient will ambulate with modified independence for 300 feet with the least restrictive device within 4 days. 4. Patient will ascend/descend 3 stairs with one handrail(s) with modified independence within 4 days. 5. Patient will perform home exercise program per protocol with independence within 4 days. 6. Patient will demonstrate AROM 0-90 degrees in operative joint within 4 days. PHYSICAL THERAPY EVALUATION  Patient: Ramírez Morgan (27 y.o. 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 His BP did drop after sit to stand but he was not symptomatic and was stable in standing post amb. Anticipate steady gains with mobility skills and left knee ROM allowing for discharge to home and HHPT for follow up. Pt reports that he did not re attend the per op class and anticipates assistance at home from his wife and daughter once discharged. Patient will benefit from skilled intervention to address the above impairments.   Patients rehabilitation potential is considered to be Good  Factors which may influence rehabilitation potential include:   [ ]         None noted  [ ]         Mental ability/status  [X]         Medical condition  [ ]         Home/family situation and support systems  [ ]         Safety awareness  [ ]         Pain tolerance/management  [ ]         Other:        PLAN :  Recommendations and Planned Interventions:  [X]           Bed Mobility Training             [ ]    Neuromuscular Re-Education  [X]           Transfer Training                   [ ]    Orthotic/Prosthetic Training  [X]           Gait Training                         [ ]    Modalities  [X]           Therapeutic Exercises           [ ]    Edema Management/Control  [X]           Therapeutic Activities            [X]    Patient and Family Training/Education  [ ]           Other (comment):     Frequency/Duration: Patient will be followed by physical therapy  twice daily to address goals. Discharge Recommendations: Home Health  Further Equipment Recommendations for Discharge: none, has rolling walker and a cane       SUBJECTIVE:   Patient stated I want to walk a little bit further.       OBJECTIVE DATA SUMMARY:   Consult received, chart reviewed, pt cleared by nursing   HISTORY:    Past Medical History:   Diagnosis Date    Arthritis       KNEES & BACK.     Coronary atherosclerosis of native coronary artery      Diabetes ([facility] [address]       Hgb A1C 6-10-11 7.1%; NIDDM    Essential hypertension, benign      GERD (gastroesophageal reflux disease)      Hypertension      Mixed dyslipidemia       6-11:   HDL 33 LDL 95    S/P coronary artery stent placement July 29th, 2011     NAN to LAD     Past Surgical History:   Procedure Laterality Date    CARDIAC SURG PROCEDURE UNLIST   2009     CATHERIZATION WITH STENT X 1 PLACEMENT    HX GI   2010     COLONOSCOPY    HX GI   2010     ENDOSCOPY    HX HEENT         SEPTOPLASTY    HX HEENT         TONSILLECTOMY    HX KNEE REPLACEMENT Left 2008     DR ROSEN    HX ORTHOPAEDIC Right 2010, 2017     KNEE REPLACEMENT, SPACER 5/2017    HX ORTHOPAEDIC   2007     RIGHT SHOULDER ROTATOR CUFF REPAIR. Prior Level of Function/Home Situation: mod I using his single point cane  Personal factors and/or comorbidities impacting plan of care: cardiac history, [place] no hearing aids     Home Situation  Home Environment: Private residence  # Steps to Enter: 3  Rails to Enter: Yes  Hand Rails : Bilateral  One/Two Story Residence: Two story, live on 1st floor  Living Alone: No  Support Systems: Spouse/Significant Other/Partner, Family member(s)  Patient Expects to be Discharged to[de-identified] Private residence  Current DME Used/Available at Home: [employer], straight, Walker, rolling     EXAMINATION/PRESENTATION/DECISION MAKING:   Critical Behavior:  Neurologic State: Alert  Orientation Level: Oriented X4  Cognition: Appropriate decision making, Appropriate for age attention/concentration, Appropriate safety awareness     Hearing: Auditory  Auditory Impairment: Hard of hearing, bilateral  Skin:  Refer to MD and nursing notes, surgical dressing in place  Edema: none noted  Range Of Motion:  AROM: Generally decreased, functional                       Strength:    Strength: Generally decreased, functional                    Tone & Sensation:                  Sensation: Intact               Coordination:     Vision:      Functional Mobility:  Bed Mobility:     Supine to Sit: Minimum assistance  Sit to Supine: Minimum assistance     Transfers:  Sit to Stand: Minimum assistance  Stand to Sit: Contact guard assistance                       Balance:   Sitting: Intact  Standing: Intact; With support  Ambulation/Gait Training:  Distance (ft): 35 Feet (ft)  Assistive Device: Gait belt;Walker, rolling  Ambulation - Level of Assistance: Contact guard assistance        Gait Abnormalities: Decreased step clearance     Left Side Weight Bearing: As tolerated  Base of Support: Widened     Speed/January: Slow  Step Length: Left shortened;Right shortened                                           Stairs: Therapeutic Exercises: Ankle pumps     Functional Measure:  Timed up and go:      Timed Get Up And Go Test: 19 (extrapolated)      Timed Up and Go and G-code impairment scale:  Percentage of Impairment CH     0%    CI     1-19% CJ     20-39% CK     40-59% CL     60-79% CM     80-99% CN      100%   Timed   Score 0-56 10 11-12 13-14 15-16 17-18 19 20          < than 10 seconds=Normal  Greater then 13.5 seconds (in elderly)=Increased fall risk   Christine Espinal [place] M. Predicting the probability for falls in community dwelling older adults using the Timed Up and Go Test. Phys Ther. 2000;80:896-903. G codes: In compliance with CMSs Claims Based Outcome Reporting, the following G-code set was chosen for this patient based on their primary functional limitation being treated: The outcome measure chosen to determine the severity of the functional limitation was the TUG with a score of 19 which was correlated with the impairment scale.       · Mobility - Walking and Moving Around:               - CURRENT STATUS:    CM - 80%-99% impaired, limited or restricted               - GOAL STATUS:           CI - 1%-19% impaired, limited or restricted               - D/C STATUS:                       ---------------To be determined---------------      Physical Therapy Evaluation Charge Determination   History Examination Presentation Decision-Making   MEDIUM  Complexity : 1-2 comorbidities / personal factors will impact the outcome/ POC  LOW Complexity : 1-2 Standardized tests and measures addressing body structure, function, activity limitation and / or participation in recreation  LOW Complexity : Stable, uncomplicated  LOW Complexity : FOTO score of       Based on the above components, the patient evaluation is determined to be of the following complexity level: LOW      Pain:  Pain Scale 1: Numeric (0 - 10)  Pain Intensity 1: 3  Pain Location 1: Knee  Pain Orientation 1: Right  Pain Description 1: Aching     Activity Tolerance:   See assessment above  Please refer to the flowsheet for vital signs taken during this treatment. After treatment:   [ ]         Patient left in no apparent distress sitting up in chair  [X]         Patient left in no apparent distress in bed  [X]         Call bell left within reach  [X]         Nursing notified  [ ]         Caregiver present  [ ]         Bed alarm activated      COMMUNICATION/EDUCATION:   The patients plan of care was discussed with: Registered Nurse.  [X]         Fall prevention education was provided and the patient/caregiver indicated understanding. [X]         Patient/family have participated as able in goal setting and plan of care. [X]         Patient/family agree to work toward stated goals and plan of care. [ ]         Patient understands intent and goals of therapy, but is neutral about his/her participation. [ ]         Patient is unable to participate in goal setting and plan of care.      Thank you for this referral.  Duong Waggoner   Time Calculation: 21 mins

## 2017-07-31 NOTE — OP NOTES
[facility] [place] [address]   OP NOTE       Name:  Cata Dougherty   MR#:  [id number]   :  1950   Account #:  [de-identified]    Surgery Date:  2017   Date of Adm:  2017       PREOPERATIVE DIAGNOSIS: Status post resection arthroplasty, with   placement of antibiotic-impregnated articulating cement spacer knee,   right knee. POSTOPERATIVE DIAGNOSIS: Status post resection arthroplasty,   with placement of antibiotic-impregnated articulating cement spacer   knee, right knee. PROCEDURES PERFORMED: Revision, with reimplantation right total   knee replacement. ATTENDING PHYSICIAN: Radames De La Garza MD    ASSISTANT: Parker Motley Physician Assistant    ANESTHESIA: General.    ESTIMATED BLOOD LOSS: 100 mL. SPECIMENS REMOVED: Old implant. INDICATIONS: A 79-year-old gentleman who is approximately 3   months out from resection arthroplasty with placement of antibiotic-  impregnated articulating cement spacer knee for deep infection of knee   prosthesis. He has completed his course of antibiotics. He was off   antibiotics for approximately 1 month, and infectious workup was   negative. Therefore, it is elected to proceed with removal of the   articulating cement spacer knee with revision total knee replacement   surgery. DESCRIPTION OF PROCEDURE: The patient was taken to the   operating room and underwent general inhalational anesthesia. The   right knee and leg were prepped and draped in the usual fashion. The   leg was exsanguinated with the Esmarch bandage and tourniquet   inflated to 300 mmHg. Original incision was utilized, taken down   through skin and subcutaneous tissue. Medial parapatellar arthrotomy   was performed, and extended proximal along the medial border of the   quadriceps tendon, distally along medial border of the insertion of the   patellar tendon. Medial release was performed.  Retropatellar scar   tissue was sharply excised. The patella was mobilized laterally and   subluxated, and the knee was flexed 90 degrees. The articulating   cement spacer knee was removed. The femoral component came off   relatively easily. The tibial component was grossly loose. The antibiotic   cement in the tibia was well fixed, and took a fair amount of time to   remove in a piecemeal fashion. Cultures were obtained. No evidence   for infection was noted. After removal of all residual bone cement and   components, I then reamed the tibia to a size 18 reamer. I then   broached the proximal tibia to a size 53 sleeve. The tibia was sized to   a size 4. Trial component was placed. We then turned our attention to   the femur. The femur was reamed up to a size 18 reamer. The distal   femur was broached to a size 34 sleeve. The femur was sized to a size   4. Our distal cuts were performed with the intramedullary sheree in place. After this resection, we then assessed our extension gap. We then put   our distal femoral cutting guide back on and made our anterior,   posterior and chamfer cuts. Our posterior stabilized cut was performed. The flexion gap was measured and was equal to our extension gap. It   did require 4 mm augments on the posterior condyles of the femur   medially and laterally. This required a 22.5 insert. The patella was   everted, and the patella was cut using freehand technique. All scar   tissue was excised. The patella was sized to a 41. Drill holes were   placed and patellar button applied. The patella tracked normally. All   trial components were removed, and all bony surfaces were cleansed   using pulsatile lavage system with antibiotic solution. We therefore   decided to use a +15 buildup tibia. Our components were assembled   on the back table. All bony surfaces were extensively irrigated with   pulsatile lavage system with antibiotic solution. The capsule and   periosteum were infiltrated with our joint cocktail.  All components were   cemented in place, taking care not to allow any cement to enter the   intramedullary canal, where our porous sleeves were in contact with   bone. We had excellent fixation. Trial reduction was performed and the   12.5 insert was chosen. This was inserted. The knee was maintained   in full extension while the cement hardened. The tourniquet was let   down after a total tourniquet time of 80 minutes. Hemostasis was   obtained using Bovie apparatus. The joint was extensively irrigated   with antibiotic solution. The arthrotomy was repaired using #1 Stratafix   suture in a running fashion. Subcutaneous tissue was approximated   using 2-0 Vicryl in interrupted fashion. Skin edges were approximated   using stapling apparatus. Bulky sterile dressing was applied, and the   patient was awakened, extubated and transferred to the recovery room   in stable condition. There were no complications.         MD Mignon SidhuCarondelet [facility]   D:  07/31/2017   13:11   T:  07/31/2017   14:08   Job #:  [id number]

## 2017-07-31 NOTE — PROGRESS NOTES
Occupational Therapy   Orders received, chart review completed. Note patient POD #0 from REVISION RIGHT TOTAL KNEE ARTHROPLASTY. Per pathway, OT will follow up tomorrow for evaluation. Recommend OOB to chair three times a day for meals and self-completion of ADLs as able and medically stable. Thank you.

## 2017-07-31 NOTE — ANESTHESIA PREPROCEDURE EVALUATION
Anesthetic History   No history of anesthetic complications            Review of Systems / Medical History  Patient summary reviewed, nursing notes reviewed and pertinent labs reviewed    Pulmonary  Within defined limits                 Neuro/Psych   Within defined limits           Cardiovascular  Within defined limits                     GI/Hepatic/Renal  Within defined limits              Endo/Other  Within defined limits           Other Findings              Physical Exam    Airway  Mallampati: II  TM Distance: > 6 cm  Neck ROM: normal range of motion   Mouth opening: Normal     Cardiovascular  Regular rate and rhythm,  S1 and S2 normal,  no murmur, click, rub, or gallop             Dental  No notable dental hx       Pulmonary  Breath sounds clear to auscultation               Abdominal  GI exam deferred       Other Findings            Anesthetic Plan    ASA: 2  Anesthesia type: general      Post-op pain plan if not by surgeon: peripheral nerve block single    Induction: Intravenous  Anesthetic plan and risks discussed with: Patient

## 2017-07-31 NOTE — IP AVS SNAPSHOT
Summary of Care Report The Summary of Care report has been created to help improve care coordination. Users with access to [employer] or [address] (Web-based application) may access additional patient information including the Discharge Summary. If you are not currently a [address] user and need more information, please call the number listed below in the [place] [address] section and ask to be connected with Medical Records. Facility Information Name Address Phone 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  
  Mixed dyslipidemia  Unknown - Present 2012 by Ramya Jackson MD  
  Entered by Ramya Jackson MD  
  Overview Signed 2011  3:58 PM by Ramya Jackson MD  
 6-11:   HDL 33 LDL 95 Coronary atherosclerosis of native coronary artery  Unknown - Present 11/27/2012 by Dora Lee MD  
  Entered by Dora Lee MD  
  S/P coronary artery stent placement  Unknown - Present 11/27/2012 by Dora Lee MD  
  Entered by Dora Lee MD  
  Overview Signed 8/22/2011 11:56 AM by Dora Lee MD  
   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  
  Entered by HETAL Murphy Infection of prosthetic knee joint ([facility] [address]  10/17/2016 - Present 10/18/2016 by Quinn Proctor MD  
  Entered by Erik Zambrano PA-C  
  Joint prosthesis infection or inflammation ([address]  5/16/2017 - Present 5/16/2017 by Quinn Proctor MD  
  Entered by HETAL Julien You are allergic to the following Allergen Reactions Levofloxacin Rash Pcn (Penicillins) Rash Tape (Adhesive) Rash Medipore tape caused large blisters when removed. Current Discharge Medication List  
  
START taking these medications Dose & Instructions Dispensing Information Comments  
 meloxicam 7.5 mg tablet Commonly known as:  MOBIC Dose:  7.5 mg Take 1 Tab by mouth daily. Quantity:  30 Tab Refills:  0  
   
 oxyCODONE IR 5 mg immediate release tablet Commonly known as:  Zeke Pack Dose:  5 mg Take 1 Tab by mouth every four (4) hours as needed. Max Daily Amount: 30 mg.  
 Quantity:  40 Tab Refills:  0  
   
 rivaroxaban 10 mg tablet Commonly known as:  Eura Dame Dose:  10 mg Take 1 Tab by mouth daily (with lunch).  You should have your prescription at home. Indications: KNEE REPLACEMENT DEEP VEIN THROMBOSIS PREVENTION Quantity:  14 Tab Refills:  0  
   
 zolpidem 5 mg tablet Commonly known as:  AMBIEN Dose:  5 mg Take 1 Tab by mouth nightly as needed for Sleep. Max Daily Amount: 5 mg. Quantity:  30 Tab Refills:  0 CONTINUE these medications which have CHANGED Dose & Instructions Dispensing Information Comments  
 acetaminophen 500 mg tablet Commonly known as:  TYLENOL What changed:   
- medication strength 
- how much to take - when to take this Dose:  1000 mg Take 2 Tabs by mouth every six (6) hours. Quantity:  120 Tab Refills:  0  
   
 tamsulosin 0.4 mg capsule Commonly known as:  FLOMAX What changed:  See the new instructions. TAKE ONE CAPSULE BY MOUTH ONCE DAILY Quantity:  90 Cap Refills:  0 CONTINUE these medications which have NOT CHANGED Dose & Instructions Dispensing Information Comments  
 amLODIPine 5 mg tablet Commonly known as:  Aliya Jose R TAKE ONE TABLET BY MOUTH ONCE DAILY Quantity:  90 Tab Refills:  3  
   
 atorvastatin 10 mg tablet Commonly known as:  LIPITOR Dose:  10 mg Take 10 mg by mouth nightly. Refills:  0  
   
 fenofibrate 160 mg tablet Commonly known as:  LOFIBRA TAKE ONE TABLET BY MOUTH ONCE DAILY Quantity:  90 Tab Refills:  0  
   
 metFORMIN 500 mg tablet Commonly known as:  GLUCOPHAGE Dose:  500 mg Take 1 Tab by mouth two (2) times daily (with meals). Quantity:  180 Tab Refills:  1  
   
 metoprolol succinate 50 mg XL tablet Commonly known as:  TOPROL-XL  
 TAKE ONE TABLET BY MOUTH ONCE DAILY Quantity:  90 Tab Refills:  3 NexIUM 20 mg capsule Generic drug:  esomeprazole Dose:  20 mg Take 20 mg by mouth daily as needed. Refills:  0  
   
 nitroglycerin 0.4 mg SL tablet Commonly known as:  NITROSTAT  
 DISSOLVE ONE TABLET UNDER THE TONGUE EVERY 5 MINUTES AS NEEDED FOR CHEST PAIN. UP TO 3 DOSES Quantity:  25 Tab Refills:  3 STOP taking these medications Comments  
 oxyCODONE-acetaminophen 5-325 mg per tablet Commonly known as:  PERCOCET Surgery Information ID Date/Time Status Primary Surgeon All Procedures Location [id number] 7/31/2017 Abhishe[address] Hannah Moore MD REVISION RIGHT TOTAL KNEE ARTHROPLASTY   [facility] MAIN OR Follow-up Information Follow up With Details Comments Contact Info [address] [facility], please call office if you have not heard from staff member by 12 noon first full day after discharge [address] 
1st Floor [facility] [address] 
[phone] None   None (395) Patient stated that they have no PCP Discharge Instructions After Hospital Care Plan:   
Discharge Instructions Knee Replacement-Dr. Kaylin Navarro Patient Name  Dion Mcdaniel Date of procedure  7/31/2017 Procedure  Procedure(s): REVISION RIGHT TOTAL KNEE ARTHROPLASTY Surgeon  Surgeon(s) and Role: Isaias Bridges MD - Primary Date of discharge: No discharge date for patient encounter. PCP: Linda Jimenez MD 
 
Follow up appointments 
-follow up with Dr. Kaylin Navarro in 2 weeks. Call [phone] to make an appointment. Home Health Agency: _________________________   phone: ___________________ The agency will contact you to arrange dates/times for visits. Please call them if you do not hear from them within 24 hours after you are discharged. When to call your Orthopaedic Surgeon: 
-unrelieved pain 
-Signs of infection-if your incision is red; continues to have drainage; drainage has a foul odor or if you have a persistent fever over 101 degrees 
-Signs of a blood clot in your leg-calf pain, tenderness, redness, swelling of lower leg When to call your Primary Care Physician: 
-Concerns about medical conditions such as diabetes, high blood pressure, asthma, congestive heart failure 
-Call if blood sugars are elevated, persistent headache or dizziness, coughing or congestion, constipation or diarrhea, burning with urination, abnormal heart rate When to call 911and go to the nearest emergency room 
-acute onset of chest pain, shortness of breath, difficulty breathing Activity 
-weight bearing as tolerated with your walker or crutches. Refer to pages 23-31 of your handbook for instructions and pictures.  
-20 repetitions of each exercise as instructed by therapist 3 times each day. Refer to pages 32-40 of your handbook for exercise instructions and pictures 
-get up every one hour and walk (except at night when sleeping) 
-do not drive or operate heavy machinery Incision Care 
-you will have staples in your knee incision; they will be removed at the surgeons office visit in 2 weeks 
-Keep a dressing (ABD and paper tape) on your incision and change daily. Wash your hands thoroughly before changing the dressing. Once you incision is not draining, you may leave it open to air 
-You may shower and get your incision wet but do not submerge your incision under water in a bath tub, hot tub or swimming pool for 6 weeks after surgery. Preventing blood clots 
-take Xarelto at 12 noon daily as prescribed by Dr. Lisa Nick Pain management 
-take pain medication as prescribed; decrease the amount you use as your pain lessens 
-avoid alcoholic beverages while taking pain medication 
-Please be aware that many medications contain Tylenol. We do not want you to over medicate so please read the information below as a guide. Do not take more than 4 Grams of Tylenol in a 24 hour period. (There are 1000 milligrams in one Gram) -You may place an ice bag on your knee for 15-20 minutes after exercising Diet 
-resume usual diet; drink plenty of fluids; eat foods high in fiber 
-you may want to take a stool softener (such as Senokot-S or Colace) to prevent constipation while you are taking pain medication. If constipation occurs, take a laxative (such as Dulcolax tablets, Milk of Magnesia, or a suppository) Home Health Care Protocol (to be followed by [address]) Nursing-per physicians order 
-complete head to toe assessment, vital signs 
-staples will be removed in the surgeons office at 2 week visit 
-medication reconciliation 
-review pain management 
-manage chronic medical conditions Physical Therapy-per physicians order Weight bearing status: 
  
Left Side Weight Bearing: As tolerated Right Side Weight Bearing: As tolerated ? Mobility Status: 
Supine to Sit: Independent Sit to Stand: Supervision Sit to Supine: Minimum assistance Gait: 
Distance (ft): 160 Feet (ft) Ambulation - Level of Assistance: Contact guard assistance Assistive Device: Gait belt, Walker, rolling Gait Abnormalities: Decreased step clearance (decreased knee flexion on right) 
 
-Home Therapy 
-assessment and evaluation-bed mobility; functional transfers (bed, chair, bathroom, stairs); ambulation with equipment, car transfers, shower transfers, safety and ability to get out of house in the event of an emergency 
-review weight bearing as tolerated, wean from walker or crutches as tolerated 
-discuss pain management 
-review how to do ADLs 
 
-Home Exercise Program-refer to [id number] of the patient handbook for exercises 
-supine exercises-ankle pumps, hamstring sets, quad sets, heel slides, short arc quad sets, long arc quads-prop heel on pillow for stretch, straight leg raise-sitting exercises-active knee flexion, passive knee flexion, active knee extension, ankle pumps, bicep curls (use weights as appropriate), triceps pushups, shoulder flexion (use weights as appropriate) -standing exercises holding onto supportive counter-toe raises, heel raises, mini-squats, heel/toe touches with knee bend, marching in place, hamstring curls Chart Review Routing History Recipient Method Report Sent By Amber Gonzalez MD  
Fax: [phone] Phone: [phone] Fax Provider Comm Report Benard Opitz, MD [[id number]] 9/11/2011 11:40 AM 09/11/2011 Abhishek Uribe MD  
Phone: [phone] In Basket Note Review Benard Opitz, MD [[id number]] 9/11/2011 11:42 AM 09/11/2011 Zoe Roth MD  
Phone: [phone] In [address] [[id number]] 7/10/2012 12:36 PM 07/10/2012 Zoe Roth MD  
Phone: [phone] In [employer] IP Auto Routed MD Leno Naik 7/28/2012 10:36 AM 07/28/2012 Zoe Roth MD  
Phone: [phone] In [address] [[id number]] 8/2/2012 12:42 PM 07/31/2012 Zoe Roth MD  
Phone: [phone] In [address] [[id number]] 8/29/2012  2:26 PM 08/28/2012 Zoe Roth MD  
Phone: [phone] In [address] [[id number]] 8/29/2012  2:27 PM 08/21/2012 Zoe Roth MD  
Phone: [phone] In [address] [[id number]] 8/31/2012  4:20 PM 08/28/2012

## 2017-07-31 NOTE — BRIEF OP NOTE
BRIEF OPERATIVE NOTE    Date of Procedure: 7/31/2017   Preoperative Diagnosis: STATUS POST RESECTION ARTHROPLASTY  Postoperative Diagnosis: STATUS POST RESECTION ARTHROPLASTY    Procedure(s):  REVISION RIGHT TOTAL KNEE ARTHROPLASTY    Surgeon(s) and Role:     * Antwon Anne MD - Primary         Assistant Staff:  Physician Assistant: Demaris Gottron, PA-C    Surgical Staff:  Circ-1: Kristen Zabala RN  Physician Assistant: Demaris Gottron, PA-C  Scrub Tech-1: Ankush Bhat  Surg Asst-1: Yury Elias  Surg Asst-2: Jarocho Marisabel Langliliam  Surg Asst-Relief: Evins [place]  Event Time In   Incision Start 7639   Incision Close 1130     Anesthesia: General   Estimated Blood Loss: 100cc  Specimens:   ID Type Source Tests Collected by Time Destination   1 : RIGHT KNEE JOINT FLUID Joint Fluid Joint, Knee AEROBIC/ANAEROBIC CULTURE, GRAM STAIN Antwon Anne MD 7/31/2017 0900 Microbiology   1 : RIGHT KNEE BONE    Antwon Anne MD 7/31/2017 1008 Discarded      Findings: no evidence for infection   Complications: none  Implants:   Implant Name Type Inv.  Item Serial No.  Lot No. LRB No. Used Action   CEMENT BNE GENTAMC GHV 40GM -- SMARTSET - SN/A  CEMENT BNE GENTAMC GHV 40GM -- SMARTSET N/A [facility] [id number] Right 1 Implanted   AUG FEM POST PFC SZ 4 4MM -- SIGMA - SN/A  AUG FEM POST PFC SZ 4 4MM -- SIGMA N/A [facility] [employer] [id number] Right 2 Implanted   PAT DOME OV PFC 3PEG 41MM -- SIGMA - SN/A  PAT DOME OV PFC 3PEG 41MM -- SIGMA N/A [facility] [employer] [id number] Right 1 Implanted   SLEEVE TY TIB POR MBT M/L 53MM --  - SN/A  SLEEVE TY TIB POR MBT M/L 53MM --  N/A [facility] [employer] [id number] Right 1 Implanted   STEM FEM UNIV FLUT [id number] --  - SN/A  STEM FEM UNIV FLUT [id number] --  N/A [facility] [employer] [id number] Right 1 Implanted   TRAY TIB MBT REV SZ4 15MM --  - SN/A  TRAY TIB MBT REV SZ4 15MM --  N/A [facility] [employer] [id number] Right 1 Implanted   COMPNT FEM PFC TC3 RT SZ 4 -- SIGMA - SN/A COMPNT FEM PFC TC3 RT SZ 4 -- SIGMA N/A [facility] [employer] [id number] Right 1 Implanted   ADPT FEM STEM SIGMA 5 DEG --  - SN/A  ADPT FEM STEM SIGMA 5 DEG --  N/A [facility] [id number] Right 1 Implanted   SLEEVE FEM DST PRCOAT UNI 34MM --  - SN/A  SLEEVE FEM DST PRCOAT UNI 34MM --  N/A [facility] [id number] Right 1 Implanted   BOLT ADPT FEM OFST SIG +2/2MM --  - SN/A  BOLT ADPT FEM OFST SIG +2/2MM --  N/A [facility] [id number] Right 1 Implanted   STEM FEM UNIV FLUT [id number] --  - SN/A  STEM FEM UNIV FLUT [id number] --  N/A [facility] [id number] Right 1 Implanted   INSERT TIB RP TC3 SZ 4 12. 5MM --  - SN/A   INSERT TIB RP TC3 SZ 4 12. 5MM --  N/A [facility] [id number] Right 1 Implanted

## 2017-07-31 NOTE — PROGRESS NOTES
Bedside and Verbal shift change report given to [employer], RN  (oncoming nurse) by Juan Liz  (offgoing nurse). Report included the following information SBAR and Kardex.

## 2017-07-31 NOTE — PROGRESS NOTES
Primary Nurse Javed Blank and Juanis Adkins, ROWENA performed a dual skin assessment on this patient No impairment noted  Nikolay score is 20

## 2017-07-31 NOTE — ANESTHESIA POSTPROCEDURE EVALUATION
Post-Anesthesia Evaluation and Assessment    Patient: Jim Hicks MRN: [id number]  SSN: [id number]    YOB: 1950  Age: 75 y.o. Sex: male       Cardiovascular Function/Vital Signs  Visit Vitals    /71 (BP 1 Location: Left arm, BP Patient Position: At rest)    Pulse 72    Temp 36.7 °C (98 °F)    Resp 14    Ht 5' 10\" (1.778 m)    Wt 79.8 kg (176 lb)    SpO2 97%    BMI 25.25 kg/m2       Patient is status post general anesthesia for Procedure(s):  REVISION RIGHT TOTAL KNEE ARTHROPLASTY  . Nausea/Vomiting: None    Postoperative hydration reviewed and adequate. Pain:  Pain Scale 1: Numeric (0 - 10) (07/31/17 1310)  Pain Intensity 1: 3 (07/31/17 1310)   Managed    Neurological Status:   Neuro (WDL): Exceptions to WDL (07/31/17 1155)  Neuro  Neurologic State: Alert (07/31/17 1310)  Orientation Level: Oriented X4 (07/31/17 1310)  Cognition: Appropriate decision making; Appropriate for age attention/concentration; Appropriate safety awareness (07/31/17 1310)  Speech: Clear (07/31/17 1310)  LUE Motor Response: Purposeful (07/31/17 1310)  LLE Motor Response: Purposeful (07/31/17 1310)  RUE Motor Response: Purposeful (07/31/17 1310)  RLE Motor Response: Purposeful (07/31/17 1310)   At baseline    Mental Status and Level of Consciousness: Arousable    Pulmonary Status:   O2 Device: Room air (07/31/17 1310)   Adequate oxygenation and airway patent    Complications related to anesthesia: None    Post-anesthesia assessment completed.  No concerns    Signed By: Jyoti Boudreaux MD     July 31, 2017

## 2017-07-31 NOTE — IP AVS SNAPSHOT
[address] 
[phone] Patient: Bimal Langley MRN: [id number] OCJ:[birth year] You are allergic to the following Allergen Reactions Levofloxacin Rash Pcn (Penicillins) Rash Tape (Adhesive) Rash Medipore tape caused large blisters when removed. Recent Documentation Height Weight BMI Smoking Status 1.778 m 80 kg 25.31 kg/m2 Never Smoker Unresulted Labs Order Current Status CULTURE, ANAEROBIC Preliminary result CULTURE, WOUND W GRAM STAIN Preliminary result Emergency Contacts Name Discharge Info Relation Home Work Mobile Deluna,Sundip DISCHARGE CAREGIVER [3] Child [2] [phone] About your hospitalization You were admitted on:  July 31, 2017 [facility] on:  August 2, 2017 Unit phone number:  [phone] Why you were hospitalized Your primary diagnosis was:  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 
[address] [place] [address] 
[phone] None   None (395) Patient stated that they have no PCP Current Discharge Medication List  
  
START taking these medications Dose & Instructions Dispensing Information Comments Morning Noon Evening Bedtime  
 meloxicam 7.5 mg tablet Commonly known as:  MOBIC Your last dose was: Your next dose is:    
   
   
 Dose:  7.5 mg Take 1 Tab by mouth daily. Quantity:  30 Tab Refills:  0  
     
   
   
   
  
 oxyCODONE IR 5 mg immediate release tablet Commonly known as:  Anup Pérez Your last dose was: Your next dose is:    
   
   
 Dose:  5 mg Take 1 Tab by mouth every four (4) hours as needed. Max Daily Amount: 30 mg.  
 Quantity:  40 Tab Refills:  0  
     
   
   
   
  
 rivaroxaban 10 mg tablet Commonly known as:  Cecille Yazidism Your last dose was: Your next dose is:    
   
   
 Dose:  10 mg Take 1 Tab by mouth daily (with lunch). You should have your prescription at home. Indications: KNEE REPLACEMENT DEEP VEIN THROMBOSIS PREVENTION Quantity:  14 Tab Refills:  0  
     
   
   
   
  
 zolpidem 5 mg tablet Commonly known as:  AMBIEN Your last dose was: Your next dose is:    
   
   
 Dose:  5 mg Take 1 Tab by mouth nightly as needed for Sleep. Max Daily Amount: 5 mg. Quantity:  30 Tab Refills:  0 CONTINUE these medications which have CHANGED Dose & Instructions Dispensing Information Comments Morning Noon Evening Bedtime  
 acetaminophen 500 mg tablet Commonly known as:  TYLENOL What changed:   
- medication strength 
- how much to take - when to take this Your last dose was: Your next dose is:    
   
   
 Dose:  1000 mg Take 2 Tabs by mouth every six (6) hours. Quantity:  120 Tab Refills:  0  
     
   
   
   
  
 tamsulosin 0.4 mg capsule Commonly known as:  FLOMAX What changed:  See the new instructions. Your last dose was: Your next dose is: TAKE ONE CAPSULE BY MOUTH ONCE DAILY Quantity:  90 Cap Refills:  0 CONTINUE these medications which have NOT CHANGED Dose & Instructions Dispensing Information Comments Morning Noon Evening Bedtime  
 amLODIPine 5 mg tablet Commonly known as:  Rica Gray Your last dose was: Your next dose is: TAKE ONE TABLET BY MOUTH ONCE DAILY Quantity:  90 Tab Refills:  3  
     
   
   
   
  
 atorvastatin 10 mg tablet Commonly known as:  LIPITOR Your last dose was: Your next dose is:    
   
   
 Dose:  10 mg Take 10 mg by mouth nightly. Refills:  0  
     
   
   
   
  
 fenofibrate 160 mg tablet Commonly known as:  LOFIBRA Your last dose was: Your next dose is: TAKE ONE TABLET BY MOUTH ONCE DAILY Quantity:  90 Tab Refills:  0  
     
   
   
   
  
 metFORMIN 500 mg tablet Commonly known as:  GLUCOPHAGE Your last dose was: Your next dose is:    
   
   
 Dose:  500 mg Take 1 Tab by mouth two (2) times daily (with meals). Quantity:  180 Tab Refills:  1  
     
   
   
   
  
 metoprolol succinate 50 mg XL tablet Commonly known as:  TOPROL-XL Your last dose was: Your next dose is: TAKE ONE TABLET BY MOUTH ONCE DAILY Quantity:  90 Tab Refills:  3 NexIUM 20 mg capsule Generic drug:  esomeprazole Your last dose was: Your next dose is:    
   
   
 Dose:  20 mg Take 20 mg by mouth daily as needed. Refills:  0  
     
   
   
   
  
 nitroglycerin 0.4 mg SL tablet Commonly known as:  NITROSTAT Your last dose was: Your next dose is:    
   
   
 DISSOLVE ONE TABLET UNDER THE TONGUE EVERY 5 MINUTES AS NEEDED FOR CHEST PAIN. UP TO 3 DOSES Quantity:  25 Tab Refills:  3 STOP taking these medications   
 oxyCODONE-acetaminophen 5-325 mg per tablet Commonly known as:  PERCOCET Where to Get Your Medications Information on where to get these meds will be given to you by the nurse or doctor. ! Ask your nurse or doctor about these medications  
  acetaminophen 500 mg tablet  
 meloxicam 7.5 mg tablet  
 oxyCODONE IR 5 mg immediate release tablet  
 rivaroxaban 10 mg tablet  
 zolpidem 5 mg tablet Discharge Instructions After Hospital Care Plan:   
Discharge Instructions Knee Replacement-Dr. Tanya Curry Patient Name  Alda Vergara Date of procedure  7/31/2017 Procedure  Procedure(s): REVISION RIGHT TOTAL KNEE ARTHROPLASTY Surgeon  Surgeon(s) and Role: Karen Espinoza MD - Primary Date of discharge: No discharge date for patient encounter. PCP: Linda Jimenez MD 
 
Follow up appointments 
-follow up with Dr. Tanya Curry in 2 weeks. Call [phone] to make an appointment. [facility]: _________________________   phone: ___________________ The agency will contact you to arrange dates/times for visits. Please call them if you do not hear from them within 24 hours after you are discharged. When to call your Orthopaedic Surgeon: 
-unrelieved pain 
-Signs of infection-if your incision is red; continues to have drainage; drainage has a foul odor or if you have a persistent fever over 101 degrees 
-Signs of a blood clot in your leg-calf pain, tenderness, redness, swelling of lower leg When to call your Primary Care Physician: 
-Concerns about medical conditions such as diabetes, high blood pressure, asthma, congestive heart failure 
-Call if blood sugars are elevated, persistent headache or dizziness, coughing or congestion, constipation or diarrhea, burning with urination, abnormal heart rate When to call 911and go to the nearest emergency room 
-acute onset of chest pain, shortness of breath, difficulty breathing Activity 
-weight bearing as tolerated with your walker or crutches. Refer to pages 23-31 of your handbook for instructions and pictures. -20 repetitions of each exercise as instructed by therapist 3 times each day. Refer to pages 32-40 of your handbook for exercise instructions and pictures 
-get up every one hour and walk (except at night when sleeping) 
-do not drive or operate heavy machinery Incision Care 
-you will have staples in your knee incision; they will be removed at the surgeons office visit in 2 weeks 
-Keep a dressing (ABD and paper tape) on your incision and change daily. Wash your hands thoroughly before changing the dressing. Once you incision is not draining, you may leave it open to air 
-You may shower and get your incision wet but do not submerge your incision under water in a bath tub, hot tub or swimming pool for 6 weeks after surgery. Preventing blood clots 
-take Xarelto at 12 noon daily as prescribed by Dr. Marsha Ocasio Pain management 
-take pain medication as prescribed; decrease the amount you use as your pain lessens 
-avoid alcoholic beverages while taking pain medication 
-Please be aware that many medications contain Tylenol. We do not want you to over medicate so please read the information below as a guide. Do not take more than 4 Grams of Tylenol in a 24 hour period. (There are 1000 milligrams in one Gram) -You may place an ice bag on your knee for 15-20 minutes after exercising Diet 
-resume usual diet; drink plenty of fluids; eat foods high in fiber 
-you may want to take a stool softener (such as Senokot-S or Colace) to prevent constipation while you are taking pain medication. If constipation occurs, take a laxative (such as Dulcolax tablets, Milk of Magnesia, or a suppository) Home Health Care Protocol (to be followed by [address]) Nursing-per physicians order 
-complete head to toe assessment, vital signs 
-staples will be removed in the surgeons office at 2 week visit 
-medication reconciliation 
-review pain management 
-manage chronic medical conditions Physical Therapy-per physicians order Weight bearing status: 
  
Left Side Weight Bearing: As tolerated Right Side Weight Bearing: As tolerated ? Mobility Status: 
Supine to Sit: Independent Sit to Stand: Supervision Sit to Supine: Minimum assistance Gait: 
Distance (ft): 160 Feet (ft) Ambulation - Level of Assistance: Contact guard assistance Assistive Device: Gait belt, Walker, rolling Gait Abnormalities: Decreased step clearance (decreased knee flexion on right) 
 
-Home Therapy 
-assessment and evaluation-bed mobility; functional transfers (bed, chair, bathroom, stairs); ambulation with equipment, car transfers, shower transfers, safety and ability to get out of house in the event of an emergency 
-review weight bearing as tolerated, wean from walker or crutches as tolerated 
-discuss pain management 
-review how to do ADLs 
 
-Home Exercise Program-refer to [id number] of the patient handbook for exercises 
-supine exercises-ankle pumps, hamstring sets, quad sets, heel slides, short arc quad sets, long arc quads-prop heel on pillow for stretch, straight leg raise-sitting exercises-active knee flexion, passive knee flexion, active knee extension, ankle pumps, bicep curls (use weights as appropriate), triceps pushups, shoulder flexion (use weights as appropriate) -standing exercises holding onto supportive counter-toe raises, heel raises, mini-squats, heel/toe touches with knee bend, marching in place, hamstring curls Discharge Orders None ACO Transitions of Care Introducing [facility], [facility] [address] offers a voluntary care coordination program to provide high quality service and care to [facility] fee-for-service beneficiaries. Moise Collins was designed to help you enhance your health and well-being through the following services: ? Transitions of Care  support for individuals who are transitioning from one care setting to another (example: Hospital to home). ? Chronic and Complex Care Coordination  support for individuals and caregivers of those with serious or chronic illnesses or with more than one chronic (ongoing) condition and those who take a number of different medications. If you meet specific medical criteria, a [facility] may call you directly to coordinate your care with your primary care physician and your other care providers. For questions about the [facility] programs, please, contact your physicians office. For general questions or additional information about Accountable Care Organizations: 
Please visit www.medicare.gov/acos. html or call 1-800-MEDICARE (7-304.708.3209) TTY users should call 4-591.150.2896. [facility] Announcement We are excited to announce that we are making your provider's discharge notes available to you in [facility]. You will see these notes when they are completed and signed by the physician that discharged you from your recent hospital stay. If you have any questions or concerns about any information you see in [facility], please call the Health Information Department where you were seen or reach out to your Primary Care Provider for more information about your plan of care. Introducing [facility]! Laura Stovall introduces [facility] patient portal. Now you can access parts of your medical record, email your doctor's office, and request medication refills online. 1. In your internet browser, go to [email or web address] 2. Click on the First Time User? Click Here link in the Sign In box. You will see the New Member Sign Up page. 3. Enter your [facility] Access Code exactly as it appears below. You will not need to use this code after youve completed the sign-up process. If you do not sign up before the expiration date, you must request a new code.  
 
· [facility] Access Code: [email or web address] 
 Expires: 9/5/2017 12:26 PM 
 
4. Enter the last four digits of your Social Security Number (xxxx) and Date of Birth (mm/dd/yyyy) as indicated and click Submit. You will be taken to the next sign-up page. 5. Create a [employer] ID. This will be your [employer] login ID and cannot be changed, so think of one that is secure and easy to remember. 6. Create a [employer] password. You can change your password at any time. 7. Enter your Password Reset Question and Answer. This can be used at a later time if you forget your password. 8. Enter your e-mail address. You will receive e-mail notification when new information is available in [address] 9. Click Sign Up. You can now view and download portions of your medical record. 10. Click the Download Summary menu link to download a portable copy of your medical information. If you have questions, please visit the Frequently Asked Questions section of the [employer] website. Remember, [facility] is NOT to be used for urgent needs. For medical emergencies, dial 911. Now available from your iPhone and Android! General Information Please provide this summary of care documentation to your next provider. Patient Signature:  ____________________________________________________________ Date:  ____________________________________________________________  
  
Jesus Cart Provider Signature:  ____________________________________________________________ Date:  ____________________________________________________________

## 2017-08-01 ENCOUNTER — HOME HEALTH ADMISSION (OUTPATIENT)
Dept: HOME HEALTH SERVICES | Facility: HOME HEALTH | Age: 67
End: 2017-08-01
Payer: MEDICARE

## 2017-08-01 ENCOUNTER — TELEPHONE (OUTPATIENT)
Dept: FAMILY MEDICINE CLINIC | Age: 67
End: 2017-08-01

## 2017-08-01 LAB
ANION GAP BLD CALC-SCNC: 8 MMOL/L (ref 5–15)
BUN SERPL-MCNC: 17 MG/DL (ref 6–20)
BUN/CREAT SERPL: 14 (ref 12–20)
CALCIUM SERPL-MCNC: 8.3 MG/DL (ref 8.5–10.1)
CHLORIDE SERPL-SCNC: 110 MMOL/L (ref 97–108)
CO2 SERPL-SCNC: 23 MMOL/L (ref 21–32)
CREAT SERPL-MCNC: 1.25 MG/DL (ref 0.7–1.3)
GLUCOSE BLD STRIP.AUTO-MCNC: 104 MG/DL (ref 65–100)
GLUCOSE BLD STRIP.AUTO-MCNC: 108 MG/DL (ref 65–100)
GLUCOSE BLD STRIP.AUTO-MCNC: 124 MG/DL (ref 65–100)
GLUCOSE BLD STRIP.AUTO-MCNC: 158 MG/DL (ref 65–100)
GLUCOSE SERPL-MCNC: 142 MG/DL (ref 65–100)
HGB BLD-MCNC: 9.1 G/DL (ref 12.1–17)
POTASSIUM SERPL-SCNC: 4.1 MMOL/L (ref 3.5–5.1)
SERVICE CMNT-IMP: ABNORMAL
SODIUM SERPL-SCNC: 141 MMOL/L (ref 136–145)

## 2017-08-01 PROCEDURE — 74011250637 HC RX REV CODE- 250/637: Performed by: PHYSICIAN ASSISTANT

## 2017-08-01 PROCEDURE — 80048 BASIC METABOLIC PNL TOTAL CA: CPT | Performed by: PHYSICIAN ASSISTANT

## 2017-08-01 PROCEDURE — 74011250636 HC RX REV CODE- 250/636: Performed by: PHYSICIAN ASSISTANT

## 2017-08-01 PROCEDURE — 36415 COLL VENOUS BLD VENIPUNCTURE: CPT | Performed by: PHYSICIAN ASSISTANT

## 2017-08-01 PROCEDURE — 65270000029 HC RM PRIVATE

## 2017-08-01 PROCEDURE — 97116 GAIT TRAINING THERAPY: CPT

## 2017-08-01 PROCEDURE — 97110 THERAPEUTIC EXERCISES: CPT

## 2017-08-01 PROCEDURE — 85018 HEMOGLOBIN: CPT | Performed by: PHYSICIAN ASSISTANT

## 2017-08-01 PROCEDURE — 82962 GLUCOSE BLOOD TEST: CPT

## 2017-08-01 PROCEDURE — 74011636637 HC RX REV CODE- 636/637: Performed by: PHYSICIAN ASSISTANT

## 2017-08-01 RX ORDER — MELOXICAM 7.5 MG/1
7.5 TABLET ORAL DAILY
Status: DISCONTINUED | OUTPATIENT
Start: 2017-08-02 | End: 2017-08-02 | Stop reason: HOSPADM

## 2017-08-01 RX ORDER — ONDANSETRON 4 MG/1
4 TABLET, ORALLY DISINTEGRATING ORAL
Status: DISCONTINUED | OUTPATIENT
Start: 2017-08-01 | End: 2017-08-02 | Stop reason: HOSPADM

## 2017-08-01 RX ORDER — OXYCODONE HYDROCHLORIDE 5 MG/1
2.5 TABLET ORAL
Status: DISCONTINUED | OUTPATIENT
Start: 2017-08-01 | End: 2017-08-02 | Stop reason: HOSPADM

## 2017-08-01 RX ORDER — OXYCODONE HYDROCHLORIDE 5 MG/1
5-10 TABLET ORAL
Status: DISCONTINUED | OUTPATIENT
Start: 2017-08-01 | End: 2017-08-02 | Stop reason: HOSPADM

## 2017-08-01 RX ORDER — METFORMIN HYDROCHLORIDE 500 MG/1
500 TABLET, EXTENDED RELEASE ORAL 2 TIMES DAILY
Status: DISCONTINUED | OUTPATIENT
Start: 2017-08-02 | End: 2017-08-02 | Stop reason: HOSPADM

## 2017-08-01 RX ADMIN — METOPROLOL SUCCINATE 50 MG: 50 TABLET, EXTENDED RELEASE ORAL at 08:12

## 2017-08-01 RX ADMIN — PANTOPRAZOLE SODIUM 40 MG: 40 TABLET, DELAYED RELEASE ORAL at 08:12

## 2017-08-01 RX ADMIN — OXYCODONE HYDROCHLORIDE 5 MG: 5 TABLET ORAL at 13:43

## 2017-08-01 RX ADMIN — SODIUM CHLORIDE 125 ML/HR: 900 INJECTION, SOLUTION INTRAVENOUS at 07:30

## 2017-08-01 RX ADMIN — ACETAMINOPHEN 1000 MG: 500 TABLET, FILM COATED ORAL at 22:02

## 2017-08-01 RX ADMIN — ACETAMINOPHEN 1000 MG: 500 TABLET, FILM COATED ORAL at 14:43

## 2017-08-01 RX ADMIN — DOCUSATE SODIUM AND SENNOSIDES 1 TABLET: 8.6; 5 TABLET, FILM COATED ORAL at 08:12

## 2017-08-01 RX ADMIN — POLYETHYLENE GLYCOL 3350 17 G: 17 POWDER, FOR SOLUTION ORAL at 08:12

## 2017-08-01 RX ADMIN — INSULIN LISPRO 2 UNITS: 100 INJECTION, SOLUTION INTRAVENOUS; SUBCUTANEOUS at 08:13

## 2017-08-01 RX ADMIN — DOCUSATE SODIUM AND SENNOSIDES 1 TABLET: 8.6; 5 TABLET, FILM COATED ORAL at 17:12

## 2017-08-01 RX ADMIN — TAMSULOSIN HYDROCHLORIDE 0.4 MG: 0.4 CAPSULE ORAL at 22:02

## 2017-08-01 RX ADMIN — ATORVASTATIN CALCIUM 10 MG: 10 TABLET, FILM COATED ORAL at 22:02

## 2017-08-01 RX ADMIN — HYDROXYZINE HYDROCHLORIDE 10 MG: 10 TABLET, FILM COATED ORAL at 01:07

## 2017-08-01 RX ADMIN — ACETAMINOPHEN 1000 MG: 500 TABLET, FILM COATED ORAL at 01:06

## 2017-08-01 RX ADMIN — Medication 10 ML: at 22:04

## 2017-08-01 RX ADMIN — RIVAROXABAN 10 MG: 10 TABLET, FILM COATED ORAL at 11:08

## 2017-08-01 RX ADMIN — AMLODIPINE BESYLATE 5 MG: 5 TABLET ORAL at 08:12

## 2017-08-01 RX ADMIN — MELOXICAM 15 MG: 7.5 TABLET ORAL at 08:12

## 2017-08-01 RX ADMIN — HYDROXYZINE HYDROCHLORIDE 10 MG: 10 TABLET, FILM COATED ORAL at 22:02

## 2017-08-01 RX ADMIN — METFORMIN HYDROCHLORIDE 500 MG: 500 TABLET, FILM COATED ORAL at 07:29

## 2017-08-01 RX ADMIN — CEFAZOLIN 2 G: 1 INJECTION, POWDER, FOR SOLUTION INTRAMUSCULAR; INTRAVENOUS; PARENTERAL at 01:07

## 2017-08-01 RX ADMIN — Medication 10 ML: at 07:30

## 2017-08-01 RX ADMIN — FENOFIBRATE 145 MG: 145 TABLET ORAL at 08:12

## 2017-08-01 NOTE — PROGRESS NOTES
Spiritual Care Partner Volunteer visited patient in [address] [place] [address] [address] on 8/1/17. Documented by:  Verna Doll M.Div.    Paging Service [phone])

## 2017-08-01 NOTE — PROGRESS NOTES
Problem: Mobility Impaired (Adult and Pediatric)  Goal: *Acute Goals and Plan of Care (Insert Text)  Physical Therapy Goals  Initiated 7/31/2017    1. Patient will move from supine to sit and sit to supine , scoot up and down and roll side to side in bed with independence within 4 days. 2. Patient will perform sit to stand with independence within 4 days. 3. Patient will ambulate with modified independence for 300 feet with the least restrictive device within 4 days. 4. Patient will ascend/descend 3 stairs with one handrail(s) with modified independence within 4 days. 5. Patient will perform home exercise program per protocol with independence within 4 days. 6. Patient will demonstrate AROM 0-90 degrees in operative joint within 4 days. PHYSICAL THERAPY TREATMENT  Patient: Kapil Randolph (12 y.o. male)  Date: 8/1/2017  Diagnosis: STATUS POST RESECTION ARTHROPLASTY  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 with presence of antibiotic-impregnated cement spacer  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 with presence of antibiotic-impregnated cement spacer 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 with presence of antibiotic-impregnated cement spacer  Procedure(s) (LRB):  REVISION RIGHT TOTAL KNEE ARTHROPLASTY   (Right) 1 Day Post-Op  Precautions:   WBAT      ASSESSMENT:  Pt is making steady progress. Pt initially reporting increased knee pain and reluctant to participate but with encouragement very willing. Pt transfers OOB independently, sit to stand with supervision, and ambulated with rolling walker x 160 feet. Pt encouraged to pace himself and increase his activity gradually as tolerated. Pt returned to room and completed knee flexion and extension in sitting. Recommend home health PT.   Progression toward goals:  [ ]      Improving appropriately and progressing toward goals  Fitc.Roles ]      Improving slowly and progressing toward goals  [ ]      Not making progress toward goals and plan of care will be adjusted       PLAN:  Patient continues to benefit from skilled intervention to address the above impairments. Continue treatment per established plan of care. Discharge Recommendations:  Home Health  Further Equipment Recommendations for Discharge:  Has his equipment       SUBJECTIVE:   I think I have done too much today. Pt needed encouragement to participate at this time but then very willing and agreeable. OBJECTIVE DATA SUMMARY:   Range of Motion:       post op knee bandage in place, measurements to be taken once it is off                       Functional Mobility Training:  Bed Mobility:     Supine to Sit: Independent              Transfers:  Sit to Stand: Supervision  Stand to Sit: Supervision                             Ambulation/Gait Training:  Distance (ft): 160 Feet (ft)  Assistive Device: Gait belt;Walker, rolling  Ambulation - Level of Assistance: Contact guard assistance        Gait Abnormalities: Decreased step clearance (decreased knee flexion on right)  Right Side Weight Bearing: As tolerated     Base of Support: Widened     Speed/January: Accelerated, cues to slow down at times, also encouraged pt to pace himself and increase his activity gradually                     Therapeutic Exercises:   Exercises performed per protocol.  Performed seated knee flexion and extension   Pain:  Pain Scale 1: Numeric (0 - 10)  Pain Intensity 1: 7 with exercises  Intervention: see MAR              Activity Tolerance:   good    After treatment:   Jessica.Pun ] Patient left in no apparent distress sitting up in chair  [ ] Patient left in no apparent distress in bed  [ X] Call bell left within reach  Jessica.Pun ] Nursing notified  [ ] Caregiver present  [ ] Bed alarm activated      COMMUNICATION/COLLABORATION:   The patients plan of care was discussed with: Registered Nurse     Lona Mondragon Core   Time Calculation: 9 mins

## 2017-08-01 NOTE — PROGRESS NOTES
CM reviewed chart. CM noted pt had revision right total knee arthroplasty with history of arthritis, DM, HTN, GERD, HTN, mixed dyslipidemia, coronary atherosclerosis, and catherzation with stent X 1 placement, right knee replacement 2010, and right shoulder rotator cuff repair. CM met with pt to introduce self and role and offer support. Bedside assessment completed. CURRENT LIVING SITUATION-  Pt states he lives with his wife in a private residence. Pt was driving prior to this hospital stay and gets assistance as needed from family with transportation. Pt denies use of assistive devices and was independent with ALD's and IAD's. Pt's PCP is Dr Deny Narvaez. Pt last saw his PCP 3/2017. Pt gets his prescriptions filled at [facility]. CM verified with pt his insurance is Medicare. Pt does not have an AMD and declines information. DISCHARGE PLANNING-  Pt denies need for assistance with medications, transportation, and follow up appointments. Disposition plan is to discharge home in care of family, home health, and self. CM offered choice of HH and pt selected [facility]. Referral sent via CC. Mima Hendricks RN CRM      Care Management Interventions  PCP Verified by CM:  Yes  Palliative Care Consult (Criteria: CHF and RRAT>21): No  Mode of Transport at Discharge: Self (Private car)  Transition of Care Consult (CM Consult): [address]: Yes  MyChart Signup: No  Physical Therapy Consult: Yes  Occupational Therapy Consult: Yes  Speech Therapy Consult: No  Current Support Network: Lives with Spouse, Own Home  Confirm Follow Up Transport: Family  Plan discussed with Pt/Family/Caregiver: Yes  Freedom of Choice Offered: Yes  Discharge Location  Discharge Placement: Home with home health

## 2017-08-01 NOTE — PROGRESS NOTES
Problem: Mobility Impaired (Adult and Pediatric)  Goal: *Acute Goals and Plan of Care (Insert Text)  Physical Therapy Goals  Initiated 7/31/2017    1. Patient will move from supine to sit and sit to supine , scoot up and down and roll side to side in bed with independence within 4 days. 2. Patient will perform sit to stand with independence within 4 days. 3. Patient will ambulate with modified independence for 300 feet with the least restrictive device within 4 days. 4. Patient will ascend/descend 3 stairs with one handrail(s) with modified independence within 4 days. 5. Patient will perform home exercise program per protocol with independence within 4 days. 6. Patient will demonstrate AROM 0-90 degrees in operative joint within 4 days. PHYSICAL THERAPY TREATMENT  Patient: Dameon Garcia (84 y.o. 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 He participated in knee ex, see chart below. He came to sitting with supervision, stood with contact guard and amb 200 feet using a rolling walker with contact guard. Note some accelerated pace and advised pt to slow down and focus on increased use of knee flexion. VSS and pt remained up to the chair post session. PT will take right knee ROM measurements after surgical bandage/ace wrap is removed.    Progression toward goals:  [X]      Improving appropriately and progressing toward goals  [ ] Improving slowly and progressing toward goals  [ ]      Not making progress toward goals and plan of care will be adjusted       PLAN:  Patient continues to benefit from skilled intervention to address the above impairments. Continue treatment per established plan of care. Discharge Recommendations:  Home Health  Further Equipment Recommendations for Discharge:  none       SUBJECTIVE:   Patient stated I don't need any pain meds, I'm fine.       OBJECTIVE DATA SUMMARY:   Chart checked, pt cleared by nursing  Critical Behavior:  Neurologic State: Alert  Orientation Level: Oriented X4  Cognition: Appropriate safety awareness, Appropriate for age attention/concentration, Appropriate decision making     Range of Motion:            post op knee bandage still in place, measurements to be taken once it is off. Functional Mobility Training:  Bed Mobility:     Supine to Sit: Supervision              Transfers:  Sit to Stand: Contact guard assistance  Stand to Sit: Supervision                             Balance:  Sitting: Intact  Standing: Intact; With support  Ambulation/Gait Training:  Distance (ft): 200 Feet (ft)  Assistive Device: Gait belt;Walker, rolling  Ambulation - Level of Assistance: Contact guard assistance        Gait Abnormalities: Decreased step clearance (decreased knee flexion on right)  Right Side Weight Bearing: As tolerated     Base of Support: Widened     Speed/January: Accelerated                                  Stairs:            Therapeutic Exercises:     EXERCISE   Sets   Reps   Active Active Assist   Passive Self ROM   Comments   Ankle Pumps   10 [X]                                        [ ]                                        [ ]                                        [ ]                                            Quad Sets   10 [X]                                        [ ]                                        [ ]                                        [ ] Hamstring Sets   10 [X]                                        [ ]                                        [ ]                                        [ ]                                            Short Arc Quads   8 [ ]                                        [X]                                        [ ]                                        [ ]                                            Knee Extension Stretch   1   [ ]                                          [X]                                          [ ]                                          [ ]                                            Anisa Dash     [ ]                                        [ ]                                        [ ]                                        [ ]                                            Vega Phy     [ ]                                        [ ]                                        [ ]                                        [ ]                                            Knee Flexion Stretch   10 [ ]                                        [X]                                        [ ]                                        [ ]                                            Straight Leg Raises   8 [ ]                                        [X]                                        [ ]                                        [ ]                                               Pain:  Pain Scale 1: Numeric (0 - 10)  Pain Intensity 1: 0 at rest, 7-8 while ex and 6-7 post session              Activity Tolerance:   VSS  Please refer to the flowsheet for vital signs taken during this treatment.   After treatment:   [X] Patient left in no apparent distress sitting up in chair  [ ] Patient left in no apparent distress in bed  [X] Call bell left within reach  [X] Nursing notified  [ ] Caregiver present  [ ] Bed alarm activated      COMMUNICATION/COLLABORATION:   The patients plan of care was discussed with: Physical Therapist and Registered Nurse     Kailyn Mtz   Time Calculation: 31 mins

## 2017-08-01 NOTE — PROGRESS NOTES
Ortho Progress Note  1 Day Post-Op  Procedure(s):  REVISION RIGHT TOTAL KNEE ARTHROPLASTY      Subjective: Pt doing well this morning. Not having any pain. Pt denies F/Ch, N/V, lightheadedness, dizziness, SOB, or abdominal pain. Physical Exam:   Visit Vitals    /63 (BP 1 Location: Left arm, BP Patient Position: At rest)    Pulse 67    Temp 98.1 °F (36.7 °C)    Resp 16    Ht 5' 10\" (1.778 m)    Wt 79.8 kg (176 lb)    SpO2 98%    BMI 25.25 kg/m2     General appearance: alert, cooperative, no distress, appears stated age  Abdomen: soft, non-tender. Bowel sounds normal. No masses,  no organomegaly  Extremities: Homans sign is negative, no sign of DVT, limited ROM R knee due to bulky dressing, no calf pain or swelling, no thigh pain with palpation.    Pulses: 2+ and symmetric  Wound: bulky dressing c/d/i, no evidence of drainage  Neurologic: Grossly normal, ankle dorsi/plantar flexion intact, heel raise intact    Recent Results (from the past 24 hour(s))   GLUCOSE, POC    Collection Time: 07/31/17  8:15 AM   Result Value Ref Range    Glucose (POC) 98 65 - 100 mg/dL    Performed by Slime Barbosa    TYPE & SCREEN    Collection Time: 07/31/17  8:23 AM   Result Value Ref Range    Crossmatch Expiration 08/03/2017     ABO/Rh(D) B POSITIVE     Antibody screen NEG    GLUCOSE, POC    Collection Time: 07/31/17  9:02 AM   Result Value Ref Range    Glucose (POC) 106 (H) 65 - 100 mg/dL    Performed by April Ramirez    CULTURE, WOUND [facility] [employer] STAIN    Collection Time: 07/31/17  9:51 AM   Result Value Ref Range    Special Requests: RIGHT KNEE JOINT FLUID     GRAM STAIN OCCASIONAL WBCS SEEN      GRAM STAIN NO ORGANISMS SEEN      Culture result: PENDING    GLUCOSE, POC    Collection Time: 07/31/17 11:52 AM   Result Value Ref Range    Glucose (POC) 159 (H) 65 - 100 mg/dL    Performed by Jaden Alberto, POC    Collection Time: 07/31/17  4:38 PM   Result Value Ref Range    Glucose (POC) 172 (H) 65 - 100 mg/dL Performed by [address] [facility] [facility], POC    Collection Time: 07/31/17  9:26 PM   Result Value Ref Range    Glucose (POC) 184 (H) 65 - 100 mg/dL    Performed by Ernst Sung (traveler)    GLUCOSE, POC    Collection Time: 08/01/17  6:06 AM   Result Value Ref Range    Glucose (POC) 158 (H) 65 - 100 mg/dL    Performed by [facility], BASIC    Collection Time: 08/01/17  6:39 AM   Result Value Ref Range    Sodium 141 136 - 145 mmol/L    Potassium 4.1 3.5 - 5.1 mmol/L    Chloride 110 (H) 97 - 108 mmol/L    CO2 23 21 - 32 mmol/L    Anion gap 8 5 - 15 mmol/L    Glucose 142 (H) 65 - 100 mg/dL    BUN 17 6 - 20 MG/DL    Creatinine 1.25 0.70 - 1.30 MG/DL    BUN/Creatinine ratio 14 12 - 20      GFR est AA >60 >60 ml/min/1.73m2    GFR est non-AA 57 (L) >60 ml/min/1.73m2    Calcium 8.3 (L) 8.5 - 10.1 MG/DL   HEMOGLOBIN    Collection Time: 08/01/17  6:39 AM   Result Value Ref Range    HGB 9.1 (L) 12.1 - 17.0 g/dL       Assessment:  Stable Post Op    Plan:  DVT Prophylaxis:Xarelto x 2 weeks  Physical Therapy: WBAT (pt amb 35' 7/31)  Discharge Planning  Pain control: tylenol, mobic  Acute blood loss anemia: hgb 9.1, normal post op finding, pt asymptomatic, will continue to monitor  DMII: pt on metformin at home, on SSI while inpt, cont to monitor blood glucose

## 2017-08-01 NOTE — PROGRESS NOTES
Bedside and Verbal shift change report given to Noah Tobin  (oncoming nurse) by Everardo Dhillon  (offgoing nurse). Report included the following information SBAR and Kardex.

## 2017-08-01 NOTE — TELEPHONE ENCOUNTER
[facility]  [phone]    Patient called regarding the [facility] that [facility] on 10/10/17 was being cancelled due to multiple no show appointments. Patient wants to let Dr. Barak Pastor know that he has been in the hospital 5 times recently and that was the reason that he was unable to make it to his appointments. The appointment on 10/10/17 was scheduled right after his wife's appointment at 10:20. He is asking that Dr. Barak Pastor reconsider and allow him to see her when his wife is being seen.

## 2017-08-01 NOTE — PROGRESS NOTES
Occupational Therapy Note:     Pt received in chair. Pt familiar with OT from numerous surgeries before. Pt discussed role of OT and reports understanding of all training provided. Pt will have support from wife as needed at home. Pt does not require any further OT intervention at this time. Recommend home with family support and assistance.        Ambrose Suarez, OT  Time Calculation: 15 mins

## 2017-08-02 VITALS
HEART RATE: 78 BPM | DIASTOLIC BLOOD PRESSURE: 61 MMHG | SYSTOLIC BLOOD PRESSURE: 102 MMHG | TEMPERATURE: 98 F | HEIGHT: 70 IN | OXYGEN SATURATION: 98 % | WEIGHT: 176.37 LBS | RESPIRATION RATE: 16 BRPM | BODY MASS INDEX: 25.25 KG/M2

## 2017-08-02 LAB
ANION GAP BLD CALC-SCNC: 4 MMOL/L (ref 5–15)
BUN SERPL-MCNC: 19 MG/DL (ref 6–20)
BUN/CREAT SERPL: 16 (ref 12–20)
CALCIUM SERPL-MCNC: 8.3 MG/DL (ref 8.5–10.1)
CHLORIDE SERPL-SCNC: 110 MMOL/L (ref 97–108)
CO2 SERPL-SCNC: 25 MMOL/L (ref 21–32)
CREAT SERPL-MCNC: 1.19 MG/DL (ref 0.7–1.3)
GLUCOSE BLD STRIP.AUTO-MCNC: 96 MG/DL (ref 65–100)
GLUCOSE SERPL-MCNC: 99 MG/DL (ref 65–100)
HGB BLD-MCNC: 8.7 G/DL (ref 12.1–17)
POTASSIUM SERPL-SCNC: 4.7 MMOL/L (ref 3.5–5.1)
SERVICE CMNT-IMP: NORMAL
SODIUM SERPL-SCNC: 139 MMOL/L (ref 136–145)

## 2017-08-02 PROCEDURE — 74011250637 HC RX REV CODE- 250/637: Performed by: PHYSICIAN ASSISTANT

## 2017-08-02 PROCEDURE — 80048 BASIC METABOLIC PNL TOTAL CA: CPT | Performed by: PHYSICIAN ASSISTANT

## 2017-08-02 PROCEDURE — 97116 GAIT TRAINING THERAPY: CPT

## 2017-08-02 PROCEDURE — 36415 COLL VENOUS BLD VENIPUNCTURE: CPT | Performed by: PHYSICIAN ASSISTANT

## 2017-08-02 PROCEDURE — 85018 HEMOGLOBIN: CPT | Performed by: PHYSICIAN ASSISTANT

## 2017-08-02 PROCEDURE — 82962 GLUCOSE BLOOD TEST: CPT

## 2017-08-02 PROCEDURE — 74011250637 HC RX REV CODE- 250/637: Performed by: NURSE PRACTITIONER

## 2017-08-02 RX ORDER — MELOXICAM 7.5 MG/1
7.5 TABLET ORAL DAILY
Qty: 30 TAB | Refills: 0 | Status: SHIPPED | OUTPATIENT
Start: 2017-08-02 | End: 2017-10-10 | Stop reason: ALTCHOICE

## 2017-08-02 RX ORDER — OXYCODONE HYDROCHLORIDE 5 MG/1
5 TABLET ORAL
Qty: 40 TAB | Refills: 0 | Status: SHIPPED | OUTPATIENT
Start: 2017-08-02 | End: 2017-10-10 | Stop reason: ALTCHOICE

## 2017-08-02 RX ORDER — ZOLPIDEM TARTRATE 5 MG/1
5 TABLET ORAL
Qty: 30 TAB | Refills: 0 | Status: SHIPPED | OUTPATIENT
Start: 2017-08-02 | End: 2018-02-13 | Stop reason: ALTCHOICE

## 2017-08-02 RX ORDER — ACETAMINOPHEN 500 MG
1000 TABLET ORAL EVERY 6 HOURS
Qty: 120 TAB | Refills: 0 | Status: SHIPPED | OUTPATIENT
Start: 2017-08-02

## 2017-08-02 RX ADMIN — POLYETHYLENE GLYCOL 3350 17 G: 17 POWDER, FOR SOLUTION ORAL at 08:31

## 2017-08-02 RX ADMIN — Medication 10 ML: at 06:00

## 2017-08-02 RX ADMIN — METFORMIN HYDROCHLORIDE 500 MG: 500 TABLET, EXTENDED RELEASE ORAL at 08:32

## 2017-08-02 RX ADMIN — METOPROLOL SUCCINATE 50 MG: 50 TABLET, EXTENDED RELEASE ORAL at 08:31

## 2017-08-02 RX ADMIN — OXYCODONE HYDROCHLORIDE 5 MG: 5 TABLET ORAL at 00:45

## 2017-08-02 RX ADMIN — MELOXICAM 7.5 MG: 7.5 TABLET ORAL at 08:32

## 2017-08-02 RX ADMIN — DOCUSATE SODIUM AND SENNOSIDES 1 TABLET: 8.6; 5 TABLET, FILM COATED ORAL at 08:31

## 2017-08-02 RX ADMIN — ACETAMINOPHEN 1000 MG: 500 TABLET, FILM COATED ORAL at 08:31

## 2017-08-02 RX ADMIN — PANTOPRAZOLE SODIUM 40 MG: 40 TABLET, DELAYED RELEASE ORAL at 08:32

## 2017-08-02 NOTE — PROGRESS NOTES
Ortho Progress Note  2 Days Post-Op  Procedure(s):  REVISION RIGHT TOTAL KNEE ARTHROPLASTY      Subjective: Pt doing well this am, no complaints. Ready to discharge home today. Pt denies SOB, lightheadedness, dizziness. No BM, +flatus, no abdominal pain. Physical Exam:   Visit Vitals    /64 (BP 1 Location: Left arm, BP Patient Position: At rest)    Pulse 61    Temp 97.5 °F (36.4 °C)    Resp 16    Ht 5' 10\" (1.778 m)    Wt 80 kg (176 lb 5.9 oz)    SpO2 97%    BMI 25.31 kg/m2     General appearance: alert, cooperative, no distress, appears stated age  Abdomen: soft, non-tender. Bowel sounds normal. No masses,  no organomegaly  Extremities: Homans sign is negative, no sign of DVT, limited ROM R knee due to bulky dressing, no calf pain or swelling, no thigh pain with palpation.    Pulses: 2+ and symmetric  Wound: dressing c/d/i, no evidence of drainage  Neurologic: Grossly normal, ankle dorsi/plantar flexion intact, heel raise intact    Recent Results (from the past 24 hour(s))   GLUCOSE, POC    Collection Time: 08/01/17 11:03 AM   Result Value Ref Range    Glucose (POC) 104 (H) 65 - 100 mg/dL    Performed by [address] [place] [address] Drive, POC    Collection Time: 08/01/17  4:34 PM   Result Value Ref Range    Glucose (POC) 108 (H) 65 - 100 mg/dL    Performed by Devyn Cheng    GLUCOSE, POC    Collection Time: 08/01/17  9:58 PM   Result Value Ref Range    Glucose (POC) 124 (H) 65 - 100 mg/dL    Performed by [address]    Collection Time: 08/02/17  5:36 AM   Result Value Ref Range    HGB 8.7 (L) 12.1 - 49.4 g/dL   METABOLIC PANEL, BASIC    Collection Time: 08/02/17  5:36 AM   Result Value Ref Range    Sodium 139 136 - 145 mmol/L    Potassium 4.7 3.5 - 5.1 mmol/L    Chloride 110 (H) 97 - 108 mmol/L    CO2 25 21 - 32 mmol/L    Anion gap 4 (L) 5 - 15 mmol/L    Glucose 99 65 - 100 mg/dL    BUN 19 6 - 20 MG/DL    Creatinine 1.19 0.70 - 1.30 MG/DL    BUN/Creatinine ratio 16 12 - 20      GFR est AA >60 >60 ml/min/1.73m2    GFR est non-AA >60 >60 ml/min/1.73m2    Calcium 8.3 (L) 8.5 - 10.1 MG/DL   GLUCOSE, POC    Collection Time: 08/02/17  6:27 AM   Result Value Ref Range    Glucose (POC) 96 65 - 100 mg/dL    Performed by Yamile Arroyo        Assessment:  Stable Post Op    Plan:  DVT Prophylaxis:Xarelto x 2 weeks  Physical Therapy: WBAT (pt amb 360' 8/1)  Discharge Planning: home with home health, today                                    F/u in office in 2 weeks  Pain control: tylenol, mobic, oxycodone  Acute blood loss anemia: hgb 8.7 (9.1 8/1), normal post op finding, pt asymptomatic, will continue to monitor  DMII: pt on metformin at home, on SSI while inpt, cont to monitor blood glucose

## 2017-08-02 NOTE — DISCHARGE INSTRUCTIONS
After Hospital Care Plan:    Discharge Instructions Knee Replacement-Dr. Herminio Medina    Patient Name  Brian Dempsey  Date of procedure  7/31/2017    Procedure  Procedure(s):  REVISION RIGHT TOTAL KNEE ARTHROPLASTY    Surgeon  Surgeon(s) and Role:     * Brett Cook MD - Primary  Date of discharge: No discharge date for patient encounter. PCP: Linda Jimenez MD    Follow up appointments  -follow up with Dr. Herminio Medina in 2 weeks. Call [phone] to make an appointment. Home Health Agency: _________________________   phone: ___________________  The agency will contact you to arrange dates/times for visits. Please call them if you do not hear from them within 24 hours after you are discharged. When to call your Orthopaedic Surgeon:  -unrelieved pain  -Signs of infection-if your incision is red; continues to have drainage; drainage has a foul odor or if you have a persistent fever over 101 degrees  -Signs of a blood clot in your leg-calf pain, tenderness, redness, swelling of lower leg  When to call your Primary Care Physician:  -Concerns about medical conditions such as diabetes, high blood pressure, asthma, congestive heart failure  -Call if blood sugars are elevated, persistent headache or dizziness, coughing or congestion, constipation or diarrhea, burning with urination, abnormal heart rate  When to call 911and go to the nearest emergency room  -acute onset of chest pain, shortness of breath, difficulty breathing    Activity  -weight bearing as tolerated with your walker or crutches. Refer to pages 23-31 of your handbook for instructions and pictures.   -20 repetitions of each exercise as instructed by therapist 3 times each day.   Refer to pages 32-40 of your handbook for exercise instructions and pictures  -get up every one hour and walk (except at night when sleeping)  -do not drive or operate heavy machinery    Incision Care  -you will have staples in your knee incision; they will be removed at the surgeons office visit in 2 weeks  -Keep a dressing (ABD and paper tape) on your incision and change daily. Wash your hands thoroughly before changing the dressing. Once you incision is not draining, you may leave it open to air  -You may shower and get your incision wet but do not submerge your incision under water in a bath tub, hot tub or swimming pool for 6 weeks after surgery. Preventing blood clots  -take Xarelto at 12 noon daily as prescribed by Dr. Duane Celaya    Pain management  -take pain medication as prescribed; decrease the amount you use as your pain lessens  -avoid alcoholic beverages while taking pain medication  -Please be aware that many medications contain Tylenol. We do not want you to over medicate so please read the information below as a guide. Do not take more than 4 Grams of Tylenol in a 24 hour period. (There are 1000 milligrams in one Gram)    -You may place an ice bag on your knee for 15-20 minutes after exercising    Diet  -resume usual diet; drink plenty of fluids; eat foods high in fiber  -you may want to take a stool softener (such as Senokot-S or Colace) to prevent constipation while you are taking pain medication. If constipation occurs, take a laxative (such as Dulcolax tablets, Milk of Magnesia, or a suppository)    [facility] Protocol (to be followed by Nusrat Jarrell vivi)  Nursing-per physicians order  -complete head to toe assessment, vital signs  -staples will be removed in the surgeons office at 2 week visit  -medication reconciliation  -review pain management  -manage chronic medical conditions    Physical Therapy-per physicians order  Weight bearing status:     Left Side Weight Bearing: As tolerated  Right Side Weight Bearing: As tolerated  ?   Mobility Status:  Supine to Sit: Independent  Sit to Stand: Supervision  Sit to Supine: Minimum assistance     Gait:  Distance (ft): 160 Feet (ft)  Ambulation - Level of Assistance: Contact guard assistance  Assistive Device: Gait belt, Walker, rolling  Gait Abnormalities: Decreased step clearance (decreased knee flexion on right)    -Home Therapy  -assessment and evaluation-bed mobility; functional transfers (bed, chair, bathroom, stairs); ambulation with equipment, car transfers, shower transfers, safety and ability to get out of house in the event of an emergency  -review weight bearing as tolerated, wean from walker or crutches as tolerated  -discuss pain management  -review how to do ADLs    -Home Exercise Program-refer to [id number] of the patient handbook for exercises  -supine exercises-ankle pumps, hamstring sets, quad sets, heel slides, short arc quad sets, long arc quads-prop heel on pillow for stretch, straight leg raise-sitting exercises-active knee flexion, passive knee flexion, active knee extension, ankle pumps, bicep curls (use weights as appropriate), triceps pushups, shoulder flexion (use weights as appropriate)  -standing exercises holding onto supportive counter-toe raises, heel raises, mini-squats, heel/toe touches with knee bend, marching in place, hamstring curls

## 2017-08-02 NOTE — PROGRESS NOTES
..Bedside and Verbal shift change report given to [address] (oncoming nurse) by Ac Fowler (offgoing nurse). Report included the following information SBAR.

## 2017-08-02 NOTE — PROGRESS NOTES
I have reviewed discharge instructions with the patient. The patient verbalized understanding. All belongings packed with patient and taken to discharge, phone, wallet, keys, , glasses, clothes, cane. Pt has taken all prescriptions and discharge instructions.  IV removed

## 2017-08-02 NOTE — PROGRESS NOTES
Problem: Mobility Impaired (Adult and Pediatric)  Goal: *Acute Goals and Plan of Care (Insert Text)  Physical Therapy Goals  Initiated 7/31/2017    1. Patient will move from supine to sit and sit to supine , scoot up and down and roll side to side in bed with independence within 4 days. 2. Patient will perform sit to stand with independence within 4 days. 3. Patient will ambulate with modified independence for 300 feet with the least restrictive device within 4 days. 4. Patient will ascend/descend 3 stairs with one handrail(s) with modified independence within 4 days. 5. Patient will perform home exercise program per protocol with independence within 4 days. 6. Patient will demonstrate AROM 0-90 degrees in operative joint within 4 days. PHYSICAL THERAPY TREATMENT/DISCHARGE  Patient: Tomás Taveras (57 y.o. 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 Pt ambulated 100 ft with RW and CGA and demonstrated advanced step through gait pattern and good speed, though slightly antalgic pattern. He safely attempted five stairs with use of L railing and cane, and then returned to chair in room. He was educated on d/c instructions from PT including use of ice for pain and edema, maintaining pain med regimen as prescribed, and mobilizing within the home.  Pt verbalized understanding and was left with all needs met. Pt has cleared for d/c to home from [address] [id number]  on 8/2/17      Patient is cleared for discharge from PT standpoint:  YES [X]     NO [ ]   Progression toward goals:  [X]          Improving appropriately and progressing toward goals  [ ]          Improving slowly and progressing toward goals  [ ]          Not making progress toward goals and plan of care will be adjusted       PLAN:  Patient will be discharged from physical therapy at this time. Rationale for discharge:  [X]     Goals Achieved  [ ]     Gertrude Montiel  [ ]     Patient not participating in therapy  [ ]     Other:  Discharge Recommendations:  Home Health  Further Equipment Recommendations for Discharge:  None        SUBJECTIVE:   Patient stated I have has this five times, I know.       OBJECTIVE DATA SUMMARY:   Critical Behavior:  Neurologic State: Alert  Orientation Level: Oriented X4  Cognition: Appropriate safety awareness, Appropriate for age attention/concentration, Appropriate decision making     Range of Motion:  AROM: Generally decreased, functional  PROM: Generally decreased, functional                    Functional Mobility Training:  Bed Mobility:                    Transfers:     Stand to Sit: Contact guard assistance                             Balance:  Sitting: Intact  Standing: Intact  Ambulation/Gait Training:  Distance (ft): 200 Feet (ft)  Assistive Device: Gait belt;Walker, rolling  Ambulation - Level of Assistance: Contact guard assistance     Gait Description (WDL): Exceptions to WDL  Gait Abnormalities: Antalgic  Right Side Weight Bearing: As tolerated           Speed/January: Slow  Step Length: Left shortened;Right shortened                               Stairs:  Number of Stairs Trained: 5  Stairs - Level of Assistance: Contact guard assistance  Rail Use: Right         Pain:  Pain Scale 1: Numeric (0 - 10)  Pain Intensity 1: 0  Pain Location 1: Knee  Pain Orientation 1: Right  Pain Description 1: Aching  Pain Intervention(s) 1: Medication (see MAR); Cold pack  Activity Tolerance:   No apparent distress      Please refer to the flowsheet for vital signs taken during this treatment. After treatment:   [X]  Patient left in no apparent distress sitting up in chair  [ ]  Patient left in no apparent distress in bed  [X]  Call bell left within reach  [X]  Nursing notified  [ ]  Caregiver present  [ ]  Bed alarm activated      COMMUNICATION/COLLABORATION:   The patients plan of care was discussed with: Registered Nurse     Chet Vinson, [facility]    Time Calculation: 10 mins      Regarding student involvement in patient care:  A student participated in this treatment session. Per CMS Medicare statements and APTA guidelines I certify that the following was true:  1. I was present and directly observed the entire session. 2. I made all skilled judgments and clinical decisions regarding care. 3. I am the practitioner responsible for assessment, treatment, and documentation.

## 2017-08-02 NOTE — DISCHARGE SUMMARY
Orthopedic Service Discharge Summary    Patient ID:  Ruthann Devine  [id number]  male  75 y.o.  1950    Admit date: 7/31/2017    Discharge date and time: 8/2/2017 11:54 AM     Admitting Physician: Oswaldo Catalan MD     Discharge Physician: Oswaldo Catalan MD    Consulting Physician(s): Treatment Team: Utilization Review: Bonnie Verdugo; Consulting Provider: Jozef Nguyen NP; Care Manager: Chandni Victor RN    Date of Surgery: 7/31/2017     Preoperative Diagnosis:  STATUS POST RESECTION ARTHROPLASTY    Postoperative Diagnosis: STATUS POST RESECTION ARTHROPLASTY    Procedure(s): Procedure(s):  REVISION RIGHT TOTAL KNEE ARTHROPLASTY      Surgeon: Surgeon(s) and Role:     Erika Mireles MD - Primary      Anesthesia:  General    Preoperative Medical Clearance:                           HPI:  Pt is a 75 y.o. 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 PMH:   Past Medical History:   Diagnosis Date    Arthritis     KNEES & BACK.  Coronary atherosclerosis of native coronary artery     Diabetes ([address]     Hgb A1C 6-10-11 7.1%; NIDDM    Essential hypertension, benign     GERD (gastroesophageal reflux disease)     Hypertension     Mixed dyslipidemia     6-11:   HDL 33 LDL 95    S/P coronary artery stent placement July 29th, 2011    NAN to LAD       Medications upon admission :   Prior to Admission Medications   Prescriptions Last Dose Informant Patient Reported?  Taking?   acetaminophen (TYLENOL) 325 mg tablet 7/24/2017 at Unknown time  No Yes   Sig: Take 2 Tabs by mouth every six (6) hours. amLODIPine (NORVASC) 5 mg tablet 7/31/2017 at 0645  No Yes   Sig: TAKE ONE TABLET BY MOUTH ONCE DAILY   atorvastatin (LIPITOR) 10 mg tablet 7/30/2017 at Unknown time  Yes Yes   Sig: Take 10 mg by mouth nightly. esomeprazole (NEXIUM) 20 mg capsule 7/31/2017 at 0645  Yes Yes   Sig: Take 20 mg by mouth daily as needed. fenofibrate (LOFIBRA) 160 mg tablet 7/30/2017 at Unknown time  No Yes   Sig: TAKE ONE TABLET BY MOUTH ONCE DAILY   metFORMIN (GLUCOPHAGE) 500 mg tablet 7/30/2017 at Unknown time  No Yes   Sig: Take 1 Tab by mouth two (2) times daily (with meals). metoprolol succinate (TOPROL-XL) 50 mg XL tablet 7/31/2017 at 0645  No Yes   Sig: TAKE ONE TABLET BY MOUTH ONCE DAILY   nitroglycerin (NITROSTAT) 0.4 mg SL tablet Unknown at Unknown time  No No   Sig: DISSOLVE ONE TABLET UNDER THE TONGUE EVERY 5 MINUTES AS NEEDED FOR CHEST PAIN. UP TO 3 DOSES   oxyCODONE-acetaminophen (PERCOCET) 5-325 mg per tablet Not Taking at Unknown time  No No   Sig: Take 1 Tab by mouth every four (4) hours as needed for Pain. Max Daily Amount: 6 Tabs. tamsulosin (FLOMAX) 0.4 mg capsule 7/30/2017 at Unknown time  No Yes   Sig: TAKE ONE CAPSULE BY MOUTH ONCE DAILY   Patient taking differently: TAKE ONE CAPSULE BY MOUTH HS      Facility-Administered Medications: None        Allergies: Allergies   Allergen Reactions    Levofloxacin Rash    Pcn [Penicillins] Rash    Tape [Adhesive] Rash     Medipore tape caused large blisters when removed. Hospital Course: The patient underwent surgery. Complications:  None; patient tolerated the procedure well. Was taken to the PACU in stable condition and then transferred to the Orthopedics floor.       Condition on discharge: good    Perioperative Antibiotics:  Ancef     Postoperative Pain Management:  acetaminophen and mobic, oxycodone    DVT Prophylaxis: xarelto 10mg    Postoperative transfusions: none Banked PRBCs     Post Op complications: none    Hemoglobin at discharge:    Lab Results   Component Value Date/Time    HGB 8.7 (L) 08/02/2017 05:36 AM    INR 1.1 07/11/2017 10:53 AM       Dressing was changed on POD # 2. Incision - clean, dry and intact. No significant erythema or swelling. Neurovascular exam within normal limits. Wound appears to be healing without any evidence of infection. Physical Therapy started on the day following surgery and progressed to ambulation with the aid of a rolling walker for distances of 100 feet with supervision. Range of motion  limited by pain. At the time of discharge, able to go up and down stairs and had understanding of precautions needed following surgery. Discharged to: Home with home health. Discharge instructions:  -See Full Summary of discharge instructions attached  -Anticoagulate with xarelto  -Resume pre hospital diet            -Resume home medications   Discharge Medication List as of 8/2/2017 10:35 AM      START taking these medications    Details   meloxicam (MOBIC) 7.5 mg tablet Take 1 Tab by mouth daily. , Print, Disp-30 Tab, R-0      oxyCODONE IR (ROXICODONE) 5 mg immediate release tablet Take 1 Tab by mouth every four (4) hours as needed. Max Daily Amount: 30 mg., Print, Disp-40 Tab, R-0      zolpidem (AMBIEN) 5 mg tablet Take 1 Tab by mouth nightly as needed for Sleep. Max Daily Amount: 5 mg., Print, Disp-30 Tab, R-0      rivaroxaban (XARELTO) 10 mg tablet Take 1 Tab by mouth daily (with lunch). You should have your prescription at home. Indications: KNEE REPLACEMENT DEEP VEIN THROMBOSIS PREVENTION, No Print, Disp-14 Tab, R-0         CONTINUE these medications which have CHANGED    Details   acetaminophen (TYLENOL) 500 mg tablet Take 2 Tabs by mouth every six (6) hours. , Print, Disp-120 Tab, R-0         CONTINUE these medications which have NOT CHANGED    Details   atorvastatin (LIPITOR) 10 mg tablet Take 10 mg by mouth nightly. , Historical Med      esomeprazole (NEXIUM) 20 mg capsule Take 20 mg by mouth daily as needed., Historical Med      metoprolol succinate (TOPROL-XL) 50 mg XL tablet TAKE ONE TABLET BY MOUTH ONCE DAILY, Normal, Disp-90 Tab, R-3      amLODIPine (NORVASC) 5 mg tablet TAKE ONE TABLET BY MOUTH ONCE DAILY, Normal, Disp-90 Tab, R-3      nitroglycerin (NITROSTAT) 0.4 mg SL tablet DISSOLVE ONE TABLET UNDER THE TONGUE EVERY 5 MINUTES AS NEEDED FOR CHEST PAIN. UP TO 3 DOSES, Normal, Disp-25 Tab, R-3      fenofibrate (LOFIBRA) 160 mg tablet TAKE ONE TABLET BY MOUTH ONCE DAILY, Normal, Disp-90 Tab, R-0      metFORMIN (GLUCOPHAGE) 500 mg tablet Take 1 Tab by mouth two (2) times daily (with meals). , Normal, Disp-180 Tab, R-1      tamsulosin (FLOMAX) 0.4 mg capsule TAKE ONE CAPSULE BY MOUTH ONCE DAILY, Mail Order, Disp-90 Cap, R-0         STOP taking these medications       oxyCODONE-acetaminophen (PERCOCET) 5-325 mg per tablet Comments:   Reason for Stopping:            per medical continuation form  -Discharge activity: Activity as tolerated  -Ambulate with Walkers, Type: Rolling Walker, appropriate total joint protocol  -Wound Care Keep wound clean and dry, Reinforce dressing PRN, Ice to area for comfort and As directed  -Follow up in office in 2 weeks      Signed:  Howard Irving PA-C  8/2/2017  8:47 AM        No att. providers found

## 2017-08-03 ENCOUNTER — HOME CARE VISIT (OUTPATIENT)
Dept: SCHEDULING | Facility: HOME HEALTH | Age: 67
End: 2017-08-03
Payer: MEDICARE

## 2017-08-03 VITALS
RESPIRATION RATE: 16 BRPM | OXYGEN SATURATION: 98 % | TEMPERATURE: 98 F | SYSTOLIC BLOOD PRESSURE: 130 MMHG | HEART RATE: 75 BPM | DIASTOLIC BLOOD PRESSURE: 70 MMHG

## 2017-08-03 PROCEDURE — 400013 HH SOC

## 2017-08-03 PROCEDURE — 3331090001 HH PPS REVENUE CREDIT

## 2017-08-03 PROCEDURE — G0299 HHS/HOSPICE OF RN EA 15 MIN: HCPCS

## 2017-08-03 PROCEDURE — 3331090002 HH PPS REVENUE DEBIT

## 2017-08-03 PROCEDURE — G0151 HHCP-SERV OF PT,EA 15 MIN: HCPCS

## 2017-08-04 PROCEDURE — 3331090002 HH PPS REVENUE DEBIT

## 2017-08-04 PROCEDURE — 3331090001 HH PPS REVENUE CREDIT

## 2017-08-05 PROCEDURE — 3331090001 HH PPS REVENUE CREDIT

## 2017-08-05 PROCEDURE — 3331090002 HH PPS REVENUE DEBIT

## 2017-08-06 PROCEDURE — 3331090002 HH PPS REVENUE DEBIT

## 2017-08-06 PROCEDURE — 3331090001 HH PPS REVENUE CREDIT

## 2017-08-07 ENCOUNTER — HOME CARE VISIT (OUTPATIENT)
Dept: SCHEDULING | Facility: HOME HEALTH | Age: 67
End: 2017-08-07
Payer: MEDICARE

## 2017-08-07 VITALS
HEART RATE: 72 BPM | RESPIRATION RATE: 17 BRPM | DIASTOLIC BLOOD PRESSURE: 72 MMHG | SYSTOLIC BLOOD PRESSURE: 128 MMHG | OXYGEN SATURATION: 98 % | TEMPERATURE: 98 F

## 2017-08-07 VITALS
RESPIRATION RATE: 17 BRPM | SYSTOLIC BLOOD PRESSURE: 129 MMHG | OXYGEN SATURATION: 98 % | HEART RATE: 73 BPM | TEMPERATURE: 98 F | DIASTOLIC BLOOD PRESSURE: 78 MMHG

## 2017-08-07 PROCEDURE — 3331090002 HH PPS REVENUE DEBIT

## 2017-08-07 PROCEDURE — A4649 SURGICAL SUPPLIES: HCPCS

## 2017-08-07 PROCEDURE — G0151 HHCP-SERV OF PT,EA 15 MIN: HCPCS

## 2017-08-07 PROCEDURE — 3331090001 HH PPS REVENUE CREDIT

## 2017-08-08 PROCEDURE — 3331090001 HH PPS REVENUE CREDIT

## 2017-08-08 PROCEDURE — 3331090002 HH PPS REVENUE DEBIT

## 2017-08-09 PROCEDURE — 3331090001 HH PPS REVENUE CREDIT

## 2017-08-09 PROCEDURE — 3331090002 HH PPS REVENUE DEBIT

## 2017-08-10 ENCOUNTER — HOME CARE VISIT (OUTPATIENT)
Dept: SCHEDULING | Facility: HOME HEALTH | Age: 67
End: 2017-08-10
Payer: MEDICARE

## 2017-08-10 PROCEDURE — G0151 HHCP-SERV OF PT,EA 15 MIN: HCPCS

## 2017-08-10 PROCEDURE — 3331090002 HH PPS REVENUE DEBIT

## 2017-08-10 PROCEDURE — 3331090001 HH PPS REVENUE CREDIT

## 2017-08-10 PROCEDURE — 3331090003 HH PPS REVENUE ADJ

## 2017-08-11 VITALS
OXYGEN SATURATION: 98 % | HEART RATE: 76 BPM | SYSTOLIC BLOOD PRESSURE: 128 MMHG | TEMPERATURE: 98 F | DIASTOLIC BLOOD PRESSURE: 73 MMHG | RESPIRATION RATE: 17 BRPM

## 2017-08-11 PROCEDURE — 3331090002 HH PPS REVENUE DEBIT

## 2017-08-11 PROCEDURE — 3331090001 HH PPS REVENUE CREDIT

## 2017-08-12 PROCEDURE — 3331090002 HH PPS REVENUE DEBIT

## 2017-08-12 PROCEDURE — 3331090001 HH PPS REVENUE CREDIT

## 2017-08-13 PROCEDURE — 3331090001 HH PPS REVENUE CREDIT

## 2017-08-13 PROCEDURE — 3331090002 HH PPS REVENUE DEBIT

## 2017-08-14 LAB
BACTERIA SPEC CULT: NORMAL
BACTERIA SPEC CULT: NORMAL
GRAM STN SPEC: NORMAL
GRAM STN SPEC: NORMAL
SERVICE CMNT-IMP: NORMAL
SERVICE CMNT-IMP: NORMAL

## 2017-08-14 PROCEDURE — 3331090001 HH PPS REVENUE CREDIT

## 2017-08-14 PROCEDURE — 3331090002 HH PPS REVENUE DEBIT

## 2017-08-15 PROCEDURE — 3331090002 HH PPS REVENUE DEBIT

## 2017-08-15 PROCEDURE — 3331090001 HH PPS REVENUE CREDIT

## 2017-08-16 PROCEDURE — 3331090001 HH PPS REVENUE CREDIT

## 2017-08-16 PROCEDURE — 3331090002 HH PPS REVENUE DEBIT

## 2017-08-17 PROCEDURE — 3331090001 HH PPS REVENUE CREDIT

## 2017-08-17 PROCEDURE — 3331090002 HH PPS REVENUE DEBIT

## 2017-08-18 PROCEDURE — 3331090002 HH PPS REVENUE DEBIT

## 2017-08-18 PROCEDURE — 3331090001 HH PPS REVENUE CREDIT

## 2017-08-19 PROCEDURE — 3331090001 HH PPS REVENUE CREDIT

## 2017-08-19 PROCEDURE — 3331090002 HH PPS REVENUE DEBIT

## 2017-08-20 PROCEDURE — 3331090002 HH PPS REVENUE DEBIT

## 2017-08-20 PROCEDURE — 3331090001 HH PPS REVENUE CREDIT

## 2017-08-21 PROCEDURE — 3331090001 HH PPS REVENUE CREDIT

## 2017-08-21 PROCEDURE — 3331090002 HH PPS REVENUE DEBIT

## 2017-08-22 PROCEDURE — 3331090002 HH PPS REVENUE DEBIT

## 2017-08-22 PROCEDURE — 3331090001 HH PPS REVENUE CREDIT

## 2017-08-23 PROCEDURE — 3331090002 HH PPS REVENUE DEBIT

## 2017-08-23 PROCEDURE — 3331090001 HH PPS REVENUE CREDIT

## 2017-08-24 ENCOUNTER — TELEPHONE (OUTPATIENT)
Dept: FAMILY MEDICINE CLINIC | Age: 67
End: 2017-08-24

## 2017-08-24 NOTE — TELEPHONE ENCOUNTER
I have not seen this patient before. He was Dr Cecilia Lockwood patient. He had an appointment x 2 in July to establish care and he cancelled one appointment and was no show for another. I was doing his medicine refills as courtesy as he rescheduled with me for October ( so he can come with his wife ). I can not comment or discuss on phone until has been seen by me or evaluated by me. He can see other provider in acute care for BP evaluation and can keep appointment with me for October.

## 2017-08-24 NOTE — TELEPHONE ENCOUNTER
I have spoken to pt concerning message below. He did confirm to message below, but he reports that after he drank some lime/salt water, his blood pressure came up to 137/67 last night. Pt states that he checked his BP again this morning and it was at 119/67 and 123/74 at noon today. I have advised pt to keep his appt with you for 10/10/2017 and be check for this,but to go to the Er if he gets the dizziness/black outs again. He declined to be checked out at the ER/here with another provider. But agrees to do so if this happens again before his scheduled appt with you. ROMEO Carmen Notice.

## 2017-08-24 NOTE — TELEPHONE ENCOUNTER
Peter Valdez  [phone]    Mr. Priyank Reed states that while at a picnic yesterday his blood pressure dropped suddenly, he became dizzy and a friend told him that he passed out for about 2-3 minutes. He states that he had an incident like this about 6-7 years ago. He checked his blood pressure this morning and it was 110/45. He is requesting a call from Dr. Tripp Taveras with advice on what to do.

## 2017-10-10 ENCOUNTER — OFFICE VISIT (OUTPATIENT)
Dept: FAMILY MEDICINE CLINIC | Age: 67
End: 2017-10-10

## 2017-10-10 ENCOUNTER — HOSPITAL ENCOUNTER (OUTPATIENT)
Dept: LAB | Age: 67
Discharge: HOME | End: 2017-10-10
Payer: MEDICARE

## 2017-10-10 VITALS
TEMPERATURE: 97.5 F | DIASTOLIC BLOOD PRESSURE: 74 MMHG | HEIGHT: 70 IN | BODY MASS INDEX: 26.92 KG/M2 | RESPIRATION RATE: 16 BRPM | OXYGEN SATURATION: 98 % | HEART RATE: 62 BPM | SYSTOLIC BLOOD PRESSURE: 112 MMHG | WEIGHT: 188 LBS

## 2017-10-10 DIAGNOSIS — I10 ESSENTIAL HYPERTENSION, BENIGN: ICD-10-CM

## 2017-10-10 DIAGNOSIS — E11.9 TYPE 2 DIABETES MELLITUS WITHOUT COMPLICATION, WITHOUT LONG-TERM CURRENT USE OF INSULIN (HCC): Primary | ICD-10-CM

## 2017-10-10 DIAGNOSIS — I25.10 ATHEROSCLEROSIS OF NATIVE CORONARY ARTERY OF NATIVE HEART WITHOUT ANGINA PECTORIS: ICD-10-CM

## 2017-10-10 DIAGNOSIS — Z23 ENCOUNTER FOR IMMUNIZATION: ICD-10-CM

## 2017-10-10 DIAGNOSIS — E78.2 MIXED DYSLIPIDEMIA: ICD-10-CM

## 2017-10-10 DIAGNOSIS — Z95.5 S/P CORONARY ARTERY STENT PLACEMENT: ICD-10-CM

## 2017-10-10 PROCEDURE — 80053 COMPREHEN METABOLIC PANEL: CPT

## 2017-10-10 PROCEDURE — 82043 UR ALBUMIN QUANTITATIVE: CPT

## 2017-10-10 PROCEDURE — 80061 LIPID PANEL: CPT

## 2017-10-10 PROCEDURE — 83036 HEMOGLOBIN GLYCOSYLATED A1C: CPT

## 2017-10-10 PROCEDURE — 36415 COLL VENOUS BLD VENIPUNCTURE: CPT

## 2017-10-10 RX ORDER — OXYCODONE AND ACETAMINOPHEN 5; 325 MG/1; MG/1
TABLET ORAL
COMMUNITY
Start: 2017-07-11 | End: 2017-10-10 | Stop reason: SDUPTHER

## 2017-10-10 RX ORDER — TAMSULOSIN HYDROCHLORIDE 0.4 MG/1
CAPSULE ORAL
COMMUNITY
Start: 2017-05-02 | End: 2017-10-10 | Stop reason: SDUPTHER

## 2017-10-10 RX ORDER — MONTELUKAST SODIUM 10 MG/1
10 TABLET ORAL
Qty: 30 TAB | Refills: 2 | Status: SHIPPED | OUTPATIENT
Start: 2017-10-10 | End: 2018-05-03

## 2017-10-10 RX ORDER — MELOXICAM 15 MG/1
15 TABLET ORAL
COMMUNITY
Start: 2017-07-18 | End: 2017-10-10 | Stop reason: SDUPTHER

## 2017-10-10 RX ORDER — METFORMIN HYDROCHLORIDE 500 MG/1
500 TABLET ORAL
COMMUNITY
Start: 2017-05-03 | End: 2018-02-13 | Stop reason: SDUPTHER

## 2017-10-10 RX ORDER — ASPIRIN 81 MG/1
162 TABLET ORAL DAILY
COMMUNITY

## 2017-10-10 RX ORDER — METOPROLOL SUCCINATE 50 MG/1
TABLET, EXTENDED RELEASE ORAL
COMMUNITY
Start: 2017-05-11 | End: 2018-02-13 | Stop reason: SDUPTHER

## 2017-10-10 RX ORDER — AMLODIPINE BESYLATE 5 MG/1
TABLET ORAL
COMMUNITY
Start: 2017-05-11 | End: 2017-10-10 | Stop reason: SDUPTHER

## 2017-10-10 RX ORDER — NITROGLYCERIN 0.4 MG/1
TABLET SUBLINGUAL
COMMUNITY
Start: 2017-05-11 | End: 2018-02-13 | Stop reason: SDUPTHER

## 2017-10-10 RX ORDER — HYDROGEN PEROXIDE 3 %
20 SOLUTION, NON-ORAL MISCELLANEOUS
COMMUNITY
End: 2017-10-10 | Stop reason: SDUPTHER

## 2017-10-10 RX ORDER — ATORVASTATIN CALCIUM 10 MG/1
10 TABLET, FILM COATED ORAL
COMMUNITY
End: 2017-10-10 | Stop reason: SDUPTHER

## 2017-10-10 RX ORDER — FENOFIBRATE 160 MG/1
TABLET ORAL
COMMUNITY
Start: 2017-05-03 | End: 2017-10-10 | Stop reason: SDUPTHER

## 2017-10-10 NOTE — PATIENT INSTRUCTIONS
Learning About Type 2 Diabetes  What is type 2 diabetes? Insulin is a hormone that helps your body use sugar from your food as energy. Type 2 diabetes happens when your body can't use insulin the right way. Over time, the pancreas can't make enough insulin. If you don't have enough insulin, too much sugar stays in your blood. If you are overweight, get little or no exercise, or have type 2 diabetes in your family, you are more likely to have problems with the way insulin works in your body.  Americans, Hispanics, Native Americans,  Americans, and Pacific Islanders have a higher risk for type 2 diabetes. Type 2 diabetes can be prevented or delayed with a healthy lifestyle, which includes staying at a healthy weight, making smart food choices, and getting regular exercise. What can you expect with type 2 diabetes? Arlene Santoyo keep hearing about how important it is to keep your blood sugar within a target range. That's because over time, high blood sugar can lead to serious problems. It can:  · Harm your eyes, nerves, and kidneys. · Damage your blood vessels, leading to heart disease and stroke. · Reduce blood flow and cause nerve damage to parts of your body, especially your feet. This can cause slow healing and pain when you walk. · Make your immune system weak and less able to fight infections. When people hear the word \"diabetes,\" they often think of problems like these. But daily care and treatment can help prevent or delay these problems. The goal is to keep your blood sugar in a target range. That's the best way to reduce your chance of having more problems from diabetes. What are the symptoms? Some people who have type 2 diabetes may not have any symptoms early on. Many people with the disease don't even know they have it at first. But with time, diabetes starts to cause symptoms. You experience most symptoms of type 2 diabetes when your blood sugar is either too high or too low.   The most common symptoms of high blood sugar include:  · Thirst.  · Frequent urination. · Weight loss. · Blurry vision. The symptoms of low blood sugar include:  · Sweating. · Shakiness. · Weakness. · Hunger. · Confusion. How can you prevent type 2 diabetes? The best way to prevent or delay type 2 diabetes is to adopt healthy habits, which include:  · Staying at a healthy weight. · Exercising regularly. · Eating healthy foods. How is type 2 diabetes treated? If you have type 2 diabetes, here are the most important things you can do. · Take your diabetes medicines. · Check your blood sugar as often as your doctor recommends. Also, get a hemoglobin A1c test at least every 6 months. · Try to eat a variety of foods and to spread carbohydrate throughout the day. Carbohydrate raises blood sugar higher and more quickly than any other nutrient does. Carbohydrate is found in sugar, breads and cereals, fruit, starchy vegetables such as potatoes and corn, and milk and yogurt. · Get at least 30 minutes of exercise on most days of the week. Walking is a good choice. You also may want to do other activities, such as running, swimming, cycling, or playing tennis or team sports. If your doctor says it's okay, do muscle-strengthening exercises at least 2 times a week. · See your doctor for checkups and tests on a regular schedule. · If you have high blood pressure or high cholesterol, take the medicines as prescribed by your doctor. · Do not smoke. Smoking can make health problems worse. This includes problems you might have with type 2 diabetes. If you need help quitting, talk to your doctor about stop-smoking programs and medicines. These can increase your chances of quitting for good. Follow-up care is a key part of your treatment and safety. Be sure to make and go to all appointments, and call your doctor if you are having problems.  It's also a good idea to know your test results and keep a list of the medicines you take. Where can you learn more? Go to http://araseli-lv.info/. Enter R651 in the search box to learn more about \"Learning About Type 2 Diabetes. \"  Current as of: March 13, 2017  Content Version: 11.3  © 6259-9305 [employer]. Care instructions adapted under license by [employer] (which disclaims liability or warranty for this information). If you have questions about a medical condition or this instruction, always ask your healthcare professional. [address] any warranty or liability for your use of this information.

## 2017-10-10 NOTE — PROGRESS NOTES
HISTORY OF PRESENT ILLNESS  Shanae Hudson is a 75 y.o. male. He is a new patient and is here to establish care. Previous followed by Dr Darius Oneil. The following sections were reviewed & updated as appropriate: PMH, PL, PSH, and SH. He was seen for follow up on DM, HTN, CAD, dyslipidemia  He had complications after his rt TKR. HPI  Cardiovascular Review  The patient has hypertension, hyperlipidemia, coronary artery disease and status post coronary artery stenting. He reports taking medications as instructed, no medication side effects noted, home BP monitoring in range of 033'Q systolic over 68'W diastolic, no chest pain on exertion, no dyspnea on exertion, no swelling of ankles, no orthostatic dizziness or lightheadedness, no orthopnea or paroxysmal nocturnal dyspnea, no palpitations, no muscle aches or pain. Diet and Lifestyle: generally follows a low fat low cholesterol diet, generally follows a low sodium diet, exercises sporadically, chews tobacco.  Lab review: labs reviewed and discussed with patient. He follows Dr Aaron Schuster   Cardiac History:   PCI 7- prox 75% LAD, D1 50% EF 60%; NAN 3 x 15 mm Xience to LAD  Stress echo 5-28-13=  Walked 3 minutes, Exercise stress echo is normal.    Medicines:  mg, Amlodipine 5 mg, Metoprolol 50 mg, Lofibra 160 mg,and Lipitor 10 mg  Lab Results   Component Value Date/Time    Cholesterol, total 121 03/13/2017 08:51 AM    HDL Cholesterol 52 03/13/2017 08:51 AM    LDL, calculated 52 03/13/2017 08:51 AM    VLDL, calculated 17 03/13/2017 08:51 AM    Triglyceride 84 03/13/2017 08:51 AM    CHOL/HDL Ratio 5.8 07/30/2011 04:31 AM         Endocrine Review  He is seen for diabetes. Since last visit he reports: no polyuria or polydipsia, no significant changes. Home glucose monitoring: is performed regularly, fasting values range 100-120  He is checking his sugars one per day. He reports medication compliance: compliant all of the time.   Medication side effects: none.  Diabetic diet compliance: compliant most of the time. Lab review: labs reviewed and discussed with patient  On Metformin 500 mg BID    Lab Results   Component Value Date/Time    Hemoglobin A1c 5.7 07/11/2017 10:53 AM           Review of Systems   Constitutional: Negative for chills, fever and malaise/fatigue. HENT: Negative for congestion, ear pain, sore throat and tinnitus. Eyes: Negative for blurred vision, double vision, pain and discharge. Respiratory: Negative for cough, shortness of breath and wheezing. Cardiovascular: Negative for chest pain, palpitations and leg swelling. Gastrointestinal: Negative for abdominal pain, blood in stool, constipation, diarrhea, nausea and vomiting. Genitourinary: Negative for dysuria, frequency, hematuria and urgency. Musculoskeletal: Negative for back pain, joint pain and myalgias. Right knee pain and back pain   Skin: Negative for rash. Neurological: Negative for dizziness, tremors, seizures and headaches. Endo/Heme/Allergies: Negative for polydipsia. Does not bruise/bleed easily. Psychiatric/Behavioral: Negative for depression and substance abuse. The patient is not nervous/anxious. Physical Exam   Constitutional: He is oriented to person, place, and time. He appears well-developed and well-nourished. HENT:   Head: Normocephalic and atraumatic. Right Ear: External ear normal.   Mouth/Throat: Oropharynx is clear and moist. No oropharyngeal exudate. Bilateral TM perfoarated   Eyes: Conjunctivae and EOM are normal. Pupils are equal, round, and reactive to light. No scleral icterus. Neck: Normal range of motion. Neck supple. No JVD present. No thyromegaly present. Cardiovascular: Normal rate, regular rhythm, normal heart sounds and intact distal pulses. No murmur heard. Pulmonary/Chest: Effort normal and breath sounds normal. He has no wheezes. Abdominal: Soft.  Bowel sounds are normal. He exhibits no distension and no mass.   Musculoskeletal: Normal range of motion. He exhibits no edema or tenderness. Feet:warm, good capillary refill, no trophic changes or ulcerative lesions, normal DP and PT pulses, normal monofilament exam and normal sensory exam     Lymphadenopathy:     He has no cervical adenopathy. Neurological: He is alert and oriented to person, place, and time. He has normal reflexes. No cranial nerve deficit. Skin: Skin is warm and dry. No rash noted. He is not diaphoretic. Psychiatric: He has a normal mood and affect. Nursing note and vitals reviewed. ASSESSMENT and PLAN  Diagnoses and all orders for this visit:    1. Type 2 diabetes mellitus without complication, without long-term current use of insulin (HCC)  -     METABOLIC PANEL, COMPREHENSIVE  -     MICROALBUMIN, UR, RAND W/ MICROALBUMIN/CREA RATIO  -     HEMOGLOBIN A1C WITH EAG    2. Essential hypertension, benign  -     METABOLIC PANEL, COMPREHENSIVE    3. Mixed dyslipidemia  -     METABOLIC PANEL, COMPREHENSIVE  -     LIPID PANEL    4. Atherosclerosis of native coronary artery of native heart without angina pectoris  -     METABOLIC PANEL, COMPREHENSIVE    5. S/P coronary artery stent placement  -     METABOLIC PANEL, COMPREHENSIVE    6. Encounter for immunization  -     Influenza virus vaccine ([address]) 72 years and older (62[address])  -     Administration fee () for Medicare insured patients  -     pneumococcal 13 buck conj dip (PREVNAR 13, PF,) 0.5 mL syrg injection; 0.5 mL by IntraMUSCular route once for 1 dose. Other orders  -     montelukast (SINGULAIR) 10 mg tablet; Take 1 Tab by mouth nightly. Discussed lifestyle issues and health guidance given  Patient was given an after visit summary which includes diagnoses, vital signs, current medications, instructions and references & authorized prescriptions . Results of labs will be conveyed to patient, once available.   Pt verbalized instructions I provided and expressed understanding of discussion that was held today.   Follow-up Disposition:  Return in about 4 months (around 2/10/2018) for fasting, medicare wellness, physical.

## 2017-10-10 NOTE — MR AVS SNAPSHOT
Visit Information Date & Time Provider Department Dept. Phone Encounter #  
 10/10/2017 10:40 AM Madai De La Garza MD [address] [place] [address] [phone] [id number] Follow-up Instructions Return in about 4 months (around 2/10/2018) for fasting, medicare wellness, physical.  
  
Your Appointments 5/10/2018 10:00 AM  
ESTABLISHED PATIENT with Thomas Officer, MD  
[facility] ([facility] SCHEDULING) Appt Note: one year follow up  
 [address] [facility] [address]  
[address] [id number] Upcoming Health Maintenance Date Due Hepatitis C Screening 1950 DTaP/Tdap/Td series (1 - Tdap) 5/30/1970 FOBT Q 1 YEAR AGE 50-75 5/30/1999 ZOSTER VACCINE AGE 60> 3/30/2009 Pneumococcal 65+ Low/Medium Risk (1 of 2 - PCV13) 5/30/2014 MEDICARE YEARLY EXAM 5/30/2014 FOOT EXAM Q1 2/12/2016 EYE EXAM RETINAL OR DILATED Q1 11/25/2016 INFLUENZA AGE 9 TO ADULT 8/1/2017 GLAUCOMA SCREENING Q2Y 11/25/2017 HEMOGLOBIN A1C Q6M 1/11/2018 MICROALBUMIN Q1 3/13/2018 LIPID PANEL Q1 3/13/2018 Allergies as of 10/10/2017  Review Complete On: 10/10/2017 By: Madai De La Garza MD  
  
 Severity Noted Reaction Type Reactions Levofloxacin Medium 11/03/2015   Systemic Rash Pcn [Penicillins] Medium 06/23/2011   Systemic Rash Tape [Adhesive]  10/27/2016   Intolerance Rash Medipore tape caused large blisters when removed. Current Immunizations  Reviewed on 10/10/2017 Name Date Influenza High Dose Vaccine PF  Incomplete Reviewed by Madai De La Garza MD on 10/10/2017 at 11:16 AM  
You Were Diagnosed With   
  
 Codes Comments Type 2 diabetes mellitus without complication, without long-term current use of insulin (HCC)    -  Primary ICD-10-CM: E11.9 ICD-9-CM: 250.00 Essential hypertension, benign     ICD-10-CM: I10 
ICD-9-CM: 401.1 Mixed dyslipidemia     ICD-10-CM: E78.2 ICD-9-CM: 272.2 Atherosclerosis of native coronary artery of native heart without angina pectoris     ICD-10-CM: I25.10 ICD-9-CM: 414.01   
 S/P coronary artery stent placement     ICD-10-CM: Z95.5 ICD-9-CM: V45.82 Encounter for immunization     ICD-10-CM: Y18 ICD-9-CM: V03.89 Vitals BP Pulse Temp Resp Height(growth percentile) Weight(growth percentile) 112/74 (BP 1 Location: Right arm, BP Patient Position: Sitting) 62 97.5 °F (36.4 °C) (Oral) 16 5' 10\" (1.778 m) 188 lb (85.3 kg) SpO2 BMI Smoking Status 98% 26.98 kg/m2 Never Smoker Vitals History BMI and BSA Data Body Mass Index Body Surface Area  
 26.98 kg/m 2 2.05 m 2 Preferred Pharmacy Pharmacy Name Phone [facility] [address] [place] [address] [phone] Your Updated Medication List  
  
   
This list is accurate as of: 10/10/17 11:43 AM.  Always use your most recent med list.  
  
  
  
  
 acetaminophen 500 mg tablet Commonly known as:  TYLENOL Take 2 Tabs by mouth every six (6) hours. amLODIPine 5 mg tablet Commonly known as:  Cece Fraction TAKE ONE TABLET BY MOUTH ONCE DAILY  
  
 aspirin delayed-release 81 mg tablet Take 162 mg by mouth daily. atorvastatin 10 mg tablet Commonly known as:  LIPITOR Take 10 mg by mouth nightly. fenofibrate 160 mg tablet Commonly known as:  LOFIBRA TAKE ONE TABLET BY MOUTH ONCE DAILY * metFORMIN 500 mg tablet Commonly known as:  GLUCOPHAGE Take 1 Tab by mouth two (2) times daily (with meals). * metFORMIN 500 mg tablet Commonly known as:  GLUCOPHAGE Take 500 mg by mouth. * metoprolol succinate 50 mg XL tablet Commonly known as:  TOPROL-XL  
TAKE ONE TABLET BY MOUTH ONCE DAILY * metoprolol succinate 50 mg XL tablet Commonly known as:  TOPROL-XL  
TAKE ONE TABLET BY MOUTH ONCE DAILY NexIUM 20 mg capsule Generic drug:  esomeprazole Take 20 mg by mouth daily as needed. * nitroglycerin 0.4 mg SL tablet Commonly known as:  NITROSTAT  
DISSOLVE ONE TABLET UNDER THE TONGUE EVERY 5 MINUTES AS NEEDED FOR CHEST PAIN. UP TO 3 DOSES * nitroglycerin 0.4 mg SL tablet Commonly known as:  NITROSTAT  
DISSOLVE ONE TABLET UNDER THE TONGUE EVERY 5 MINUTES AS NEEDED FOR CHEST PAIN. UP TO 3 DOSES  
  
 tamsulosin 0.4 mg capsule Commonly known as:  FLOMAX TAKE ONE CAPSULE BY MOUTH ONCE DAILY  
  
 zolpidem 5 mg tablet Commonly known as:  AMBIEN Take 1 Tab by mouth nightly as needed for Sleep. Max Daily Amount: 5 mg.  
  
 * Notice: This list has 6 medication(s) that are the same as other medications prescribed for you. Read the directions carefully, and ask your doctor or other care provider to review them with you. We Performed the Following ADMIN INFLUENZA VIRUS VAC [ [facility]] HEMOGLOBIN A1C WITH EAG [59425 CPT(R)] INFLUENZA VIRUS VACCINE, HIGH DOSE SEASONAL, PRESERVATIVE FREE [63561 CPT(R)] LIPID PANEL [63017 CPT(R)] METABOLIC PANEL, COMPREHENSIVE [83001 CPT(R)] MICROALBUMIN, UR, RAND W/ MICROALBUMIN/CREA RATIO [id number] CPT(R)] Follow-up Instructions Return in about 4 months (around 2/10/2018) for fasting, medicare wellness, physical.  
  
  
Patient Instructions Learning About Type 2 Diabetes What is type 2 diabetes? Insulin is a hormone that helps your body use sugar from your food as energy. Type 2 diabetes happens when your body can't use insulin the right way. Over time, the pancreas can't make enough insulin. If you don't have enough insulin, too much sugar stays in your blood. If you are overweight, get little or no exercise, or have type 2 diabetes in your family, you are more likely to have problems with the way insulin works in your body.   Americans, Hispanics, Native Americans,  Americans, and Pacific Islanders have a higher risk for type 2 diabetes. Type 2 diabetes can be prevented or delayed with a healthy lifestyle, which includes staying at a healthy weight, making smart food choices, and getting regular exercise. What can you expect with type 2 diabetes? Bernardo Acevedo keep hearing about how important it is to keep your blood sugar within a target range. That's because over time, high blood sugar can lead to serious problems. It can: 
· Harm your eyes, nerves, and kidneys. · Damage your blood vessels, leading to heart disease and stroke. · Reduce blood flow and cause nerve damage to parts of your body, especially your feet. This can cause slow healing and pain when you walk. · Make your immune system weak and less able to fight infections. When people hear the word \"diabetes,\" they often think of problems like these. But daily care and treatment can help prevent or delay these problems. The goal is to keep your blood sugar in a target range. That's the best way to reduce your chance of having more problems from diabetes. What are the symptoms? Some people who have type 2 diabetes may not have any symptoms early on. Many people with the disease don't even know they have it at first. But with time, diabetes starts to cause symptoms. You experience most symptoms of type 2 diabetes when your blood sugar is either too high or too low. The most common symptoms of high blood sugar include: · Thirst. 
· Frequent urination. · Weight loss. · Blurry vision. The symptoms of low blood sugar include: · Sweating. · Shakiness. · Weakness. · Hunger. · Confusion. How can you prevent type 2 diabetes? The best way to prevent or delay type 2 diabetes is to adopt healthy habits, which include: 
· Staying at a healthy weight. · Exercising regularly. · Eating healthy foods. How is type 2 diabetes treated? If you have type 2 diabetes, here are the most important things you can do. · Take your diabetes medicines. · Check your blood sugar as often as your doctor recommends. Also, get a hemoglobin A1c test at least every 6 months. · Try to eat a variety of foods and to spread carbohydrate throughout the day. Carbohydrate raises blood sugar higher and more quickly than any other nutrient does. Carbohydrate is found in sugar, breads and cereals, fruit, starchy vegetables such as potatoes and corn, and milk and yogurt. · Get at least 30 minutes of exercise on most days of the week. Walking is a good choice. You also may want to do other activities, such as running, swimming, cycling, or playing tennis or team sports. If your doctor says it's okay, do muscle-strengthening exercises at least 2 times a week. · See your doctor for checkups and tests on a regular schedule. · If you have high blood pressure or high cholesterol, take the medicines as prescribed by your doctor. · Do not smoke. Smoking can make health problems worse. This includes problems you might have with type 2 diabetes. If you need help quitting, talk to your doctor about stop-smoking programs and medicines. These can increase your chances of quitting for good. Follow-up care is a key part of your treatment and safety. Be sure to make and go to all appointments, and call your doctor if you are having problems. It's also a good idea to know your test results and keep a list of the medicines you take. Where can you learn more? Go to http://araseli-lv.info/. Enter L257 in the search box to learn more about \"Learning About Type 2 Diabetes. \" Current as of: March 13, 2017 Content Version: 11.3 © 0125-0886 [employer]. Care instructions adapted under license by [employer] (which disclaims liability or warranty for this information).  If you have questions about a medical condition or this instruction, always ask your healthcare professional. [employer] disclaims any warranty or liability for your use of this information. Introducing [facility]! Dileep Bill introduces [employer] patient portal. Now you can access parts of your medical record, email your doctor's office, and request medication refills online. 1. In your internet browser, go to [email or web address] 2. Click on the First Time User? Click Here link in the Sign In box. You will see the New Member Sign Up page. 3. Enter your [employer] Access Code exactly as it appears below. You will not need to use this code after youve completed the sign-up process. If you do not sign up before the expiration date, you must request a new code. · [employer] Access Code: [email or web address] Expires: 1/8/2018 11:43 AM 
 
4. Enter the last four digits of your Social Security Number (xxxx) and Date of Birth (mm/dd/yyyy) as indicated and click Submit. You will be taken to the next sign-up page. 5. Create a [employer] ID. This will be your [employer] login ID and cannot be changed, so think of one that is secure and easy to remember. 6. Create a [employer] password. You can change your password at any time. 7. Enter your Password Reset Question and Answer. This can be used at a later time if you forget your password. 8. Enter your e-mail address. You will receive e-mail notification when new information is available in [address] 9. Click Sign Up. You can now view and download portions of your medical record. 10. Click the Download Summary menu link to download a portable copy of your medical information. If you have questions, please visit the Frequently Asked Questions section of the [employer] website. Remember, [employer] is NOT to be used for urgent needs. For medical emergencies, dial 911. Now available from your iPhone and Android! Please provide this summary of care documentation to your next provider. Your primary care clinician is listed as Fam Gee. If you have any questions after today's visit, please call [phone].

## 2017-10-11 LAB
ALBUMIN SERPL-MCNC: 4.4 G/DL (ref 3.6–4.8)
ALBUMIN/CREAT UR: 4.4 MG/G CREAT (ref 0–30)
ALBUMIN/GLOB SERPL: 1.6 {RATIO} (ref 1.2–2.2)
ALP SERPL-CCNC: 56 IU/L (ref 39–117)
ALT SERPL-CCNC: 18 IU/L (ref 0–44)
AST SERPL-CCNC: 20 IU/L (ref 0–40)
BILIRUB SERPL-MCNC: 0.4 MG/DL (ref 0–1.2)
BUN SERPL-MCNC: 14 MG/DL (ref 8–27)
BUN/CREAT SERPL: 11 (ref 10–24)
CALCIUM SERPL-MCNC: 9.8 MG/DL (ref 8.6–10.2)
CHLORIDE SERPL-SCNC: 104 MMOL/L (ref 96–106)
CHOLEST SERPL-MCNC: 128 MG/DL (ref 100–199)
CO2 SERPL-SCNC: 20 MMOL/L (ref 18–29)
CREAT SERPL-MCNC: 1.31 MG/DL (ref 0.76–1.27)
CREAT UR-MCNC: 195.4 MG/DL
EST. AVERAGE GLUCOSE BLD GHB EST-MCNC: 131 MG/DL
GLOBULIN SER CALC-MCNC: 2.8 G/DL (ref 1.5–4.5)
GLUCOSE SERPL-MCNC: 126 MG/DL (ref 65–99)
HBA1C MFR BLD: 6.2 % (ref 4.8–5.6)
HDLC SERPL-MCNC: 44 MG/DL
INTERPRETATION, 910389: NORMAL
INTERPRETATION: NORMAL
LDLC SERPL CALC-MCNC: 65 MG/DL (ref 0–99)
MICROALBUMIN UR-MCNC: 8.6 UG/ML
PDF IMAGE, 910387: NORMAL
POTASSIUM SERPL-SCNC: 5.7 MMOL/L (ref 3.5–5.2)
PROT SERPL-MCNC: 7.2 G/DL (ref 6–8.5)
SODIUM SERPL-SCNC: 140 MMOL/L (ref 134–144)
TRIGL SERPL-MCNC: 96 MG/DL (ref 0–149)
VLDLC SERPL CALC-MCNC: 19 MG/DL (ref 5–40)

## 2017-10-18 DIAGNOSIS — E11.9 CONTROLLED TYPE 2 DIABETES MELLITUS WITHOUT COMPLICATION, UNSPECIFIED LONG TERM INSULIN USE STATUS: Primary | ICD-10-CM

## 2017-11-09 RX ORDER — METFORMIN HYDROCHLORIDE 500 MG/1
TABLET ORAL
Qty: 180 TAB | Refills: 1 | Status: SHIPPED | COMMUNITY
Start: 2017-11-09 | End: 2018-02-15 | Stop reason: DRUGHIGH

## 2017-11-09 RX ORDER — TAMSULOSIN HYDROCHLORIDE 0.4 MG/1
CAPSULE ORAL
Qty: 90 CAP | Refills: 0 | Status: SHIPPED | COMMUNITY
Start: 2017-11-09 | End: 2020-05-04 | Stop reason: SDUPTHER

## 2018-02-02 RX ORDER — FENOFIBRATE 160 MG/1
TABLET ORAL
Qty: 90 TAB | Refills: 0 | Status: SHIPPED | OUTPATIENT
Start: 2018-02-02 | End: 2018-05-02 | Stop reason: SDUPTHER

## 2018-02-13 ENCOUNTER — TELEPHONE (OUTPATIENT)
Dept: FAMILY MEDICINE CLINIC | Age: 68
End: 2018-02-13

## 2018-02-13 ENCOUNTER — OFFICE VISIT (OUTPATIENT)
Dept: FAMILY MEDICINE CLINIC | Age: 68
End: 2018-02-13

## 2018-02-13 ENCOUNTER — HOSPITAL ENCOUNTER (OUTPATIENT)
Dept: LAB | Age: 68
Discharge: HOME | End: 2018-02-13
Payer: MEDICARE

## 2018-02-13 VITALS
BODY MASS INDEX: 28.49 KG/M2 | HEIGHT: 70 IN | TEMPERATURE: 97.3 F | SYSTOLIC BLOOD PRESSURE: 122 MMHG | WEIGHT: 199 LBS | HEART RATE: 87 BPM | DIASTOLIC BLOOD PRESSURE: 74 MMHG | RESPIRATION RATE: 18 BRPM | OXYGEN SATURATION: 98 %

## 2018-02-13 DIAGNOSIS — I10 ESSENTIAL HYPERTENSION, BENIGN: ICD-10-CM

## 2018-02-13 DIAGNOSIS — Z86.39 HISTORY OF NON ANEMIC VITAMIN B12 DEFICIENCY: ICD-10-CM

## 2018-02-13 DIAGNOSIS — E11.9 TYPE 2 DIABETES MELLITUS WITHOUT COMPLICATION, WITHOUT LONG-TERM CURRENT USE OF INSULIN (HCC): ICD-10-CM

## 2018-02-13 DIAGNOSIS — Z71.89 ADVANCED DIRECTIVES, COUNSELING/DISCUSSION: ICD-10-CM

## 2018-02-13 DIAGNOSIS — J40 SINOBRONCHITIS: ICD-10-CM

## 2018-02-13 DIAGNOSIS — Z13.39 SCREENING FOR ALCOHOLISM: ICD-10-CM

## 2018-02-13 DIAGNOSIS — Z11.59 SCREENING FOR VIRAL DISEASE: ICD-10-CM

## 2018-02-13 DIAGNOSIS — Z12.11 SCREEN FOR COLON CANCER: ICD-10-CM

## 2018-02-13 DIAGNOSIS — Z13.31 SCREENING FOR DEPRESSION: ICD-10-CM

## 2018-02-13 DIAGNOSIS — J32.9 SINOBRONCHITIS: ICD-10-CM

## 2018-02-13 DIAGNOSIS — E66.3 OVER WEIGHT: ICD-10-CM

## 2018-02-13 DIAGNOSIS — Z95.5 S/P CORONARY ARTERY STENT PLACEMENT: ICD-10-CM

## 2018-02-13 DIAGNOSIS — E78.2 MIXED DYSLIPIDEMIA: ICD-10-CM

## 2018-02-13 DIAGNOSIS — Z00.00 MEDICARE ANNUAL WELLNESS VISIT, SUBSEQUENT: Primary | ICD-10-CM

## 2018-02-13 PROBLEM — T84.50XA JOINT PROSTHESIS INFECTION OR INFLAMMATION (HCC): Status: RESOLVED | Noted: 2017-05-16 | Resolved: 2018-02-13

## 2018-02-13 PROCEDURE — 84436 ASSAY OF TOTAL THYROXINE: CPT

## 2018-02-13 PROCEDURE — 86803 HEPATITIS C AB TEST: CPT

## 2018-02-13 PROCEDURE — 80061 LIPID PANEL: CPT

## 2018-02-13 PROCEDURE — 82607 VITAMIN B-12: CPT

## 2018-02-13 PROCEDURE — 82043 UR ALBUMIN QUANTITATIVE: CPT

## 2018-02-13 PROCEDURE — 80053 COMPREHEN METABOLIC PANEL: CPT

## 2018-02-13 PROCEDURE — 85025 COMPLETE CBC W/AUTO DIFF WBC: CPT

## 2018-02-13 PROCEDURE — 81001 URINALYSIS AUTO W/SCOPE: CPT

## 2018-02-13 PROCEDURE — 83036 HEMOGLOBIN GLYCOSYLATED A1C: CPT

## 2018-02-13 PROCEDURE — 84443 ASSAY THYROID STIM HORMONE: CPT

## 2018-02-13 RX ORDER — MONTELUKAST SODIUM 10 MG/1
10 TABLET ORAL
COMMUNITY
Start: 2017-10-10 | End: 2018-02-13 | Stop reason: SDUPTHER

## 2018-02-13 RX ORDER — AZITHROMYCIN 500 MG/1
500 TABLET, FILM COATED ORAL DAILY
Qty: 5 TAB | Refills: 0 | Status: SHIPPED | OUTPATIENT
Start: 2018-02-13 | End: 2018-02-18

## 2018-02-13 RX ORDER — TRAMADOL HYDROCHLORIDE 50 MG/1
50-100 TABLET ORAL
COMMUNITY
Start: 2017-10-18 | End: 2018-02-13 | Stop reason: ALTCHOICE

## 2018-02-13 NOTE — PATIENT INSTRUCTIONS
Medicare Wellness Visit, Male    The best way to live healthy is to have a healthy lifestyle by eating a well-balanced diet, exercising regularly, limiting alcohol and stopping smoking. Regular physical exams and screening tests are another way to keep healthy. Preventive exams provided by your health care provider can find health problems before they become diseases or illnesses. Preventive services including immunizations, screening tests, monitoring and exams can help you take care of your own health. All people over age 72 should have a pneumovax  and and a prevnar shot to prevent pneumonia. These are once in a lifetime unless you and your provider decide differently. All people over 65 should have a yearly flu shot and a tetanus vaccine every 10 years. Screening for diabetes mellitus with a blood sugar test should be done every year. Glaucoma is a disease of the eye due to increased ocular pressure that can lead to blindness and it should be done every year by an eye professional.    Cardiovascular screening tests that check for elevated lipids (fatty part of blood) which can lead to heart disease and strokes should be done every 5 years. Colorectal screening that evaluates for blood or polyps in your colon should be done yearly as a stool test or every five years as a flexible sigmoidoscope or every 10 years as a colonoscopy up to age 76. Men up to age 76 may need a screening blood test for prostate cancer at certain intervals, depending on their personal and family history. This decision is between the patient and his provider. If you have been a smoker or had family history of abdominal aortic aneurysms, you and your provider may decide to schedule an ultrasound test of your aorta. Hepatitis C screening is also recommended for anyone born between [birth year] through [address]. A shingles vaccine is also recommended once in a lifetime after age 61.     Your Medicare Wellness Exam is recommended annually. Here is a list of your current Health Maintenance items with a due date:  Health Maintenance Due   Topic Date Due    Hepatitis C Test  1950    DTaP/Tdap/Td  (1 - Tdap) 05/30/1970    Stool testing for trace blood  05/30/1999    Pneumococcal Vaccine (1 of 2 - PCV13) 05/30/2014    Annual Well Visit  05/30/2014    Diabetic Foot Care  02/12/2016    Eye Exam  11/25/2016    Glaucoma Screening   11/25/2017          Advance Directives: Care Instructions  Your Care Instructions  An advance directive is a legal way to state your wishes at the end of your life. It tells your family and your doctor what to do if you can no longer say what you want. There are two main types of advance directives. You can change them any time that your wishes change. · A living will tells your family and your doctor your wishes about life support and other treatment. · A durable power of  for health care lets you name a person to make treatment decisions for you when you can't speak for yourself. This person is called a health care agent. If you do not have an advance directive, decisions about your medical care may be made by a doctor or a  who doesn't know you. It may help to think of an advance directive as a gift to the people who care for you. If you have one, they won't have to make tough decisions by themselves. Follow-up care is a key part of your treatment and safety. Be sure to make and go to all appointments, and call your doctor if you are having problems. It's also a good idea to know your test results and keep a list of the medicines you take. How can you care for yourself at home? · Discuss your wishes with your loved ones and your doctor. This way, there are no surprises. · Many states have a unique form. Or you might use a universal form that has been approved by many states. This kind of form can sometimes be completed and stored online.  Your electronic copy will then be available wherever you have a connection to the Internet. In most cases, doctors will respect your wishes even if you have a form from a different state. · You don't need a  to do an advance directive. But you may want to get legal advice. · Think about these questions when you prepare an advance directive:  ¨ Who do you want to make decisions about your medical care if you are not able to? Many people choose a family member or close friend. ¨ Do you know enough about life support methods that might be used? If not, talk to your doctor so you understand. ¨ What are you most afraid of that might happen? You might be afraid of having pain, losing your independence, or being kept alive by machines. ¨ Where would you prefer to die? Choices include your home, a hospital, or a nursing home. ¨ Would you like to have information about hospice care to support you and your family? ¨ Do you want to donate organs when you die? ¨ Do you want certain Religion practices performed before you die? If so, put your wishes in the advance directive. · Read your advance directive every year, and make changes as needed. When should you call for help? Be sure to contact your doctor if you have any questions. Where can you learn more? Go to http://araseli-lv.info/. Enter R264 in the search box to learn more about \"Advance Directives: Care Instructions. \"  Current as of: September 24, 2016  Content Version: 11.4  © 2193-2961 [employer]. Care instructions adapted under license by [employer] (which disclaims liability or warranty for this information). If you have questions about a medical condition or this instruction, always ask your healthcare professional. [address] any warranty or liability for your use of this information.

## 2018-02-13 NOTE — ACP (ADVANCE CARE PLANNING)
Advance Care Planning    Advance Care Planning (ACP) Provider Conversation Snapshot    Date of ACP Conversation: 02/13/18  Persons included in Conversation:  patient  Length of ACP Conversation in minutes:  16 minutes    Authorized Decision Maker (if patient is incapable of making informed decisions): wife, Amauri Marley  This person is: Other Legally Authorized Decision Maker (e.g. Next of Kin)          For Patients with Decision Making Capacity:   Values/Goals: Exploration of values, goals, and preferences if recovery is not expected, even with continued medical treatment in the event of:  Imminent death  Severe, permanent brain injury  \"In these circumstances, what matters most to you? \"  Care focused more on comfort or quality of life.     Conversation Outcomes / Follow-Up Plan:   Recommended completion of Advance Directive form after review of ACP materials and conversation with prospective healthcare agent   Recommended communicating the plan and making copies for the healthcare agent, personal physician, and others as appropriate (e.g., health system)  Recommended review of completed ACP document annually or upon change in health status  advance directive form given and explained how to complete

## 2018-02-13 NOTE — MR AVS SNAPSHOT
[facility] 
 
 
 [address] [place] [address] 
[phone] Patient: David Ferrell MRN: [id number] UCY:[id number] Visit Information Date & Time Provider Department Dept. Phone Encounter #  
 2/13/2018  8:40 AM Sundeep Waller MD [address] [place] [address] [phone] [id number] Follow-up Instructions Return in about 4 months (around 6/13/2018) for fasting, follow up, DM. Your Appointments 5/10/2018 10:00 AM  
ESTABLISHED PATIENT with Aide Parkinson MD  
[facility] ([facility] SCHEDULING) Appt Note: one year follow up  
 [address] [facility] [address] [place] [address]  
[address] [facility] [address] Upcoming Health Maintenance Date Due Hepatitis C Screening 1950 DTaP/Tdap/Td series (1 - Tdap) 5/30/1970 FOBT Q 1 YEAR AGE 50-75 5/30/1999 Pneumococcal 65+ Low/Medium Risk (1 of 2 - PCV13) 5/30/2014 MEDICARE YEARLY EXAM 5/30/2014 FOOT EXAM Q1 2/12/2016 EYE EXAM RETINAL OR DILATED Q1 11/25/2016 GLAUCOMA SCREENING Q2Y 11/25/2017 HEMOGLOBIN A1C Q6M 4/10/2018 MICROALBUMIN Q1 10/10/2018 LIPID PANEL Q1 10/10/2018 Allergies as of 2/13/2018  Review Complete On: 2/13/2018 By: Sundeep Waller MD  
  
 Severity Noted Reaction Type Reactions Levofloxacin Medium 11/03/2015   Systemic Rash Pcn [Penicillins] Medium 06/23/2011   Systemic Rash Tape [Adhesive]  10/27/2016   Intolerance Rash Medipore tape caused large blisters when removed. Current Immunizations  Reviewed on 2/13/2018 Name Date Influenza High Dose Vaccine PF 10/10/2017 Influenza Vaccine 12/3/2016, 10/30/2015 Pneumococcal Conjugate (PCV-13) 10/10/2017 Reviewed by Sundeep Waller MD on 2/13/2018 at  9:12 AM  
 Reviewed by Sundeep Waller MD on 2/13/2018 at  9:15 AM  
You Were Diagnosed With   
  
 Codes Comments Medicare annual wellness visit, subsequent    -  Primary ICD-10-CM: Z00.00 ICD-9-CM: V70.0 Type 2 diabetes mellitus without complication, without long-term current use of insulin (HCC)     ICD-10-CM: E11.9 ICD-9-CM: 250.00 Essential hypertension, benign     ICD-10-CM: I10 
ICD-9-CM: 401.1 S/P coronary artery stent placement     ICD-10-CM: Z95.5 ICD-9-CM: V45.82 Mixed dyslipidemia     ICD-10-CM: E78.2 ICD-9-CM: 272.2 History of non anemic vitamin B12 deficiency     ICD-10-CM: Z86.39 
ICD-9-CM: V12.1 Screening for viral disease     ICD-10-CM: Z11.59 
ICD-9-CM: V73.99 Advanced directives, counseling/discussion     ICD-10-CM: Z71.89 ICD-9-CM: V65.49 Screen for colon cancer     ICD-10-CM: Z12.11 ICD-9-CM: V76.51 Screening for depression     ICD-10-CM: Z13.89 ICD-9-CM: V79.0 Screening for alcoholism     ICD-10-CM: Z13.89 ICD-9-CM: V79.1 Over weight     ICD-10-CM: Z46.7 ICD-9-CM: 278.02 Sinobronchitis     ICD-10-CM: J32.9, J40 ICD-9-CM: 473.9, 490 Vitals BP Pulse Temp Resp Height(growth percentile) Weight(growth percentile) 122/74 (BP 1 Location: Left arm, BP Patient Position: Sitting) 87 97.3 °F (36.3 °C) 18 5' 10\" (1.778 m) 199 lb (90.3 kg) SpO2 BMI Smoking Status 98% 28.55 kg/m2 Never Smoker Vitals History BMI and BSA Data Body Mass Index Body Surface Area 28.55 kg/m 2 2.11 m 2 Preferred Pharmacy Pharmacy Name Phone [facility] [phone] Your Updated Medication List  
  
   
This list is accurate as of: 2/13/18  9:39 AM.  Always use your most recent med list.  
  
  
  
  
 acetaminophen 500 mg tablet Commonly known as:  TYLENOL Take 2 Tabs by mouth every six (6) hours. amLODIPine 5 mg tablet Commonly known as:  Kenn Jacobs TAKE ONE TABLET BY MOUTH ONCE DAILY  
  
 aspirin delayed-release 81 mg tablet Take 162 mg by mouth daily. atorvastatin 10 mg tablet Commonly known as:  LIPITOR Take 10 mg by mouth nightly. azithromycin 500 mg Tab Commonly known as:  Jenet Hole Take 1 Tab by mouth daily for 5 days. fenofibrate 160 mg tablet Commonly known as:  LOFIBRA TAKE ONE TABLET BY MOUTH ONCE DAILY  
  
 glucose blood VI test strips strip Commonly known as:  ACCU-CHEK RED PLUS TEST STRP One strip daily to check blood sugar  
  
 metFORMIN 500 mg tablet Commonly known as:  GLUCOPHAGE  
TAKE ONE TABLET BY MOUTH TWICE DAILY WITH MEALS  
  
 metoprolol succinate 50 mg XL tablet Commonly known as:  TOPROL-XL  
TAKE ONE TABLET BY MOUTH ONCE DAILY  
  
 montelukast 10 mg tablet Commonly known as:  SINGULAIR Take 1 Tab by mouth nightly. NexIUM 20 mg capsule Generic drug:  esomeprazole Take 20 mg by mouth daily as needed. nitroglycerin 0.4 mg SL tablet Commonly known as:  NITROSTAT  
DISSOLVE ONE TABLET UNDER THE TONGUE EVERY 5 MINUTES AS NEEDED FOR CHEST PAIN. UP TO 3 DOSES  
  
 pneumococcal 23-buck ps vaccine 25 mcg/0.5 mL injection Commonly known as:  PNEUMOVAX 23  
0.5 mL by IntraMUSCular route once for 1 dose. tamsulosin 0.4 mg capsule Commonly known as:  FLOMAX TAKE ONE CAPSULE BY MOUTH ONCE DAILY Prescriptions Printed Refills  
 pneumococcal 23-buck ps vaccine (PNEUMOVAX 23) 25 mcg/0.5 mL injection 0 Si.5 mL by IntraMUSCular route once for 1 dose. Class: Print Route: IntraMUSCular Prescriptions Sent to Pharmacy Refills  
 azithromycin (ZITHROMAX) 500 mg tab 0 Sig: Take 1 Tab by mouth daily for 5 days. Class: Normal  
 Pharmacy: [address] [place] [address] Ph #: [phone] Route: Oral  
  
We Performed the Following ADVANCE CARE PLANNING FIRST 30 MINS [26518 CPT(R)] CBC WITH AUTOMATED DIFF [15144 CPT(R)] [id number] [place] HEMOGLOBIN A1C WITH EAG [42121 CPT(R)] HEPATITIS C AB [30687 CPT(R)]  DIABETES FOOT EXAM [HM7 Custom] LIPID PANEL [57569 CPT(R)] METABOLIC PANEL, COMPREHENSIVE [79341 CPT(R)] MICROALBUMIN, UR, RAND W/ MICROALBUMIN/CREA RATIO [id number] CPT(R)] RI ANNUAL ALCOHOL SCREEN 15 MIN [id number] [facility]] REFERRAL FOR COLONOSCOPY [WGK234 Custom] TSH REFLEX TO T4 [50858 CPT(R)] URINALYSIS W/ RFLX MICROSCOPIC [31774 CPT(R)] VITAMIN B12 & FOLATE [19402 CPT(R)] Follow-up Instructions Return in about 4 months (around 6/13/2018) for fasting, follow up, DM. Referral Information Referral ID Referred By Referred To  
  
 [id number] [facility] [address] [place] [address] 66 [phone] 83 [place], [address] [place] [address] Crista Visits Status Start Date End Date 1 New Request 2/13/18 2/13/19 If your referral has a status of pending review or denied, additional information will be sent to support the outcome of this decision. Patient Instructions Medicare Wellness Visit, Male The best way to live healthy is to have a healthy lifestyle by eating a well-balanced diet, exercising regularly, limiting alcohol and stopping smoking. Regular physical exams and screening tests are another way to keep healthy. Preventive exams provided by your health care provider can find health problems before they become diseases or illnesses. Preventive services including immunizations, screening tests, monitoring and exams can help you take care of your own health. All people over age 72 should have a pneumovax  and and a prevnar shot to prevent pneumonia. These are once in a lifetime unless you and your provider decide differently. All people over 65 should have a yearly flu shot and a tetanus vaccine every 10 years. Screening for diabetes mellitus with a blood sugar test should be done every year.  
 
Glaucoma is a disease of the eye due to increased ocular pressure that can lead to blindness and it should be done every year by an eye professional. 
 
Cardiovascular screening tests that check for elevated lipids (fatty part of blood) which can lead to heart disease and strokes should be done every 5 years. Colorectal screening that evaluates for blood or polyps in your colon should be done yearly as a stool test or every five years as a flexible sigmoidoscope or every 10 years as a colonoscopy up to age 76. Men up to age 76 may need a screening blood test for prostate cancer at certain intervals, depending on their personal and family history. This decision is between the patient and his provider. If you have been a smoker or had family history of abdominal aortic aneurysms, you and your provider may decide to schedule an ultrasound test of your aorta. Hepatitis C screening is also recommended for anyone born between [birth year] through [address]. A shingles vaccine is also recommended once in a lifetime after age 61. Your Medicare Wellness Exam is recommended annually. Here is a list of your current Health Maintenance items with a due date: 
Health Maintenance Due Topic Date Due  
Glorious Cedars Test  1950  
 DTaP/Tdap/Td  (1 - Tdap) 05/30/1970  Stool testing for trace blood  05/30/1999  Pneumococcal Vaccine (1 of 2 - PCV13) 05/30/2014 Sulema Saunders Annual Well Visit  05/30/2014  Diabetic Foot Care  02/12/2016  Eye Exam  11/25/2016  Glaucoma Screening   11/25/2017 Advance Directives: Care Instructions Your Care Instructions An advance directive is a legal way to state your wishes at the end of your life. It tells your family and your doctor what to do if you can no longer say what you want. There are two main types of advance directives. You can change them any time that your wishes change. · A living will tells your family and your doctor your wishes about life support and other treatment. · A durable power of  for health care lets you name a person to make treatment decisions for you when you can't speak for yourself. This person is called a health care agent. If you do not have an advance directive, decisions about your medical care may be made by a doctor or a  who doesn't know you. It may help to think of an advance directive as a gift to the people who care for you. If you have one, they won't have to make tough decisions by themselves. Follow-up care is a key part of your treatment and safety. Be sure to make and go to all appointments, and call your doctor if you are having problems. It's also a good idea to know your test results and keep a list of the medicines you take. How can you care for yourself at home? · Discuss your wishes with your loved ones and your doctor. This way, there are no surprises. · Many states have a unique form. Or you might use a universal form that has been approved by many states. This kind of form can sometimes be completed and stored online. Your electronic copy will then be available wherever you have a connection to the Internet. In most cases, doctors will respect your wishes even if you have a form from a different state. · You don't need a  to do an advance directive. But you may want to get legal advice. · Think about these questions when you prepare an advance directive: ¨ Who do you want to make decisions about your medical care if you are not able to? Many people choose a family member or close friend. ¨ Do you know enough about life support methods that might be used? If not, talk to your doctor so you understand. ¨ What are you most afraid of that might happen? You might be afraid of having pain, losing your independence, or being kept alive by machines. ¨ Where would you prefer to die? Choices include your home, a hospital, or a nursing home.  
¨ Would you like to have information about hospice care to support you and your family? ¨ Do you want to donate organs when you die? ¨ Do you want certain Voodoo practices performed before you die? If so, put your wishes in the advance directive. · Read your advance directive every year, and make changes as needed. When should you call for help? Be sure to contact your doctor if you have any questions. Where can you learn more? Go to http://araseli-lv.info/. Enter R264 in the search box to learn more about \"Advance Directives: Care Instructions. \" Current as of: September 24, 2016 Content Version: 11.4 © 1971-6189 [employer]. Care instructions adapted under license by [employer] (which disclaims liability or warranty for this information). If you have questions about a medical condition or this instruction, always ask your healthcare professional. [address] any warranty or liability for your use of this information. Introducing [facility]! Katina Ovalles introduces [facility] patient portal. Now you can access parts of your medical record, email your doctor's office, and request medication refills online. 1. In your internet browser, go to [email or web address] 2. Click on the First Time User? Click Here link in the Sign In box. You will see the New Member Sign Up page. 3. Enter your [facility] Access Code exactly as it appears below. You will not need to use this code after youve completed the sign-up process. If you do not sign up before the expiration date, you must request a new code. · [facility] Access Code: [email or web address] Expires: 5/14/2018  9:39 AM 
 
4. Enter the last four digits of your Social Security Number (xxxx) and Date of Birth (mm/dd/yyyy) as indicated and click Submit. You will be taken to the next sign-up page. 5. Create a [facility] ID. This will be your [facility] login ID and cannot be changed, so think of one that is secure and easy to remember. 6. Create a [employer] password. You can change your password at any time. 7. Enter your Password Reset Question and Answer. This can be used at a later time if you forget your password. 8. Enter your e-mail address. You will receive e-mail notification when new information is available in [address] 9. Click Sign Up. You can now view and download portions of your medical record. 10. Click the Download Summary menu link to download a portable copy of your medical information. If you have questions, please visit the Frequently Asked Questions section of the [employer] website. Remember, [employer] is NOT to be used for urgent needs. For medical emergencies, dial 911. Now available from your iPhone and Android! Please provide this summary of care documentation to your next provider. Your primary care clinician is listed as Saravanan Aguirre. If you have any questions after today's visit, please call [phone].

## 2018-02-13 NOTE — TELEPHONE ENCOUNTER
----- Message from Camron Avila MD sent at 2/13/2018  9:13 AM EST -----  Regarding: eye exam  Patient had eye exam with Dr Daniel Lindo last week, can we please get records  thanks

## 2018-02-13 NOTE — PROGRESS NOTES
HISTORY OF PRESENT ILLNESS  Nely Corral is a 75 y.o. male. He was seen for 4 months follow up on diabetes. HTN, dyslipidemia, and sinus congestion and cough, along with his annual wellness visit. HPI  Cardiovascular Review  The patient has hypertension, hyperlipidemia, coronary artery disease and status post coronary artery stenting. He reports taking medications as instructed, no medication side effects noted, home BP monitoring in range of 190'Y systolic over 26'D diastolic, no chest pain on exertion, no dyspnea on exertion, no swelling of ankles, no orthostatic dizziness or lightheadedness, no orthopnea or paroxysmal nocturnal dyspnea, no palpitations, no muscle aches or pain. Diet and Lifestyle: generally follows a low fat low cholesterol diet, generally follows a low sodium diet, exercises sporadically, chews tobacco.  Lab review: labs reviewed and discussed with patient. He follows Dr Yasmin Leigh   Cardiac History:   PCI 7- prox 75% LAD, D1 50% EF 60%; NAN 3 x 15 mm Xience to LAD  Stress echo 5-28-13=  Walked 3 minutes, Exercise stress echo is normal.    Medicines:  mg, Amlodipine 5 mg, Metoprolol XL 50 mg, Lofibra 160 mg,and Lipitor 10 mg    Endocrine Review  He is seen for diabetes. Since last visit he reports: no polyuria or polydipsia, no significant changes. Home glucose monitoring: is performed regularly, fasting values range 100-120  He is checking his sugars one per day. He reports medication compliance: compliant all of the time. Medication side effects: none. Diabetic diet compliance: compliant most of the time. Lab review: labs reviewed and discussed with patient  On Metformin 500 mg BID , but taking 750 mg BID as home sugars running high    Also concerned about persistent cough, productive of green phlegm and wheezing. No fever, chills, sore throat    Review of Systems   Constitutional: Negative for chills, fever and malaise/fatigue.    HENT: Positive for congestion and hearing loss (left). Negative for ear pain, sore throat and tinnitus. Eyes: Negative for blurred vision, double vision, pain and discharge. Respiratory: Positive for cough and sputum production. Negative for shortness of breath and wheezing. Cardiovascular: Negative for chest pain, palpitations and leg swelling. Gastrointestinal: Negative for abdominal pain, blood in stool, constipation, diarrhea, nausea and vomiting. Genitourinary: Negative for dysuria, frequency, hematuria and urgency. Musculoskeletal: Negative for back pain, joint pain and myalgias. Skin: Negative for rash. Neurological: Negative for dizziness, tremors, seizures and headaches. Endo/Heme/Allergies: Negative for polydipsia. Does not bruise/bleed easily. Psychiatric/Behavioral: Negative for depression and substance abuse. The patient is not nervous/anxious. Physical Exam   Constitutional: He is oriented to person, place, and time. He appears well-developed and well-nourished. No distress. HENT:   Head: Normocephalic and atraumatic. Right Ear: Tympanic membrane and external ear normal. No drainage. Tympanic membrane is not injected. No middle ear effusion. No decreased hearing is noted. Left Ear: Tympanic membrane normal. No drainage. Tympanic membrane is not injected. No middle ear effusion. No decreased hearing is noted. Nose: Mucosal edema present. No rhinorrhea. Right sinus exhibits maxillary sinus tenderness and frontal sinus tenderness. Left sinus exhibits maxillary sinus tenderness and frontal sinus tenderness. Mouth/Throat: Uvula is midline and oropharynx is clear and moist. No oropharyngeal exudate. Bilateral TM perfoarated   Eyes: Conjunctivae and EOM are normal. Pupils are equal, round, and reactive to light. No scleral icterus. Neck: Normal range of motion. Neck supple. No JVD present. No thyromegaly present.    Cardiovascular: Normal rate, regular rhythm, normal heart sounds and intact distal pulses. No murmur heard. Pulmonary/Chest: Effort normal and breath sounds normal. He has no wheezes. He has no rales. Abdominal: Soft. Bowel sounds are normal. He exhibits no distension and no mass. Musculoskeletal: Normal range of motion. He exhibits no edema or tenderness. Feet:warm, good capillary refill, no trophic changes or ulcerative lesions, normal DP and PT pulses, normal monofilament exam and normal sensory exam     Lymphadenopathy:        Head (right side): No tonsillar adenopathy present. Head (left side): No tonsillar adenopathy present. He has no cervical adenopathy. Neurological: He is alert and oriented to person, place, and time. He has normal reflexes. No cranial nerve deficit. Skin: Skin is warm and dry. No rash noted. He is not diaphoretic. Psychiatric: He has a normal mood and affect. Nursing note and vitals reviewed. ASSESSMENT and PLAN  Diagnoses and all orders for this visit:    1. Medicare annual wellness visit, subsequent  -     CBC WITH AUTOMATED DIFF  -     METABOLIC PANEL, COMPREHENSIVE  -     TSH REFLEX TO T4  -     URINALYSIS W/ RFLX MICROSCOPIC    2. Type 2 diabetes mellitus without complication, without long-term current use of insulin ([facility] [place] [address]  Assessment & Plan:  Well Controlled, based on history, physical exam and review of pertinent labs, studies and medications; meds reconciled; continue current treatment plan, lifestyle modifications recommended, medication compliance emphasized. Key Antihyperglycemic Medications             metFORMIN (GLUCOPHAGE) 500 mg tablet  (Taking) TAKE ONE TABLET BY MOUTH TWICE DAILY WITH MEALS    metFORMIN (GLUCOPHAGE) 500 mg tablet  (Taking) Take 500 mg by mouth. Other Key Diabetic Medications             fenofibrate (LOFIBRA) 160 mg tablet  (Taking) TAKE ONE TABLET BY MOUTH ONCE DAILY    atorvastatin (LIPITOR) 10 mg tablet  (Taking) Take 10 mg by mouth nightly.         Lab Results   Component Value Date/Time Hemoglobin A1c 6.2 10/10/2017 11:54 AM    Glucose 126 10/10/2017 11:54 AM    Creatinine 1.31 10/10/2017 11:54 AM    Microalb/Creat ratio (ug/mg creat.) 4.4 10/10/2017 11:54 AM    Cholesterol, total 128 10/10/2017 11:54 AM    HDL Cholesterol 44 10/10/2017 11:54 AM    LDL, calculated 65 10/10/2017 11:54 AM    Triglyceride 96 10/10/2017 11:54 AM     Diabetic Foot and Eye Exam HM Status   Topic Date Due    Diabetic Foot Care  02/12/2016    Eye Exam  11/25/2016       Orders:  -     HM DIABETES FOOT EXAM  -     HEMOGLOBIN A1C WITH EAG  -     MICROALBUMIN, UR, RAND W/ MICROALBUMIN/CREA RATIO    3. Essential hypertension, benign    4. S/P coronary artery stent placement    5. Mixed dyslipidemia  -     LIPID PANEL    6. History of non anemic vitamin B12 deficiency  -     VITAMIN B12 & FOLATE    7. Screening for viral disease  -     HEPATITIS C AB    8. Advanced directives, counseling/discussion  -     ADVANCE CARE PLANNING FIRST 30 MINS    9. Screen for colon cancer  -     Referral for Colonoscopy (options for GI, Colon &  Rectal Surgery, & General Surgery)    10. Screening for depression  -     Depression Screen Annual    11. Screening for alcoholism  -     Annual  Alcohol Screen 15 min ()    12. Over weight  Discussed abnormal weight, ideal BMI and importance of diet and exercise to loose weight. Referral for exercise program was offered. Printed information about diet and lifestyle modification provided. 13. Sinobronchitis  -     azithromycin (ZITHROMAX) 500 mg tab; Take 1 Tab by mouth daily for 5 days. Other orders  -     pneumococcal 23-buck ps vaccine (PNEUMOVAX 23) 25 mcg/0.5 mL injection; 0.5 mL by IntraMUSCular route once for 1 dose. Discussed lifestyle issues and health guidance given  Patient was given an after visit summary which includes diagnoses, vital signs, current medications, instructions and references & authorized prescriptions .  Results of labs will be conveyed to patient, once available. Pt verbalized instructions I provided and expressed understanding of discussion that was held today. Follow-up Disposition:  Return in about 4 months (around 6/13/2018) for fasting, follow up, DM.

## 2018-02-13 NOTE — PROGRESS NOTES
This is a Subsequent Medicare Annual Wellness Exam (AWV) (Performed 12 months after IPPE or effective date of Medicare Part B enrollment)    I have reviewed the patient's medical history in detail and updated the computerized patient record. History     Past Medical History:   Diagnosis Date    Arthritis     KNEES & BACK. 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 Current Outpatient Prescriptions   Medication Sig Dispense Refill    pneumococcal 23-buck ps vaccine (PNEUMOVAX 23) 25 mcg/0.5 mL injection 0.5 mL by IntraMUSCular route once for 1 dose.  0.5 mL 0    fenofibrate (LOFIBRA) 160 mg tablet TAKE ONE TABLET BY MOUTH ONCE DAILY 90 Tab 0    tamsulosin (FLOMAX) 0.4 mg capsule TAKE ONE CAPSULE BY MOUTH ONCE DAILY 90 Cap 0    metFORMIN (GLUCOPHAGE) 500 mg tablet TAKE ONE TABLET BY MOUTH TWICE DAILY WITH MEALS 180 Tab 1    glucose blood VI test strips (ACCU-CHEK RED PLUS TEST STRP) strip One strip daily to check blood sugar 100 Strip 5    aspirin delayed-release 81 mg tablet Take 162 mg by mouth daily.  montelukast (SINGULAIR) 10 mg tablet Take 1 Tab by mouth nightly. 30 Tab 2    acetaminophen (TYLENOL) 500 mg tablet Take 2 Tabs by mouth every six (6) hours. 120 Tab 0    atorvastatin (LIPITOR) 10 mg tablet Take 10 mg by mouth nightly.  esomeprazole (NEXIUM) 20 mg capsule Take 20 mg by mouth daily as needed.  metoprolol succinate (TOPROL-XL) 50 mg XL tablet TAKE ONE TABLET BY MOUTH ONCE DAILY 90 Tab 3    amLODIPine (NORVASC) 5 mg tablet TAKE ONE TABLET BY MOUTH ONCE DAILY 90 Tab 3    nitroglycerin (NITROSTAT) 0.4 mg SL tablet DISSOLVE ONE TABLET UNDER THE TONGUE EVERY 5 MINUTES AS NEEDED FOR CHEST PAIN. UP TO 3 DOSES 25 Tab 3     Allergies   Allergen Reactions    Levofloxacin Rash    Pcn [Penicillins] Rash    Tape [Adhesive] Rash     Medipore tape caused large blisters when removed.      Family History   Problem Relation Age of Onset    Diabetes Mother     Heart Disease Mother     Diabetes Son      TYPE 1    Alcohol abuse Son     Anesth Problems Neg Hx      Social History   Substance Use Topics    Smoking status: Never Smoker    Smokeless tobacco: Former User      Comment: Patient states on and off for chewing tobacco.     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 Functional Ability and Level of Safety:   Hearing Loss  The patient needs further evaluation. She follows ENT, Dr Piper Herrmann [facility]  The home contains: handrails and grab bars  Patient does total self care    Fall Risk  Fall Risk Assessment, last 12 mths 2/13/2018   Able to walk? Yes   Fall in past 12 months? No       Abuse Screen  Patient is not abused    Cognitive Screening   Evaluation of Cognitive Function:  Has your family/caregiver stated any concerns about your memory: no  Normal  MMSE 29/30    Patient Care Team   Patient Care Team:  Wilber Vaughan MD as PCP - General (Family Practice)  Carles Bloch, MD (Orthopedic Surgery)  Priscila Alicea MD (Infectious Diseases)  Veronica Gamino MD (Cardiology)  Destinee Walsh MD (Urology)    Assessment/Plan   Education and counseling provided:  Are appropriate based on today's review and evaluation    Diagnoses and all orders for this visit:    1. Medicare annual wellness visit, subsequent  -     CBC WITH AUTOMATED DIFF  -     METABOLIC PANEL, COMPREHENSIVE  -     TSH REFLEX TO T4  -     URINALYSIS W/ RFLX MICROSCOPIC    2. Type 2 diabetes mellitus without complication, without long-term current use of insulin ([facility] [address]  Assessment & Plan:  Well Controlled, based on history, physical exam and review of pertinent labs, studies and medications; meds reconciled; continue current treatment plan, lifestyle modifications recommended, medication compliance emphasized. Key Antihyperglycemic Medications             metFORMIN (GLUCOPHAGE) 500 mg tablet  (Taking) TAKE ONE TABLET BY MOUTH TWICE DAILY WITH MEALS    metFORMIN (GLUCOPHAGE) 500 mg tablet  (Taking) Take 500 mg by mouth.         Other Key Diabetic Medications             fenofibrate (LOFIBRA) 160 mg tablet  (Taking) TAKE ONE TABLET BY MOUTH ONCE DAILY atorvastatin (LIPITOR) 10 mg tablet  (Taking) Take 10 mg by mouth nightly. Lab Results   Component Value Date/Time    Hemoglobin A1c 6.2 10/10/2017 11:54 AM    Glucose 126 10/10/2017 11:54 AM    Creatinine 1.31 10/10/2017 11:54 AM    Microalb/Creat ratio (ug/mg creat.) 4.4 10/10/2017 11:54 AM    Cholesterol, total 128 10/10/2017 11:54 AM    HDL Cholesterol 44 10/10/2017 11:54 AM    LDL, calculated 65 10/10/2017 11:54 AM    Triglyceride 96 10/10/2017 11:54 AM     Diabetic Foot and Eye Exam HM Status   Topic Date Due    Diabetic Foot Care  02/12/2016    Eye Exam  11/25/2016       Orders:  -     HM DIABETES FOOT EXAM  -     HEMOGLOBIN A1C WITH EAG  -     MICROALBUMIN, UR, RAND W/ MICROALBUMIN/CREA RATIO    3. Essential hypertension, benign    4. S/P coronary artery stent placement    5. Mixed dyslipidemia  -     LIPID PANEL    6. History of non anemic vitamin B12 deficiency    7. Screening for viral disease  -     HEPATITIS C AB    8. Advanced directives, counseling/discussion  -     ADVANCE CARE PLANNING FIRST 30 MINS    9. Screen for colon cancer  -     Referral for Colonoscopy (options for GI, Colon &  Rectal Surgery, & General Surgery)    10. Screening for depression  -     Depression Screen Annual    11. Screening for alcoholism  -     Annual  Alcohol Screen 15 min ()    Other orders  -     pneumococcal 23-buck ps vaccine (PNEUMOVAX 23) 25 mcg/0.5 mL injection; 0.5 mL by IntraMUSCular route once for 1 dose.       Health Maintenance Due   Topic Date Due    Hepatitis C Screening  1950    DTaP/Tdap/Td series (1 - Tdap) 05/30/1970    FOBT Q 1 YEAR AGE 50-75  05/30/1999    Pneumococcal 65+ Low/Medium Risk (1 of 2 - PCV13) 05/30/2014    MEDICARE YEARLY EXAM  05/30/2014    FOOT EXAM Q1  02/12/2016    EYE EXAM RETINAL OR DILATED Q1  11/25/2016    GLAUCOMA SCREENING Q2Y  11/25/2017     Discussed lifestyle issues and health guidance given  Patient was given an after visit summary which includes diagnoses, vital signs, current medications, instructions and references & authorized prescriptions . Results of labs will be conveyed to patient, once available. Pt verbalized instructions I provided and expressed understanding of discussion that was held today. Follow-up Disposition:  Return in about 4 months (around 6/13/2018) for fasting, follow up, DM.

## 2018-02-13 NOTE — ASSESSMENT & PLAN NOTE
Well Controlled, based on history, physical exam and review of pertinent labs, studies and medications; meds reconciled; continue current treatment plan, lifestyle modifications recommended, medication compliance emphasized. Key Antihyperglycemic Medications             metFORMIN (GLUCOPHAGE) 500 mg tablet  (Taking) TAKE ONE TABLET BY MOUTH TWICE DAILY WITH MEALS    metFORMIN (GLUCOPHAGE) 500 mg tablet  (Taking) Take 500 mg by mouth. Other Key Diabetic Medications             fenofibrate (LOFIBRA) 160 mg tablet  (Taking) TAKE ONE TABLET BY MOUTH ONCE DAILY    atorvastatin (LIPITOR) 10 mg tablet  (Taking) Take 10 mg by mouth nightly.         Lab Results   Component Value Date/Time    Hemoglobin A1c 6.2 10/10/2017 11:54 AM    Glucose 126 10/10/2017 11:54 AM    Creatinine 1.31 10/10/2017 11:54 AM    Microalb/Creat ratio (ug/mg creat.) 4.4 10/10/2017 11:54 AM    Cholesterol, total 128 10/10/2017 11:54 AM    HDL Cholesterol 44 10/10/2017 11:54 AM    LDL, calculated 65 10/10/2017 11:54 AM    Triglyceride 96 10/10/2017 11:54 AM     Diabetic Foot and Eye Exam HM Status   Topic Date Due    Diabetic Foot Care  02/12/2016    Eye Exam  11/25/2016

## 2018-02-13 NOTE — TELEPHONE ENCOUNTER
Chief Complaint   Patient presents with    Request Records     Diabetic eye exam     Request faxed to Dr. Thelma Vega office at [phone] with confirmation received, for patients latest diabetic retinal eye exam as requested by Dr. Murlean Kawasaki.

## 2018-02-13 NOTE — PROGRESS NOTES
Chief Complaint   Patient presents with    Annual Wellness Visit     Fasting     Knee Pain     right knee    Back Pain     right knee     1. Have you been to the ER, urgent care clinic since your last visit? Hospitalized since your last visit? No    2. Have you seen or consulted any other health care providers outside of the [address] [place] [address] since your last visit? Include any pap smears or colon screening. No     Patient does not have an Advanced Directive.

## 2018-02-14 LAB
ALBUMIN SERPL-MCNC: 4.5 G/DL (ref 3.6–4.8)
ALBUMIN/CREAT UR: 14.3 MG/G CREAT (ref 0–30)
ALBUMIN/GLOB SERPL: 1.7 {RATIO} (ref 1.2–2.2)
ALP SERPL-CCNC: 53 IU/L (ref 39–117)
ALT SERPL-CCNC: 15 IU/L (ref 0–44)
APPEARANCE UR: CLEAR
AST SERPL-CCNC: 15 IU/L (ref 0–40)
BACTERIA #/AREA URNS HPF: ABNORMAL /[HPF]
BASOPHILS # BLD AUTO: 0 X10E3/UL (ref 0–0.2)
BASOPHILS NFR BLD AUTO: 0 %
BILIRUB SERPL-MCNC: 0.5 MG/DL (ref 0–1.2)
BILIRUB UR QL STRIP: NEGATIVE
BUN SERPL-MCNC: 13 MG/DL (ref 8–27)
BUN/CREAT SERPL: 10 (ref 10–24)
CALCIUM SERPL-MCNC: 9.8 MG/DL (ref 8.6–10.2)
CASTS URNS QL MICRO: ABNORMAL /LPF
CHLORIDE SERPL-SCNC: 105 MMOL/L (ref 96–106)
CHOLEST SERPL-MCNC: 126 MG/DL (ref 100–199)
CO2 SERPL-SCNC: 22 MMOL/L (ref 18–29)
COLOR UR: YELLOW
CREAT SERPL-MCNC: 1.32 MG/DL (ref 0.76–1.27)
CREAT UR-MCNC: 257.3 MG/DL
CRYSTALS URNS MICRO: ABNORMAL
EOSINOPHIL # BLD AUTO: 0.1 X10E3/UL (ref 0–0.4)
EOSINOPHIL NFR BLD AUTO: 2 %
EPI CELLS #/AREA URNS HPF: ABNORMAL /HPF
ERYTHROCYTE [DISTWIDTH] IN BLOOD BY AUTOMATED COUNT: 15 % (ref 12.3–15.4)
EST. AVERAGE GLUCOSE BLD GHB EST-MCNC: 157 MG/DL
FOLATE SERPL-MCNC: 7.7 NG/ML
GFR SERPLBLD CREATININE-BSD FMLA CKD-EPI: 55 ML/MIN/1.73
GFR SERPLBLD CREATININE-BSD FMLA CKD-EPI: 64 ML/MIN/1.73
GLOBULIN SER CALC-MCNC: 2.6 G/DL (ref 1.5–4.5)
GLUCOSE SERPL-MCNC: 147 MG/DL (ref 65–99)
GLUCOSE UR QL: NEGATIVE
HBA1C MFR BLD: 7.1 % (ref 4.8–5.6)
HCT VFR BLD AUTO: 38.4 % (ref 37.5–51)
HCV AB S/CO SERPL IA: <0.1 S/CO RATIO (ref 0–0.9)
HDLC SERPL-MCNC: 41 MG/DL
HGB BLD-MCNC: 12.3 G/DL (ref 13–17.7)
HGB UR QL STRIP: ABNORMAL
IMM GRANULOCYTES # BLD: 0 X10E3/UL (ref 0–0.1)
IMM GRANULOCYTES NFR BLD: 0 %
INTERPRETATION, 910389: NORMAL
INTERPRETATION: NORMAL
KETONES UR QL STRIP: NEGATIVE
LDLC SERPL CALC-MCNC: 57 MG/DL (ref 0–99)
LEUKOCYTE ESTERASE UR QL STRIP: NEGATIVE
LYMPHOCYTES # BLD AUTO: 2.4 X10E3/UL (ref 0.7–3.1)
LYMPHOCYTES NFR BLD AUTO: 37 %
MCH RBC QN AUTO: 26.3 PG (ref 26.6–33)
MCHC RBC AUTO-ENTMCNC: 32 G/DL (ref 31.5–35.7)
MCV RBC AUTO: 82 FL (ref 79–97)
MICRO URNS: ABNORMAL
MICROALBUMIN UR-MCNC: 36.7 UG/ML
MONOCYTES # BLD AUTO: 0.5 X10E3/UL (ref 0.1–0.9)
MONOCYTES NFR BLD AUTO: 8 %
MUCOUS THREADS URNS QL MICRO: PRESENT
NEUTROPHILS # BLD AUTO: 3.4 X10E3/UL (ref 1.4–7)
NEUTROPHILS NFR BLD AUTO: 53 %
NITRITE UR QL STRIP: NEGATIVE
PDF IMAGE, 910387: NORMAL
PH UR STRIP: 5 [PH] (ref 5–7.5)
PLATELET # BLD AUTO: 309 X10E3/UL (ref 150–379)
POTASSIUM SERPL-SCNC: 5.4 MMOL/L (ref 3.5–5.2)
PROT SERPL-MCNC: 7.1 G/DL (ref 6–8.5)
PROT UR QL STRIP: NEGATIVE
RBC # BLD AUTO: 4.67 X10E6/UL (ref 4.14–5.8)
RBC #/AREA URNS HPF: ABNORMAL /HPF
SODIUM SERPL-SCNC: 143 MMOL/L (ref 134–144)
SP GR UR: 1.02 (ref 1–1.03)
T4 SERPL-MCNC: 9.2 UG/DL (ref 4.5–12)
TRIGL SERPL-MCNC: 139 MG/DL (ref 0–149)
TSH SERPL DL<=0.005 MIU/L-ACNC: 8.83 UIU/ML (ref 0.45–4.5)
UNIDENT CRYS URNS QL MICRO: PRESENT
UROBILINOGEN UR STRIP-MCNC: 0.2 MG/DL (ref 0.2–1)
VIT B12 SERPL-MCNC: <150 PG/ML (ref 232–1245)
VLDLC SERPL CALC-MCNC: 28 MG/DL (ref 5–40)
WBC # BLD AUTO: 6.5 X10E3/UL (ref 3.4–10.8)
WBC #/AREA URNS HPF: ABNORMAL /HPF

## 2018-02-15 DIAGNOSIS — E11.9 TYPE 2 DIABETES MELLITUS WITHOUT COMPLICATION, WITHOUT LONG-TERM CURRENT USE OF INSULIN (HCC): Primary | ICD-10-CM

## 2018-02-15 RX ORDER — METFORMIN HYDROCHLORIDE 850 MG/1
850 TABLET ORAL 2 TIMES DAILY WITH MEALS
Qty: 180 TAB | Refills: 1 | Status: SHIPPED | OUTPATIENT
Start: 2018-02-15 | End: 2018-08-02 | Stop reason: SDUPTHER

## 2018-04-12 ENCOUNTER — TELEPHONE (OUTPATIENT)
Dept: FAMILY MEDICINE CLINIC | Age: 68
End: 2018-04-12

## 2018-04-12 DIAGNOSIS — K21.9 GASTROESOPHAGEAL REFLUX DISEASE, ESOPHAGITIS PRESENCE NOT SPECIFIED: Primary | ICD-10-CM

## 2018-04-12 NOTE — TELEPHONE ENCOUNTER
----- Message from [facility] sent at 4/12/2018 10:47 AM EDT -----  Regarding: Dr. Preet Hi wants a call back to discuss concerns about his colonoscopy. Pt best contact [phone].

## 2018-04-13 NOTE — TELEPHONE ENCOUNTER
Outbound call to patient.  verified. Patient stated he has appt for colonoscopy on next Tuesday. He requested if he can get it done from \"top and bottom\". Inquired if patient was referring to endoscopy as well. He stated yes he has burning and discomfort in his stomach and since he would be under and taking the medicine he is requesting to have both the same day.  Advised I would notify MD and request the test.

## 2018-04-17 ENCOUNTER — HOSPITAL ENCOUNTER (OUTPATIENT)
Age: 68
Setting detail: OUTPATIENT SURGERY
Discharge: HOME | End: 2018-04-17
Attending: SPECIALIST | Admitting: SPECIALIST
Payer: MEDICARE

## 2018-04-17 ENCOUNTER — ANESTHESIA EVENT (OUTPATIENT)
Dept: ENDOSCOPY | Age: 68
End: 2018-04-17
Payer: MEDICARE

## 2018-04-17 ENCOUNTER — ANESTHESIA (OUTPATIENT)
Dept: ENDOSCOPY | Age: 68
End: 2018-04-17
Payer: MEDICARE

## 2018-04-17 VITALS
DIASTOLIC BLOOD PRESSURE: 80 MMHG | OXYGEN SATURATION: 98 % | SYSTOLIC BLOOD PRESSURE: 122 MMHG | TEMPERATURE: 97.7 F | HEART RATE: 73 BPM | BODY MASS INDEX: 28.06 KG/M2 | RESPIRATION RATE: 15 BRPM | WEIGHT: 196 LBS | HEIGHT: 70 IN

## 2018-04-17 PROCEDURE — 88305 TISSUE EXAM BY PATHOLOGIST: CPT | Performed by: SPECIALIST

## 2018-04-17 PROCEDURE — 74011000250 HC RX REV CODE- 250

## 2018-04-17 PROCEDURE — 76040000007: Performed by: SPECIALIST

## 2018-04-17 PROCEDURE — 76060000032 HC ANESTHESIA 0.5 TO 1 HR: Performed by: SPECIALIST

## 2018-04-17 PROCEDURE — 77030027957 HC TBNG IRR ENDOGTR BUSS -B: Performed by: SPECIALIST

## 2018-04-17 PROCEDURE — 77030009426 HC FCPS BIOP ENDOSC BSC -B: Performed by: SPECIALIST

## 2018-04-17 PROCEDURE — 74011250637 HC RX REV CODE- 250/637: Performed by: SPECIALIST

## 2018-04-17 PROCEDURE — 74011250636 HC RX REV CODE- 250/636

## 2018-04-17 RX ORDER — NALOXONE HYDROCHLORIDE 0.4 MG/ML
0.4 INJECTION, SOLUTION INTRAMUSCULAR; INTRAVENOUS; SUBCUTANEOUS
Status: DISCONTINUED | OUTPATIENT
Start: 2018-04-17 | End: 2018-04-17 | Stop reason: HOSPADM

## 2018-04-17 RX ORDER — LIDOCAINE HYDROCHLORIDE 20 MG/ML
INJECTION, SOLUTION EPIDURAL; INFILTRATION; INTRACAUDAL; PERINEURAL AS NEEDED
Status: DISCONTINUED | OUTPATIENT
Start: 2018-04-17 | End: 2018-04-17 | Stop reason: HOSPADM

## 2018-04-17 RX ORDER — PROPOFOL 10 MG/ML
INJECTION, EMULSION INTRAVENOUS AS NEEDED
Status: DISCONTINUED | OUTPATIENT
Start: 2018-04-17 | End: 2018-04-17 | Stop reason: HOSPADM

## 2018-04-17 RX ORDER — SODIUM CHLORIDE 9 MG/ML
50 INJECTION, SOLUTION INTRAVENOUS CONTINUOUS
Status: DISCONTINUED | OUTPATIENT
Start: 2018-04-17 | End: 2018-04-17 | Stop reason: HOSPADM

## 2018-04-17 RX ORDER — SODIUM CHLORIDE 0.9 % (FLUSH) 0.9 %
5-10 SYRINGE (ML) INJECTION EVERY 8 HOURS
Status: DISCONTINUED | OUTPATIENT
Start: 2018-04-17 | End: 2018-04-17 | Stop reason: HOSPADM

## 2018-04-17 RX ORDER — FENTANYL CITRATE 50 UG/ML
200 INJECTION, SOLUTION INTRAMUSCULAR; INTRAVENOUS
Status: DISCONTINUED | OUTPATIENT
Start: 2018-04-17 | End: 2018-04-17 | Stop reason: HOSPADM

## 2018-04-17 RX ORDER — MIDAZOLAM HYDROCHLORIDE 1 MG/ML
.25-1 INJECTION, SOLUTION INTRAMUSCULAR; INTRAVENOUS
Status: DISCONTINUED | OUTPATIENT
Start: 2018-04-17 | End: 2018-04-17 | Stop reason: HOSPADM

## 2018-04-17 RX ORDER — EPINEPHRINE 0.1 MG/ML
1 INJECTION INTRACARDIAC; INTRAVENOUS
Status: DISCONTINUED | OUTPATIENT
Start: 2018-04-17 | End: 2018-04-17 | Stop reason: HOSPADM

## 2018-04-17 RX ORDER — SODIUM CHLORIDE 0.9 % (FLUSH) 0.9 %
5-10 SYRINGE (ML) INJECTION AS NEEDED
Status: DISCONTINUED | OUTPATIENT
Start: 2018-04-17 | End: 2018-04-17 | Stop reason: HOSPADM

## 2018-04-17 RX ORDER — FLUMAZENIL 0.1 MG/ML
0.2 INJECTION INTRAVENOUS
Status: DISCONTINUED | OUTPATIENT
Start: 2018-04-17 | End: 2018-04-17 | Stop reason: HOSPADM

## 2018-04-17 RX ORDER — DEXTROMETHORPHAN/PSEUDOEPHED 2.5-7.5/.8
1.2 DROPS ORAL
Status: DISCONTINUED | OUTPATIENT
Start: 2018-04-17 | End: 2018-04-17 | Stop reason: HOSPADM

## 2018-04-17 RX ORDER — GLYCOPYRROLATE 0.2 MG/ML
INJECTION INTRAMUSCULAR; INTRAVENOUS AS NEEDED
Status: DISCONTINUED | OUTPATIENT
Start: 2018-04-17 | End: 2018-04-17 | Stop reason: HOSPADM

## 2018-04-17 RX ORDER — ATROPINE SULFATE 0.1 MG/ML
0.5 INJECTION INTRAVENOUS
Status: DISCONTINUED | OUTPATIENT
Start: 2018-04-17 | End: 2018-04-17 | Stop reason: HOSPADM

## 2018-04-17 RX ORDER — SODIUM CHLORIDE 9 MG/ML
INJECTION, SOLUTION INTRAVENOUS
Status: DISCONTINUED | OUTPATIENT
Start: 2018-04-17 | End: 2018-04-17 | Stop reason: HOSPADM

## 2018-04-17 RX ADMIN — PROPOFOL 25 MG: 10 INJECTION, EMULSION INTRAVENOUS at 10:51

## 2018-04-17 RX ADMIN — PROPOFOL 25 MG: 10 INJECTION, EMULSION INTRAVENOUS at 11:03

## 2018-04-17 RX ADMIN — PROPOFOL 25 MG: 10 INJECTION, EMULSION INTRAVENOUS at 11:07

## 2018-04-17 RX ADMIN — GLYCOPYRROLATE 0.2 MG: 0.2 INJECTION INTRAMUSCULAR; INTRAVENOUS at 10:47

## 2018-04-17 RX ADMIN — PROPOFOL 25 MG: 10 INJECTION, EMULSION INTRAVENOUS at 10:53

## 2018-04-17 RX ADMIN — PROPOFOL 25 MG: 10 INJECTION, EMULSION INTRAVENOUS at 10:57

## 2018-04-17 RX ADMIN — PROPOFOL 50 MG: 10 INJECTION, EMULSION INTRAVENOUS at 10:49

## 2018-04-17 RX ADMIN — LIDOCAINE HYDROCHLORIDE 60 MG: 20 INJECTION, SOLUTION EPIDURAL; INFILTRATION; INTRACAUDAL; PERINEURAL at 10:49

## 2018-04-17 RX ADMIN — PROPOFOL 25 MG: 10 INJECTION, EMULSION INTRAVENOUS at 10:52

## 2018-04-17 RX ADMIN — PROPOFOL 25 MG: 10 INJECTION, EMULSION INTRAVENOUS at 11:01

## 2018-04-17 RX ADMIN — PROPOFOL 25 MG: 10 INJECTION, EMULSION INTRAVENOUS at 11:05

## 2018-04-17 RX ADMIN — PROPOFOL 25 MG: 10 INJECTION, EMULSION INTRAVENOUS at 10:50

## 2018-04-17 RX ADMIN — PROPOFOL 25 MG: 10 INJECTION, EMULSION INTRAVENOUS at 10:55

## 2018-04-17 RX ADMIN — SODIUM CHLORIDE: 9 INJECTION, SOLUTION INTRAVENOUS at 10:43

## 2018-04-17 NOTE — ANESTHESIA POSTPROCEDURE EVALUATION
Post-Anesthesia Evaluation and Assessment    Patient: Za Chávez MRN: [id number]  SSN: [id number]    YOB: 1950  Age: 75 y.o. Sex: male       Cardiovascular Function/Vital Signs  Visit Vitals    /80    Pulse 73    Temp 36.5 °C (97.7 °F)    Resp 15    Ht 5' 10\" (1.778 m)    Wt 88.9 kg (196 lb)    SpO2 98%    BMI 28.12 kg/m2       Patient is status post MAC anesthesia for Procedure(s):  COLONOSCOPY  ESOPHAGOGASTRODUODENOSCOPY (EGD)  ESOPHAGOGASTRODUODENAL (EGD) BIOPSY  ENDOSCOPIC POLYPECTOMY. Nausea/Vomiting: None    Postoperative hydration reviewed and adequate. Pain:  Pain Scale 1: Numeric (0 - 10) (04/17/18 1132)  Pain Intensity 1: 0 (04/17/18 1132)   Managed    Neurological Status: At baseline    Mental Status and Level of Consciousness: Arousable    Pulmonary Status:   O2 Device: Room air (04/17/18 1132)   Adequate oxygenation and airway patent    Complications related to anesthesia: None    Post-anesthesia assessment completed.  No concerns    Signed By: Shannon Black MD     April 17, 2018

## 2018-04-17 NOTE — DISCHARGE INSTRUCTIONS
Zenaida Shin  [id number]  1950    COLON DISCHARGE INSTRUCTIONS  Discomfort:  Redness at IV site- apply warm compress to area; if redness or soreness persist- contact your physician  There may be a slight amount of blood passed from the rectum  Gaseous discomfort- walking, belching will help relieve any discomfort  You may not operate a vehicle for 12 hours  You may not engage in an occupation involving machinery or appliances for rest of today  You may not drink alcoholic beverages for at least 12 hours  Avoid making any critical decisions for at least 24 hour  DIET:   Regular diet. - however -  remember your colon is empty and a heavy meal will produce gas. Avoid these foods:  vegetables, fried / greasy foods, carbonated drinks for today. MEDICATIONS:      Regarding Aspirin or Nonsteroidal medications, please see below. ACTIVITY:  You may resume your normal daily activities it is recommended that you spend the remainder of the day resting -  avoid any strenuous activity. CALL M.D. ANY SIGN OF:  Increasing pain, nausea, vomiting  Abdominal distension (swelling)  New increased bleeding (oral or rectal)  Fever (chills)  Pain in chest area    Shortness of breath    You may not  take any Advil, Aspirin, Ibuprofen, Motrin, Aleve, or Goodys for 10 days, ONLY  Tylenol as needed for pain.       Follow-up Instructions:   Call Dr. Reji Levin  Results of procedure / biopsy in 10 days  Telephone #  [phone]      DISCHARGE SUMMARY from Nurse    The following personal items collected during your admission are returned to you:   Dental Appliance: Dental Appliances: None  Vision: Visual Aid: Glasses, At bedside  Hearing Aid:    Jewelry:    Clothing:    Other Valuables:    Valuables sent to safe:

## 2018-04-17 NOTE — H&P
Colonoscopy History and Physical      The patient was seen and examined. Date of last colonoscopy: 2008, Polyps  No      The airway was assessed and documented. The problem list, past medical history, and medications were reviewed. 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 Social History Main Topics    Smoking status: Never Smoker    Smokeless tobacco: Former User      Comment: Patient states on and off for chewing tobacco.      Alcohol use 1.2 oz/week     2 Cans of beer per week      Comment: NOT DRINKING AT THIS TIME    Drug use: No    Sexual activity: Yes     Partners: Female     Other Topics Concern    Not on file     Social History Narrative     Past Medical History:   Diagnosis Date    Arthritis     KNEES & BACK.     Coronary atherosclerosis of native coronary artery     Diabetes (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 Taking?   acetaminophen (TYLENOL) 500 mg tablet 3/17/2018 at Unknown time  No Yes   Sig: Take 2 Tabs by mouth every six (6) hours. amLODIPine (NORVASC) 5 mg tablet 4/17/2018 at Unknown time  No Yes   Sig: TAKE ONE TABLET BY MOUTH ONCE DAILY   aspirin delayed-release 81 mg tablet 4/16/2018 at Unknown time  Yes Yes   Sig: Take 162 mg by mouth daily. atorvastatin (LIPITOR) 10 mg tablet 4/16/2018 at Unknown time  Yes Yes   Sig: Take 10 mg by mouth nightly. esomeprazole (NEXIUM) 20 mg capsule 4/10/2018 at Unknown time  Yes Yes   Sig: Take 20 mg by mouth daily as needed. fenofibrate (LOFIBRA) 160 mg tablet 4/16/2018 at Unknown time  No Yes   Sig: TAKE ONE TABLET BY MOUTH ONCE DAILY   glucose blood VI test strips (ACCU-CHEK RED PLUS TEST STRP) strip Not Taking at Unknown time  No No   Sig: One strip daily to check blood sugar   metFORMIN (GLUCOPHAGE) 850 mg tablet 4/16/2018 at Unknown time  No Yes   Sig: Take 1 Tab by mouth two (2) times daily (with meals). metoprolol succinate (TOPROL-XL) 50 mg XL tablet 4/16/2018 at Unknown time  No Yes   Sig: TAKE ONE TABLET BY MOUTH ONCE DAILY   montelukast (SINGULAIR) 10 mg tablet 4/16/2018 at Unknown time  No Yes   Sig: Take 1 Tab by mouth nightly. nitroglycerin (NITROSTAT) 0.4 mg SL tablet Unknown at Unknown time  No No   Sig: DISSOLVE ONE TABLET UNDER THE TONGUE EVERY 5 MINUTES AS NEEDED FOR CHEST PAIN. UP TO 3 DOSES   tamsulosin (FLOMAX) 0.4 mg capsule 4/16/2018 at Unknown time  No Yes   Sig: TAKE ONE CAPSULE BY MOUTH ONCE DAILY      Facility-Administered Medications: None       The patient was seen and examined in the endoscopy suite. The airway was assessed and docuemented. The problem list and medications were reviewed. Chief complaint, history of present illness, and review of systems and Past medical History are positive for: HTN , diabetes, epigastric pain and colon cancer screening.     The heart, lungs and mental status were satisfactory for the administration of sedation and for the procedure. I discussed with the patient the objectives, risks, consequences and alternatives to the procedure.      Plan: Endoscopic procedure with sedation     Irene Wilson MD   4/17/2018  10:51 AM

## 2018-04-17 NOTE — ROUTINE PROCESS
Dom Dixon  1950  [id number]    Situation:  Verbal report received from: Stephon Toro RN  Procedure: Procedure(s) with comments:  COLONOSCOPY - colon  ESOPHAGOGASTRODUODENOSCOPY (EGD)  ESOPHAGOGASTRODUODENAL (EGD) BIOPSY  ENDOSCOPIC POLYPECTOMY    Background:    Preoperative diagnosis: colon  Postoperative diagnosis: 1.- Gastritis  2.- Colon Polyp    :  Dr. Liya Munoz  Assistant(s): Endoscopy Technician-1: David Portillo IV  Endoscopy RN-1: Tamera Jha RN    Specimens:   ID Type Source Tests Collected by Time Destination   1 : Antrum BX Preservative   Ruel Hoffmann MD 4/17/2018 1055 Pathology   2 : Transverse Colon Polyp Preservative   Ruel Hoffmann MD 4/17/2018 1107 Pathology     H. Pylori  no    Assessment:  Intra-procedure medications     Anesthesia gave intra-procedure sedation and medications, see anesthesia flow sheet yes    Intravenous fluids: NS@ KVO     Vital signs stable     Abdominal assessment: round and soft     Recommendation:  Discharge patient per MD order.     Family or Friend   Permission to share finding with family or friend yes

## 2018-04-17 NOTE — PROCEDURES
[address], [address]                 Colonoscopy Procedure Note    Indications:   See Preoperative Diagnosis above  Referring Physician: Cristine Huerta MD  Anesthesia/Sedation: MAC anesthesia Propofol  Endoscopist:  Dr. Kaleb Palma  Assistant:  Endoscopy Technician-1: Delmy Lundberg IV  Endoscopy RN-1: Rere Lowery RN  Preoperative diagnosis: colon  Postoperative diagnosis: 1.- Gastritis  2.- Colon Polyp    Procedure in Detail:  Informed consent was obtained for the procedure, including sedation. Risks of perforation, hemorrhage, adverse drug reaction, and aspiration were discussed. The patient was placed in the left lateral decubitus position. Based on the pre-procedure assessment, including review of the patient's medical history, medications, allergies, and review of systems, he had been deemed to be an appropriate candidate for moderate sedation; he was therefore sedated with the medications listed above. The patient was monitored continuously with ECG tracing, pulse oximetry, blood pressure monitoring, and direct observations. A rectal examination was performed. The [id number] was inserted into the rectum and advanced under direct vision to the cecum, which was identified by the ileocecal valve and appendiceal orifice. The quality of the colonic preparation was good. A careful inspection was made as the colonoscope was withdrawn, including a retroflexed view of the rectum; findings and interventions are described below. Appropriate photodocumentation was obtained. Findings:  Rectum: normal  Sigmoid: normal  Descending Colon: normal  Transverse Colon: 3 mm polyp removed with cold biopsy  Ascending Colon: normal  Cecum: normal    Specimens:    colon polyp    EBL: None    Complications: None; patient tolerated the procedure well. Recommendations:     - Await pathology.     - If adenoma is present, repeat colonoscopy in 5 years.      Signed By: Spenser Ramirez MD                        April 17, 2018

## 2018-04-17 NOTE — PROGRESS NOTES

## 2018-04-17 NOTE — IP AVS SNAPSHOT
[address] [facility] [address] 
[phone] Patient: Argenis Dykes MRN: [id number] NBJ:[id number] About your hospitalization You were admitted on:  April 17, 2018 You last received care in the:  [facility] ENDOSCOPY You were discharged on:  April 17, 2018 Why you were hospitalized Your primary diagnosis was:  Not on File Follow-up Information None Your Scheduled Appointments Thursday May 10, 2018 10:00 AM EDT  
ESTABLISHED PATIENT with Donn Whalen MD  
[facility] ([facility]) [address] [place] [address]  [address] [place] [address]  
[phone] Discharge Orders None A check fernando indicates which time of day the medication should be taken. My Medications CONTINUE taking these medications Instructions Each Dose to Equal  
 Morning Noon Evening Bedtime  
 acetaminophen 500 mg tablet Commonly known as:  TYLENOL Your last dose was: Your next dose is: Take 2 Tabs by mouth every six (6) hours. 1000 mg  
    
   
   
   
  
 amLODIPine 5 mg tablet Commonly known as:  Juan A Marvin Your last dose was: Your next dose is: TAKE ONE TABLET BY MOUTH ONCE DAILY  
     
   
   
   
  
 aspirin delayed-release 81 mg tablet Your last dose was: Your next dose is: Take 162 mg by mouth daily. 162 mg  
    
   
   
   
  
 atorvastatin 10 mg tablet Commonly known as:  LIPITOR Your last dose was: Your next dose is: Take 10 mg by mouth nightly. 10 mg  
    
   
   
   
  
 fenofibrate 160 mg tablet Commonly known as:  LOFIBRA Your last dose was: Your next dose is: TAKE ONE TABLET BY MOUTH ONCE DAILY  
     
   
   
   
  
 glucose blood VI test strips strip Commonly known as:  ACCU-CHEK RED PLUS TEST STRP  
   
 Your last dose was: Your next dose is: One strip daily to check blood sugar  
     
   
   
   
  
 metFORMIN 850 mg tablet Commonly known as:  GLUCOPHAGE Your last dose was: Your next dose is: Take 1 Tab by mouth two (2) times daily (with meals). 850 mg  
    
   
   
   
  
 metoprolol succinate 50 mg XL tablet Commonly known as:  TOPROL-XL Your last dose was: Your next dose is: TAKE ONE TABLET BY MOUTH ONCE DAILY  
     
   
   
   
  
 montelukast 10 mg tablet Commonly known as:  SINGULAIR Your last dose was: Your next dose is: Take 1 Tab by mouth nightly. 10 mg NexIUM 20 mg capsule Generic drug:  esomeprazole Your last dose was: Your next dose is: Take 20 mg by mouth daily as needed. 20 mg  
    
   
   
   
  
 nitroglycerin 0.4 mg SL tablet Commonly known as:  NITROSTAT Your last dose was: Your next dose is:    
   
   
 DISSOLVE ONE TABLET UNDER THE TONGUE EVERY 5 MINUTES AS NEEDED FOR CHEST PAIN. UP TO 3 DOSES  
     
   
   
   
  
 tamsulosin 0.4 mg capsule Commonly known as:  FLOMAX Your last dose was: Your next dose is: TAKE ONE CAPSULE BY MOUTH ONCE DAILY Discharge Instructions Za Chávez [id number] 
1950 COLON DISCHARGE INSTRUCTIONS Discomfort: 
Redness at IV site- apply warm compress to area; if redness or soreness persist- contact your physician There may be a slight amount of blood passed from the rectum Gaseous discomfort- walking, belching will help relieve any discomfort You may not operate a vehicle for 12 hours You may not engage in an occupation involving machinery or appliances for rest of today You may not drink alcoholic beverages for at least 12 hours Avoid making any critical decisions for at least 24 hour DIET: 
 Regular diet.  however -  remember your colon is empty and a heavy meal will produce gas. Avoid these foods:  vegetables, fried / greasy foods, carbonated drinks for today. MEDICATIONS: 
  
 Regarding Aspirin or Nonsteroidal medications, please see below. ACTIVITY: 
You may resume your normal daily activities it is recommended that you spend the remainder of the day resting -  avoid any strenuous activity. CALL M.D. ANY SIGN OF: Increasing pain, nausea, vomiting Abdominal distension (swelling) New increased bleeding (oral or rectal) Fever (chills) Pain in chest area Shortness of breath You may not  take any Advil, Aspirin, Ibuprofen, Motrin, Aleve, or Goodys for 10 days, ONLY  Tylenol as needed for pain. Follow-up Instructions: 
 Call Dr. Elsa Galvan Results of procedure / biopsy in 10 days Telephone #  [phone] DISCHARGE SUMMARY from Nurse The following personal items collected during your admission are returned to you:  
Dental Appliance: Dental Appliances: None Vision: Visual Aid: Glasses, At bedside Hearing Aid:   
Jewelry:   
Clothing:   
Other Valuables:   
Valuables sent to safe:   
 
 
 
 
  
  
  
Introducing [facility]! [employer] introduces Nightpro patient portal. Now you can access parts of your medical record, email your doctor's office, and request medication refills online. 1. In your internet browser, go to [email or web address] 2. Click on the First Time User? Click Here link in the Sign In box. You will see the New Member Sign Up page. 3. Enter your Nightpro Access Code exactly as it appears below. You will not need to use this code after youve completed the sign-up process. If you do not sign up before the expiration date, you must request a new code. · Nightpro Access Code: [email or web address] Expires: 5/14/2018 10:39 AM 
 
4.  Enter the last four digits of your Social Security Number (xxxx) and Date of Birth (mm/dd/yyyy) as indicated and click Submit. You will be taken to the next sign-up page. 5. Create a Visual[employer]t ID. This will be your [employer] login ID and cannot be changed, so think of one that is secure and easy to remember. 6. Create a [employer] password. You can change your password at any time. 7. Enter your Password Reset Question and Answer. This can be used at a later time if you forget your password. 8. Enter your e-mail address. You will receive e-mail notification when new information is available in [address] 9. Click Sign Up. You can now view and download portions of your medical record. 10. Click the Download Summary menu link to download a portable copy of your medical information. If you have questions, please visit the Frequently Asked Questions section of the [employer] website. Remember, [employer] is NOT to be used for urgent needs. For medical emergencies, dial 911. Now available from your iPhone and Android! Introducing Noah Knox As a [facility] patient, I wanted to make you aware of our electronic visit tool called Noah Porterkristy. [facility] 24/7 allows you to connect within minutes with a medical provider 24 hours a day, seven days a week via a mobile device or tablet or logging into a secure website from your computer. You can access Noah Abilio from anywhere in the [place]. A virtual visit might be right for you when you have a simple condition and feel like you just dont want to get out of bed, or cant get away from work for an appointment, when your regular [facility] provider is not available (evenings, weekends or holidays), or when youre out of town and need minor care. Electronic visits cost only $49 and if the [facility] 24/7 provider determines a prescription is needed to treat your condition, one can be electronically transmitted to a nearby pharmacy*. Please take a moment to enroll today if you have not already done so. The enrollment process is free and takes just a few minutes. To enroll, please download the [facility] 24/7 darrion to your tablet or phone, or visit [email or web address] to enroll on your computer. And, as an [address] [place] [address] patient with a [employer] account, the results of your visits will be scanned into your electronic medical record and your primary care provider will be able to view the scanned results. We urge you to continue to see your regular [facility] provider for your ongoing medical care. And while your primary care provider may not be the one available when you seek a Quickoffice virtual visit, the peace of mind you get from getting a real diagnosis real time can be priceless. For more information on Quickoffice, view our Frequently Asked Questions (FAQs) at [email or web address]. Sincerely, 
 
Jailene Richter MD 
Chief Medical Officer [facility] *:  certain medications cannot be prescribed via Quickoffice Providers Seen During Your Hospitalization Provider Specialty Primary office phone Orlando Trejo MD Gastroenterology [phone] Your Primary Care Physician (PCP) Primary Care Physician Office Phone Office Fax Annalisa Brown [phone] You are allergic to the following Allergen Reactions Levofloxacin Rash Pcn (Penicillins) Rash Tape (Adhesive) Rash Medipore tape caused large blisters when removed. Recent Documentation Height Weight BMI Smoking Status 1.778 m 88.9 kg 28.12 kg/m2 Never Smoker Emergency Contacts Name Discharge Info Relation Home Work Mobile Deluna,Sundip DISCHARGE CAREGIVER [3] Child [2] [phone] Patient Belongings The following personal items are in your possession at time of discharge: Dental Appliances: None  Visual Aid: Glasses, At bedside Please provide this summary of care documentation to your next provider. Signatures-by signing, you are acknowledging that this After Visit Summary has been reviewed with you and you have received a copy. Patient Signature:  ____________________________________________________________ Date:  ____________________________________________________________  
  
[facility] Provider Signature:  ____________________________________________________________ Date:  ____________________________________________________________

## 2018-04-17 NOTE — PROCEDURES
[address]  [address]                 NAME:  Miguel A Crandall   :   1950   MRN:   [id number]     Date/Time:  2018 11:13 AM    Esophagogastroduodenoscopy (EGD) Procedure Note    :  Shantell Nino MD    Referring Provider:  Sylvia Hugo MD    Anethesia/Sedation:  MAC anesthesia Propofol    Preoperative diagnosis: colon    Postoperative diagnosis: 1.- Gastritis  2.- Colon Polyp    Procedure Details     After infom consent was obtained for the procedure, with all risks and benefits of procedure explained the patient was taken to the endoscopy suite and placed in the left lateral decubitus position. Following sequential administration of sedation as per above, the [id number] gastroscope was inserted into the mouth and advanced under direct vision to second portion of the duodenum. A careful inspection was made as the gastroscope was withdrawn, including a retroflexed view of the proximal stomach; findings and interventions are described below. Findings:  Esophagus:normal  Stomach:Patchy erythema in antrum, biopsies done  Duodenum/jejunum:normal      Therapies:  none    Specimens: antral biopsy           EBL: None    Complications:   None; patient tolerated the procedure well. Impression:    See Postoperative diagnosis above    Recommendations:  -Acid suppression with a proton pump inhibitor. , -Await pathology. , -No NSAIDS    Discharge disposition:  Home in the company of  when able to ambulate    Shantell Nino MD

## 2018-05-01 ENCOUNTER — TELEPHONE (OUTPATIENT)
Dept: FAMILY MEDICINE CLINIC | Age: 68
End: 2018-05-01

## 2018-05-01 NOTE — TELEPHONE ENCOUNTER
Chad from [facility] (don't have [facility]) is requesting last office  notes on pt be faxed   Shilpi Medina #[phone]  Fax# [phone]

## 2018-05-02 ENCOUNTER — OFFICE VISIT (OUTPATIENT)
Dept: FAMILY MEDICINE CLINIC | Age: 68
End: 2018-05-02

## 2018-05-02 ENCOUNTER — HOSPITAL ENCOUNTER (OUTPATIENT)
Dept: LAB | Age: 68
Discharge: HOME | End: 2018-05-02
Payer: MEDICARE

## 2018-05-02 VITALS
SYSTOLIC BLOOD PRESSURE: 128 MMHG | HEIGHT: 70 IN | DIASTOLIC BLOOD PRESSURE: 76 MMHG | OXYGEN SATURATION: 96 % | RESPIRATION RATE: 18 BRPM | HEART RATE: 77 BPM | TEMPERATURE: 98 F | WEIGHT: 199 LBS | BODY MASS INDEX: 28.49 KG/M2

## 2018-05-02 DIAGNOSIS — Z01.818 PREOP GENERAL PHYSICAL EXAM: Primary | ICD-10-CM

## 2018-05-02 PROCEDURE — 80053 COMPREHEN METABOLIC PANEL: CPT

## 2018-05-02 PROCEDURE — 85025 COMPLETE CBC W/AUTO DIFF WBC: CPT

## 2018-05-02 RX ORDER — FENOFIBRATE 160 MG/1
TABLET ORAL
Qty: 90 TAB | Refills: 0 | Status: SHIPPED | COMMUNITY
Start: 2018-05-02 | End: 2018-08-02 | Stop reason: SDUPTHER

## 2018-05-02 NOTE — MR AVS SNAPSHOT
[address] [facility] [address] 
 
 
 [address] 
[phone] Patient: Kapil Randolph MRN: [id number] OLF:[id number] Visit Information Date & Time Provider Department Dept. Phone Encounter #  
 5/2/2018  1:20 PM Nellie Alcantara  [facility] [phone] [id number] Follow-up Instructions Return if symptoms worsen or fail to improve. Your Appointments 5/3/2018 10:00 AM  
ESTABLISHED PATIENT with Chayo Le MD  
[facility] ([facility] SCHEDULING) Appt Note: one year follow up; Preop . Pt having Ear Surgery May 8/ Dr [facility])  
 [address] [facility] [address] [place] [address]  
[address] [facility] [address]  
  
    
 6/21/2018  8:00 AM  
ROUTINE CARE with Nellie Alcantara MD  
[facility]) Appt Note: 4 months fasting f/u appt  
 [address] [address]  
[phone]  
  
   
 [address] [address] Upcoming Health Maintenance Date Due DTaP/Tdap/Td series (1 - Tdap) 5/30/1970 Influenza Age 5 to Adult 8/1/2018 HEMOGLOBIN A1C Q6M 8/13/2018 Pneumococcal 65+ Low/Medium Risk (2 of 2 - PPSV23) 10/10/2018 EYE EXAM RETINAL OR DILATED Q1 2/9/2019 FOOT EXAM Q1 2/13/2019 MICROALBUMIN Q1 2/13/2019 LIPID PANEL Q1 2/13/2019 MEDICARE YEARLY EXAM 2/14/2019 GLAUCOMA SCREENING Q2Y 2/9/2020 COLONOSCOPY 4/17/2028 Allergies as of 5/2/2018  Review Complete On: 5/2/2018 By: Nellie Alcantara MD  
  
 Severity Noted Reaction Type Reactions Levofloxacin Medium 11/03/2015   Systemic Rash Pcn [Penicillins] Medium 06/23/2011   Systemic Rash Tape [Adhesive]  10/27/2016   Intolerance Rash Medipore tape caused large blisters when removed. Current Immunizations  Reviewed on 2/13/2018 Name Date Influenza High Dose Vaccine PF 10/10/2017 Influenza Vaccine 12/3/2016, 10/30/2015 Pneumococcal Conjugate (PCV-13) 10/10/2017 Not reviewed this visit You Were Diagnosed With   
  
 Codes Comments Preop general physical exam    -  Primary ICD-10-CM: U46.321 ICD-9-CM: V72.83 Vitals BP Pulse Temp Resp Height(growth percentile) Weight(growth percentile) 128/76 (BP 1 Location: Left arm, BP Patient Position: Sitting) 77 98 °F (36.7 °C) (Oral) 18 5' 10\" (1.778 m) 199 lb (90.3 kg) SpO2 BMI Smoking Status 96% 28.55 kg/m2 Never Smoker Vitals History BMI and BSA Data Body Mass Index Body Surface Area 28.55 kg/m 2 2.11 m 2 Preferred Pharmacy Pharmacy Name Phone [address] [facility] [phone] Your Updated Medication List  
  
   
This list is accurate as of 5/2/18  2:08 PM.  Always use your most recent med list.  
  
  
  
  
 acetaminophen 500 mg tablet Commonly known as:  TYLENOL Take 2 Tabs by mouth every six (6) hours. amLODIPine 5 mg tablet Commonly known as:  Lindsey Chimes TAKE ONE TABLET BY MOUTH ONCE DAILY  
  
 aspirin delayed-release 81 mg tablet Take 162 mg by mouth daily. atorvastatin 10 mg tablet Commonly known as:  LIPITOR Take 10 mg by mouth nightly. fenofibrate 160 mg tablet Commonly known as:  LOFIBRA TAKE ONE TABLET BY MOUTH ONCE DAILY  
  
 glucose blood VI test strips strip Commonly known as:  ACCU-CHEK RED PLUS TEST STRP One strip daily to check blood sugar  
  
 metFORMIN 850 mg tablet Commonly known as:  GLUCOPHAGE Take 1 Tab by mouth two (2) times daily (with meals). metoprolol succinate 50 mg XL tablet Commonly known as:  TOPROL-XL  
TAKE ONE TABLET BY MOUTH ONCE DAILY  
  
 montelukast 10 mg tablet Commonly known as:  SINGULAIR Take 1 Tab by mouth nightly. NexIUM 20 mg capsule Generic drug:  esomeprazole Take 20 mg by mouth daily as needed. nitroglycerin 0.4 mg SL tablet Commonly known as:  NITROSTAT  
DISSOLVE ONE TABLET UNDER THE TONGUE EVERY 5 MINUTES AS NEEDED FOR CHEST PAIN. UP TO 3 DOSES  
  
 tamsulosin 0.4 mg capsule Commonly known as:  FLOMAX TAKE ONE CAPSULE BY MOUTH ONCE DAILY We Performed the Following CBC WITH AUTOMATED DIFF [93610 CPT(R)] METABOLIC PANEL, COMPREHENSIVE [28185 CPT(R)] Follow-up Instructions Return if symptoms worsen or fail to improve. Patient Instructions Tympanoplasty: Before Your Surgery What is tympanoplasty? Tympanoplasty (say \"nayana-PAN-oh-plass-bridger\") is surgery to repair a hole in the eardrum. The surgery may be done to improve hearing. It is also done to stop frequent ear infections that other treatment does not help. You will get medicine to make you sleep or feel relaxed during the surgery. You will not feel pain. The doctor will probably do the surgery through a cut (incision) behind your ear. Sometimes the surgery can be done through the opening of the ear canal. The doctor will use a small piece of your tissue to patch the hole in your eardrum. This is taken from your outer ear or the area behind the ear. If the doctor made an incision, he or she will close it with stitches. You will probably go home on the same day of your surgery. Most people are able to go back to work or their normal routine in about 1 to 2 weeks. But if you must be very active or lift heavy things for your job, you may need to take up to 2 to 4 weeks off. Follow-up care is a key part of your treatment and safety. Be sure to make and go to all appointments, and call your doctor if you are having problems. It's also a good idea to know your test results and keep a list of the medicines you take. What happens before surgery? ?Surgery can be stressful.  This information will help you understand what you can expect. And it will help you safely prepare for surgery. ? Preparing for surgery ? · Understand exactly what surgery is planned, along with the risks, benefits, and other options. · Tell your doctors ALL the medicines, vitamins, supplements, and herbal remedies you take. Some of these can increase the risk of bleeding or interact with anesthesia. ? · If you take blood thinners, such as warfarin (Coumadin), clopidogrel (Plavix), or aspirin, be sure to talk to your doctor. He or she will tell you if you should stop taking these medicines before your surgery. Make sure that you understand exactly what your doctor wants you to do.  
? · Your doctor will tell you which medicines to take or stop before your surgery. You may need to stop taking certain medicines a week or more before surgery. So talk to your doctor as soon as you can.  
? · If you have an advance directive, let your doctor know. It may include a living will and a durable power of  for health care. Bring a copy to the hospital. If you don't have one, you may want to prepare one. It lets your doctor and loved ones know your health care wishes. Doctors advise that everyone prepare these papers before any type of surgery or procedure. What happens on the day of surgery? · Follow the instructions exactly about when to stop eating and drinking. If you don't, your surgery may be canceled. If your doctor told you to take your medicines on the day of surgery, take them with only a sip of water. ? · Take a bath or shower before you come in for your surgery. Do not apply lotions, perfumes, deodorants, or nail polish. ? · Do not shave the surgical site yourself. ? · Take off all jewelry and piercings. And take out contact lenses, if you wear them. ? At the hospital or surgery center · Bring a picture ID. ? · The area for surgery is often marked to make sure there are no errors. ? · You will be kept comfortable and safe by your anesthesia provider. The anesthesia may make you sleep. Or it may just numb the area being worked on. ? · The surgery will take about 1 to 3 hours. ? · You will have foam packing or ointment in your ear canal. Your doctor may take this out about 1 to 2 weeks after your surgery. ? · You may have strips of tape or a bandage over the incision behind your ear. Going home · Be sure you have someone to drive you home. Anesthesia and pain medicine make it unsafe for you to drive. ? · You will be given more specific instructions about recovering from your surgery. They will cover things like diet, wound care, follow-up care, driving, and getting back to your normal routine. When should you call your doctor? · You have questions or concerns. ? · You don't understand how to prepare for your surgery. ? · You become ill before the surgery (such as fever, flu, or a cold). ? · You need to reschedule or have changed your mind about having the surgery. Where can you learn more? Go to http://araseli-lv.info/. Enter [id number] in the search box to learn more about \"Tympanoplasty: Before Your Surgery. \" Current as of: May 12, 2017 Content Version: 11.4 © 9843-5005 [employer]. Care instructions adapted under license by [employer] (which disclaims liability or warranty for this information). If you have questions about a medical condition or this instruction, always ask your healthcare professional. [place] [address] any warranty or liability for your use of this information. Tympanoplasty: What to Expect at [facility] Your Recovery Tympanoplasty (say \"nayana-PAN-oh-plass-bridger\") is surgery to repair a hole in the eardrum. The surgery may have been done to improve hearing or to stop frequent ear infections that did not get better with other treatments. You may feel dizzy for a few days after surgery. The cut (incision) the doctor made behind your ear may be sore, and you may have ear pain for about a week. Some bloody fluid may drain from your ear canal and the incision. Your ear will probably feel blocked or stuffy. You may not be able to hear as well as before. This usually gets better as the eardrum heals and after the doctor takes the cotton or gauze packing out of the ear canal. The doctor will take out the packing 1 to 2 weeks after surgery. Your stitches may dissolve on their own, or the doctor may need to take them out. Your doctor will discuss this with you. It may take time before your hearing gets better. Your doctor will test your hearing after your ear has healed. This may be 8 to 12 weeks after surgery. While you are healing, it is important to avoid getting water in your ear. You will also need to avoid heavy lifting, strenuous exercise, and other activities that may put pressure on your eardrum. This includes flying in an airplane, swimming, scuba diving, or playing contact sports. This care sheet gives you a general idea about how long it will take for you to recover. But each person recovers at a different pace. Follow the steps below to get better as quickly as possible. How can you care for yourself at home? Activity · Rest when you feel tired. Getting enough sleep will help you recover. For the first week, sleep with your head up by using 2 or 3 pillows. You can also try to sleep with your head up in a reclining chair. · Try to walk each day. Start by walking a little more than you did the day before. Bit by bit, increase the amount you walk. Walking boosts blood flow and helps prevent pneumonia and constipation. · Avoid sudden head movements and bending over for the first 2 or 3 days after surgery. These actions may make you dizzy.  
· Avoid strenuous activities, such as bicycle riding, jogging, weight lifting, or aerobic exercise, for about 2 to 4 weeks or until your doctor says it is okay. · For 2 to 4 weeks or until your doctor says it is okay, avoid lifting anything that would make you strain. This may include a child, heavy grocery bags and milk containers, a heavy briefcase or backpack, cat litter or dog food bags, or a vacuum . · Do not fly in an airplane, swim, scuba dive, or play contact sports until your doctor says it is okay. These activities could prevent your eardrum from healing correctly. · Do not get water in your ear for 1 to 3 months, or until your doctor says it is okay. You can take baths, but do not shower or get water near your ear until the packing is removed. When you bathe, plug your ear with a cotton ball lightly coated in petroleum jelly to keep water out. Do not use plastic earplugs that go into the ear canal while you have packing in your ear. Use only the earplugs that your doctor recommends. · Ask your doctor when you can drive again. · Most people are able to go back to work or their normal routine in about 1 to 2 weeks. But if your job requires strenuous activity or heavy lifting, you may need to take 2 to 4 weeks off. Diet · You can eat your normal diet. If your stomach is upset, try bland, low-fat foods like plain rice, broiled chicken, toast, and yogurt. · Drink plenty of fluids to avoid becoming dehydrated. · Check with your doctor before drinking alcohol. Alcohol may make dizziness worse. · You may notice that your bowel movements are not regular right after your surgery. This is common. Try to avoid constipation and straining with bowel movements. You may want to take a fiber supplement every day. If you have not had a bowel movement after a couple of days, ask your doctor about taking a mild laxative. Medicines · Your doctor will tell you if and when you can restart your medicines. He or she will also give you instructions about taking any new medicines. · If you take blood thinners, such as warfarin (Coumadin), clopidogrel (Plavix), or aspirin, be sure to talk to your doctor. He or she will tell you if and when to start taking those medicines again. Make sure that you understand exactly what your doctor wants you to do. · Be safe with medicines. Take pain medicines exactly as directed. ¨ If the doctor gave you a prescription medicine for pain, take it as prescribed. ¨ If you are not taking a prescription pain medicine, ask your doctor if you can take an over-the-counter medicine. · If you think your pain medicine is making you sick to your stomach: 
¨ Take your medicine after meals (unless your doctor has told you not to). ¨ Ask your doctor for a different pain medicine. · If your doctor prescribed antibiotics, take them as directed. Do not stop taking them just because you feel better. You need to take the full course of antibiotics. Incision care · You may have a bandage over the incision. You can remove the bandage 1 or 2 days after surgery or when your doctor says it is okay. · If you have strips of tape on the incision behind your ear, leave the tape on for a week or until it falls off. · Wash the area daily with warm, soapy water, and pat it dry. Don't use hydrogen peroxide or alcohol, which may delay healing. You may cover the area with a gauze bandage if it weeps. Change the bandage every day. · Keep the area clean and dry. Other instructions · Until your doctor says it is okay, do not blow your nose. If you need to sneeze or cough, do not try to stop it. Open your mouth, and do not pinch your nose. Follow-up care is a key part of your treatment and safety. Be sure to make and go to all appointments, and call your doctor if you are having problems. It's also a good idea to know your test results and keep a list of the medicines you take. When should you call for help? Call 911 anytime you think you may need emergency care.  For example, call if: 
? · You passed out (lost consciousness). ? · You have severe trouble breathing. ? · You have sudden chest pain, shortness of breath, or you cough up blood. ?Call your doctor now or seek immediate medical care if: 
? · You bleed through your bandage. ? · You have pain that does not get better after you take pain medicine. ? · You have signs of infection, such as: 
¨ Increased pain, swelling, warmth, or redness. ¨ Red streaks leading from the incision. ¨ Pus draining from the incision. ¨ A fever. ? Watch closely for changes in your health, and be sure to contact your doctor if: 
? · You notice changes in hearing. ? · You do not get better as expected. Where can you learn more? Go to http://araseli-lv.info/. Enter W767 in the search box to learn more about \"Tympanoplasty: What to Expect at Home. \" Current as of: May 12, 2017 Content Version: 11.4 © 3329-3095 [employer]. Care instructions adapted under license by [employer] (which disclaims liability or warranty for this information). If you have questions about a medical condition or this instruction, always ask your healthcare professional. [address] any warranty or liability for your use of this information. Introducing [facility]! Erasmo Lee introduces [employer] patient portal. Now you can access parts of your medical record, email your doctor's office, and request medication refills online. 1. In your internet browser, go to [email or web address] 2. Click on the First Time User? Click Here link in the Sign In box. You will see the New Member Sign Up page. 3. Enter your [employer] Access Code exactly as it appears below. You will not need to use this code after youve completed the sign-up process. If you do not sign up before the expiration date, you must request a new code. · [employer] Access Code: [email or web address] Expires: 5/14/2018 10:39 AM 
 
4. Enter the last four digits of your Social Security Number (xxxx) and Date of Birth (mm/dd/yyyy) as indicated and click Submit. You will be taken to the next sign-up page. 5. Create a [employer] ID. This will be your [employer] login ID and cannot be changed, so think of one that is secure and easy to remember. 6. Create a [employer] password. You can change your password at any time. 7. Enter your Password Reset Question and Answer. This can be used at a later time if you forget your password. 8. Enter your e-mail address. You will receive e-mail notification when new information is available in [address] 9. Click Sign Up. You can now view and download portions of your medical record. 10. Click the Download Summary menu link to download a portable copy of your medical information. If you have questions, please visit the Frequently Asked Questions section of the [employer] website. Remember, [employer] is NOT to be used for urgent needs. For medical emergencies, dial 911. Now available from your iPhone and Android! Please provide this summary of care documentation to your next provider. Your primary care clinician is listed as Lucas Hernandez. If you have any questions after today's visit, please call [phone].

## 2018-05-02 NOTE — PROGRESS NOTES
Chief Complaint   Patient presents with    Pre-op Exam     myringoplasty     1. Have you been to the ER, urgent care clinic since your last visit? Hospitalized since your last visit? No    2. Have you seen or consulted any other health care providers outside of the [address] [place] [address] since your last visit? Include any pap smears or colon screening.  No

## 2018-05-02 NOTE — PATIENT INSTRUCTIONS
Tympanoplasty: Before Your Surgery  What is tympanoplasty? Tympanoplasty (say \"nayana-PAN-oh-plass-bridger\") is surgery to repair a hole in the eardrum. The surgery may be done to improve hearing. It is also done to stop frequent ear infections that other treatment does not help. You will get medicine to make you sleep or feel relaxed during the surgery. You will not feel pain. The doctor will probably do the surgery through a cut (incision) behind your ear. Sometimes the surgery can be done through the opening of the ear canal. The doctor will use a small piece of your tissue to patch the hole in your eardrum. This is taken from your outer ear or the area behind the ear. If the doctor made an incision, he or she will close it with stitches. You will probably go home on the same day of your surgery. Most people are able to go back to work or their normal routine in about 1 to 2 weeks. But if you must be very active or lift heavy things for your job, you may need to take up to 2 to 4 weeks off. Follow-up care is a key part of your treatment and safety. Be sure to make and go to all appointments, and call your doctor if you are having problems. It's also a good idea to know your test results and keep a list of the medicines you take. What happens before surgery? ?Surgery can be stressful. This information will help you understand what you can expect. And it will help you safely prepare for surgery. ? Preparing for surgery  ? · Understand exactly what surgery is planned, along with the risks, benefits, and other options. · Tell your doctors ALL the medicines, vitamins, supplements, and herbal remedies you take. Some of these can increase the risk of bleeding or interact with anesthesia. ? · If you take blood thinners, such as warfarin (Coumadin), clopidogrel (Plavix), or aspirin, be sure to talk to your doctor. He or she will tell you if you should stop taking these medicines before your surgery.  Make sure that you understand exactly what your doctor wants you to do.   ? · Your doctor will tell you which medicines to take or stop before your surgery. You may need to stop taking certain medicines a week or more before surgery. So talk to your doctor as soon as you can.   ? · If you have an advance directive, let your doctor know. It may include a living will and a durable power of  for health care. Bring a copy to the hospital. If you don't have one, you may want to prepare one. It lets your doctor and loved ones know your health care wishes. Doctors advise that everyone prepare these papers before any type of surgery or procedure. What happens on the day of surgery? · Follow the instructions exactly about when to stop eating and drinking. If you don't, your surgery may be canceled. If your doctor told you to take your medicines on the day of surgery, take them with only a sip of water. ? · Take a bath or shower before you come in for your surgery. Do not apply lotions, perfumes, deodorants, or nail polish. ? · Do not shave the surgical site yourself. ? · Take off all jewelry and piercings. And take out contact lenses, if you wear them. ? At the hospital or surgery center   · Bring a picture ID. ? · The area for surgery is often marked to make sure there are no errors. ? · You will be kept comfortable and safe by your anesthesia provider. The anesthesia may make you sleep. Or it may just numb the area being worked on. ? · The surgery will take about 1 to 3 hours. ? · You will have foam packing or ointment in your ear canal. Your doctor may take this out about 1 to 2 weeks after your surgery. ? · You may have strips of tape or a bandage over the incision behind your ear. Going home   · Be sure you have someone to drive you home. Anesthesia and pain medicine make it unsafe for you to drive. ? · You will be given more specific instructions about recovering from your surgery.  They will cover things like diet, wound care, follow-up care, driving, and getting back to your normal routine. When should you call your doctor? · You have questions or concerns. ? · You don't understand how to prepare for your surgery. ? · You become ill before the surgery (such as fever, flu, or a cold). ? · You need to reschedule or have changed your mind about having the surgery. Where can you learn more? Go to http://araseli-lv.info/. Enter [id number] in the search box to learn more about \"Tympanoplasty: Before Your Surgery. \"  Current as of: May 12, 2017  Content Version: 11.4  © 7610-8324 [employer]. Care instructions adapted under license by [employer] (which disclaims liability or warranty for this information). If you have questions about a medical condition or this instruction, always ask your healthcare professional. [place] [address] any warranty or liability for your use of this information. Tympanoplasty: What to Expect at Home  Your Recovery  Tympanoplasty (say \"nayana-PAN-oh-plass-bridger\") is surgery to repair a hole in the eardrum. The surgery may have been done to improve hearing or to stop frequent ear infections that did not get better with other treatments. You may feel dizzy for a few days after surgery. The cut (incision) the doctor made behind your ear may be sore, and you may have ear pain for about a week. Some bloody fluid may drain from your ear canal and the incision. Your ear will probably feel blocked or stuffy. You may not be able to hear as well as before. This usually gets better as the eardrum heals and after the doctor takes the cotton or gauze packing out of the ear canal. The doctor will take out the packing 1 to 2 weeks after surgery. Your stitches may dissolve on their own, or the doctor may need to take them out. Your doctor will discuss this with you. It may take time before your hearing gets better.  Your doctor will test your hearing after your ear has healed. This may be 8 to 12 weeks after surgery. While you are healing, it is important to avoid getting water in your ear. You will also need to avoid heavy lifting, strenuous exercise, and other activities that may put pressure on your eardrum. This includes flying in an airplane, swimming, scuba diving, or playing contact sports. This care sheet gives you a general idea about how long it will take for you to recover. But each person recovers at a different pace. Follow the steps below to get better as quickly as possible. How can you care for yourself at home? Activity  · Rest when you feel tired. Getting enough sleep will help you recover. For the first week, sleep with your head up by using 2 or 3 pillows. You can also try to sleep with your head up in a reclining chair. · Try to walk each day. Start by walking a little more than you did the day before. Bit by bit, increase the amount you walk. Walking boosts blood flow and helps prevent pneumonia and constipation. · Avoid sudden head movements and bending over for the first 2 or 3 days after surgery. These actions may make you dizzy. · Avoid strenuous activities, such as bicycle riding, jogging, weight lifting, or aerobic exercise, for about 2 to 4 weeks or until your doctor says it is okay. · For 2 to 4 weeks or until your doctor says it is okay, avoid lifting anything that would make you strain. This may include a child, heavy grocery bags and milk containers, a heavy briefcase or backpack, cat litter or dog food bags, or a vacuum . · Do not fly in an airplane, swim, scuba dive, or play contact sports until your doctor says it is okay. These activities could prevent your eardrum from healing correctly. · Do not get water in your ear for 1 to 3 months, or until your doctor says it is okay. You can take baths, but do not shower or get water near your ear until the packing is removed.  When you bathe, plug your ear with a cotton ball lightly coated in petroleum jelly to keep water out. Do not use plastic earplugs that go into the ear canal while you have packing in your ear. Use only the earplugs that your doctor recommends. · Ask your doctor when you can drive again. · Most people are able to go back to work or their normal routine in about 1 to 2 weeks. But if your job requires strenuous activity or heavy lifting, you may need to take 2 to 4 weeks off. Diet  · You can eat your normal diet. If your stomach is upset, try bland, low-fat foods like plain rice, broiled chicken, toast, and yogurt. · Drink plenty of fluids to avoid becoming dehydrated. · Check with your doctor before drinking alcohol. Alcohol may make dizziness worse. · You may notice that your bowel movements are not regular right after your surgery. This is common. Try to avoid constipation and straining with bowel movements. You may want to take a fiber supplement every day. If you have not had a bowel movement after a couple of days, ask your doctor about taking a mild laxative. Medicines  · Your doctor will tell you if and when you can restart your medicines. He or she will also give you instructions about taking any new medicines. · If you take blood thinners, such as warfarin (Coumadin), clopidogrel (Plavix), or aspirin, be sure to talk to your doctor. He or she will tell you if and when to start taking those medicines again. Make sure that you understand exactly what your doctor wants you to do. · Be safe with medicines. Take pain medicines exactly as directed. ¨ If the doctor gave you a prescription medicine for pain, take it as prescribed. ¨ If you are not taking a prescription pain medicine, ask your doctor if you can take an over-the-counter medicine. · If you think your pain medicine is making you sick to your stomach:  ¨ Take your medicine after meals (unless your doctor has told you not to).   ¨ Ask your doctor for a different pain medicine. · If your doctor prescribed antibiotics, take them as directed. Do not stop taking them just because you feel better. You need to take the full course of antibiotics. Incision care  · You may have a bandage over the incision. You can remove the bandage 1 or 2 days after surgery or when your doctor says it is okay. · If you have strips of tape on the incision behind your ear, leave the tape on for a week or until it falls off. · Wash the area daily with warm, soapy water, and pat it dry. Don't use hydrogen peroxide or alcohol, which may delay healing. You may cover the area with a gauze bandage if it weeps. Change the bandage every day. · Keep the area clean and dry. Other instructions  · Until your doctor says it is okay, do not blow your nose. If you need to sneeze or cough, do not try to stop it. Open your mouth, and do not pinch your nose. Follow-up care is a key part of your treatment and safety. Be sure to make and go to all appointments, and call your doctor if you are having problems. It's also a good idea to know your test results and keep a list of the medicines you take. When should you call for help? Call 911 anytime you think you may need emergency care. For example, call if:  ? · You passed out (lost consciousness). ? · You have severe trouble breathing. ? · You have sudden chest pain, shortness of breath, or you cough up blood. ?Call your doctor now or seek immediate medical care if:  ? · You bleed through your bandage. ? · You have pain that does not get better after you take pain medicine. ? · You have signs of infection, such as:  ¨ Increased pain, swelling, warmth, or redness. ¨ Red streaks leading from the incision. ¨ Pus draining from the incision. ¨ A fever. ? Watch closely for changes in your health, and be sure to contact your doctor if:  ? · You notice changes in hearing. ? · You do not get better as expected. Where can you learn more?   Go to http://araseli-lv.info/. Enter G920 in the search box to learn more about \"Tympanoplasty: What to Expect at Home. \"  Current as of: May 12, 2017  Content Version: 11.4  © 3463-9460 [employer]. Care instructions adapted under license by [employer] (which disclaims liability or warranty for this information). If you have questions about a medical condition or this instruction, always ask your healthcare professional. [address] any warranty or liability for your use of this information.

## 2018-05-02 NOTE — PROGRESS NOTES
HISTORY OF PRESENT ILLNESS  Mars Cardoso is a 75 y.o. male. He was seen for preop physical and clearance for Myringoplasty. HPI  HPI:  David Baron is 75 y.o. male (1950) who presents for preoperative evaluation. Procedure/Surgery: myringoplasty,left ear   Date of Procedure/Surgery: 5/8/18  Surgeon: Dr Royal Gudino: [facility] [place]  Primary Physician: Philomena Arias MD       Reason for surgery: hearing loss left ear    Latex Allergy: no  Anesthesia Complications: None  History of abnormal bleeding : None  History of Blood Transfusions: no  Health Care Directive or Living Will: no  Recent use of: ASA  Tetanus up to date: tetanus status unknown to the patient      Lab Results   Component Value Date/Time    Hemoglobin A1c 7.1 (H) 02/13/2018 10:10 AM              ---------------------------------------     Prior to Admission medications    Medication Sig Start Date End Date Taking? Authorizing Provider   metFORMIN (GLUCOPHAGE) 850 mg tablet Take 1 Tab by mouth two (2) times daily (with meals). 2/15/18  Yes Philomena Arias MD   fenofibrate (LOFIBRA) 160 mg tablet TAKE ONE TABLET BY MOUTH ONCE DAILY 2/2/18  Yes Philomena Arias MD   tamsulosin (FLOMAX) 0.4 mg capsule TAKE ONE CAPSULE BY MOUTH ONCE DAILY 11/9/17  Yes Philomena Arias MD   glucose blood VI test strips (ACCU-CHEK RED PLUS TEST STRP) strip One strip daily to check blood sugar 10/18/17  Yes Philomena Arias MD   aspirin delayed-release 81 mg tablet Take 162 mg by mouth daily. Yes Historical Provider   montelukast (SINGULAIR) 10 mg tablet Take 1 Tab by mouth nightly. 10/10/17  Yes Philomena Arias MD   acetaminophen (TYLENOL) 500 mg tablet Take 2 Tabs by mouth every six (6) hours. 8/2/17  Yes Kaden Ragland PA-C   atorvastatin (LIPITOR) 10 mg tablet Take 10 mg by mouth nightly. Yes Historical Provider   esomeprazole (NEXIUM) 20 mg capsule Take 20 mg by mouth daily as needed.    Yes Historical Provider metoprolol succinate (TOPROL-XL) 50 mg XL tablet TAKE ONE TABLET BY MOUTH ONCE DAILY 5/11/17  Yes Leigh Ann June MD   amLODIPine (NORVASC) 5 mg tablet TAKE ONE TABLET BY MOUTH ONCE DAILY 5/11/17  Yes Leigh Ann June MD   nitroglycerin (NITROSTAT) 0.4 mg SL tablet DISSOLVE ONE TABLET UNDER THE TONGUE EVERY 5 MINUTES AS NEEDED FOR CHEST PAIN. UP TO 3 DOSES 5/11/17  Yes Leigh Ann June MD          Allergies   Allergen Reactions    Levofloxacin Rash    Pcn [Penicillins] Rash    Tape [Adhesive] Rash     Medipore tape caused large blisters when removed. 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 HX GI  2010 COLONOSCOPY    HX GI  2010    ENDOSCOPY    HX HEENT      SEPTOPLASTY    HX HEENT      TONSILLECTOMY    HX KNEE REPLACEMENT Left 2008    DR ROSEN    HX ORTHOPAEDIC Right 2010, 2017    KNEE REPLACEMENT, SPACER 5/2017    HX ORTHOPAEDIC  2007    RIGHT SHOULDER ROTATOR CUFF REPAIR. Social History   Substance Use Topics    Smoking status: Never Smoker    Smokeless tobacco: Former User      Comment: Patient states on and off for chewing tobacco.      Alcohol use 1.2 oz/week     2 Cans of beer per week      Comment: NOT DRINKING AT THIS TIME         --------------------------------------    Review of Systems   Constitutional: Negative for chills, fever and malaise/fatigue. HENT: Negative for congestion, ear pain, sore throat and tinnitus. Eyes: Negative for blurred vision, double vision, pain and discharge. Respiratory: Negative for cough, shortness of breath and wheezing. Cardiovascular: Negative for chest pain, palpitations and leg swelling. Gastrointestinal: Negative for abdominal pain, blood in stool, constipation, diarrhea, nausea and vomiting. Genitourinary: Negative for dysuria, frequency, hematuria and urgency. Musculoskeletal: Negative for back pain, joint pain and myalgias. Skin: Negative for rash. Neurological: Negative for dizziness, tremors, seizures and headaches. Endo/Heme/Allergies: Negative for polydipsia. Does not bruise/bleed easily. Psychiatric/Behavioral: Negative for depression and substance abuse. The patient is not nervous/anxious. Physical Exam   Constitutional: He is oriented to person, place, and time. He appears well-developed and well-nourished. HENT:   Head: Normocephalic and atraumatic. Right Ear: External ear normal.   Mouth/Throat: Oropharynx is clear and moist. No oropharyngeal exudate. Eyes: Conjunctivae and EOM are normal. Pupils are equal, round, and reactive to light. No scleral icterus. Neck: Normal range of motion. Neck supple.  No JVD present. No thyromegaly present. Cardiovascular: Normal rate, regular rhythm, normal heart sounds and intact distal pulses. No murmur heard. Pulmonary/Chest: Effort normal and breath sounds normal. He has no wheezes. Abdominal: Soft. Bowel sounds are normal. He exhibits no distension and no mass. Musculoskeletal: Normal range of motion. He exhibits no edema or tenderness. Lymphadenopathy:     He has no cervical adenopathy. Neurological: He is alert and oriented to person, place, and time. He has normal reflexes. No cranial nerve deficit. Skin: Skin is warm and dry. No rash noted. He is not diaphoretic. Psychiatric: He has a normal mood and affect. Nursing note and vitals reviewed. ASSESSMENT and PLAN  Diagnoses and all orders for this visit:    1. Preop general physical exam  -     CBC WITH AUTOMATED DIFF  -     METABOLIC PANEL, COMPREHENSIVE      After reviewing the patient's history & exam he is low risk for cardiac complications. No contraindications to planned surgery   Will get all results before we clear him for surgery and complete paper work. Discussed lifestyle issues and health guidance given  Patient was given an after visit summary which includes diagnoses, vital signs, current medications, instructions and references & authorized prescriptions . Results of labs will be conveyed to patient, once available. Pt verbalized instructions I provided and expressed understanding of discussion that was held today.   Follow-up Disposition: Not on File

## 2018-05-03 ENCOUNTER — OFFICE VISIT (OUTPATIENT)
Dept: CARDIOLOGY CLINIC | Age: 68
End: 2018-05-03

## 2018-05-03 VITALS
SYSTOLIC BLOOD PRESSURE: 110 MMHG | RESPIRATION RATE: 16 BRPM | BODY MASS INDEX: 28.66 KG/M2 | OXYGEN SATURATION: 98 % | WEIGHT: 200.2 LBS | HEIGHT: 70 IN | HEART RATE: 70 BPM | DIASTOLIC BLOOD PRESSURE: 60 MMHG

## 2018-05-03 DIAGNOSIS — Z01.810 PRE-OPERATIVE CARDIOVASCULAR EXAMINATION: ICD-10-CM

## 2018-05-03 DIAGNOSIS — Z95.5 S/P CORONARY ARTERY STENT PLACEMENT: ICD-10-CM

## 2018-05-03 DIAGNOSIS — I25.10 ATHEROSCLEROSIS OF NATIVE CORONARY ARTERY OF NATIVE HEART WITHOUT ANGINA PECTORIS: Primary | ICD-10-CM

## 2018-05-03 DIAGNOSIS — I10 ESSENTIAL HYPERTENSION, BENIGN: ICD-10-CM

## 2018-05-03 DIAGNOSIS — E78.2 MIXED DYSLIPIDEMIA: ICD-10-CM

## 2018-05-03 LAB
ALBUMIN SERPL-MCNC: 4.4 G/DL (ref 3.6–4.8)
ALBUMIN/GLOB SERPL: 1.6 {RATIO} (ref 1.2–2.2)
ALP SERPL-CCNC: 54 IU/L (ref 39–117)
ALT SERPL-CCNC: 20 IU/L (ref 0–44)
AST SERPL-CCNC: 22 IU/L (ref 0–40)
BASOPHILS # BLD AUTO: 0 X10E3/UL (ref 0–0.2)
BASOPHILS NFR BLD AUTO: 0 %
BILIRUB SERPL-MCNC: 0.4 MG/DL (ref 0–1.2)
BUN SERPL-MCNC: 15 MG/DL (ref 8–27)
BUN/CREAT SERPL: 12 (ref 10–24)
CALCIUM SERPL-MCNC: 10.3 MG/DL (ref 8.6–10.2)
CHLORIDE SERPL-SCNC: 105 MMOL/L (ref 96–106)
CO2 SERPL-SCNC: 22 MMOL/L (ref 18–29)
CREAT SERPL-MCNC: 1.29 MG/DL (ref 0.76–1.27)
EOSINOPHIL # BLD AUTO: 0.1 X10E3/UL (ref 0–0.4)
EOSINOPHIL NFR BLD AUTO: 1 %
ERYTHROCYTE [DISTWIDTH] IN BLOOD BY AUTOMATED COUNT: 14.5 % (ref 12.3–15.4)
GFR SERPLBLD CREATININE-BSD FMLA CKD-EPI: 57 ML/MIN/1.73
GFR SERPLBLD CREATININE-BSD FMLA CKD-EPI: 65 ML/MIN/1.73
GLOBULIN SER CALC-MCNC: 2.8 G/DL (ref 1.5–4.5)
GLUCOSE SERPL-MCNC: 168 MG/DL (ref 65–99)
HCT VFR BLD AUTO: 39.5 % (ref 37.5–51)
HGB BLD-MCNC: 12.5 G/DL (ref 13–17.7)
IMM GRANULOCYTES # BLD: 0 X10E3/UL (ref 0–0.1)
IMM GRANULOCYTES NFR BLD: 0 %
INTERPRETATION: NORMAL
LYMPHOCYTES # BLD AUTO: 1.7 X10E3/UL (ref 0.7–3.1)
LYMPHOCYTES NFR BLD AUTO: 27 %
MCH RBC QN AUTO: 25.9 PG (ref 26.6–33)
MCHC RBC AUTO-ENTMCNC: 31.6 G/DL (ref 31.5–35.7)
MCV RBC AUTO: 82 FL (ref 79–97)
MONOCYTES # BLD AUTO: 0.4 X10E3/UL (ref 0.1–0.9)
MONOCYTES NFR BLD AUTO: 7 %
NEUTROPHILS # BLD AUTO: 4.1 X10E3/UL (ref 1.4–7)
NEUTROPHILS NFR BLD AUTO: 65 %
PLATELET # BLD AUTO: 315 X10E3/UL (ref 150–379)
POTASSIUM SERPL-SCNC: 4.8 MMOL/L (ref 3.5–5.2)
PROT SERPL-MCNC: 7.2 G/DL (ref 6–8.5)
RBC # BLD AUTO: 4.82 X10E6/UL (ref 4.14–5.8)
SODIUM SERPL-SCNC: 144 MMOL/L (ref 134–144)
WBC # BLD AUTO: 6.3 X10E3/UL (ref 3.4–10.8)

## 2018-05-03 NOTE — PROGRESS NOTES
Dameon Garcia     1950       Mary Love MD, [facility]  Date of Visit-5/3/2018   PCP is Fidencio Parson MD   [facility] [place]  [facility]  HPI:  Dameon Garcia is a 75 y.o. male   Follow up of CAD, chart indicates no recent hospital stays. Reviewed annual medicare wellness visit with Fidencio Parson MD     Overall the pt states he is doing well. He reports that he is here for preop prior to ear surgery. The pt states that he has a hole in his ear drum that is going to be fixed. Denies chest pain, edema, syncope or shortness of breath at rest, has no tachycardia, palpitations or sense of arrhythmia. EKG: NSR WNL at a rate of 70    Assessment/Plan:     1. CAD stable, no angina, PRN nitro has not needed nitro. Continue metoprolol, aspirin and statin. 2. Hypertension at goal, excellent control today. Continue amlodipine. BP Readings from Last 3 Encounters:   05/03/18 110/60   05/02/18 128/76   04/17/18 122/80        3. Lipids on high potency statin as appropriate for secondary prevention. Lab Results   Component Value Date/Time    LDL, calculated 57 02/13/2018 10:10 AM        4. He is having ear surgery on the ear drum with Dr. Sriram Barth I have no objection to this. I have no objection to the planned  surgery. Patient seems to be at a low risk for steven-operative severe adverse cardiac events. Impression:   1. Atherosclerosis of native coronary artery of native heart without angina pectoris    2. Essential hypertension, benign    3. Mixed dyslipidemia    4. Pre-operative cardiovascular examination    5. S/P coronary artery stent placement       Cardiac History:   PCI 7- prox 75% LAD, D1 50% EF 60%; NAN 3 x 15 mm Xience to LAD  Stress echo 5-28-13=  Walked 3 minutes, Exercise stress echo is normal.      ROS-except as noted above. . A complete cardiac and respiratory are reviewed and negative except as above ; Resp-denies wheezing  or productive cough,. Const- No unusual weight loss or fever; Neuro-no recent seizure or CVA ; GI- No BRBPR, abdom pain, bloating ; - no  hematuria   see supplement sheet, initialed and to be scanned by staff  Past Medical History:   Diagnosis Date    Arthritis     KNEES & BACK. 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 He has quit using smokeless tobacco. He reports that he drinks about 1.2 oz of alcohol per week  He reports that he does not use illicit drugs. Exam and Labs:  /60 (BP 1 Location: Left arm, BP Patient Position: Sitting)  Pulse 70  Resp 16  Ht 5' 10\" (1.778 m)  Wt 200 lb 3.2 oz (90.8 kg)  SpO2 98%  BMI 28.73 kg/o8Iowslhlvhonvim:  NAD, comfortable  Head: NC,AT. Eyes: No scleral icterus. Neck:  Neck supple. No JVD present. Throat: moist mucous membranes. Chest: Effort normal & normal respiratory excursion . Neurological: alert, conversant and oriented . Skin: Skin is not cold. No obvious systemic rash noted. Not diaphoretic. No erythema. Psychiatric:  Grossly normal mood and affect. Behavior appears normal. Extremities:  no clubbing or cyanosis. Abdomen: non distended    Lungs:breath sounds normal. No stridor. distress, wheezes or  Rales. Heart: normal rate, regular rhythm, normal S1, S2, no murmurs, rubs, clicks or gallops , PMI non displaced. Edema: Edema is none.   Lab Results   Component Value Date/Time    Cholesterol, total 126 02/13/2018 10:10 AM    HDL Cholesterol 41 02/13/2018 10:10 AM    LDL, calculated 57 02/13/2018 10:10 AM    Triglyceride 139 02/13/2018 10:10 AM    CHOL/HDL Ratio 5.8 (H) 07/30/2011 04:31 AM     Lab Results   Component Value Date/Time    Sodium 144 05/02/2018 02:13 PM    Potassium 4.8 05/02/2018 02:13 PM    Chloride 105 05/02/2018 02:13 PM    CO2 22 05/02/2018 02:13 PM    Anion gap 4 (L) 08/02/2017 05:36 AM    Glucose 168 (H) 05/02/2018 02:13 PM    BUN 15 05/02/2018 02:13 PM    Creatinine 1.29 (H) 05/02/2018 02:13 PM    BUN/Creatinine ratio 12 05/02/2018 02:13 PM    GFR est AA 65 05/02/2018 02:13 PM    GFR est non-AA 57 (L) 05/02/2018 02:13 PM    Calcium 10.3 (H) 05/02/2018 02:13 PM      Wt Readings from Last 3 Encounters:   05/03/18 200 lb 3.2 oz (90.8 kg)   05/02/18 199 lb (90.3 kg)   04/17/18 196 lb (88.9 kg)      BP Readings from Last 3 Encounters:   05/03/18 110/60   05/02/18 128/76   04/17/18 122/80      Current Outpatient Prescriptions   Medication Sig    fenofibrate (LOFIBRA) 160 mg tablet TAKE 1 TABLET BY MOUTH ONCE DAILY    metFORMIN (GLUCOPHAGE) 850 mg tablet Take 1 Tab by mouth two (2) times daily (with meals).  tamsulosin (FLOMAX) 0.4 mg capsule TAKE ONE CAPSULE BY MOUTH ONCE DAILY    glucose blood VI test strips (ACCU-CHEK RED PLUS TEST STRP) strip One strip daily to check blood sugar    aspirin delayed-release 81 mg tablet Take 162 mg by mouth daily.  acetaminophen (TYLENOL) 500 mg tablet Take 2 Tabs by mouth every six (6) hours.  atorvastatin (LIPITOR) 10 mg tablet Take 10 mg by mouth nightly.  esomeprazole (NEXIUM) 20 mg capsule Take 20 mg by mouth daily as needed.  metoprolol succinate (TOPROL-XL) 50 mg XL tablet TAKE ONE TABLET BY MOUTH ONCE DAILY    amLODIPine (NORVASC) 5 mg tablet TAKE ONE TABLET BY MOUTH ONCE DAILY    nitroglycerin (NITROSTAT) 0.4 mg SL tablet DISSOLVE ONE TABLET UNDER THE TONGUE EVERY 5 MINUTES AS NEEDED FOR CHEST PAIN.  UP TO 3 DOSES     No current facility-administered medications for this visit. Impression see above.       Written by Cherelle Villeda, as dictated by Theodore Cheema MD.

## 2018-05-03 NOTE — PROGRESS NOTES
Outbound call to patient.  verified. reviewed recent labs with patient. He verbalized understandting.  notified that form for surgery was faxed today with confirmation

## 2018-05-03 NOTE — MR AVS SNAPSHOT
[address] [facility] 
[phone] Patient: Donovan Rucker MRN: [id number] FNI:[id number] Visit Information Date & Time Provider Department Dept. Phone Encounter #  
 5/3/2018 10:00 AM Author MD Francisco [facility] [phone] [id number] Your Appointments 6/21/2018  8:00 AM  
ROUTINE CARE with Ismael Márquez MD  
[facility]) Appt Note: 4 months fasting f/u appt  
 [address] [phone]  
[phone]  
  
   
 [address] [phone] Upcoming Health Maintenance Date Due DTaP/Tdap/Td series (1 - Tdap) 5/30/1970 Influenza Age 5 to Adult 8/1/2018 HEMOGLOBIN A1C Q6M 8/13/2018 Pneumococcal 65+ Low/Medium Risk (2 of 2 - PPSV23) 10/10/2018 EYE EXAM RETINAL OR DILATED Q1 2/9/2019 FOOT EXAM Q1 2/13/2019 MICROALBUMIN Q1 2/13/2019 LIPID PANEL Q1 2/13/2019 MEDICARE YEARLY EXAM 2/14/2019 GLAUCOMA SCREENING Q2Y 2/9/2020 COLONOSCOPY 4/17/2028 Allergies as of 5/3/2018  Review Complete On: 5/2/2018 By: Ismael Márquez MD  
  
 Severity Noted Reaction Type Reactions Levofloxacin Medium 11/03/2015   Systemic Rash Pcn [Penicillins] Medium 06/23/2011   Systemic Rash Tape [Adhesive]  10/27/2016   Intolerance Rash Medipore tape caused large blisters when removed. Current Immunizations  Reviewed on 2/13/2018 Name Date Influenza High Dose Vaccine PF 10/10/2017 Influenza Vaccine 12/3/2016, 10/30/2015 Pneumococcal Conjugate (PCV-13) 10/10/2017 Not reviewed this visit You Were Diagnosed With   
  
 Codes Comments Atherosclerosis of native coronary artery of native heart without angina pectoris    -  Primary ICD-10-CM: I25.10 ICD-9-CM: 414.01 Essential hypertension, benign     ICD-10-CM: I10 
ICD-9-CM: 401.1 Mixed dyslipidemia     ICD-10-CM: E78.2 ICD-9-CM: 272.2 S/P coronary artery stent placement     ICD-10-CM: Z95.5 ICD-9-CM: V45.82 Vitals BP Pulse Resp Height(growth percentile) Weight(growth percentile) SpO2  
 110/60 (BP 1 Location: Left arm, BP Patient Position: Sitting) 70 16 5' 10\" (1.778 m) 200 lb 3.2 oz (90.8 kg) 98% BMI Smoking Status 28.73 kg/m2 Never Smoker Vitals History BMI and BSA Data Body Mass Index Body Surface Area 28.73 kg/m 2 2.12 m 2 Preferred Pharmacy Pharmacy Name Phone [address] [phone] Your Updated Medication List  
  
   
This list is accurate as of 5/3/18 10:56 AM.  Always use your most recent med list.  
  
  
  
  
 acetaminophen 500 mg tablet Commonly known as:  TYLENOL Take 2 Tabs by mouth every six (6) hours. amLODIPine 5 mg tablet Commonly known as:  Rica Matar TAKE ONE TABLET BY MOUTH ONCE DAILY  
  
 aspirin delayed-release 81 mg tablet Take 162 mg by mouth daily. atorvastatin 10 mg tablet Commonly known as:  LIPITOR Take 10 mg by mouth nightly. fenofibrate 160 mg tablet Commonly known as:  LOFIBRA TAKE 1 TABLET BY MOUTH ONCE DAILY  
  
 glucose blood VI test strips strip Commonly known as:  ACCU-CHEK RED PLUS TEST STRP One strip daily to check blood sugar  
  
 metFORMIN 850 mg tablet Commonly known as:  GLUCOPHAGE Take 1 Tab by mouth two (2) times daily (with meals). metoprolol succinate 50 mg XL tablet Commonly known as:  TOPROL-XL  
TAKE ONE TABLET BY MOUTH ONCE DAILY NexIUM 20 mg capsule Generic drug:  esomeprazole Take 20 mg by mouth daily as needed. nitroglycerin 0.4 mg SL tablet Commonly known as:  NITROSTAT  
DISSOLVE ONE TABLET UNDER THE TONGUE EVERY 5 MINUTES AS NEEDED FOR CHEST PAIN. UP TO 3 DOSES  
  
 tamsulosin 0.4 mg capsule Commonly known as:  FLOMAX TAKE ONE CAPSULE BY MOUTH ONCE DAILY We Performed the Following AMB POC EKG ROUTINE W/ 12 LEADS, INTER & REP [56861 CPT(R)] Patient Instructions You will need to follow up in clinic with Dr. Kishan Childs in 1 year. Introducing [facility] SERVICES! Eunice Nicholas introduces CURRENT patient portal. Now you can access parts of your medical record, email your doctor's office, and request medication refills online. 1. In your internet browser, go to [email or web address] [facility] 2. Click on the First Time User? Click Here link in the Sign In box. You will see the New Member Sign Up page. 3. Enter your CURRENT Access Code exactly as it appears below. You will not need to use this code after youve completed the sign-up process. If you do not sign up before the expiration date, you must request a new code. · CURRENT Access Code: [email or web address] Expires: 5/14/2018 10:39 AM 
 
4. Enter the last four digits of your Social Security Number (xxxx) and Date of Birth (mm/dd/yyyy) as indicated and click Submit. You will be taken to the next sign-up page. 5. Create a CURRENT ID. This will be your CURRENT login ID and cannot be changed, so think of one that is secure and easy to remember. 6. Create a CURRENT password. You can change your password at any time. 7. Enter your Password Reset Question and Answer. This can be used at a later time if you forget your password. 8. Enter your e-mail address. You will receive e-mail notification when new information is available in [address]. 9. Click Sign Up. You can now view and download portions of your medical record. 10. Click the Download Summary menu link to download a portable copy of your medical information. If you have questions, please visit the Frequently Asked Questions section of the CURRENT website. Remember, CURRENT is NOT to be used for urgent needs. For medical emergencies, dial 911. Now available from your iPhone and Android! Please provide this summary of care documentation to your next provider. Your primary care clinician is listed as Bart Sheth. If you have any questions after today's visit, please call [phone].

## 2018-05-03 NOTE — PROGRESS NOTES
Please inform patient  CBC shows low hgb but improving now since his hospitalization  Kidney function still abnormal but stable , needs to stay well hydrated  Will complete form once we get EKG report from cardiology  thanks

## 2018-05-04 RX ORDER — AMLODIPINE BESYLATE 5 MG/1
TABLET ORAL
Qty: 90 TAB | Refills: 1 | Status: SHIPPED | OUTPATIENT
Start: 2018-05-04 | End: 2018-11-03 | Stop reason: SDUPTHER

## 2018-05-04 NOTE — TELEPHONE ENCOUNTER
Request for amlodipine 5 mg, daily. Last office visit 5/3/18,   Next office visit 5/14/19.      Refills per verbal order from Dr. Dank Do.

## 2018-06-21 ENCOUNTER — OFFICE VISIT (OUTPATIENT)
Dept: FAMILY MEDICINE CLINIC | Age: 68
End: 2018-06-21

## 2018-06-21 ENCOUNTER — HOSPITAL ENCOUNTER (OUTPATIENT)
Dept: LAB | Age: 68
Discharge: HOME | End: 2018-06-21
Payer: MEDICARE

## 2018-06-21 VITALS
BODY MASS INDEX: 28.35 KG/M2 | DIASTOLIC BLOOD PRESSURE: 72 MMHG | HEART RATE: 66 BPM | RESPIRATION RATE: 16 BRPM | HEIGHT: 70 IN | TEMPERATURE: 98.1 F | SYSTOLIC BLOOD PRESSURE: 120 MMHG | WEIGHT: 198 LBS | OXYGEN SATURATION: 98 %

## 2018-06-21 DIAGNOSIS — E78.2 MIXED DYSLIPIDEMIA: ICD-10-CM

## 2018-06-21 DIAGNOSIS — I10 ESSENTIAL HYPERTENSION, BENIGN: ICD-10-CM

## 2018-06-21 DIAGNOSIS — F51.01 PRIMARY INSOMNIA: ICD-10-CM

## 2018-06-21 DIAGNOSIS — E11.9 TYPE 2 DIABETES MELLITUS WITHOUT COMPLICATION, WITHOUT LONG-TERM CURRENT USE OF INSULIN (HCC): Primary | ICD-10-CM

## 2018-06-21 DIAGNOSIS — E53.8 VITAMIN B12 DEFICIENCY: ICD-10-CM

## 2018-06-21 DIAGNOSIS — I25.10 ATHEROSCLEROSIS OF NATIVE CORONARY ARTERY OF NATIVE HEART WITHOUT ANGINA PECTORIS: ICD-10-CM

## 2018-06-21 DIAGNOSIS — J32.4 CHRONIC PANSINUSITIS: ICD-10-CM

## 2018-06-21 PROCEDURE — 83036 HEMOGLOBIN GLYCOSYLATED A1C: CPT

## 2018-06-21 PROCEDURE — 36415 COLL VENOUS BLD VENIPUNCTURE: CPT

## 2018-06-21 PROCEDURE — 80053 COMPREHEN METABOLIC PANEL: CPT

## 2018-06-21 PROCEDURE — 80061 LIPID PANEL: CPT

## 2018-06-21 PROCEDURE — 82607 VITAMIN B-12: CPT

## 2018-06-21 RX ORDER — CEFDINIR 300 MG/1
300 CAPSULE ORAL 2 TIMES DAILY
Qty: 20 CAP | Refills: 0 | Status: SHIPPED | OUTPATIENT
Start: 2018-06-21 | End: 2018-07-01

## 2018-06-21 RX ORDER — ACETAMINOPHEN 325 MG/1
650 TABLET ORAL
COMMUNITY
End: 2018-10-31 | Stop reason: SDUPTHER

## 2018-06-21 RX ORDER — ZOLPIDEM TARTRATE 5 MG/1
5 TABLET ORAL
Qty: 30 TAB | Refills: 0 | Status: SHIPPED | OUTPATIENT
Start: 2018-06-21 | End: 2018-10-31 | Stop reason: SDUPTHER

## 2018-06-21 NOTE — PROGRESS NOTES
HISTORY OF PRESENT ILLNESS  Amy Morales is a 70 y.o. male. He was seen for 4 months follow up on diabetes, HTN, CAD, dyslipidemia  He had Myringoplasty in 05/2018, doing well. But still very congested . H/o chronic sinusitis and is on several inhalers. HPI  Cardiovascular Review  The patient has hypertension, hyperlipidemia, coronary artery disease and status post coronary artery stenting. He reports taking medications as instructed, no medication side effects noted, home BP monitoring in range of 381'D systolic over 40'M diastolic, no chest pain on exertion, no dyspnea on exertion, no swelling of ankles, no orthostatic dizziness or lightheadedness, no orthopnea or paroxysmal nocturnal dyspnea, no palpitations, no muscle aches or pain. Diet and Lifestyle: generally follows a low fat low cholesterol diet, generally follows a low sodium diet, exercises sporadically, chews tobacco.  Lab review: labs reviewed and discussed with patient. He follows Dr Luann Frank   Cardiac History:   PCI 7- prox 75% LAD, D1 50% EF 60%; NAN 3 x 15 mm Xience to LAD  Stress echo 5-28-13=  Walked 3 minutes, Exercise stress echo is normal.    Medicines:  mg, Amlodipine 5 mg, Metoprolol XL 50 mg, Lofibra 160 mg,and Lipitor 10 mg  Lab Results   Component Value Date/Time    Cholesterol, total 126 02/13/2018 10:10 AM    HDL Cholesterol 41 02/13/2018 10:10 AM    LDL, calculated 57 02/13/2018 10:10 AM    VLDL, calculated 28 02/13/2018 10:10 AM    Triglyceride 139 02/13/2018 10:10 AM    CHOL/HDL Ratio 5.8 (H) 07/30/2011 04:31 AM         Endocrine Review  He is seen for diabetes. Since last visit he reports: no polyuria or polydipsia, no significant changes. Home glucose monitoring: is performed regularly, fasting values range 100-120  He is checking his sugars one per day. He reports medication compliance: compliant all of the time. Medication side effects: none. Diabetic diet compliance: compliant most of the time.   Lab review: labs reviewed and discussed with patient  On Metformin 850 mg bid. Had low Vitamin B12 on last visit    Review of Systems   Constitutional: Negative for chills, fever and malaise/fatigue. HENT: Positive for congestion. Negative for ear pain, sore throat and tinnitus. Eyes: Negative for blurred vision, double vision, pain and discharge. Respiratory: Negative for cough, shortness of breath and wheezing. Cardiovascular: Negative for chest pain, palpitations and leg swelling. Gastrointestinal: Negative for abdominal pain, blood in stool, constipation, diarrhea, nausea and vomiting. Genitourinary: Negative for dysuria, frequency, hematuria and urgency. Musculoskeletal: Positive for back pain. Negative for joint pain and myalgias. Skin: Negative for rash. Neurological: Negative for dizziness, tremors, seizures and headaches. Endo/Heme/Allergies: Negative for polydipsia. Does not bruise/bleed easily. Psychiatric/Behavioral: Negative for depression and substance abuse. The patient has insomnia. The patient is not nervous/anxious. Physical Exam   Constitutional: He is oriented to person, place, and time. He appears well-developed and well-nourished. HENT:   Head: Normocephalic and atraumatic. Right Ear: External ear normal.   Mouth/Throat: Oropharynx is clear and moist. No oropharyngeal exudate. Left TM shows no perforation, good graft healing   Eyes: Conjunctivae and EOM are normal. Pupils are equal, round, and reactive to light. No scleral icterus. Neck: Normal range of motion. Neck supple. No JVD present. No thyromegaly present. Cardiovascular: Normal rate, regular rhythm, normal heart sounds and intact distal pulses. No murmur heard. Pulmonary/Chest: Effort normal and breath sounds normal. He has no wheezes. Abdominal: Soft. Bowel sounds are normal. He exhibits no distension and no mass. Musculoskeletal: Normal range of motion.  He exhibits no edema or tenderness. Feet:warm, good capillary refill, no trophic changes or ulcerative lesions, normal DP and PT pulses, normal monofilament exam and normal sensory exam     Lymphadenopathy:     He has no cervical adenopathy. Neurological: He is alert and oriented to person, place, and time. He has normal reflexes. No cranial nerve deficit. Skin: Skin is warm and dry. No rash noted. He is not diaphoretic. Psychiatric: He has a normal mood and affect. Nursing note and vitals reviewed. ASSESSMENT and PLAN  Diagnoses and all orders for this visit:    1. Type 2 diabetes mellitus without complication, without long-term current use of insulin (HCC)  -     METABOLIC PANEL, COMPREHENSIVE  -     HEMOGLOBIN A1C WITH EAG    2. Essential hypertension, benign  -     METABOLIC PANEL, COMPREHENSIVE    3. Atherosclerosis of native coronary artery of native heart without angina pectoris  -     METABOLIC PANEL, COMPREHENSIVE  -     LIPID PANEL    4. Mixed dyslipidemia  -     METABOLIC PANEL, COMPREHENSIVE  -     LIPID PANEL    5. Vitamin B12 deficiency  -     VITAMIN B12 & FOLATE    6. Primary insomnia  -     zolpidem (AMBIEN) 5 mg tablet; Take 1 Tab by mouth nightly as needed for Sleep. Max Daily Amount: 5 mg.    7. Chronic pansinusitis  -     cefdinir (OMNICEF) 300 mg capsule; Take 1 Cap by mouth two (2) times a day for 10 days. Discussed lifestyle issues and health guidance given  Patient was given an after visit summary which includes diagnoses, vital signs, current medications, instructions and references & authorized prescriptions . Results of labs will be conveyed to patient, once available. Pt verbalized instructions I provided and expressed understanding of discussion that was held today. Follow-up Disposition:  Return in about 4 months (around 10/21/2018) for fasting, follow up, DM, HTN, CHO.

## 2018-06-21 NOTE — MR AVS SNAPSHOT
[facility] [place] 
 
 
 [address] [facility] [address] [place] [address] 
[phone] Patient: Sasha Sanchez MRN: [id number] CWP:[id number] Visit Information Date & Time Provider Department Dept. Phone Encounter #  
 6/21/2018  8:00 AM Dionisio Poon, 403 [facility] [phone] [id number] Follow-up Instructions Return in about 4 months (around 10/21/2018) for fasting, follow up, DM, HTN, CHO. Your Appointments 5/14/2019  9:40 AM  
ESTABLISHED PATIENT with Isra Fox MD  
[facility] ([facility] SCHEDULING) Appt Note: 1 yr f/u  
 [address] [place] [address]  
[address] [address] Upcoming Health Maintenance Date Due DTaP/Tdap/Td series (1 - Tdap) 5/30/1970 Influenza Age 5 to Adult 8/1/2018 HEMOGLOBIN A1C Q6M 8/13/2018 Pneumococcal 65+ Low/Medium Risk (2 of 2 - PPSV23) 10/10/2018 EYE EXAM RETINAL OR DILATED Q1 2/9/2019 FOOT EXAM Q1 2/13/2019 MICROALBUMIN Q1 2/13/2019 LIPID PANEL Q1 2/13/2019 MEDICARE YEARLY EXAM 2/14/2019 GLAUCOMA SCREENING Q2Y 2/9/2020 COLONOSCOPY 4/17/2028 Allergies as of 6/21/2018  Review Complete On: 6/21/2018 By: Dionisio Poon MD  
  
 Severity Noted Reaction Type Reactions Levofloxacin Medium 11/03/2015   Systemic Rash Pcn [Penicillins] Medium 06/23/2011   Systemic Rash Tape [Adhesive]  10/27/2016   Intolerance Rash Medipore tape caused large blisters when removed. Current Immunizations  Reviewed on 2/13/2018 Name Date Influenza High Dose Vaccine PF 10/10/2017 Influenza Vaccine 12/3/2016, 10/30/2015 Pneumococcal Conjugate (PCV-13) 10/10/2017 Not reviewed this visit You Were Diagnosed With   
  
 Codes Comments  Type 2 diabetes mellitus without complication, without long-term current use of insulin ([facility]    -  Primary ICD-10-CM: E11.9 ICD-9-CM: 250.00 Essential hypertension, benign     ICD-10-CM: I10 
ICD-9-CM: 401.1 Atherosclerosis of native coronary artery of native heart without angina pectoris     ICD-10-CM: I25.10 ICD-9-CM: 414.01 Mixed dyslipidemia     ICD-10-CM: E78.2 ICD-9-CM: 272.2 Vitamin B12 deficiency     ICD-10-CM: E53.8 ICD-9-CM: 266.2 Primary insomnia     ICD-10-CM: F51.01 
ICD-9-CM: 307.42 Chronic pansinusitis     ICD-10-CM: J32.4 ICD-9-CM: 473.8 Vitals BP Pulse Temp Resp Height(growth percentile) Weight(growth percentile) 120/72 (BP 1 Location: Right arm, BP Patient Position: Sitting) 66 98.1 °F (36.7 °C) (Oral) 16 5' 10\" (1.778 m) 198 lb (89.8 kg) SpO2 BMI Smoking Status 98% 28.41 kg/m2 Never Smoker Vitals History BMI and BSA Data Body Mass Index Body Surface Area  
 28.41 kg/m 2 2.11 m 2 Preferred Pharmacy Pharmacy Name Phone Rolanda Avina [address] [place] [address] [phone] Your Updated Medication List  
  
   
This list is accurate as of 6/21/18  8:42 AM.  Always use your most recent med list.  
  
  
  
  
 * acetaminophen 325 mg tablet Commonly known as:  TYLENOL Take 650 mg by mouth. * acetaminophen 500 mg tablet Commonly known as:  TYLENOL Take 2 Tabs by mouth every six (6) hours. amLODIPine 5 mg tablet Commonly known as:  Lindsey Chimes TAKE ONE TABLET BY MOUTH ONCE DAILY  
  
 aspirin delayed-release 81 mg tablet Take 162 mg by mouth daily. atorvastatin 10 mg tablet Commonly known as:  LIPITOR Take 10 mg by mouth nightly. cefdinir 300 mg capsule Commonly known as:  OMNICEF Take 1 Cap by mouth two (2) times a day for 10 days. fenofibrate 160 mg tablet Commonly known as:  LOFIBRA TAKE 1 TABLET BY MOUTH ONCE DAILY  
  
 glucose blood VI test strips strip Commonly known as:  ACCU-CHEK RED PLUS TEST STRP  
 One strip daily to check blood sugar  
  
 metFORMIN 850 mg tablet Commonly known as:  GLUCOPHAGE Take 1 Tab by mouth two (2) times daily (with meals). metoprolol succinate 50 mg XL tablet Commonly known as:  TOPROL-XL  
TAKE ONE TABLET BY MOUTH ONCE DAILY NexIUM 20 mg capsule Generic drug:  esomeprazole Take 20 mg by mouth daily as needed. nitroglycerin 0.4 mg SL tablet Commonly known as:  NITROSTAT  
DISSOLVE ONE TABLET UNDER THE TONGUE EVERY 5 MINUTES AS NEEDED FOR CHEST PAIN. UP TO 3 DOSES  
  
 tamsulosin 0.4 mg capsule Commonly known as:  FLOMAX TAKE ONE CAPSULE BY MOUTH ONCE DAILY  
  
 zolpidem 5 mg tablet Commonly known as:  AMBIEN Take 1 Tab by mouth nightly as needed for Sleep. Max Daily Amount: 5 mg.  
  
 * Notice: This list has 2 medication(s) that are the same as other medications prescribed for you. Read the directions carefully, and ask your doctor or other care provider to review them with you. Prescriptions Printed Refills  
 zolpidem (AMBIEN) 5 mg tablet 0 Sig: Take 1 Tab by mouth nightly as needed for Sleep. Max Daily Amount: 5 mg. Class: Print Route: Oral  
  
Prescriptions Sent to Pharmacy Refills  
 cefdinir (OMNICEF) 300 mg capsule 0 Sig: Take 1 Cap by mouth two (2) times a day for 10 days. Class: Normal  
 Pharmacy: [facility] [address] #: [phone] Route: Oral  
  
We Performed the Following HEMOGLOBIN A1C WITH EAG [25633 CPT(R)] LIPID PANEL [44910 CPT(R)] METABOLIC PANEL, COMPREHENSIVE [59265 CPT(R)] VITAMIN B12 & FOLATE [17287 CPT(R)] Follow-up Instructions Return in about 4 months (around 10/21/2018) for fasting, follow up, DM, HTN, CHO. Patient Instructions Learning About Type 2 Diabetes What is type 2 diabetes?  
 
Insulin is a hormone that helps your body use sugar from your food as energy. Type 2 diabetes happens when your body can't use insulin the right way. Over time, the pancreas can't make enough insulin. If you don't have enough insulin, too much sugar stays in your blood. If you are overweight, get little or no exercise, or have type 2 diabetes in your family, you are more likely to have problems with the way insulin works in your body.  Americans, Hispanics, Native Americans,  Americans, and Pacific Islanders have a higher risk for type 2 diabetes. Type 2 diabetes can be prevented or delayed with a healthy lifestyle, which includes staying at a healthy weight, making smart food choices, and getting regular exercise. What can you expect with type 2 diabetes? Christen Timmonsjacqui keep hearing about how important it is to keep your blood sugar within a target range. That's because over time, high blood sugar can lead to serious problems. It can: 
· Harm your eyes, nerves, and kidneys. · Damage your blood vessels, leading to heart disease and stroke. · Reduce blood flow and cause nerve damage to parts of your body, especially your feet. This can cause slow healing and pain when you walk. · Make your immune system weak and less able to fight infections. When people hear the word \"diabetes,\" they often think of problems like these. But daily care and treatment can help prevent or delay these problems. The goal is to keep your blood sugar in a target range. That's the best way to reduce your chance of having more problems from diabetes. What are the symptoms? Some people who have type 2 diabetes may not have any symptoms early on. Many people with the disease don't even know they have it at first. But with time, diabetes starts to cause symptoms. You experience most symptoms of type 2 diabetes when your blood sugar is either too high or too low. The most common symptoms of high blood sugar include: · Thirst. 
· Frequent urination. · Weight loss. · Blurry vision. The symptoms of low blood sugar include: · Sweating. · Shakiness. · Weakness. · Hunger. · Confusion. How can you prevent type 2 diabetes? The best way to prevent or delay type 2 diabetes is to adopt healthy habits, which include: 
· Staying at a healthy weight. · Exercising regularly. · Eating healthy foods. How is type 2 diabetes treated? If you have type 2 diabetes, here are the most important things you can do. · Take your diabetes medicines. · Check your blood sugar as often as your doctor recommends. Also, get a hemoglobin A1c test at least every 6 months. · Try to eat a variety of foods and to spread carbohydrate throughout the day. Carbohydrate raises blood sugar higher and more quickly than any other nutrient does. Carbohydrate is found in sugar, breads and cereals, fruit, starchy vegetables such as potatoes and corn, and milk and yogurt. · Get at least 30 minutes of exercise on most days of the week. Walking is a good choice. You also may want to do other activities, such as running, swimming, cycling, or playing tennis or team sports. If your doctor says it's okay, do muscle-strengthening exercises at least 2 times a week. · See your doctor for checkups and tests on a regular schedule. · If you have high blood pressure or high cholesterol, take the medicines as prescribed by your doctor. · Do not smoke. Smoking can make health problems worse. This includes problems you might have with type 2 diabetes. If you need help quitting, talk to your doctor about stop-smoking programs and medicines. These can increase your chances of quitting for good. Follow-up care is a key part of your treatment and safety. Be sure to make and go to all appointments, and call your doctor if you are having problems. It's also a good idea to know your test results and keep a list of the medicines you take. Where can you learn more? Go to http://araseli-lv.info/. Enter V705 in the search box to learn more about \"Learning About Type 2 Diabetes. \" Current as of: March 13, 2017 Content Version: 11.4 © 6083-2636 Healthwise, Incorporated. Care instructions adapted under license by [employer] (which disclaims liability or warranty for this information). If you have questions about a medical condition or this instruction, always ask your healthcare professional. [address] any warranty or liability for your use of this information. Introducing [facility]! [facility] introduces [employer] patient portal. Now you can access parts of your medical record, email your doctor's office, and request medication refills online. 1. In your internet browser, go to [email or web address] 2. Click on the First Time User? Click Here link in the Sign In box. You will see the New Member Sign Up page. 3. Enter your [employer] Access Code exactly as it appears below. You will not need to use this code after youve completed the sign-up process. If you do not sign up before the expiration date, you must request a new code. · Sw[employer] Access Code: [email or web address] Expires: 9/19/2018  8:42 AM 
 
4. Enter the last four digits of your Social Security Number (xxxx) and Date of Birth (mm/dd/yyyy) as indicated and click Submit. You will be taken to the next sign-up page. 5. Create a Sw[employer]bucks ID. This will be your Sw[employer]bucks login ID and cannot be changed, so think of one that is secure and easy to remember. 6. Create a [employer] password. You can change your password at any time. 7. Enter your Password Reset Question and Answer. This can be used at a later time if you forget your password. 8. Enter your e-mail address. You will receive e-mail notification when new information is available in [address] 9. Click Sign Up. You can now view and download portions of your medical record. 10. Click the Download Summary menu link to download a portable copy of your medical information. If you have questions, please visit the Frequently Asked Questions section of the [employer] website. Remember, [employer] is NOT to be used for urgent needs. For medical emergencies, dial 911. Now available from your iPhone and Android! Please provide this summary of care documentation to your next provider. Your primary care clinician is listed as Virginia Motta. If you have any questions after today's visit, please call [phone].

## 2018-06-21 NOTE — LETTER
6/22/2018 4:31 PM 
 
Mr. Sasha Sanchez 
[address] [facility] [place] [id number] Dear Sasha Sanchez: Please find your most recent results below. Resulted Orders METABOLIC PANEL, COMPREHENSIVE Result Value Ref Range Glucose 149 (H) 65 - 99 mg/dL BUN 19 8 - 27 mg/dL Creatinine 1.31 (H) 0.76 - 1.27 mg/dL GFR est non-AA 55 (L) >59 mL/min/1.73 GFR est AA 64 >59 mL/min/1.73  
 BUN/Creatinine ratio 15 10 - 24 Sodium 138 134 - 144 mmol/L Potassium 5.1 3.5 - 5.2 mmol/L Chloride 103 96 - 106 mmol/L  
 CO2 19 (L) 20 - 29 mmol/L Comment: **Please note reference interval change** Calcium 9.7 8.6 - 10.2 mg/dL Protein, total 7.1 6.0 - 8.5 g/dL Albumin 4.4 3.6 - 4.8 g/dL GLOBULIN, TOTAL 2.7 1.5 - 4.5 g/dL A-G Ratio 1.6 1.2 - 2.2 Bilirubin, total 0.4 0.0 - 1.2 mg/dL Alk. phosphatase 55 39 - 117 IU/L  
 AST (SGOT) 19 0 - 40 IU/L  
 ALT (SGPT) 21 0 - 44 IU/L Narrative Performed at:  [address]  [id number] : Jesus Manuel Beckett MD, Phone:  [phone] HEMOGLOBIN A1C WITH EAG Result Value Ref Range Hemoglobin A1c 6.8 (H) 4.8 - 5.6 % Comment:  
            Pre-diabetes: 5.7 - 6.4 Diabetes: >6.4 Glycemic control for adults with diabetes: <7.0 Estimated average glucose 148 mg/dL Narrative Performed at:  [address]  [id number] : Jesus Manuel Beckett MD, Phone:  [phone] LIPID PANEL Result Value Ref Range Cholesterol, total 131 100 - 199 mg/dL Triglyceride 229 (H) 0 - 149 mg/dL HDL Cholesterol 33 (L) >39 mg/dL VLDL, calculated 46 (H) 5 - 40 mg/dL LDL, calculated 52 0 - 99 mg/dL Narrative Performed at:  [address]  [id number] : Jesus Manuel Beckett MD, Phone:  [phone] VITAMIN B12 & FOLATE Result Value Ref Range Vitamin B12 814 232 - 1245 pg/mL Folate 5.0 >3.0 ng/mL Comment: A serum folate concentration of less than 3.1 ng/mL is 
considered to represent clinical deficiency. Narrative Performed at:  [address]  [id number] : Heath Sandoval MD, Phone:  [phone] CVD REPORT Result Value Ref Range INTERPRETATION Note Comment:  
   Supplemental report is available. PDF IMAGE Not applicable Narrative Performed at:  [address] [place] [address]  [id number] : Devonte Leigh MD, Phone:  [phone] CKD REPORT Result Value Ref Range Interpretation Note Comment:  
   Supplemental report is available. Narrative Performed at:  [address] [place] [address]  [id number] : Devonte Leigh MD, Phone:  [phone] RECOMMENDATIONS: 
Kidney function abnormal but stable, to keep with hydration, and avoid NSAID Cholesterol results normal, but Triglycerides high, likely from high sugar, and to limit fried food Average blood sugar improved from 7.1 to 6.8 . To continue with same medicines Vitamin B12 levels normal  
 
Please call me if you have any questions: [phone] Sincerely, Ismael Márquez MD

## 2018-06-21 NOTE — PROGRESS NOTES
Chief Complaint   Patient presents with    Diabetes     Follow up, Fasting     Cholesterol Problem    Hypertension    Nasal Congestion     coughing up greenish mucus      1. Have you been to the ER, urgent care clinic since your last visit? Hospitalized since your last visit? No    2. Have you seen or consulted any other health care providers outside of the [address] [place] [address] since your last visit? Include any pap smears or colon screening.  No

## 2018-06-21 NOTE — PATIENT INSTRUCTIONS
Learning About Type 2 Diabetes  What is type 2 diabetes? Insulin is a hormone that helps your body use sugar from your food as energy. Type 2 diabetes happens when your body can't use insulin the right way. Over time, the pancreas can't make enough insulin. If you don't have enough insulin, too much sugar stays in your blood. If you are overweight, get little or no exercise, or have type 2 diabetes in your family, you are more likely to have problems with the way insulin works in your body.  Americans, Hispanics, Native Americans,  Americans, and Pacific Islanders have a higher risk for type 2 diabetes. Type 2 diabetes can be prevented or delayed with a healthy lifestyle, which includes staying at a healthy weight, making smart food choices, and getting regular exercise. What can you expect with type 2 diabetes? Keila Gonzales keep hearing about how important it is to keep your blood sugar within a target range. That's because over time, high blood sugar can lead to serious problems. It can:  · Harm your eyes, nerves, and kidneys. · Damage your blood vessels, leading to heart disease and stroke. · Reduce blood flow and cause nerve damage to parts of your body, especially your feet. This can cause slow healing and pain when you walk. · Make your immune system weak and less able to fight infections. When people hear the word \"diabetes,\" they often think of problems like these. But daily care and treatment can help prevent or delay these problems. The goal is to keep your blood sugar in a target range. That's the best way to reduce your chance of having more problems from diabetes. What are the symptoms? Some people who have type 2 diabetes may not have any symptoms early on. Many people with the disease don't even know they have it at first. But with time, diabetes starts to cause symptoms. You experience most symptoms of type 2 diabetes when your blood sugar is either too high or too low.   The most common symptoms of high blood sugar include:  · Thirst.  · Frequent urination. · Weight loss. · Blurry vision. The symptoms of low blood sugar include:  · Sweating. · Shakiness. · Weakness. · Hunger. · Confusion. How can you prevent type 2 diabetes? The best way to prevent or delay type 2 diabetes is to adopt healthy habits, which include:  · Staying at a healthy weight. · Exercising regularly. · Eating healthy foods. How is type 2 diabetes treated? If you have type 2 diabetes, here are the most important things you can do. · Take your diabetes medicines. · Check your blood sugar as often as your doctor recommends. Also, get a hemoglobin A1c test at least every 6 months. · Try to eat a variety of foods and to spread carbohydrate throughout the day. Carbohydrate raises blood sugar higher and more quickly than any other nutrient does. Carbohydrate is found in sugar, breads and cereals, fruit, starchy vegetables such as potatoes and corn, and milk and yogurt. · Get at least 30 minutes of exercise on most days of the week. Walking is a good choice. You also may want to do other activities, such as running, swimming, cycling, or playing tennis or team sports. If your doctor says it's okay, do muscle-strengthening exercises at least 2 times a week. · See your doctor for checkups and tests on a regular schedule. · If you have high blood pressure or high cholesterol, take the medicines as prescribed by your doctor. · Do not smoke. Smoking can make health problems worse. This includes problems you might have with type 2 diabetes. If you need help quitting, talk to your doctor about stop-smoking programs and medicines. These can increase your chances of quitting for good. Follow-up care is a key part of your treatment and safety. Be sure to make and go to all appointments, and call your doctor if you are having problems.  It's also a good idea to know your test results and keep a list of the medicines you take. Where can you learn more? Go to http://araseli-lv.info/. Enter V986 in the search box to learn more about \"Learning About Type 2 Diabetes. \"  Current as of: March 13, 2017  Content Version: 11.4  © 4805-3361 Healthwise, Incorporated. Care instructions adapted under license by [employer] (which disclaims liability or warranty for this information). If you have questions about a medical condition or this instruction, always ask your healthcare professional. [address] any warranty or liability for your use of this information.

## 2018-06-22 LAB
ALBUMIN SERPL-MCNC: 4.4 G/DL (ref 3.6–4.8)
ALBUMIN/GLOB SERPL: 1.6 {RATIO} (ref 1.2–2.2)
ALP SERPL-CCNC: 55 IU/L (ref 39–117)
ALT SERPL-CCNC: 21 IU/L (ref 0–44)
AST SERPL-CCNC: 19 IU/L (ref 0–40)
BILIRUB SERPL-MCNC: 0.4 MG/DL (ref 0–1.2)
BUN SERPL-MCNC: 19 MG/DL (ref 8–27)
BUN/CREAT SERPL: 15 (ref 10–24)
CALCIUM SERPL-MCNC: 9.7 MG/DL (ref 8.6–10.2)
CHLORIDE SERPL-SCNC: 103 MMOL/L (ref 96–106)
CHOLEST SERPL-MCNC: 131 MG/DL (ref 100–199)
CO2 SERPL-SCNC: 19 MMOL/L (ref 20–29)
CREAT SERPL-MCNC: 1.31 MG/DL (ref 0.76–1.27)
EST. AVERAGE GLUCOSE BLD GHB EST-MCNC: 148 MG/DL
FOLATE SERPL-MCNC: 5 NG/ML
GFR SERPLBLD CREATININE-BSD FMLA CKD-EPI: 55 ML/MIN/1.73
GFR SERPLBLD CREATININE-BSD FMLA CKD-EPI: 64 ML/MIN/1.73
GLOBULIN SER CALC-MCNC: 2.7 G/DL (ref 1.5–4.5)
GLUCOSE SERPL-MCNC: 149 MG/DL (ref 65–99)
HBA1C MFR BLD: 6.8 % (ref 4.8–5.6)
HDLC SERPL-MCNC: 33 MG/DL
INTERPRETATION, 910389: NORMAL
INTERPRETATION: NORMAL
LDLC SERPL CALC-MCNC: 52 MG/DL (ref 0–99)
PDF IMAGE, 910387: NORMAL
POTASSIUM SERPL-SCNC: 5.1 MMOL/L (ref 3.5–5.2)
PROT SERPL-MCNC: 7.1 G/DL (ref 6–8.5)
SODIUM SERPL-SCNC: 138 MMOL/L (ref 134–144)
TRIGL SERPL-MCNC: 229 MG/DL (ref 0–149)
VIT B12 SERPL-MCNC: 814 PG/ML (ref 232–1245)
VLDLC SERPL CALC-MCNC: 46 MG/DL (ref 5–40)

## 2018-06-22 NOTE — PROGRESS NOTES
Please inform patient and send letter,  Kidney function abnormal but stable, to keep with hydration, and avoid NSAID  Cholesterol results normal, but Triglycerides high, likely from high sugar, and to limit fried food  Average blood sugar improved from 7.1 to 6.8 .  To continue with same medicines  Vitamin B12 levels normal  thanks

## 2018-06-22 NOTE — PROGRESS NOTES
Outbound call to patient. Name and  verified. Reviewed recent lab results with patient. He verbalized understanding. Letter printed with results.

## 2018-08-02 DIAGNOSIS — E11.9 TYPE 2 DIABETES MELLITUS WITHOUT COMPLICATION, WITHOUT LONG-TERM CURRENT USE OF INSULIN (HCC): ICD-10-CM

## 2018-08-02 RX ORDER — METOPROLOL SUCCINATE 50 MG/1
TABLET, EXTENDED RELEASE ORAL
Qty: 90 TAB | Refills: 3 | Status: SHIPPED | OUTPATIENT
Start: 2018-08-02 | End: 2019-05-14 | Stop reason: SDUPTHER

## 2018-08-02 RX ORDER — METFORMIN HYDROCHLORIDE 850 MG/1
TABLET ORAL
Qty: 180 TAB | Refills: 1 | Status: SHIPPED | OUTPATIENT
Start: 2018-08-02 | End: 2019-02-01 | Stop reason: SDUPTHER

## 2018-08-02 RX ORDER — ATORVASTATIN CALCIUM 10 MG/1
TABLET, FILM COATED ORAL
Qty: 90 TAB | Refills: 3 | Status: SHIPPED | OUTPATIENT
Start: 2018-08-02 | End: 2019-05-14 | Stop reason: SDUPTHER

## 2018-08-02 RX ORDER — FENOFIBRATE 160 MG/1
TABLET ORAL
Qty: 90 TAB | Refills: 0 | Status: SHIPPED | OUTPATIENT
Start: 2018-08-02 | End: 2018-11-03 | Stop reason: SDUPTHER

## 2018-10-31 ENCOUNTER — HOSPITAL ENCOUNTER (OUTPATIENT)
Dept: LAB | Age: 68
Discharge: HOME | End: 2018-10-31
Payer: MEDICARE

## 2018-10-31 ENCOUNTER — OFFICE VISIT (OUTPATIENT)
Dept: FAMILY MEDICINE CLINIC | Age: 68
End: 2018-10-31

## 2018-10-31 VITALS
TEMPERATURE: 97.8 F | BODY MASS INDEX: 27.35 KG/M2 | OXYGEN SATURATION: 96 % | SYSTOLIC BLOOD PRESSURE: 124 MMHG | DIASTOLIC BLOOD PRESSURE: 82 MMHG | HEIGHT: 70 IN | HEART RATE: 68 BPM | WEIGHT: 191 LBS | RESPIRATION RATE: 16 BRPM

## 2018-10-31 DIAGNOSIS — N18.30 CKD (CHRONIC KIDNEY DISEASE) STAGE 3, GFR 30-59 ML/MIN (HCC): ICD-10-CM

## 2018-10-31 DIAGNOSIS — E11.9 TYPE 2 DIABETES MELLITUS WITHOUT COMPLICATION, WITHOUT LONG-TERM CURRENT USE OF INSULIN (HCC): Primary | ICD-10-CM

## 2018-10-31 DIAGNOSIS — Z23 ENCOUNTER FOR IMMUNIZATION: ICD-10-CM

## 2018-10-31 DIAGNOSIS — E78.2 MIXED DYSLIPIDEMIA: ICD-10-CM

## 2018-10-31 DIAGNOSIS — I10 ESSENTIAL HYPERTENSION, BENIGN: ICD-10-CM

## 2018-10-31 DIAGNOSIS — F51.01 PRIMARY INSOMNIA: ICD-10-CM

## 2018-10-31 DIAGNOSIS — J32.4 CHRONIC PANSINUSITIS: ICD-10-CM

## 2018-10-31 DIAGNOSIS — R94.6 ABNORMAL THYROID FUNCTION TEST: ICD-10-CM

## 2018-10-31 PROCEDURE — 80061 LIPID PANEL: CPT

## 2018-10-31 PROCEDURE — 82043 UR ALBUMIN QUANTITATIVE: CPT

## 2018-10-31 PROCEDURE — 85025 COMPLETE CBC W/AUTO DIFF WBC: CPT

## 2018-10-31 PROCEDURE — 84443 ASSAY THYROID STIM HORMONE: CPT

## 2018-10-31 PROCEDURE — 80053 COMPREHEN METABOLIC PANEL: CPT

## 2018-10-31 PROCEDURE — 83036 HEMOGLOBIN GLYCOSYLATED A1C: CPT

## 2018-10-31 RX ORDER — DIAZEPAM 5 MG/1
TABLET ORAL
COMMUNITY
Start: 2018-08-21 | End: 2018-10-31 | Stop reason: ALTCHOICE

## 2018-10-31 RX ORDER — METFORMIN HYDROCHLORIDE 500 MG/1
500 TABLET ORAL
COMMUNITY
Start: 2017-05-03 | End: 2018-10-31 | Stop reason: SDUPTHER

## 2018-10-31 RX ORDER — MONTELUKAST SODIUM 10 MG/1
10 TABLET ORAL
COMMUNITY
Start: 2017-10-10 | End: 2018-10-31 | Stop reason: ALTCHOICE

## 2018-10-31 RX ORDER — ZOLPIDEM TARTRATE 5 MG/1
5 TABLET ORAL
Qty: 30 TAB | Refills: 0 | Status: SHIPPED | OUTPATIENT
Start: 2018-10-31 | End: 2019-05-14

## 2018-10-31 RX ORDER — ATORVASTATIN CALCIUM 10 MG/1
10 TABLET, FILM COATED ORAL
COMMUNITY
End: 2018-10-31 | Stop reason: SDUPTHER

## 2018-10-31 RX ORDER — HYDROGEN PEROXIDE 3 %
20 SOLUTION, NON-ORAL MISCELLANEOUS
COMMUNITY
End: 2018-10-31 | Stop reason: SDUPTHER

## 2018-10-31 RX ORDER — MONTELUKAST SODIUM 10 MG/1
10 TABLET ORAL DAILY
Qty: 90 TAB | Refills: 0 | Status: SHIPPED | OUTPATIENT
Start: 2018-10-31 | End: 2019-05-14

## 2018-10-31 RX ORDER — GABAPENTIN 300 MG/1
300 CAPSULE ORAL
COMMUNITY
Start: 2018-08-15 | End: 2018-10-31 | Stop reason: ALTCHOICE

## 2018-10-31 NOTE — PROGRESS NOTES
Chief Complaint Patient presents with  Diabetes Follow up, Fasting  Cholesterol Problem  Hypertension  Back Pain  Immunization/Injection Influenza high dose 1. Have you been to the ER, urgent care clinic since your last visit? Hospitalized since your last visit? No 
 
2. Have you seen or consulted any other health care providers outside of the [address] [place] [address] since your last visit? Include any pap smears or colon screening. No 
 
Phillip Cherry  is a 70 y.o.  male  who present for Influenza High dose immunizations/injections. He/she denies any symptoms , reactions or allergies that would exclude them from being immunized today. Risks and adverse reactions were discussed and the VIS was given if applicable to them. All questions were addressed. He/She was observed for 10 min post injection. There were no reactions observed.  
 
Rigo Reese LPN

## 2018-10-31 NOTE — PATIENT INSTRUCTIONS
DASH Diet: Care Instructions Your Care Instructions The DASH diet is an eating plan that can help lower your blood pressure. DASH stands for Dietary Approaches to Stop Hypertension. Hypertension is high blood pressure. The DASH diet focuses on eating foods that are high in calcium, potassium, and magnesium. These nutrients can lower blood pressure. The foods that are highest in these nutrients are fruits, vegetables, low-fat dairy products, nuts, seeds, and legumes. But taking calcium, potassium, and magnesium supplements instead of eating foods that are high in those nutrients does not have the same effect. The DASH diet also includes whole grains, fish, and poultry. The DASH diet is one of several lifestyle changes your doctor may recommend to lower your high blood pressure. Your doctor may also want you to decrease the amount of sodium in your diet. Lowering sodium while following the DASH diet can lower blood pressure even further than just the DASH diet alone. Follow-up care is a key part of your treatment and safety. Be sure to make and go to all appointments, and call your doctor if you are having problems. It's also a good idea to know your test results and keep a list of the medicines you take. How can you care for yourself at home? Following the DASH diet · Eat 4 to 5 servings of fruit each day. A serving is 1 medium-sized piece of fruit, ½ cup chopped or canned fruit, 1/4 cup dried fruit, or 4 ounces (½ cup) of fruit juice. Choose fruit more often than fruit juice. · Eat 4 to 5 servings of vegetables each day. A serving is 1 cup of lettuce or raw leafy vegetables, ½ cup of chopped or cooked vegetables, or 4 ounces (½ cup) of vegetable juice. Choose vegetables more often than vegetable juice. · Get 2 to 3 servings of low-fat and fat-free dairy each day. A serving is 8 ounces of milk, 1 cup of yogurt, or 1 ½ ounces of cheese. · Eat 6 to 8 servings of grains each day. A serving is 1 slice of bread, 1 ounce of dry cereal, or ½ cup of cooked rice, pasta, or cooked cereal. Try to choose whole-grain products as much as possible. · Limit lean meat, poultry, and fish to 2 servings each day. A serving is 3 ounces, about the size of a deck of cards. · Eat 4 to 5 servings of nuts, seeds, and legumes (cooked dried beans, lentils, and split peas) each week. A serving is 1/3 cup of nuts, 2 tablespoons of seeds, or ½ cup of cooked beans or peas. · Limit fats and oils to 2 to 3 servings each day. A serving is 1 teaspoon of vegetable oil or 2 tablespoons of salad dressing. · Limit sweets and added sugars to 5 servings or less a week. A serving is 1 tablespoon jelly or jam, ½ cup sorbet, or 1 cup of lemonade. · Eat less than 2,300 milligrams (mg) of sodium a day. If you limit your sodium to 1,500 mg a day, you can lower your blood pressure even more. Tips for success · Start small. Do not try to make dramatic changes to your diet all at once. You might feel that you are missing out on your favorite foods and then be more likely to not follow the plan. Make small changes, and stick with them. Once those changes become habit, add a few more changes. · Try some of the following: ? Make it a goal to eat a fruit or vegetable at every meal and at snacks. This will make it easy to get the recommended amount of fruits and vegetables each day. ? Try yogurt topped with fruit and nuts for a snack or healthy dessert. ? Add lettuce, tomato, cucumber, and onion to sandwiches. ? Combine a ready-made pizza crust with low-fat mozzarella cheese and lots of vegetable toppings. Try using tomatoes, squash, spinach, broccoli, carrots, cauliflower, and onions. ? Have a variety of cut-up vegetables with a low-fat dip as an appetizer instead of chips and dip. ? Sprinkle sunflower seeds or chopped almonds over salads.  Or try adding chopped walnuts or almonds to cooked vegetables. ? Try some vegetarian meals using beans and peas. Add garbanzo or kidney beans to salads. Make burritos and tacos with mashed turner beans or black beans. Where can you learn more? Go to http://araseli-lv.info/. Enter W453 in the search box to learn more about \"DASH Diet: Care Instructions. \" Current as of: December 6, 2017 Content Version: 11.8 © 2512-1811 [employer]. Care instructions adapted under license by [employer] (which disclaims liability or warranty for this information). If you have questions about a medical condition or this instruction, always ask your healthcare professional. [address] any warranty or liability for your use of this information.

## 2018-10-31 NOTE — PROGRESS NOTES
HISTORY OF PRESENT ILLNESS Theone Guillermo is a 70 y.o. male. he was seen for follow up on array of issues. HPI Cardiovascular Review The patient has hypertension, hyperlipidemia, coronary artery disease and status post coronary artery stenting. He reports taking medications as instructed, no medication side effects noted, home BP monitoring in range of 078'B systolic over 63'D diastolic, no chest pain on exertion, no dyspnea on exertion, no swelling of ankles, no orthostatic dizziness or lightheadedness, no orthopnea or paroxysmal nocturnal dyspnea, no palpitations, no muscle aches or pain. Diet and Lifestyle: generally follows a low fat low cholesterol diet, generally follows a low sodium diet, exercises sporadically, chews tobacco.  Lab review: labs reviewed and discussed with patient. He follows Dr Neil Marroquin  
Cardiac History: PCI 7- prox 75% LAD, D1 50% EF 60%; NAN 3 x 15 mm Xience to LAD Stress echo 5-28-13=  Walked 3 minutes, Exercise stress echo is normal.   
Medicines:  mg, Amlodipine 5 mg, Metoprolol XL 50 mg, Lofibra 160 mg,and Lipitor 10 mg 
 
  
Lab Results Component Value Date/Time Cholesterol, total 131 06/21/2018 08:50 AM  
 HDL Cholesterol 33 (L) 06/21/2018 08:50 AM  
 LDL, calculated 52 06/21/2018 08:50 AM  
 VLDL, calculated 46 (H) 06/21/2018 08:50 AM  
 Triglyceride 229 (H) 06/21/2018 08:50 AM  
 CHOL/HDL Ratio 5.8 (H) 07/30/2011 04:31 AM  
 
 
Endocrine Review He is seen for diabetes. Since last visit he reports: no polyuria or polydipsia, no significant changes. Home glucose monitoring: is performed regularly, fasting values range 100-120  He is checking his sugars one per day. He reports medication compliance: compliant all of the time. Medication side effects: none. Diabetic diet compliance: compliant most of the time. Lab review: labs reviewed and discussed with patient On Metformin 850 mg bid. Lab Results Component Value Date/Time Hemoglobin A1c 6.8 (H) 06/21/2018 08:50 AM  
  
 
  
Had low Vitamin B12 in 02/2018, started on supplement Insomnia: 
ONSET: problem is longstanding SEEN PREVIOUSLY: several months ago by me DESCRIPTION OF SX:  patient estimates 4 hours of sleep per nite. difficulty initiating sleep. 
frequent awakening ASSOCIATED ISSUES: situational anxiety related to family issues 
chronic pain involving the both knees is interfering with sleep DENIES: depression and ETOH overuse TREATMENT TO DATE: benzodiazapines begun 4 month(s) ago: effective Review of Systems Constitutional: Negative for chills, fever and malaise/fatigue. HENT: Positive for congestion. Negative for ear pain, sore throat and tinnitus. Eyes: Negative for blurred vision, double vision, pain and discharge. Respiratory: Negative for cough, shortness of breath and wheezing. Cardiovascular: Negative for chest pain, palpitations and leg swelling. Gastrointestinal: Negative for abdominal pain, blood in stool, constipation, diarrhea, nausea and vomiting. Genitourinary: Negative for dysuria, frequency, hematuria and urgency. Musculoskeletal: Positive for back pain. Negative for joint pain and myalgias. Skin: Negative for rash. Neurological: Negative for dizziness, tremors, seizures and headaches. Endo/Heme/Allergies: Negative for polydipsia. Does not bruise/bleed easily. Psychiatric/Behavioral: Negative for depression and substance abuse. The patient has insomnia. The patient is not nervous/anxious. Physical Exam  
Constitutional: He is oriented to person, place, and time. He appears well-developed and well-nourished. HENT:  
Head: Normocephalic and atraumatic. Right Ear: External ear normal.  
Mouth/Throat: Oropharynx is clear and moist. No oropharyngeal exudate. Left TM shows no perforation, good graft healing Eyes: Conjunctivae and EOM are normal. Pupils are equal, round, and reactive to light. No scleral icterus. Neck: Normal range of motion. Neck supple. No JVD present. No thyromegaly present. Cardiovascular: Normal rate, regular rhythm, normal heart sounds and intact distal pulses. No murmur heard. Pulmonary/Chest: Effort normal and breath sounds normal. He has no wheezes. Abdominal: Soft. Bowel sounds are normal. He exhibits no distension and no mass. Musculoskeletal: Normal range of motion. He exhibits no edema or tenderness. Feet:warm, good capillary refill, no trophic changes or ulcerative lesions, normal DP and PT pulses, normal monofilament exam and normal sensory exam 
  
Lymphadenopathy:  
  He has no cervical adenopathy. Neurological: He is alert and oriented to person, place, and time. He has normal reflexes. No cranial nerve deficit. Skin: Skin is warm and dry. No rash noted. He is not diaphoretic. Psychiatric: He has a normal mood and affect. Nursing note and vitals reviewed. ASSESSMENT and PLAN Diagnoses and all orders for this visit: 
 
1. Type 2 diabetes mellitus without complication, without long-term current use of insulin ([facility]) 
-      DIABETES FOOT EXAM 
-     HEMOGLOBIN A1C WITH EAG 
-     MICROALBUMIN, UR, RAND W/ MICROALB/CREAT RATIO 2. Encounter for immunization -     ADMIN INFLUENZA VIRUS VAC 
-     INFLUENZA VACCINE INACTIVATED (IIV), SUBUNIT, ADJUVANTED, IM 
-     pneumococcal 23-buck ps vaccine (PNEUMOVAX 23) 25 mcg/0.5 mL injection; 0.5 mL by IntraMUSCular route once for 1 dose. 
-     varicella-zoster recombinant, PF, (SHINGRIX, PF,) 50 mcg/0.5 mL susr injection; 0.5mL by IntraMUSCular route once now and then repeat in 2-6 months 
-     diph,pertuss,acel,,tetanus vac,PF, (ADACEL) 2 Lf-(2.5-5-3-5 mcg)-5Lf/0.5 mL syrg vaccine; 0.5 mL by IntraMUSCular route once for 1 dose. 3. Essential hypertension, benign -     METABOLIC PANEL, COMPREHENSIVE 4. Primary insomnia -     zolpidem (AMBIEN) 5 mg tablet; Take 1 Tab by mouth nightly as needed for Sleep. Max Daily Amount: 5 mg. 
 
5. Mixed dyslipidemia -     METABOLIC PANEL, COMPREHENSIVE 
-     LIPID PANEL 6. CKD (chronic kidney disease) stage 3, GFR 30-59 ml/min ([facility]) 
-     CBC WITH AUTOMATED DIFF 
-     METABOLIC PANEL, COMPREHENSIVE 7. Chronic pansinusitis 
-     montelukast (SINGULAIR) 10 mg tablet; Take 1 Tab by mouth daily. 8. Abnormal thyroid function test 
-     TSH AND FREE T4 Discussed lifestyle issues and health guidance given Discussed sleep hygiene Patient was given an after visit summary which includes diagnoses, vital signs, current medications, instructions and references & authorized prescriptions . Results of labs will be conveyed to patient, once available. Pt verbalized instructions I provided and expressed understanding of discussion that was held today.  
Follow-up Disposition: 
Return in about 4 months (around 2/28/2019) for fasting, medicare wellness, physical.

## 2018-11-01 LAB
ALBUMIN SERPL-MCNC: 4.3 G/DL (ref 3.6–4.8)
ALBUMIN/CREAT UR: 12 MG/G CREAT (ref 0–30)
ALBUMIN/GLOB SERPL: 1.6 {RATIO} (ref 1.2–2.2)
ALP SERPL-CCNC: 46 IU/L (ref 39–117)
ALT SERPL-CCNC: 14 IU/L (ref 0–44)
AST SERPL-CCNC: 18 IU/L (ref 0–40)
BASOPHILS # BLD AUTO: 0 X10E3/UL (ref 0–0.2)
BASOPHILS NFR BLD AUTO: 1 %
BILIRUB SERPL-MCNC: 0.4 MG/DL (ref 0–1.2)
BUN SERPL-MCNC: 17 MG/DL (ref 8–27)
BUN/CREAT SERPL: 14 (ref 10–24)
CALCIUM SERPL-MCNC: 9.9 MG/DL (ref 8.6–10.2)
CHLORIDE SERPL-SCNC: 108 MMOL/L (ref 96–106)
CHOLEST SERPL-MCNC: 110 MG/DL (ref 100–199)
CO2 SERPL-SCNC: 20 MMOL/L (ref 20–29)
CREAT SERPL-MCNC: 1.2 MG/DL (ref 0.76–1.27)
CREAT UR-MCNC: 284.8 MG/DL
EOSINOPHIL # BLD AUTO: 0.2 X10E3/UL (ref 0–0.4)
EOSINOPHIL NFR BLD AUTO: 3 %
ERYTHROCYTE [DISTWIDTH] IN BLOOD BY AUTOMATED COUNT: 15.6 % (ref 12.3–15.4)
EST. AVERAGE GLUCOSE BLD GHB EST-MCNC: 137 MG/DL
GLOBULIN SER CALC-MCNC: 2.7 G/DL (ref 1.5–4.5)
GLUCOSE SERPL-MCNC: 113 MG/DL (ref 65–99)
HBA1C MFR BLD: 6.4 % (ref 4.8–5.6)
HCT VFR BLD AUTO: 39.4 % (ref 37.5–51)
HDLC SERPL-MCNC: 39 MG/DL
HGB BLD-MCNC: 12.7 G/DL (ref 13–17.7)
IMM GRANULOCYTES # BLD: 0 X10E3/UL (ref 0–0.1)
IMM GRANULOCYTES NFR BLD: 0 %
INTERPRETATION, 910389: NORMAL
INTERPRETATION: NORMAL
LDLC SERPL CALC-MCNC: 51 MG/DL (ref 0–99)
LYMPHOCYTES # BLD AUTO: 2.2 X10E3/UL (ref 0.7–3.1)
LYMPHOCYTES NFR BLD AUTO: 35 %
MCH RBC QN AUTO: 26.8 PG (ref 26.6–33)
MCHC RBC AUTO-ENTMCNC: 32.2 G/DL (ref 31.5–35.7)
MCV RBC AUTO: 83 FL (ref 79–97)
MICROALBUMIN UR-MCNC: 34.2 UG/ML
MONOCYTES # BLD AUTO: 0.4 X10E3/UL (ref 0.1–0.9)
MONOCYTES NFR BLD AUTO: 6 %
NEUTROPHILS # BLD AUTO: 3.5 X10E3/UL (ref 1.4–7)
NEUTROPHILS NFR BLD AUTO: 55 %
PDF IMAGE, 910387: NORMAL
PLATELET # BLD AUTO: 243 X10E3/UL (ref 150–379)
POTASSIUM SERPL-SCNC: 4.8 MMOL/L (ref 3.5–5.2)
PROT SERPL-MCNC: 7 G/DL (ref 6–8.5)
RBC # BLD AUTO: 4.74 X10E6/UL (ref 4.14–5.8)
SODIUM SERPL-SCNC: 143 MMOL/L (ref 134–144)
T4 FREE SERPL-MCNC: 1.32 NG/DL (ref 0.82–1.77)
TRIGL SERPL-MCNC: 100 MG/DL (ref 0–149)
TSH SERPL DL<=0.005 MIU/L-ACNC: 3.47 UIU/ML (ref 0.45–4.5)
VLDLC SERPL CALC-MCNC: 20 MG/DL (ref 5–40)
WBC # BLD AUTO: 6.3 X10E3/UL (ref 3.4–10.8)

## 2018-11-02 NOTE — PROGRESS NOTES
Please inform patient and send letter, Average sugar and cholesterol results have improved significantly. To continue with same medicines and diet/exercise CBC shows borderline anemia but hemoglobin very stable.   
Kidney, liver, thyroid, urine protein all are normal 
thanks

## 2018-11-03 DIAGNOSIS — E78.2 MIXED DYSLIPIDEMIA: Primary | ICD-10-CM

## 2018-11-03 DIAGNOSIS — E11.9 CONTROLLED TYPE 2 DIABETES MELLITUS WITHOUT COMPLICATION (HCC): ICD-10-CM

## 2018-11-03 RX ORDER — FENOFIBRATE 160 MG/1
TABLET ORAL
Qty: 90 TAB | Refills: 0 | Status: SHIPPED | OUTPATIENT
Start: 2018-11-03 | End: 2019-02-01 | Stop reason: SDUPTHER

## 2018-11-05 RX ORDER — AMLODIPINE BESYLATE 5 MG/1
TABLET ORAL
Qty: 90 TAB | Refills: 3 | Status: SHIPPED | OUTPATIENT
Start: 2018-11-05 | End: 2019-05-14

## 2018-11-05 NOTE — PROGRESS NOTES
Inbound call from patient returning nurse call. Patients name and  verified. Reviewed recent lab results. Letter printed and mailed to patient.

## 2018-11-05 NOTE — TELEPHONE ENCOUNTER
Request for norvasc. Last office visit 5-3-18, next office visit 5-14-19.  Refills per verbal order from Dr. Rosemary Calhoun.

## 2018-11-06 DIAGNOSIS — E11.9 TYPE 2 DIABETES MELLITUS WITHOUT COMPLICATION, WITHOUT LONG-TERM CURRENT USE OF INSULIN (HCC): Primary | ICD-10-CM

## 2019-02-01 DIAGNOSIS — E78.2 MIXED DYSLIPIDEMIA: ICD-10-CM

## 2019-02-01 DIAGNOSIS — E11.9 TYPE 2 DIABETES MELLITUS WITHOUT COMPLICATION, WITHOUT LONG-TERM CURRENT USE OF INSULIN (HCC): ICD-10-CM

## 2019-02-01 RX ORDER — METFORMIN HYDROCHLORIDE 850 MG/1
TABLET ORAL
Qty: 180 TAB | Refills: 1 | Status: SHIPPED | OUTPATIENT
Start: 2019-02-01 | End: 2019-07-03 | Stop reason: SDUPTHER

## 2019-02-01 RX ORDER — FENOFIBRATE 160 MG/1
TABLET ORAL
Qty: 90 TAB | Refills: 0 | Status: SHIPPED | OUTPATIENT
Start: 2019-02-01 | End: 2019-05-02 | Stop reason: SDUPTHER

## 2019-03-12 ENCOUNTER — OFFICE VISIT (OUTPATIENT)
Dept: FAMILY MEDICINE CLINIC | Age: 69
End: 2019-03-12

## 2019-03-12 ENCOUNTER — HOSPITAL ENCOUNTER (OUTPATIENT)
Dept: LAB | Age: 69
Discharge: HOME | End: 2019-03-12
Payer: MEDICARE

## 2019-03-12 VITALS
HEIGHT: 70 IN | RESPIRATION RATE: 16 BRPM | HEART RATE: 64 BPM | DIASTOLIC BLOOD PRESSURE: 70 MMHG | SYSTOLIC BLOOD PRESSURE: 120 MMHG | WEIGHT: 190 LBS | TEMPERATURE: 97.9 F | BODY MASS INDEX: 27.2 KG/M2 | OXYGEN SATURATION: 97 %

## 2019-03-12 DIAGNOSIS — J32.4 CHRONIC PANSINUSITIS: ICD-10-CM

## 2019-03-12 DIAGNOSIS — I10 ESSENTIAL HYPERTENSION, BENIGN: ICD-10-CM

## 2019-03-12 DIAGNOSIS — E53.8 VITAMIN B12 DEFICIENCY: ICD-10-CM

## 2019-03-12 DIAGNOSIS — M25.561 CHRONIC PAIN OF RIGHT KNEE: ICD-10-CM

## 2019-03-12 DIAGNOSIS — M54.16 LUMBAR RADICULOPATHY: ICD-10-CM

## 2019-03-12 DIAGNOSIS — E11.9 TYPE 2 DIABETES MELLITUS WITHOUT COMPLICATION, WITHOUT LONG-TERM CURRENT USE OF INSULIN (HCC): Primary | ICD-10-CM

## 2019-03-12 DIAGNOSIS — N18.30 CKD (CHRONIC KIDNEY DISEASE) STAGE 3, GFR 30-59 ML/MIN (HCC): ICD-10-CM

## 2019-03-12 DIAGNOSIS — E78.2 MIXED DYSLIPIDEMIA: ICD-10-CM

## 2019-03-12 DIAGNOSIS — G89.29 CHRONIC PAIN OF RIGHT KNEE: ICD-10-CM

## 2019-03-12 PROCEDURE — 83036 HEMOGLOBIN GLYCOSYLATED A1C: CPT

## 2019-03-12 PROCEDURE — 80053 COMPREHEN METABOLIC PANEL: CPT

## 2019-03-12 PROCEDURE — 80061 LIPID PANEL: CPT

## 2019-03-12 PROCEDURE — 36415 COLL VENOUS BLD VENIPUNCTURE: CPT

## 2019-03-12 PROCEDURE — 82607 VITAMIN B-12: CPT

## 2019-03-12 PROCEDURE — 82043 UR ALBUMIN QUANTITATIVE: CPT

## 2019-03-12 RX ORDER — GABAPENTIN 300 MG/1
300 CAPSULE ORAL
Qty: 90 CAP | Refills: 1 | Status: SHIPPED | OUTPATIENT
Start: 2019-03-12 | End: 2019-05-14

## 2019-03-12 RX ORDER — GABAPENTIN 300 MG/1
CAPSULE ORAL
COMMUNITY
Start: 2018-12-11 | End: 2019-03-12 | Stop reason: SDUPTHER

## 2019-03-12 RX ORDER — FENOFIBRATE 160 MG/1
TABLET ORAL
COMMUNITY
Start: 2017-05-03 | End: 2019-03-12 | Stop reason: SDUPTHER

## 2019-03-12 RX ORDER — HYDROGEN PEROXIDE 3 %
20 SOLUTION, NON-ORAL MISCELLANEOUS
COMMUNITY
End: 2019-03-12 | Stop reason: SDUPTHER

## 2019-03-12 RX ORDER — DOXYCYCLINE 100 MG/1
100 CAPSULE ORAL 2 TIMES DAILY
Qty: 20 CAP | Refills: 0 | Status: SHIPPED | OUTPATIENT
Start: 2019-03-12 | End: 2019-03-22

## 2019-03-12 RX ORDER — ATORVASTATIN CALCIUM 10 MG/1
10 TABLET, FILM COATED ORAL
COMMUNITY
End: 2019-03-12 | Stop reason: SDUPTHER

## 2019-03-12 RX ORDER — METOPROLOL SUCCINATE 50 MG/1
TABLET, EXTENDED RELEASE ORAL
COMMUNITY
Start: 2017-05-11 | End: 2019-03-12 | Stop reason: SDUPTHER

## 2019-03-12 RX ORDER — TAMSULOSIN HYDROCHLORIDE 0.4 MG/1
CAPSULE ORAL
COMMUNITY
Start: 2017-05-02 | End: 2019-03-12 | Stop reason: SDUPTHER

## 2019-03-12 RX ORDER — AMLODIPINE BESYLATE 5 MG/1
TABLET ORAL
COMMUNITY
Start: 2017-05-11 | End: 2019-03-12 | Stop reason: SDUPTHER

## 2019-03-12 RX ORDER — NITROGLYCERIN 0.4 MG/1
TABLET SUBLINGUAL
COMMUNITY
Start: 2017-05-11 | End: 2019-03-12 | Stop reason: SDUPTHER

## 2019-03-12 RX ORDER — ACETAMINOPHEN 325 MG/1
650 TABLET ORAL
COMMUNITY
End: 2019-07-31 | Stop reason: ALTCHOICE

## 2019-03-12 RX ORDER — MONTELUKAST SODIUM 10 MG/1
10 TABLET ORAL
COMMUNITY
Start: 2017-10-10 | End: 2019-03-12 | Stop reason: SDUPTHER

## 2019-03-12 RX ORDER — DIAZEPAM 5 MG/1
TABLET ORAL
COMMUNITY
Start: 2018-08-21 | End: 2019-03-12 | Stop reason: ALTCHOICE

## 2019-03-12 RX ORDER — METFORMIN HYDROCHLORIDE 500 MG/1
500 TABLET ORAL
COMMUNITY
Start: 2017-05-03 | End: 2019-03-12 | Stop reason: DRUGHIGH

## 2019-03-12 NOTE — ASSESSMENT & PLAN NOTE
Well Controlled, based on history, physical exam and review of pertinent labs, studies and medications; meds reconciled; continue current treatment plan, lifestyle modifications recommended, medication compliance emphasized. Key Antihyperglycemic Medications             metFORMIN (GLUCOPHAGE) 850 mg tablet  (Taking) TAKE 1 TABLET BY MOUTH TWICE DAILY WITH MEALS        Other Key Diabetic Medications             gabapentin (NEURONTIN) 300 mg capsule  (Taking) Take 1 Cap by mouth nightly.     fenofibrate (LOFIBRA) 160 mg tablet  (Taking) TAKE 1 TABLET BY MOUTH ONCE DAILY    atorvastatin (LIPITOR) 10 mg tablet  (Taking) TAKE ONE TABLET BY MOUTH ONCE DAILY        Lab Results   Component Value Date/Time    Hemoglobin A1c 6.4 10/31/2018 09:09 AM    Glucose 113 10/31/2018 09:09 AM    Creatinine 1.20 10/31/2018 09:09 AM    Microalb/Creat ratio (ug/mg creat.) 12.0 10/31/2018 09:09 AM    Cholesterol, total 110 10/31/2018 09:09 AM    HDL Cholesterol 39 10/31/2018 09:09 AM    LDL, calculated 51 10/31/2018 09:09 AM    Triglyceride 100 10/31/2018 09:09 AM     Diabetic Foot and Eye Exam HM Status   Topic Date Due    Diabetic Foot Care  10/31/2019    Eye Exam  02/09/2020

## 2019-03-12 NOTE — PATIENT INSTRUCTIONS
Diabetic Neuropathy: Care Instructions  Your Care Instructions    When you have diabetes, your blood sugar level may get too high. Over time, high blood sugar levels can damage nerves. This is called diabetic neuropathy. Nerve damage can cause pain, burning, tingling, and numbness and may leave you feeling weak. The feet are often affected. When you have nerve damage in your feet, you cannot feel your feet and toes as well as normal and may not notice cuts or sores. Even a small injury can lead to a serious infection. It is very important that you follow your doctor's advice on foot care. Sometimes diabetes damages nerves that help the body function. If this happens, your blood pressure, sweating, digestion, and urination might be affected. Your doctor may give you a target blood sugar level that is higher or lower than you are used to. Try to keep your blood sugar very close to this target level to prevent more damage. Follow-up care is a key part of your treatment and safety. Be sure to make and go to all appointments, and call your doctor if you are having problems. It's also a good idea to know your test results and keep a list of the medicines you take. How can you care for yourself at home? · Take your medicines exactly as prescribed. Call your doctor if you think you are having a problem with your medicine. It is very important that you take your insulin or diabetes pills as your doctor tells you. · Try to keep blood sugar at your target level. ? Eat a variety of healthy foods, with carbohydrate spread out in your meals. A dietitian can help you plan meals. ? Try to get at least 30 minutes of exercise on most days. ? Check your blood sugar as many times each day as your doctor recommends. · Take and record your blood pressure at home if your doctor tells you to. Learn the importance of the two measures of blood pressure (such as 130 over 80, or 130/80).  To take your blood pressure at home:  ? Ask your doctor to check your blood pressure monitor to be sure it is accurate and the cuff fits you. Also ask your doctor to watch you to make sure that you are using it right. ? Do not use medicine known to raise blood pressure (such as some nasal decongestant sprays) before taking your blood pressure. ? Avoid taking your blood pressure if you have just exercised or are nervous or upset. Rest at least 15 minutes before you take a reading. · Take pain medicines exactly as directed. ? If the doctor gave you a prescription medicine for pain, take it as prescribed. ? If you are not taking a prescription pain medicine, ask your doctor if you can take an over-the-counter medicine. · Do not smoke. Smoking can increase your chance for a heart attack or stroke. If you need help quitting, talk to your doctor about stop-smoking programs and medicines. These can increase your chances of quitting for good. · Limit alcohol to 2 drinks a day for men and 1 drink a day for women. Too much alcohol can cause health problems. · Eat small meals often, rather than 2 or 3 large meals a day. To care for your feet  · Prevent injury by wearing shoes at all times, even when you are indoors. · Do foot care as part of your daily routine. Wash your feet and then rub lotion on your feet, but not between your toes. Use a handheld mirror or magnifying mirror to inspect your feet for blisters, cuts, cracks, or sores. · Have your toenails trimmed and filed straight across. · Wear shoes and socks that fit well. Soft shoes that have good support and that fit well (such as tennis shoes) are best for your feet. · Check your shoes for any loose objects or rough edges before you put them on. · Ask your doctor to check your feet during each visit. Your doctor may notice a foot problem you have missed. · Get early treatment for any foot problem, even a minor one. When should you call for help?   Call your doctor now or seek immediate medical care if:    · You have symptoms of infection, such as:  ? Increased pain, swelling, warmth, or redness. ? Red streaks leading from the area. ? Pus draining from the area. ? A fever.     · You have new or worse numbness, pain, or tingling in any part of your body.    Watch closely for changes in your health, and be sure to contact your doctor if:    · You have a new problem with your feet, such as:  ? A new sore or ulcer. ? A break in the skin that is not healing after several days. ? Bleeding corns or calluses. ? An ingrown toenail.     · You do not get better as expected. Where can you learn more? Go to http://araseli-lv.info/. Enter N568 in the search box to learn more about \"Diabetic Neuropathy: Care Instructions. \"  Current as of: July 25, 2018  Content Version: 11.9  © 5369-9383 [employer]. Care instructions adapted under license by [employer] (which disclaims liability or warranty for this information). If you have questions about a medical condition or this instruction, always ask your healthcare professional. [place] [address] any warranty or liability for your use of this information.

## 2019-03-12 NOTE — PROGRESS NOTES
Chief Complaint   Patient presents with    Diabetes    Cholesterol Problem    Hypertension    Cough    Leg Pain     right leg     Medication Evaluation     ciproflaxin. when taken face became tight and itchy      1. Have you been to the ER, urgent care clinic since your last visit? Hospitalized since your last visit? No    2. Have you seen or consulted any other health care providers outside of the [address] [place] [address] since your last visit? Include any pap smears or colon screening.  No

## 2019-03-12 NOTE — PROGRESS NOTES
HISTORY OF PRESENT ILLNESS  Jeri Hartley is a 70 y.o. male. he was seen today to discuss several concerns. HPI    Cardiovascular Review  The patient has hypertension, hyperlipidemia, coronary artery disease and status post coronary artery stenting.  He reports taking medications as instructed, no medication side effects noted, home BP monitoring in range of 767'H systolic over 80'R diastolic, no chest pain on exertion, no dyspnea on exertion, no swelling of ankles, no orthostatic dizziness or lightheadedness, no orthopnea or paroxysmal nocturnal dyspnea, no palpitations, no muscle aches or pain.  Diet and Lifestyle: generally follows a low fat low cholesterol diet, generally follows a low sodium diet, exercises sporadically, chews tobacco.  Lab review: labs reviewed and discussed with patient.  He follows Dr Will Roberson   Cardiac History:   PCI 7- prox 75% LAD, D1 50% EF 60%; NAN 3 x 15 mm Xience to LAD  Stress echo 5-28-13=  Walked 3 minutes, Exercise stress echo is normal.    Medicines:  mg, Amlodipine 5 mg, Metoprolol XL 50 mg, Lofibra 160 mg,and Lipitor 10 mg        Lab Results   Component Value Date/Time    Cholesterol, total 110 10/31/2018 09:09 AM    HDL Cholesterol 39 (L) 10/31/2018 09:09 AM    LDL, calculated 51 10/31/2018 09:09 AM    VLDL, calculated 20 10/31/2018 09:09 AM    Triglyceride 100 10/31/2018 09:09 AM    CHOL/HDL Ratio 5.8 (H) 07/30/2011 04:31 AM        Endocrine Review  He is seen for diabetes.  Since last visit he reports: no polyuria or polydipsia, no significant changes.  Home glucose monitoring: is performed regularly, fasting values range 100-120  He is checking his sugars one per day.  He reports medication compliance: compliant all of the time.  Medication side effects: none.  Diabetic diet compliance: compliant most of the time.  Lab review: labs reviewed and discussed with patient  On Metformin 850 mg bid.       Lab Results   Component Value Date/Time    Hemoglobin A1c 6.4 (H) 10/31/2018 09:09 AM           Had low Vitamin B12 in 02/2018, started on supplement  Lab Results   Component Value Date/Time    Vitamin B12 814 06/21/2018 08:50 AM    Folate 5.0 06/21/2018 08:50 AM       He had epidural shots for lumbar radiculopathy by DR Ty Hays in 08/2018. As per him, he has started with similar symptoms again. He had reexploration of right knee due to infection in knee prosthesis and he now has started with pain again on right knee. He follows ENT for her chronic sinusitis and was on abx Augmentin for a long period. Now he is out of Abx and has started with cough. He is s/p sinoplasty. Review of Systems   Constitutional: Negative for chills, fever and malaise/fatigue. HENT: Positive for congestion. Negative for ear pain, sore throat and tinnitus. Eyes: Negative for blurred vision, double vision, pain and discharge. Respiratory: Positive for cough. Negative for shortness of breath and wheezing. Cardiovascular: Negative for chest pain, palpitations and leg swelling. Gastrointestinal: Negative for abdominal pain, blood in stool, constipation, diarrhea, nausea and vomiting. Genitourinary: Negative for dysuria, frequency, hematuria and urgency. Musculoskeletal: Positive for back pain and joint pain. Negative for myalgias. Right knee pain   Skin: Negative for rash. Neurological: Negative for dizziness, tremors, seizures and headaches. Endo/Heme/Allergies: Negative for polydipsia. Does not bruise/bleed easily. Psychiatric/Behavioral: Negative for depression and substance abuse. The patient is not nervous/anxious. Physical Exam   Constitutional: He is oriented to person, place, and time. He appears well-developed and well-nourished. No distress. HENT:   Head: Normocephalic and atraumatic. Right Ear: Tympanic membrane and external ear normal. No drainage. Tympanic membrane is not injected. No middle ear effusion. No decreased hearing is noted. Left Ear: Tympanic membrane normal. No drainage. Tympanic membrane is not injected. No middle ear effusion. No decreased hearing is noted. Nose: Mucosal edema present. No rhinorrhea. Right sinus exhibits maxillary sinus tenderness and frontal sinus tenderness. Left sinus exhibits maxillary sinus tenderness and frontal sinus tenderness. Mouth/Throat: Uvula is midline and oropharynx is clear and moist. No oropharyngeal exudate. Left TM shows no perforation, good graft healing   Eyes: Conjunctivae and EOM are normal. Pupils are equal, round, and reactive to light. No scleral icterus. Neck: Normal range of motion. Neck supple. No JVD present. No thyromegaly present. Cardiovascular: Normal rate, regular rhythm, normal heart sounds and intact distal pulses. No murmur heard. Pulmonary/Chest: Effort normal and breath sounds normal. He has no wheezes. He has no rales. Abdominal: Soft. Bowel sounds are normal. He exhibits no distension and no mass. Musculoskeletal: Normal range of motion. He exhibits no edema. Right knee: Tenderness (posterior) found. Feet:warm, good capillary refill, no trophic changes or ulcerative lesions, normal DP and PT pulses, normal monofilament exam and normal sensory exam     Lymphadenopathy:        Head (right side): No tonsillar adenopathy present. Head (left side): No tonsillar adenopathy present. He has no cervical adenopathy. Neurological: He is alert and oriented to person, place, and time. He has normal reflexes. No cranial nerve deficit. Skin: Skin is warm and dry. No rash noted. He is not diaphoretic. Psychiatric: He has a normal mood and affect. Nursing note and vitals reviewed. ASSESSMENT and PLAN  Diagnoses and all orders for this visit:    1.  Type 2 diabetes mellitus without complication, without long-term current use of insulin ([facility] [address]  Assessment & Plan:  Well Controlled, based on history, physical exam and review of pertinent labs, studies and medications; meds reconciled; continue current treatment plan, lifestyle modifications recommended, medication compliance emphasized. Key Antihyperglycemic Medications             metFORMIN (GLUCOPHAGE) 850 mg tablet  (Taking) TAKE 1 TABLET BY MOUTH TWICE DAILY WITH MEALS        Other Key Diabetic Medications             gabapentin (NEURONTIN) 300 mg capsule  (Taking) Take 1 Cap by mouth nightly. fenofibrate (LOFIBRA) 160 mg tablet  (Taking) TAKE 1 TABLET BY MOUTH ONCE DAILY    atorvastatin (LIPITOR) 10 mg tablet  (Taking) TAKE ONE TABLET BY MOUTH ONCE DAILY        Lab Results   Component Value Date/Time    Hemoglobin A1c 6.4 10/31/2018 09:09 AM    Glucose 113 10/31/2018 09:09 AM    Creatinine 1.20 10/31/2018 09:09 AM    Microalb/Creat ratio (ug/mg creat.) 12.0 10/31/2018 09:09 AM    Cholesterol, total 110 10/31/2018 09:09 AM    HDL Cholesterol 39 10/31/2018 09:09 AM    LDL, calculated 51 10/31/2018 09:09 AM    Triglyceride 100 10/31/2018 09:09 AM     Diabetic Foot and Eye Exam HM Status   Topic Date Due    Diabetic Foot Care  10/31/2019    Eye Exam  02/09/2020       Orders:  -     METABOLIC PANEL, COMPREHENSIVE  -     HEMOGLOBIN A1C WITH EAG  -     MICROALBUMIN, UR, RAND W/ MICROALB/CREAT RATIO    2. Mixed dyslipidemia  -     METABOLIC PANEL, COMPREHENSIVE  -     LIPID PANEL    3. CKD (chronic kidney disease) stage 3, GFR 30-59 ml/min ([facility])  Assessment & Plan:  Improving. Discussed medicines to avoid and also for proper hydration    Orders:  -     METABOLIC PANEL, COMPREHENSIVE    4. Essential hypertension, benign  -     METABOLIC PANEL, COMPREHENSIVE    5. Vitamin B12 deficiency  -     VITAMIN B12    6. Chronic pansinusitis  -     doxycycline (VIBRAMYCIN) 100 mg capsule; Take 1 Cap by mouth two (2) times a day for 10 days. 7. Lumbar radiculopathy  -     gabapentin (NEURONTIN) 300 mg capsule; Take 1 Cap by mouth nightly.     8. Chronic pain of right knee  -     REFERRAL TO ORTHOPEDICS      Discussed lifestyle issues and health guidance given  Patient was given an after visit summary which includes diagnoses, vital signs, current medications, instructions and references & authorized prescriptions . Results of labs will be conveyed to patient, once available. Pt verbalized instructions I provided and expressed understanding of discussion that was held today. Follow-up Disposition:  Return in about 4 months (around 7/12/2019) for fasting, medicare wellness.

## 2019-03-13 LAB
ALBUMIN SERPL-MCNC: 4.4 G/DL (ref 3.6–4.8)
ALBUMIN/CREAT UR: 13.4 MG/G CREAT (ref 0–30)
ALBUMIN/GLOB SERPL: 1.8 {RATIO} (ref 1.2–2.2)
ALP SERPL-CCNC: 48 IU/L (ref 39–117)
ALT SERPL-CCNC: 17 IU/L (ref 0–44)
AST SERPL-CCNC: 20 IU/L (ref 0–40)
BILIRUB SERPL-MCNC: 0.4 MG/DL (ref 0–1.2)
BUN SERPL-MCNC: 17 MG/DL (ref 8–27)
BUN/CREAT SERPL: 13 (ref 10–24)
CALCIUM SERPL-MCNC: 9.6 MG/DL (ref 8.6–10.2)
CHLORIDE SERPL-SCNC: 107 MMOL/L (ref 96–106)
CHOLEST SERPL-MCNC: 124 MG/DL (ref 100–199)
CO2 SERPL-SCNC: 20 MMOL/L (ref 20–29)
CREAT SERPL-MCNC: 1.29 MG/DL (ref 0.76–1.27)
CREAT UR-MCNC: 325.5 MG/DL
EST. AVERAGE GLUCOSE BLD GHB EST-MCNC: 143 MG/DL
GLOBULIN SER CALC-MCNC: 2.4 G/DL (ref 1.5–4.5)
GLUCOSE SERPL-MCNC: 112 MG/DL (ref 65–99)
HBA1C MFR BLD: 6.6 % (ref 4.8–5.6)
HDLC SERPL-MCNC: 33 MG/DL
INTERPRETATION, 910389: NORMAL
INTERPRETATION: NORMAL
LDLC SERPL CALC-MCNC: 66 MG/DL (ref 0–99)
MICROALBUMIN UR-MCNC: 43.6 UG/ML
PDF IMAGE, 910387: NORMAL
POTASSIUM SERPL-SCNC: 5 MMOL/L (ref 3.5–5.2)
PROT SERPL-MCNC: 6.8 G/DL (ref 6–8.5)
SODIUM SERPL-SCNC: 141 MMOL/L (ref 134–144)
TRIGL SERPL-MCNC: 127 MG/DL (ref 0–149)
VIT B12 SERPL-MCNC: 383 PG/ML (ref 232–1245)
VLDLC SERPL CALC-MCNC: 25 MG/DL (ref 5–40)

## 2019-03-13 NOTE — PROGRESS NOTES
Please inform patient and send letter, and remind to make his 4 months wellness visit.   Kidney function is abnormal, but at base line  Cholesterol results are normal except low good cholesterol  hgba1c up from last time 6.6, to take Metformin very regularly and watch diet strictly  Vitamin B12 low normal, to take OTC vitamin B12 2000 units on Sat and Sun  Urine protein normal  So no change in current management  thanks

## 2019-03-14 NOTE — PROGRESS NOTES
Outbound call to patient. Name and  verified. Reviewed recent labs with patient he verbalized results and recommendations. Letter printed with results.

## 2019-05-02 DIAGNOSIS — E78.2 MIXED DYSLIPIDEMIA: ICD-10-CM

## 2019-05-06 RX ORDER — FENOFIBRATE 160 MG/1
TABLET ORAL
Qty: 90 TAB | Refills: 0 | Status: SHIPPED | OUTPATIENT
Start: 2019-05-06 | End: 2019-07-03 | Stop reason: SDUPTHER

## 2019-05-14 ENCOUNTER — OFFICE VISIT (OUTPATIENT)
Dept: CARDIOLOGY CLINIC | Age: 69
End: 2019-05-14

## 2019-05-14 VITALS
RESPIRATION RATE: 18 BRPM | OXYGEN SATURATION: 98 % | DIASTOLIC BLOOD PRESSURE: 58 MMHG | HEIGHT: 70 IN | BODY MASS INDEX: 27.14 KG/M2 | HEART RATE: 51 BPM | SYSTOLIC BLOOD PRESSURE: 100 MMHG | WEIGHT: 189.6 LBS

## 2019-05-14 DIAGNOSIS — I25.10 ATHEROSCLEROSIS OF NATIVE CORONARY ARTERY OF NATIVE HEART WITHOUT ANGINA PECTORIS: Primary | ICD-10-CM

## 2019-05-14 DIAGNOSIS — E78.2 MIXED DYSLIPIDEMIA: ICD-10-CM

## 2019-05-14 DIAGNOSIS — E11.21 TYPE 2 DIABETES WITH NEPHROPATHY (HCC): ICD-10-CM

## 2019-05-14 DIAGNOSIS — I10 ESSENTIAL HYPERTENSION, BENIGN: ICD-10-CM

## 2019-05-14 DIAGNOSIS — Z95.5 S/P CORONARY ARTERY STENT PLACEMENT: ICD-10-CM

## 2019-05-14 RX ORDER — ATORVASTATIN CALCIUM 10 MG/1
10 TABLET, FILM COATED ORAL DAILY
Qty: 90 TAB | Refills: 3 | Status: SHIPPED | OUTPATIENT
Start: 2019-05-14 | End: 2019-11-18 | Stop reason: SDUPTHER

## 2019-05-14 RX ORDER — NITROGLYCERIN 0.4 MG/1
TABLET SUBLINGUAL
Qty: 1 BOTTLE | Refills: 3 | Status: SHIPPED | OUTPATIENT
Start: 2019-05-14 | End: 2021-11-23

## 2019-05-14 RX ORDER — METOPROLOL SUCCINATE 50 MG/1
50 TABLET, EXTENDED RELEASE ORAL DAILY
Qty: 90 TAB | Refills: 3 | Status: SHIPPED | OUTPATIENT
Start: 2019-05-14 | End: 2019-11-18 | Stop reason: SDUPTHER

## 2019-05-14 RX ORDER — TRAMADOL HYDROCHLORIDE 50 MG/1
TABLET ORAL
COMMUNITY
Start: 2019-05-10 | End: 2019-07-27

## 2019-05-14 NOTE — PROGRESS NOTES
Moy Estrada     1950       Mary Yang MD, [facility]  Date of Visit-5/14/2019   PCP is Claire Eid MD   [address] [facility] [place] [facility] [place]  [facility]  HPI:  Moy Estrada is a 70 y.o. male   Pt is coming in today for a one year visit for CAD, HTN, and lipids. Since last seen, has had f/u with Claire Eid MD for diabetes. Lipids in March were favorable. Kidney function was acceptable as well. Today Mr. Daksha Kwon reports \"alright, not bad. \" He did have some chest pain and pain in his left arm. This happened the month prior as well. These were not associated with any activities and lasted for about 5 minutes. He has previously had a Nitroglycerin prescription but notes that he needs a refill. Mr. Daksha Kwon has noticed having low blood pressure at home but this has not been directly associated with these episodes. Denies edema, shortness of breath at rest, has no tachycardia, palpitations or sense of arrhythmia. EKG: shows sinus bradycardia at 51 within normal limits. Assessment/Plan:     1. CAD with unstable angina: has had 2 episodes that are typical. I suggest we get a nuclear stress test. Refill Atorvastatin and Metoprolol. Make sure he has a new prescription for Nitroglycerin. If he has worsening CP, consider cath. I will call him with results of the stress test    2. HTN: has had some low BP and diff with a passing out episode. I will stop amlodipine  BP Readings from Last 3 Encounters:   05/14/19 100/58   03/12/19 120/70   10/31/18 124/82      3. Lipids on high potency statin as appropriate for secondary prevention. Lab Results   Component Value Date/Time    LDL, calculated 66 03/12/2019 09:40 AM   4. DM cardiovascular disease risk factors stable  Needs tight control  F/U in 6 months.  I will call him with results of the stress test.  Future Appointments   Date Time Provider Tima Greenberg   6/10/2019 10:00 AM Melinda GIMENEZ SCHED   11/18/2019  9:20 AM Mary Manrique MD Davia Lose      Patient Instructions   Please stop your amlodipine (norvasc). You will be scheduled for nuclear stress testing after your appointment today. Key CAD CHF Meds             atorvastatin (LIPITOR) 10 mg tablet (Taking) Take 1 Tab by mouth daily. metoprolol succinate (TOPROL-XL) 50 mg XL tablet (Taking) Take 1 Tab by mouth daily. nitroglycerin (NITROSTAT) 0.4 mg SL tablet (Taking) DISSOLVE ONE TABLET UNDER THE TONGUE EVERY 5 MINUTES AS NEEDED FOR CHEST PAIN. UP TO 3 DOSES    fenofibrate (LOFIBRA) 160 mg tablet (Taking) TAKE 1 TABLET BY MOUTH ONCE DAILY    aspirin delayed-release 81 mg tablet (Taking) Take 162 mg by mouth daily. Impression:   1. Atherosclerosis of native coronary artery of native heart without angina pectoris    2. Essential hypertension, benign    3. Mixed dyslipidemia    4. S/P coronary artery stent placement    5. Type 2 diabetes with nephropathy [facility])       Cardiac History:   PCI 7- prox 75% LAD, D1 50% EF 60%; NAN 3 x 15 mm Xience to LAD  Stress echo 5-28-13=  Walked 3 minutes, Exercise stress echo is normal.      ROS-except as noted above. . A complete cardiac and respiratory are reviewed and negative except as above ; Resp-denies wheezing  or productive cough,. Const- No unusual weight loss or fever; Neuro-no recent seizure or CVA ; GI- No BRBPR, abdom pain, bloating ; - no  hematuria   see supplement sheet, initialed and to be scanned by staff  Past Medical History:   Diagnosis Date    Arthritis     KNEES & BACK.    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 He has quit using smokeless tobacco. He reports that he drinks about 1.2 oz of alcohol per week. He reports that he does not use drugs. Exam and Labs:  /58 (BP 1 Location: Left arm, BP Patient Position: Sitting)   Pulse (!) 51   Resp 18   Ht 5' 10\" (1.778 m)   Wt 189 lb 9.6 oz (86 kg)   SpO2 98%   BMI 27.20 kg/m² Constitutional:  NAD, comfortable  Head: NC,AT. Eyes: No scleral icterus. Neck:  Neck supple. No JVD present. Throat: moist mucous membranes. Chest: Effort normal & normal respiratory excursion . Neurological: alert, conversant and oriented . Skin: Skin is not cold. No obvious systemic rash noted. Not diaphoretic. No erythema. Psychiatric:  Grossly normal mood and affect. Behavior appears normal. Extremities:  no clubbing or cyanosis. Abdomen: non distended    Lungs:breath sounds normal. No stridor. distress, wheezes or  Rales. Heart: normal rate, regular rhythm, normal S1, S2, no murmurs, rubs, clicks or gallops , PMI non displaced. Edema: Edema is none.   Lab Results   Component Value Date/Time    Cholesterol, total 124 03/12/2019 09:40 AM    HDL Cholesterol 33 (L) 03/12/2019 09:40 AM    LDL, calculated 66 03/12/2019 09:40 AM    Triglyceride 127 03/12/2019 09:40 AM    CHOL/HDL Ratio 5.8 (H) 07/30/2011 04:31 AM     Lab Results   Component Value Date/Time    Sodium 141 03/12/2019 09:40 AM    Potassium 5.0 03/12/2019 09:40 AM    Chloride 107 (H) 03/12/2019 09:40 AM    CO2 20 03/12/2019 09:40 AM    Anion gap 4 (L) 08/02/2017 05:36 AM    Glucose 112 (H) 03/12/2019 09:40 AM    BUN 17 03/12/2019 09:40 AM    Creatinine 1.29 (H) 03/12/2019 09:40 AM    BUN/Creatinine ratio 13 03/12/2019 09:40 AM    GFR est AA 65 03/12/2019 09:40 AM    GFR est non-AA 56 (L) 03/12/2019 09:40 AM    Calcium 9.6 03/12/2019 09:40 AM Wt Readings from Last 3 Encounters:   05/14/19 189 lb 9.6 oz (86 kg)   03/12/19 190 lb (86.2 kg)   10/31/18 191 lb (86.6 kg)      BP Readings from Last 3 Encounters:   05/14/19 100/58   03/12/19 120/70   10/31/18 124/82      Current Outpatient Medications   Medication Sig    traMADol (ULTRAM) 50 mg tablet     atorvastatin (LIPITOR) 10 mg tablet Take 1 Tab by mouth daily.  metoprolol succinate (TOPROL-XL) 50 mg XL tablet Take 1 Tab by mouth daily.  nitroglycerin (NITROSTAT) 0.4 mg SL tablet DISSOLVE ONE TABLET UNDER THE TONGUE EVERY 5 MINUTES AS NEEDED FOR CHEST PAIN. UP TO 3 DOSES    fenofibrate (LOFIBRA) 160 mg tablet TAKE 1 TABLET BY MOUTH ONCE DAILY    acetaminophen (TYLENOL) 325 mg tablet Take 650 mg by mouth.  metFORMIN (GLUCOPHAGE) 850 mg tablet TAKE 1 TABLET BY MOUTH TWICE DAILY WITH MEALS    glucose blood VI test strips (ACCU-CHEK RED PLUS TEST STRP) strip USE ONE STRIP TO CHECK GLUCOSE ONCE DAILY    tamsulosin (FLOMAX) 0.4 mg capsule TAKE ONE CAPSULE BY MOUTH ONCE DAILY    aspirin delayed-release 81 mg tablet Take 162 mg by mouth daily.  acetaminophen (TYLENOL) 500 mg tablet Take 2 Tabs by mouth every six (6) hours.  esomeprazole (NEXIUM) 20 mg capsule Take 20 mg by mouth daily as needed. No current facility-administered medications for this visit. Impression see above.       Written by Markos Escalona, as dictated by Jose G Jolley MD.

## 2019-05-14 NOTE — PATIENT INSTRUCTIONS
Please stop your amlodipine (norvasc). You will be scheduled for nuclear stress testing after your appointment today. Wear comfortable clothing (shorts or pants with a shirt or blouse- no underwire bras) and walking or athletic shoes. Do not eat or drink anything, except water, for at least 2 hours prior to your appointment. Avoid tobacco products for at least 6 hours prior to your test.    Do not eat or drink anything containing caffeine, including but not limited to the following: chocolate, regular and decaffeinated coffee, soft drinks, or tea for at least 12-24 hours prior to your test.    Do not hold your scheduled medications prior to your test. If you are a diabetic, please ask your physician how to adjust your food and insulin prior to your test. Please bring all medications you are currently taking. You will need to inform our office if you are pregnant, nursing, or think you may be pregnant. Your test will be performed on a 1 day protocol. This is determined by your height, weight, and other risk factors. For a 2 day test, please allow for 2 hours in the office each day. For a 1 day test, please allow for 4 hours in the office that day. The radioactive isotope used for your testing is different from any of the dyes that are commonly used in x-ray procedures, and is ordered specially for your test. Please call to cancel or reschedule your appointment at least 24 hours prior to your scheduled appointment to avoid being billed for the expensive isotope.

## 2019-05-14 NOTE — LETTER
5/14/19 Patient: Kimberly Malloy YOB: 1950 Date of Visit: 5/14/2019 Yoana Ritter MD 
[address] [facility] [address] [id number] VIA In Basket Dear Yonaa Ritter MD, Thank you for referring Mr. Gal Ortiz to [address] [place] [address] for evaluation. My notes for this consultation are attached. If you have questions, please do not hesitate to call me. I look forward to following your patient along with you.  
 
 
Sincerely, 
 
Maury Perez MD

## 2019-07-03 DIAGNOSIS — E78.2 MIXED DYSLIPIDEMIA: ICD-10-CM

## 2019-07-03 DIAGNOSIS — E11.9 TYPE 2 DIABETES MELLITUS WITHOUT COMPLICATION, WITHOUT LONG-TERM CURRENT USE OF INSULIN (HCC): ICD-10-CM

## 2019-07-03 RX ORDER — METFORMIN HYDROCHLORIDE 850 MG/1
TABLET ORAL
Qty: 180 TAB | Refills: 1 | Status: SHIPPED | OUTPATIENT
Start: 2019-07-03 | End: 2020-02-03

## 2019-07-03 RX ORDER — FENOFIBRATE 160 MG/1
TABLET ORAL
Qty: 90 TAB | Refills: 0 | Status: SHIPPED | OUTPATIENT
Start: 2019-07-03 | End: 2019-11-09 | Stop reason: SDUPTHER

## 2019-07-27 ENCOUNTER — HOSPITAL ENCOUNTER (OUTPATIENT)
Dept: GENERAL RADIOLOGY | Age: 69
Discharge: HOME | End: 2019-07-27
Payer: MEDICARE

## 2019-07-27 ENCOUNTER — TELEPHONE (OUTPATIENT)
Dept: FAMILY MEDICINE CLINIC | Age: 69
End: 2019-07-27

## 2019-07-27 ENCOUNTER — OFFICE VISIT (OUTPATIENT)
Dept: FAMILY MEDICINE CLINIC | Age: 69
End: 2019-07-27

## 2019-07-27 VITALS
DIASTOLIC BLOOD PRESSURE: 64 MMHG | OXYGEN SATURATION: 98 % | RESPIRATION RATE: 18 BRPM | HEART RATE: 60 BPM | HEIGHT: 70 IN | SYSTOLIC BLOOD PRESSURE: 126 MMHG | BODY MASS INDEX: 26.54 KG/M2 | TEMPERATURE: 98.1 F | WEIGHT: 185.4 LBS

## 2019-07-27 DIAGNOSIS — K05.00 ACUTE GINGIVITIS: Primary | ICD-10-CM

## 2019-07-27 DIAGNOSIS — R05.3 CHRONIC COUGH: ICD-10-CM

## 2019-07-27 PROCEDURE — 71046 X-RAY EXAM CHEST 2 VIEWS: CPT

## 2019-07-27 RX ORDER — CLINDAMYCIN HYDROCHLORIDE 300 MG/1
300 CAPSULE ORAL 3 TIMES DAILY
Qty: 21 CAP | Refills: 0 | Status: SHIPPED | OUTPATIENT
Start: 2019-07-27 | End: 2019-07-31 | Stop reason: ALTCHOICE

## 2019-07-27 NOTE — PATIENT INSTRUCTIONS
[facility]  [address], [address] [place] [id number]   Phone [phone] or [phone]  Hours [id number] (extractions)  Simple tooth extraction: $60 per tooth, $55 per x-ray

## 2019-07-27 NOTE — PROGRESS NOTES
Assessment/Plan:     Diagnoses and all orders for this visit:    1. Acute gingivitis  -     clindamycin (CLEOCIN) 300 mg capsule; Take 1 Cap by mouth three (3) times daily for 7 days. Treatment as above. Follow up with dentist.  Contact information given today. 2. Chronic cough  -     XR CHEST PA LAT; Future  Treatment will be based on results. Follow-up and Dispositions    · Return if symptoms worsen or fail to improve. Discussed expected course/resolution/complications of diagnosis in detail with patient. Medication risks/benefits/costs/interactions/alternatives discussed with patient. Pt was given after visit summary which includes diagnoses, current medications & vitals. Pt expressed understanding with the diagnosis and plan          Subjective:      Glenda Gutierrez is a 78 y.o. male who presents for had concerns including Cyst (cyst on his mouth notice it yesterday no pain). Here for several complaints. Reports a history of chronic cough which has been persistent months. Prior evaluation with PCP. Cannot recall prior chest xray. Reports recent international travel to [facility]. Reports cough as dry. There is no associated wheezing, shortness of breath, fever, chills, or body aches. Reports discomfort in the gums of the lower dentition. Prior dental work as well in [facility]. Symptoms have been present several weeks, stable over time. This is his first evaluation. He is overdue for routine dental exams.      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 21 Cap 0    fenofibrate (LOFIBRA) 160 mg tablet TAKE 1 TABLET BY MOUTH ONCE DAILY 90 Tab 0    metFORMIN (GLUCOPHAGE) 850 mg tablet TAKE 1 TABLET BY MOUTH TWICE DAILY WITH MEALS 180 Tab 1    atorvastatin (LIPITOR) 10 mg tablet Take 1 Tab by mouth daily. 90 Tab 3    metoprolol succinate (TOPROL-XL) 50 mg XL tablet Take 1 Tab by mouth daily. 90 Tab 3    nitroglycerin (NITROSTAT) 0.4 mg SL tablet DISSOLVE ONE TABLET UNDER THE TONGUE EVERY 5 MINUTES AS NEEDED FOR CHEST PAIN. UP TO 3 DOSES 1 Bottle 3    acetaminophen (TYLENOL) 325 mg tablet Take 650 mg by mouth.  glucose blood VI test strips (ACCU-CHEK RED PLUS TEST STRP) strip USE ONE STRIP TO CHECK GLUCOSE ONCE DAILY 100 Strip 5    tamsulosin (FLOMAX) 0.4 mg capsule TAKE ONE CAPSULE BY MOUTH ONCE DAILY 90 Cap 0    aspirin delayed-release 81 mg tablet Take 162 mg by mouth daily.  acetaminophen (TYLENOL) 500 mg tablet Take 2 Tabs by mouth every six (6) hours. 120 Tab 0    esomeprazole (NEXIUM) 20 mg capsule Take 20 mg by mouth daily as needed. Allergies   Allergen Reactions    Levofloxacin Rash    Pcn [Penicillins] Rash    Adhesive Tape-Silicones Unknown (comments)     Other reaction(s): Rash  Medipore tape caused large blisters when removed.  Tape [Adhesive] Rash     Medipore tape caused large blisters when removed. ROS:   Review of Systems   Constitutional: Negative for chills, fever and malaise/fatigue. HENT: Negative for congestion, ear pain, sinus pain and sore throat. Respiratory: Positive for cough.  Negative for sputum production, shortness of breath and wheezing. Cardiovascular: Negative for chest pain. Neurological: Negative for seizures. Endo/Heme/Allergies: Negative for environmental allergies. Objective:     Visit Vitals  /64   Pulse 60   Temp 98.1 °F (36.7 °C) (Oral)   Resp 18   Ht 5' 10\" (1.778 m)   Wt 185 lb 6.4 oz (84.1 kg)   SpO2 98%   BMI 26.60 kg/m²       Vitals and Nurse Documentation reviewed. Physical Exam   Constitutional: No distress. HENT:   Right Ear: Tympanic membrane is not erythematous and not bulging. No middle ear effusion. Left Ear: Tympanic membrane is not erythematous and not bulging. No middle ear effusion. Nose: No rhinorrhea. Right sinus exhibits no maxillary sinus tenderness and no frontal sinus tenderness. Left sinus exhibits no maxillary sinus tenderness and no frontal sinus tenderness. Mouth/Throat: Abnormal dentition. Dental caries present. No oropharyngeal exudate or posterior oropharyngeal erythema. Lower posterior gingivitis with minimal purulence. Eyes: EOM and lids are normal.   Cardiovascular: S1 normal and S2 normal. Exam reveals no gallop and no friction rub. No murmur heard. Pulmonary/Chest: He has wheezes in the right upper field and the right middle field. He has rhonchi in the left middle field and the left lower field. Lymphadenopathy:     He has no cervical adenopathy. Skin: Skin is warm and dry.    Psychiatric: Mood and affect normal.

## 2019-07-27 NOTE — PROGRESS NOTES
Chief Complaint   Patient presents with    Cyst     cyst on his mouth notice it yesterday no pain     Spoke to patient Identified pt with two pt identifiers (Name @ )    1. Have you been to the ER, urgent care clinic since your last visit? Hospitalized since your last visit? No    2. Have you seen or consulted any other health care providers outside of the [address] [place] [address] since your last visit? Include any pap smears or colon screening.  No     \"REVIEWED RECORD IN PREPARATION FOR VISIT AND HAVE OBTAINED NECESSARY DOCUMENTATION\"

## 2019-07-27 NOTE — PROGRESS NOTES
Patient called on after hour call service for result  Informed that Xray chest result is normal  thanks

## 2019-07-27 NOTE — TELEPHONE ENCOUNTER
Received voice message from patient that he was seen in office today for cough and had Xray chest done. He was asked to call on call provider to get result and if Xray is abnormal need to be on medicine  Xray chest reviewed. Was normal. Called patient and informed that his xray is normal. And to continue with antibiotic that was prescribed today for dental infection. Patient was appreciative of call.

## 2019-07-31 ENCOUNTER — OFFICE VISIT (OUTPATIENT)
Dept: FAMILY MEDICINE CLINIC | Age: 69
End: 2019-07-31

## 2019-07-31 VITALS
BODY MASS INDEX: 26.34 KG/M2 | HEIGHT: 70 IN | TEMPERATURE: 98.4 F | OXYGEN SATURATION: 95 % | DIASTOLIC BLOOD PRESSURE: 62 MMHG | SYSTOLIC BLOOD PRESSURE: 122 MMHG | HEART RATE: 65 BPM | RESPIRATION RATE: 16 BRPM | WEIGHT: 184 LBS

## 2019-07-31 DIAGNOSIS — E11.40 TYPE 2 DIABETES MELLITUS WITH DIABETIC NEUROPATHY, WITHOUT LONG-TERM CURRENT USE OF INSULIN (HCC): ICD-10-CM

## 2019-07-31 DIAGNOSIS — Z13.39 SCREENING FOR ALCOHOLISM: ICD-10-CM

## 2019-07-31 DIAGNOSIS — E78.2 MIXED DYSLIPIDEMIA: ICD-10-CM

## 2019-07-31 DIAGNOSIS — Z71.89 ADVANCED DIRECTIVES, COUNSELING/DISCUSSION: ICD-10-CM

## 2019-07-31 DIAGNOSIS — Z13.31 SCREENING FOR DEPRESSION: ICD-10-CM

## 2019-07-31 DIAGNOSIS — Z00.00 MEDICARE ANNUAL WELLNESS VISIT, SUBSEQUENT: Primary | ICD-10-CM

## 2019-07-31 DIAGNOSIS — E11.21 TYPE 2 DIABETES WITH NEPHROPATHY (HCC): ICD-10-CM

## 2019-07-31 DIAGNOSIS — N18.30 CKD (CHRONIC KIDNEY DISEASE) STAGE 3, GFR 30-59 ML/MIN (HCC): ICD-10-CM

## 2019-07-31 DIAGNOSIS — Z12.5 SPECIAL SCREENING FOR MALIGNANT NEOPLASM OF PROSTATE: ICD-10-CM

## 2019-07-31 DIAGNOSIS — I25.10 ATHEROSCLEROSIS OF NATIVE CORONARY ARTERY OF NATIVE HEART WITHOUT ANGINA PECTORIS: ICD-10-CM

## 2019-07-31 DIAGNOSIS — E53.8 VITAMIN B12 DEFICIENCY: ICD-10-CM

## 2019-07-31 DIAGNOSIS — I10 ESSENTIAL HYPERTENSION, BENIGN: ICD-10-CM

## 2019-07-31 RX ORDER — AMLODIPINE BESYLATE 5 MG/1
TABLET ORAL
COMMUNITY
Start: 2017-05-11 | End: 2019-07-31 | Stop reason: SINTOL

## 2019-07-31 RX ORDER — TRIAMCINOLONE ACETONIDE 1 MG/G
PASTE DENTAL 2 TIMES DAILY
Qty: 5 G | Refills: 1 | Status: SHIPPED | OUTPATIENT
Start: 2019-07-31 | End: 2020-03-10 | Stop reason: ALTCHOICE

## 2019-07-31 RX ORDER — METOPROLOL SUCCINATE 50 MG/1
TABLET, EXTENDED RELEASE ORAL
COMMUNITY
Start: 2017-05-11 | End: 2019-07-31 | Stop reason: SDUPTHER

## 2019-07-31 RX ORDER — PRENATAL VIT 91/IRON/FOLIC/DHA 28-975-200
COMBINATION PACKAGE (EA) ORAL 2 TIMES DAILY
Qty: 30 G | Refills: 1 | Status: SHIPPED | OUTPATIENT
Start: 2019-07-31 | End: 2019-11-04 | Stop reason: ALTCHOICE

## 2019-07-31 RX ORDER — NITROGLYCERIN 0.4 MG/1
TABLET SUBLINGUAL
COMMUNITY
Start: 2017-05-11 | End: 2019-07-31 | Stop reason: SDUPTHER

## 2019-07-31 RX ORDER — TAMSULOSIN HYDROCHLORIDE 0.4 MG/1
CAPSULE ORAL
COMMUNITY
Start: 2017-05-02 | End: 2019-07-31 | Stop reason: SDUPTHER

## 2019-07-31 RX ORDER — DIAZEPAM 5 MG/1
TABLET ORAL
COMMUNITY
Start: 2018-08-21 | End: 2019-07-31 | Stop reason: ALTCHOICE

## 2019-07-31 RX ORDER — GABAPENTIN 300 MG/1
CAPSULE ORAL
COMMUNITY
Start: 2018-12-11 | End: 2019-07-31 | Stop reason: SDUPTHER

## 2019-07-31 RX ORDER — GABAPENTIN 300 MG/1
CAPSULE ORAL
Refills: 1 | COMMUNITY
Start: 2019-06-07 | End: 2019-07-31 | Stop reason: SDUPTHER

## 2019-07-31 RX ORDER — FENOFIBRATE 160 MG/1
TABLET ORAL
COMMUNITY
Start: 2017-05-03 | End: 2019-07-31 | Stop reason: SDUPTHER

## 2019-07-31 RX ORDER — GABAPENTIN 300 MG/1
300 CAPSULE ORAL 2 TIMES DAILY
Qty: 60 CAP | Refills: 2 | Status: SHIPPED | OUTPATIENT
Start: 2019-07-31 | End: 2020-05-04 | Stop reason: SDUPTHER

## 2019-07-31 NOTE — ASSESSMENT & PLAN NOTE
Stable, based on history, physical exam and review of pertinent labs, studies and medications; meds reconciled; continue current treatment plan, lifestyle modifications recommended, medication compliance emphasized. Key CKD Meds             tamsulosin (FLOMAX) 0.4 mg capsule (Taking) TAKE ONE CAPSULE BY MOUTH ONCE DAILY        Lab Results   Component Value Date/Time    GFR est non-AA 56 03/12/2019 09:40 AM    GFR est AA 65 03/12/2019 09:40 AM    Creatinine 1.29 03/12/2019 09:40 AM    BUN 17 03/12/2019 09:40 AM    Sodium 141 03/12/2019 09:40 AM    Potassium 5.0 03/12/2019 09:40 AM    Chloride 107 03/12/2019 09:40 AM    CO2 20 03/12/2019 09:40 AM    Calcium 9.6 03/12/2019 09:40 AM     Estimated Creatinine Clearance: 55 mL/min (A) (based on SCr of 1.29 mg/dL (H)).

## 2019-07-31 NOTE — ASSESSMENT & PLAN NOTE
Well Controlled, based on history, physical exam and review of pertinent labs, studies and medications; meds reconciled; changes to treatment plan as per orders, lifestyle modifications recommended, medication compliance emphasized. Key Antihyperglycemic Medications             metFORMIN (GLUCOPHAGE) 850 mg tablet (Taking) TAKE 1 TABLET BY MOUTH TWICE DAILY WITH MEALS        Other Key Diabetic Medications             gabapentin (NEURONTIN) 300 mg capsule (Taking) Take 1 Cap by mouth two (2) times a day. Max Daily Amount: 600 mg.    fenofibrate (LOFIBRA) 160 mg tablet (Taking) TAKE 1 TABLET BY MOUTH ONCE DAILY    atorvastatin (LIPITOR) 10 mg tablet (Taking) Take 1 Tab by mouth daily.         Lab Results   Component Value Date/Time    Hemoglobin A1c 6.6 03/12/2019 09:40 AM    Glucose 112 03/12/2019 09:40 AM    Creatinine 1.29 03/12/2019 09:40 AM    Microalb/Creat ratio (ug/mg creat.) 13.4 03/12/2019 09:40 AM    Cholesterol, total 124 03/12/2019 09:40 AM    HDL Cholesterol 33 03/12/2019 09:40 AM    LDL, calculated 66 03/12/2019 09:40 AM    Triglyceride 127 03/12/2019 09:40 AM     Diabetic Foot and Eye Exam HM Status   Topic Date Due    Diabetic Foot Care  10/31/2019    Eye Exam  02/09/2020

## 2019-07-31 NOTE — PROGRESS NOTES
This is the Subsequent Medicare Annual Wellness Exam, performed 12 months or more after the Initial AWV or the last Subsequent AWV    I have reviewed the patient's medical history in detail and updated the computerized patient record. We did a Medicare Wellness evaluation including a review of past, family, and social histories with attention to age/sex appropriate screening, risk assessment, preventive care and counseling. Any cognitive, fall risk, or ADL issues identified are addressed separately. A patient specific preventive care plan was provided and appropriate screening tests were ordered as specified. History     Past Medical History:   Diagnosis Date    Arthritis     KNEES & BACK. 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    S/P coronary artery stent placement July 29th, 2011    NAN to LAD      Past Surgical History:   Procedure Laterality Date    CARDIAC SURG PROCEDURE UNLIST  2009    CATHERIZATION WITH STENT X 1 PLACEMENT    COLONOSCOPY N/A 4/17/2018    COLONOSCOPY performed by Nitin Pradhan [facility] at [facility] ENDOSCOPY    HX GI  2010    COLONOSCOPY    HX GI  2010    ENDOSCOPY    HX HEENT      SEPTOPLASTY    HX HEENT      TONSILLECTOMY    HX KNEE REPLACEMENT Left 2008    DR ROSEN    HX ORTHOPAEDIC Right 2010, 2017    KNEE REPLACEMENT, SPACER 5/2017    HX ORTHOPAEDIC  2007    RIGHT SHOULDER ROTATOR CUFF REPAIR.      Current Outpatient Medications   Medication Sig Dispense Refill    glucose blood VI test strips (ACCU-CHEK RED PLUS TEST STRP) strip USE ONE STRIP TO CHECK GLUCOSE ONCE DAILY 100 Strip 5    gabapentin (NEURONTIN) 300 mg capsule Take 1 Cap by mouth two (2) times a day. Max Daily Amount: 600 mg. 60 Cap 2    diph,pertuss,acel,,tetanus vac,PF, (ADACEL) 2 Lf-(2.5-5-3-5 mcg)-5Lf/0.5 mL syrg vaccine 0.5 mL by IntraMUSCular route once for 1 dose. 0.5 mL 0    varicella-zoster recombinant, PF, (SHINGRIX, PF,) 50 mcg/0.5 mL susr injection 0.5mL by IntraMUSCular route once now and then repeat in 2-6 months 0.5 mL 1    triamcinolone acetonide (KENALOG) 0.1 % dental paste by Dental route two (2) times a day. 5 g 1    terbinafine HCl (LAMISIL) 1 % topical cream Apply  to affected area two (2) times a day. 30 g 1    fenofibrate (LOFIBRA) 160 mg tablet TAKE 1 TABLET BY MOUTH ONCE DAILY 90 Tab 0    metFORMIN (GLUCOPHAGE) 850 mg tablet TAKE 1 TABLET BY MOUTH TWICE DAILY WITH MEALS 180 Tab 1    atorvastatin (LIPITOR) 10 mg tablet Take 1 Tab by mouth daily. 90 Tab 3    metoprolol succinate (TOPROL-XL) 50 mg XL tablet Take 1 Tab by mouth daily. 90 Tab 3    nitroglycerin (NITROSTAT) 0.4 mg SL tablet DISSOLVE ONE TABLET UNDER THE TONGUE EVERY 5 MINUTES AS NEEDED FOR CHEST PAIN. UP TO 3 DOSES 1 Bottle 3    tamsulosin (FLOMAX) 0.4 mg capsule TAKE ONE CAPSULE BY MOUTH ONCE DAILY 90 Cap 0    aspirin delayed-release 81 mg tablet Take 162 mg by mouth daily.  acetaminophen (TYLENOL) 500 mg tablet Take 2 Tabs by mouth every six (6) hours. 120 Tab 0    esomeprazole (NEXIUM) 20 mg capsule Take 20 mg by mouth daily as needed. Allergies   Allergen Reactions    Levofloxacin Rash    Pcn [Penicillins] Rash    Adhesive Tape-Silicones Unknown (comments)     Other reaction(s): Rash  Medipore tape caused large blisters when removed.  Tape [Adhesive] Rash     Medipore tape caused large blisters when removed.      Family History   Problem Relation Age of Onset    Diabetes Mother     Heart Disease Mother     Diabetes Son         TYPE 1    Alcohol abuse Son     Anesth Problems Neg Hx      Social History     Tobacco Use    Smoking status: Never Smoker    Smokeless tobacco: Former User    Tobacco comment: Patient states on and off for chewing tobacco.     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 with presence of antibiotic-impregnated cement spacer Z89.529    History of non anemic vitamin B12 deficiency Z86.39    Vitamin B12 deficiency E53.8    Chronic pansinusitis J32.4    CKD (chronic kidney disease) stage 3, GFR 30-59 ml/min ([facility]) N18.3    Type 2 diabetes mellitus with diabetic neuropathy, without long-term current use of insulin ([facility]) E11.40    Type 2 diabetes mellitus with diabetic neuropathy ([facility]) E11.40       Depression Risk Factor Screening:     3 most recent PHQ Screens 7/31/2019   Little interest or pleasure in doing things Several days   Feeling down, depressed, irritable, or hopeless Several days   Total Score PHQ 2 2     Alcohol Risk Factor Screening: You do not drink alcohol or very rarely. Functional Ability and Level of Safety:   Hearing Loss  The patient needs further evaluation. Activities of Daily Living  The home contains: handrails and grab bars  Patient does total self care    Fall Risk  Fall Risk Assessment, last 12 mths 7/31/2019   Able to walk? Yes   Fall in past 12 months?  No       Abuse Screen  Patient is not abused    Cognitive Screening   Evaluation of Cognitive Function:  Has your family/caregiver stated any concerns about your memory: no  Normal, MMSE    Patient Care Team   Patient Care Team:  Josefa Kc MD as PCP - General (Family Practice)  Daisy Maya MD (Orthopedic Surgery)  Kai Chavira MD (Cardiology)  Anthony Romero MD (Urology)    Assessment/Plan   Education and counseling provided:  Are appropriate based on today's review and evaluation    Diagnoses and all orders for this visit:    1. Medicare annual wellness visit, subsequent  -     TSH 3RD GENERATION  -     URINALYSIS W/ RFLX MICROSCOPIC    2. Type 2 diabetes mellitus with diabetic neuropathy, without long-term current use of insulin ([facility])  -     glucose blood VI test strips (ACCU-CHEK RED PLUS TEST STRP) strip; USE ONE STRIP TO CHECK GLUCOSE ONCE DAILY  -     gabapentin (NEURONTIN) 300 mg capsule; Take 1 Cap by mouth two (2) times a day. Max Daily Amount: 600 mg.  -      DIABETES FOOT EXAM  -     METABOLIC PANEL, COMPREHENSIVE  -     HEMOGLOBIN A1C WITH EAG    3. Type 2 diabetes with nephropathy (HCC)  -     glucose blood VI test strips (ACCU-CHEK RED PLUS TEST STRP) strip; USE ONE STRIP TO CHECK GLUCOSE ONCE DAILY  -      DIABETES FOOT EXAM  -     METABOLIC PANEL, COMPREHENSIVE  -     HEMOGLOBIN A1C WITH EAG    4. Mixed dyslipidemia  -     METABOLIC PANEL, COMPREHENSIVE  -     LIPID PANEL    5. CKD (chronic kidney disease) stage 3, GFR 30-59 ml/min ([facility])  -     METABOLIC PANEL, COMPREHENSIVE  -     CBC WITH AUTOMATED DIFF    6. Essential hypertension, benign  -     METABOLIC PANEL, COMPREHENSIVE    7. Vitamin B12 deficiency  -     VITAMIN B12    8. Atherosclerosis of native coronary artery of native heart without angina pectoris  -     LIPID PANEL    9. Advanced directives, counseling/discussion  -     ADVANCE CARE PLANNING FIRST 27 MINS    10. Screening for alcoholism  -     RI ANNUAL ALCOHOL SCREEN 15 MIN    11.  Screening for depression  -     DEPRESSION SCREEN ANNUAL    12. Special screening for malignant neoplasm of prostate  -     PSA SCREENING (SCREENING)    Other orders  -     diph,pertuss,acel,,tetanus vac,PF, (ADACEL) 2 Lf-(2.5-5-3-5 mcg)-5Lf/0.5 mL syrg vaccine; 0.5 mL by IntraMUSCular route once for 1 dose.  -     varicella-zoster recombinant, PF, (SHINGRIX, PF,) 50 mcg/0.5 mL susr injection; 0.5mL by IntraMUSCular route once now and then repeat in 2-6 months  -     triamcinolone acetonide (KENALOG) 0.1 % dental paste; by Dental route two (2) times a day. -     terbinafine HCl (LAMISIL) 1 % topical cream; Apply  to affected area two (2) times a day. Health Maintenance Due   Topic Date Due    DTaP/Tdap/Td series (1 - Tdap) 05/30/1970    Shingrix Vaccine Age 50> (1 of 2) 05/30/1999    MEDICARE YEARLY EXAM  02/14/2019     Discussed lifestyle issues and health guidance given  Patient was given an after visit summary which includes diagnoses, vital signs, current medications, instructions and references & authorized prescriptions . Results of labs will be conveyed to patient, once available. Pt verbalized instructions I provided and expressed understanding of discussion that was held today. Follow-up and Dispositions    · Return in about 4 months (around 11/30/2019) for fasting, follow up.

## 2019-07-31 NOTE — PATIENT INSTRUCTIONS
Medicare Wellness Visit, Male The best way to live healthy is to have a lifestyle where you eat a well-balanced diet, exercise regularly, limit alcohol use, and quit all forms of tobacco/nicotine, if applicable. Regular preventive services are another way to keep healthy. Preventive services (vaccines, screening tests, monitoring & exams) can help personalize your care plan, which helps you manage your own care. Screening tests can find health problems at the earliest stages, when they are easiest to treat. [address] [facility] follows the current, evidence-based guidelines published by the [facility] Joshua Sayra ([facility] [[email or web address]]STF) when recommending preventive services for our patients. Because we follow these guidelines, sometimes recommendations change over time as research supports it. (For example, a prostate screening blood test is no longer routinely recommended for men with no symptoms.) Of course, you and your doctor may decide to screen more often for some diseases, based on your risk and co-morbidities (chronic disease you are already diagnosed with). Preventive services for you include: - Medicare offers their members a free annual wellness visit, which is time for you and your primary care provider to discuss and plan for your preventive service needs. Take advantage of this benefit every year! 
-All adults over age 72 should receive the recommended pneumonia vaccines. Current USPSTF guidelines recommend a series of two vaccines for the best pneumonia protection.  
-All adults should have a flu vaccine yearly and an ECG.  All adults age 61 and older should receive a shingles vaccine once in their lifetime.   
-All adults age 38-68 who are overweight should have a diabetes screening test once every three years.  
-Other screening tests & preventive services for persons with diabetes include: an eye exam to screen for diabetic retinopathy, a kidney function test, a foot exam, and stricter control over your cholesterol.  
-Cardiovascular screening for adults with routine risk involves an electrocardiogram (ECG) at intervals determined by the provider.  
-Colorectal cancer screening should be done for adults age 54-65 with no increased risk factors for colorectal cancer. There are a number of acceptable methods of screening for this type of cancer. Each test has its own benefits and drawbacks. Discuss with your provider what is most appropriate for you during your annual wellness visit. The different tests include: colonoscopy (considered the best screening method), a fecal occult blood test, a fecal DNA test, and sigmoidoscopy. 
-All adults born between [facility] should be screened once for Hepatitis C. 
-An Abdominal Aortic Aneurysm (AAA) Screening is recommended for men age 73-68 who has ever smoked in their lifetime. Here is a list of your current Health Maintenance items (your personalized list of preventive services) with a due date: 
Health Maintenance Due Topic Date Due  
 DTaP/Tdap/Td  (1 - Tdap) 05/30/1970  Shingles Vaccine (1 of 2) 05/30/1999 Vinay Zhu Annual Well Visit  02/14/2019 [address] What is a living will? A living will is a legal form you use to write down the kind of care you want at the end of your life. It is used by the health professionals who will treat you if you aren't able to decide for yourself. If you put your wishes in writing, your loved ones and others will know what kind of care you want. They won't need to guess. This can ease your mind and be helpful to others. A living will is not the same as an estate or property will. An estate will explains what you want to happen with your money and property after you die. Is a living will a legal document? A living will is a legal document. Each state has its own laws about living el.  If you move to another state, make sure that your living will is legal in the state where you now live. Or you might use a universal form that has been approved by many states. This kind of form can sometimes be completed and stored online. Your electronic copy will then be available wherever you have a connection to the Internet. In most cases, doctors will respect your wishes even if you have a form from a different state. · You don't need an  to complete a living will. But legal advice can be helpful if your state's laws are unclear, your health history is complicated, or your family can't agree on what should be in your living will. · You can change your living will at any time. Some people find that their wishes about end-of-life care change as their health changes. · In addition to making a living will, think about completing a medical power of  form. This form lets you name the person you want to make end-of-life treatment decisions for you (your \"health care agent\") if you're not able to. Many hospitals and nursing homes will give you the forms you need to complete a living will and a medical power of . · Your living will is used only if you can't make or communicate decisions for yourself anymore. If you become able to make decisions again, you can accept or refuse any treatment, no matter what you wrote in your living will. · Your state may offer an online registry. This is a place where you can store your living will online so the doctors and nurses who need to treat you can find it right away. What should you think about when creating a living will? Talk about your end-of-life wishes with your family members and your doctor. Let them know what you want. That way the people making decisions for you won't be surprised by your choices. Think about these questions as you make your living will: · Do you know enough about life support methods that might be used?  If not, talk to your doctor so you know what might be done if you can't breathe on your own, your heart stops, or you're unable to swallow. · What things would you still want to be able to do after you receive life-support methods? Would you want to be able to walk? To speak? To eat on your own? To live without the help of machines? · If you have a choice, where do you want to be cared for? In your home? At a hospital or nursing home? · Do you want certain Voodoo practices performed if you become very ill? · If you have a choice at the end of your life, where would you prefer to die? At home? In a hospital or nursing home? Somewhere else? · Would you prefer to be buried or cremated? · Do you want your organs to be donated after you die? What should you do with your living will? · Make sure that your family members and your health care agent have copies of your living will. · Give your doctor a copy of your living will to keep in your medical record. If you have more than one doctor, make sure that each one has a copy. · You may want to put a copy of your living will where it can be easily found. Where can you learn more? Go to http://araseli-lv.info/. Enter C138 in the search box to learn more about \"Learning About Living Perroy. \" Current as of: August 8, 2016 Content Version: 11.3 © 5117-0168 [employer]. Care instructions adapted under license by [employer] (which disclaims liability or warranty for this information). If you have questions about a medical condition or this instruction, always ask your healthcare professional. [place] [address] any warranty or liability for your use of this information.

## 2019-07-31 NOTE — ASSESSMENT & PLAN NOTE
Stable, based on history, physical exam and review of pertinent labs, studies and medications; meds reconciled; continue current treatment plan, lifestyle modifications recommended, medication compliance emphasized. Key Antihyperglycemic Medications             metFORMIN (GLUCOPHAGE) 850 mg tablet (Taking) TAKE 1 TABLET BY MOUTH TWICE DAILY WITH MEALS        Other Key Diabetic Medications             gabapentin (NEURONTIN) 300 mg capsule (Taking) Take 1 Cap by mouth two (2) times a day. Max Daily Amount: 600 mg.    fenofibrate (LOFIBRA) 160 mg tablet (Taking) TAKE 1 TABLET BY MOUTH ONCE DAILY    atorvastatin (LIPITOR) 10 mg tablet (Taking) Take 1 Tab by mouth daily.         Lab Results   Component Value Date/Time    Hemoglobin A1c 6.6 03/12/2019 09:40 AM    Glucose 112 03/12/2019 09:40 AM    Creatinine 1.29 03/12/2019 09:40 AM    Microalb/Creat ratio (ug/mg creat.) 13.4 03/12/2019 09:40 AM    Cholesterol, total 124 03/12/2019 09:40 AM    HDL Cholesterol 33 03/12/2019 09:40 AM    LDL, calculated 66 03/12/2019 09:40 AM    Triglyceride 127 03/12/2019 09:40 AM     Diabetic Foot and Eye Exam HM Status   Topic Date Due    Diabetic Foot Care  10/31/2019    Eye Exam  02/09/2020

## 2019-07-31 NOTE — ACP (ADVANCE CARE PLANNING)
Advance Care Planning    Advance Care Planning (ACP) Provider Conversation Snapshot    Date of ACP Conversation: 07/31/19  Persons included in Conversation:  patient and family (patient and wife)  Length of ACP Conversation in minutes:  16 minutes    Authorized Decision Maker (if patient is incapable of making informed decisions): spouse, Viry Suresh. He doesn't want his son Jennifer Door to be his POA  This person is: Other Legally Authorized Decision Maker (e.g. Next of Kin)            For Patients with Decision Making Capacity:   Values/Goals: Exploration of values, goals, and preferences if recovery is not expected, even with continued medical treatment in the event of:  Imminent death  Severe, permanent brain injury  \"In these circumstances, what matters most to you? \"  Care focused more on quantity (length) of life. Conversation Outcomes / Follow-Up Plan:   Recommended completion of Advance Directive form after review of ACP materials and conversation with prospective healthcare agent   Recommended communicating the plan and making copies for the healthcare agent, personal physician, and others as appropriate (e.g., health system)  Recommended review of completed ACP document annually or upon change in health status  directive was discussed,updated and gave copy to patient to complete.

## 2019-07-31 NOTE — PROGRESS NOTES
Chief Complaint   Patient presents with    Diabetes     Follow up    Cholesterol Problem    Labs     1. Have you been to the ER, urgent care clinic since your last visit? Hospitalized since your last visit? No    2. Have you seen or consulted any other health care providers outside of the [address] [place] [address] since your last visit? Include any pap smears or colon screening.  No

## 2019-07-31 NOTE — PROGRESS NOTES
HISTORY OF PRESENT ILLNESS  Eliana Carlisle is a 78 y.o. male. seen for 4 months follow up on several medical conditions including DM,HTN,CKD,CAD, dyslipidemia, foot pain along with his annual wellness visit.   HPI  Cardiovascular Review  The patient has hypertension, hyperlipidemia, coronary artery disease and status post coronary artery stenting.  He reports taking medications as instructed, no medication side effects noted, home BP monitoring in range of 726'M systolic over 81'U diastolic, no chest pain on exertion, no dyspnea on exertion, no swelling of ankles, no orthostatic dizziness or lightheadedness, no orthopnea or paroxysmal nocturnal dyspnea, no palpitations, no muscle aches or pain.  Diet and Lifestyle: generally follows a low fat low cholesterol diet, generally follows a low sodium diet, exercises sporadically, chews tobacco.  Lab review: labs reviewed and discussed with patient.  He follows Dr Junior Espino   Cardiac History:   PCI 7- prox 75% LAD, D1 50% EF 60%; NAN 3 x 15 mm Xience to LAD  Stress echo 5-28-13=  Walked 3 minutes, Exercise stress echo is normal.    Medicines:  mg,  Metoprolol XL 50 mg, Lofibra 160 mg,and Lipitor 10 mg     Lab Results   Component Value Date/Time    Cholesterol, total 124 03/12/2019 09:40 AM    HDL Cholesterol 33 (L) 03/12/2019 09:40 AM    LDL, calculated 66 03/12/2019 09:40 AM    VLDL, calculated 25 03/12/2019 09:40 AM    Triglyceride 127 03/12/2019 09:40 AM    CHOL/HDL Ratio 5.8 (H) 07/30/2011 04:31 AM        Endocrine Review  He is seen for diabetes.  Since last visit he reports: no polyuria or polydipsia, no significant changes.  Home glucose monitoring: is performed regularly, fasting values range 100-120  He is checking his sugars one per day.  He reports medication compliance: compliant all of the time.  Medication side effects: none.  Diabetic diet compliance: compliant most of the time.  Lab review: labs reviewed and discussed with patient  On Metformin 850 mg bid. Lab Results   Component Value Date/Time    Hemoglobin A1c 6.6 (H) 03/12/2019 09:40 AM      Started on Gabapentin for his bilateral feet pain     CKD:  Stage of Chronic Kidney Disease III exhibited by:  lab  Labs:  Lab Results   Component Value Date/Time    GFR est AA 65 03/12/2019 09:40 AM    GFR est non-AA 56 (L) 03/12/2019 09:40 AM    Creatinine 1.29 (H) 03/12/2019 09:40 AM    BUN 17 03/12/2019 09:40 AM    Sodium 141 03/12/2019 09:40 AM    Potassium 5.0 03/12/2019 09:40 AM    Chloride 107 (H) 03/12/2019 09:40 AM    CO2 20 03/12/2019 09:40 AM           He had epidural shots for lumbar radiculopathy by DR Gamaliel Jolley in 08/2018. As per him, he has started with similar symptoms again. was on gabapentin for his radiculopathy,not getting recent refills    Seen recently in office for cough and mouth pain. Xray chest was normal. On Clindamycin for his gingivitis.       Review of Systems   Constitutional: Negative for chills, fever and malaise/fatigue. HENT: Positive for ear discharge and ear pain. Negative for congestion, sore throat and tinnitus. Eyes: Negative for blurred vision, double vision, pain and discharge. Respiratory: Positive for cough. Negative for shortness of breath and wheezing. Cardiovascular: Negative for chest pain, palpitations and leg swelling. Gastrointestinal: Negative for abdominal pain, blood in stool, constipation, diarrhea, nausea and vomiting. Genitourinary: Negative for dysuria, frequency, hematuria and urgency. Musculoskeletal: Negative for back pain, joint pain and myalgias. Bilateral feet pain   Skin: Negative for rash. Neurological: Negative for dizziness, tremors, seizures and headaches. Endo/Heme/Allergies: Negative for polydipsia. Does not bruise/bleed easily. Psychiatric/Behavioral: Negative for depression and substance abuse. The patient is not nervous/anxious.         Physical Exam   Constitutional: He is oriented to person, place, and time. He appears well-developed and well-nourished. HENT:   Head: Normocephalic and atraumatic. Right Ear: There is drainage. Tympanic membrane is perforated. Decreased hearing is noted. Left Ear: There is drainage. Tympanic membrane is perforated. Decreased hearing is noted. Mouth/Throat: Oropharynx is clear and moist. No oropharyngeal exudate. Left TM shows new perforation,in center   Eyes: Pupils are equal, round, and reactive to light. Conjunctivae and EOM are normal. No scleral icterus. Neck: Normal range of motion. Neck supple. No JVD present. No thyromegaly present. Cardiovascular: Normal rate, regular rhythm, normal heart sounds and intact distal pulses. No murmur heard. Pulmonary/Chest: Effort normal and breath sounds normal. He has no wheezes. Abdominal: Soft. Bowel sounds are normal. He exhibits no distension and no mass. Musculoskeletal: Normal range of motion. He exhibits no edema or tenderness. Feet:warm, good capillary refill, no trophic changes or ulcerative lesions, normal DP and PT pulses, normal monofilament exam and normal sensory exam  Changes of tinea pedis,both feet, left > right     Lymphadenopathy:     He has no cervical adenopathy. Neurological: He is alert and oriented to person, place, and time. He has normal reflexes. No cranial nerve deficit. Skin: Skin is warm and dry. No rash noted. He is not diaphoretic. Psychiatric: He has a normal mood and affect. Nursing note and vitals reviewed. ASSESSMENT and PLAN  Diagnoses and all orders for this visit:    1. Medicare annual wellness visit, subsequent  -     TSH 3RD GENERATION  -     URINALYSIS W/ RFLX MICROSCOPIC    2.  Type 2 diabetes mellitus with diabetic neuropathy, without long-term current use of insulin (HCC)  Assessment & Plan:  Stable, based on history, physical exam and review of pertinent labs, studies and medications; meds reconciled; continue current treatment plan, lifestyle modifications recommended, medication compliance emphasized. Key Antihyperglycemic Medications             metFORMIN (GLUCOPHAGE) 850 mg tablet (Taking) TAKE 1 TABLET BY MOUTH TWICE DAILY WITH MEALS        Other Key Diabetic Medications             gabapentin (NEURONTIN) 300 mg capsule (Taking) Take 1 Cap by mouth two (2) times a day. Max Daily Amount: 600 mg.    fenofibrate (LOFIBRA) 160 mg tablet (Taking) TAKE 1 TABLET BY MOUTH ONCE DAILY    atorvastatin (LIPITOR) 10 mg tablet (Taking) Take 1 Tab by mouth daily. Lab Results   Component Value Date/Time    Hemoglobin A1c 6.6 03/12/2019 09:40 AM    Glucose 112 03/12/2019 09:40 AM    Creatinine 1.29 03/12/2019 09:40 AM    Microalb/Creat ratio (ug/mg creat.) 13.4 03/12/2019 09:40 AM    Cholesterol, total 124 03/12/2019 09:40 AM    HDL Cholesterol 33 03/12/2019 09:40 AM    LDL, calculated 66 03/12/2019 09:40 AM    Triglyceride 127 03/12/2019 09:40 AM     Diabetic Foot and Eye Exam  Status   Topic Date Due    Diabetic Foot Care  10/31/2019    Eye Exam  02/09/2020       Orders:  -     glucose blood VI test strips (ACCU-CHEK RED PLUS TEST STRP) strip; USE ONE STRIP TO CHECK GLUCOSE ONCE DAILY  -     gabapentin (NEURONTIN) 300 mg capsule; Take 1 Cap by mouth two (2) times a day. Max Daily Amount: 600 mg.  -      DIABETES FOOT EXAM  -     METABOLIC PANEL, COMPREHENSIVE  -     HEMOGLOBIN A1C WITH EAG    3. Type 2 diabetes with nephropathy [facility])  Assessment & Plan:  Stable, based on history, physical exam and review of pertinent labs, studies and medications; meds reconciled; continue current treatment plan, lifestyle modifications recommended, medication compliance emphasized. Key Antihyperglycemic Medications             metFORMIN (GLUCOPHAGE) 850 mg tablet (Taking) TAKE 1 TABLET BY MOUTH TWICE DAILY WITH MEALS        Other Key Diabetic Medications             gabapentin (NEURONTIN) 300 mg capsule (Taking) Take 1 Cap by mouth two (2) times a day. Max Daily Amount: 600 mg. fenofibrate (LOFIBRA) 160 mg tablet (Taking) TAKE 1 TABLET BY MOUTH ONCE DAILY    atorvastatin (LIPITOR) 10 mg tablet (Taking) Take 1 Tab by mouth daily. Lab Results   Component Value Date/Time    Hemoglobin A1c 6.6 03/12/2019 09:40 AM    Glucose 112 03/12/2019 09:40 AM    Creatinine 1.29 03/12/2019 09:40 AM    Microalb/Creat ratio (ug/mg creat.) 13.4 03/12/2019 09:40 AM    Cholesterol, total 124 03/12/2019 09:40 AM    HDL Cholesterol 33 03/12/2019 09:40 AM    LDL, calculated 66 03/12/2019 09:40 AM    Triglyceride 127 03/12/2019 09:40 AM     Diabetic Foot and Eye Exam HM Status   Topic Date Due    Diabetic Foot Care  10/31/2019    Eye Exam  02/09/2020       Orders:  -     glucose blood VI test strips (ACCU-CHEK RED PLUS TEST STRP) strip; USE ONE STRIP TO CHECK GLUCOSE ONCE DAILY  -      DIABETES FOOT EXAM  -     METABOLIC PANEL, COMPREHENSIVE  -     HEMOGLOBIN A1C WITH EAG    4. Mixed dyslipidemia  -     METABOLIC PANEL, COMPREHENSIVE  -     LIPID PANEL    5. CKD (chronic kidney disease) stage 3, GFR 30-59 ml/min ([facility])  Assessment & Plan:  Stable, based on history, physical exam and review of pertinent labs, studies and medications; meds reconciled; continue current treatment plan, lifestyle modifications recommended, medication compliance emphasized. Key CKD Meds             tamsulosin (FLOMAX) 0.4 mg capsule (Taking) TAKE ONE CAPSULE BY MOUTH ONCE DAILY        Lab Results   Component Value Date/Time    GFR est non-AA 56 03/12/2019 09:40 AM    GFR est AA 65 03/12/2019 09:40 AM    Creatinine 1.29 03/12/2019 09:40 AM    BUN 17 03/12/2019 09:40 AM    Sodium 141 03/12/2019 09:40 AM    Potassium 5.0 03/12/2019 09:40 AM    Chloride 107 03/12/2019 09:40 AM    CO2 20 03/12/2019 09:40 AM    Calcium 9.6 03/12/2019 09:40 AM     Estimated Creatinine Clearance: 55 mL/min (A) (based on SCr of 1.29 mg/dL (H)). Orders:  -     METABOLIC PANEL, COMPREHENSIVE  -     CBC WITH AUTOMATED DIFF    6.  Essential hypertension, benign  -     METABOLIC PANEL, COMPREHENSIVE    7. Vitamin B12 deficiency  -     VITAMIN B12    8. Atherosclerosis of native coronary artery of native heart without angina pectoris  -     LIPID PANEL    9. Advanced directives, counseling/discussion  -     ADVANCE CARE PLANNING FIRST 27 MINS    10. Screening for alcoholism  -     MS ANNUAL ALCOHOL SCREEN 15 MIN    11. Screening for depression  -     DEPRESSION SCREEN ANNUAL    12. Special screening for malignant neoplasm of prostate  -     PSA SCREENING (SCREENING)    Other orders  -     diph,pertuss,acel,,tetanus vac,PF, (ADACEL) 2 Lf-(2.5-5-3-5 mcg)-5Lf/0.5 mL syrg vaccine; 0.5 mL by IntraMUSCular route once for 1 dose.  -     varicella-zoster recombinant, PF, (SHINGRIX, PF,) 50 mcg/0.5 mL susr injection; 0.5mL by IntraMUSCular route once now and then repeat in 2-6 months  -     triamcinolone acetonide (KENALOG) 0.1 % dental paste; by Dental route two (2) times a day. -     terbinafine HCl (LAMISIL) 1 % topical cream; Apply  to affected area two (2) times a day. Discussed lifestyle issues and health guidance given  Patient was given an after visit summary which includes diagnoses, vital signs, current medications, instructions and references & authorized prescriptions . Results of labs will be conveyed to patient, once available. Pt verbalized instructions I provided and expressed understanding of discussion that was held today. Follow-up and Dispositions    · Return in about 4 months (around 11/30/2019) for fasting, follow up.

## 2019-08-03 LAB
ALBUMIN SERPL-MCNC: 4.5 G/DL (ref 3.5–4.8)
ALBUMIN/GLOB SERPL: 1.6 {RATIO} (ref 1.2–2.2)
ALP SERPL-CCNC: 44 IU/L (ref 39–117)
ALT SERPL-CCNC: 18 IU/L (ref 0–44)
APPEARANCE UR: CLEAR
AST SERPL-CCNC: 22 IU/L (ref 0–40)
BACTERIA #/AREA URNS HPF: ABNORMAL /[HPF]
BASOPHILS # BLD AUTO: 0 X10E3/UL (ref 0–0.2)
BASOPHILS NFR BLD AUTO: 0 %
BILIRUB SERPL-MCNC: 0.8 MG/DL (ref 0–1.2)
BILIRUB UR QL STRIP: NEGATIVE
BUN SERPL-MCNC: 13 MG/DL (ref 8–27)
BUN/CREAT SERPL: 9 (ref 10–24)
CALCIUM SERPL-MCNC: 10.1 MG/DL (ref 8.6–10.2)
CASTS URNS QL MICRO: ABNORMAL /LPF
CHLORIDE SERPL-SCNC: 112 MMOL/L (ref 96–106)
CHOLEST SERPL-MCNC: 110 MG/DL (ref 100–199)
CO2 SERPL-SCNC: 22 MMOL/L (ref 20–29)
COLOR UR: YELLOW
CREAT SERPL-MCNC: 1.41 MG/DL (ref 0.76–1.27)
EOSINOPHIL # BLD AUTO: 0.2 X10E3/UL (ref 0–0.4)
EOSINOPHIL NFR BLD AUTO: 3 %
EPI CELLS #/AREA URNS HPF: ABNORMAL /HPF (ref 0–10)
ERYTHROCYTE [DISTWIDTH] IN BLOOD BY AUTOMATED COUNT: 15.4 % (ref 12.3–15.4)
EST. AVERAGE GLUCOSE BLD GHB EST-MCNC: 131 MG/DL
GLOBULIN SER CALC-MCNC: 2.8 G/DL (ref 1.5–4.5)
GLUCOSE SERPL-MCNC: 120 MG/DL (ref 65–99)
GLUCOSE UR QL: NEGATIVE
HBA1C MFR BLD: 6.2 % (ref 4.8–5.6)
HCT VFR BLD AUTO: 39.5 % (ref 37.5–51)
HDLC SERPL-MCNC: 38 MG/DL
HGB BLD-MCNC: 12.8 G/DL (ref 13–17.7)
HGB UR QL STRIP: ABNORMAL
IMM GRANULOCYTES # BLD AUTO: 0 X10E3/UL (ref 0–0.1)
IMM GRANULOCYTES NFR BLD AUTO: 0 %
INTERPRETATION, 910389: NORMAL
INTERPRETATION: NORMAL
KETONES UR QL STRIP: NEGATIVE
LDLC SERPL CALC-MCNC: 51 MG/DL (ref 0–99)
LEUKOCYTE ESTERASE UR QL STRIP: NEGATIVE
LYMPHOCYTES # BLD AUTO: 1.9 X10E3/UL (ref 0.7–3.1)
LYMPHOCYTES NFR BLD AUTO: 38 %
MCH RBC QN AUTO: 27.4 PG (ref 26.6–33)
MCHC RBC AUTO-ENTMCNC: 32.4 G/DL (ref 31.5–35.7)
MCV RBC AUTO: 84 FL (ref 79–97)
MICRO URNS: ABNORMAL
MONOCYTES # BLD AUTO: 0.2 X10E3/UL (ref 0.1–0.9)
MONOCYTES NFR BLD AUTO: 4 %
MUCOUS THREADS URNS QL MICRO: PRESENT
NEUTROPHILS # BLD AUTO: 2.8 X10E3/UL (ref 1.4–7)
NEUTROPHILS NFR BLD AUTO: 55 %
NITRITE UR QL STRIP: NEGATIVE
PDF IMAGE, 910387: NORMAL
PH UR STRIP: 5 [PH] (ref 5–7.5)
PLATELET # BLD AUTO: 286 X10E3/UL (ref 150–450)
POTASSIUM SERPL-SCNC: 5.2 MMOL/L (ref 3.5–5.2)
PROT SERPL-MCNC: 7.3 G/DL (ref 6–8.5)
PROT UR QL STRIP: NEGATIVE
RBC # BLD AUTO: 4.68 X10E6/UL (ref 4.14–5.8)
RBC #/AREA URNS HPF: ABNORMAL /HPF (ref 0–2)
SODIUM SERPL-SCNC: 150 MMOL/L (ref 134–144)
SP GR UR: 1.02 (ref 1–1.03)
TRIGL SERPL-MCNC: 104 MG/DL (ref 0–149)
TSH SERPL DL<=0.005 MIU/L-ACNC: 3.11 UIU/ML (ref 0.45–4.5)
UROBILINOGEN UR STRIP-MCNC: 0.2 MG/DL (ref 0.2–1)
VIT B12 SERPL-MCNC: 1606 PG/ML (ref 232–1245)
VLDLC SERPL CALC-MCNC: 21 MG/DL (ref 5–40)
WBC # BLD AUTO: 5.1 X10E3/UL (ref 3.4–10.8)
WBC #/AREA URNS HPF: ABNORMAL /HPF (ref 0–5)

## 2019-08-05 NOTE — PROGRESS NOTES
Please inform patient and send letter,  CBC shows low hemoglobin, but stable,chronic  Average sugar has improved, to continue with Metformin and good diet  Kidney function is worsening. Also urine positive for blood. Need to get checked with urology.  He follows Dr [facility] ,vitamin b12 results are normal  Need to schedule his 4 months follow up  thanks

## 2019-08-06 NOTE — PROGRESS NOTES
Outbound call to patient name and  verified. reviewed recent labs with patient. Letter printed with results.  He declined to schedule follow up

## 2019-11-04 ENCOUNTER — OFFICE VISIT (OUTPATIENT)
Dept: FAMILY MEDICINE CLINIC | Age: 69
End: 2019-11-04

## 2019-11-04 VITALS
SYSTOLIC BLOOD PRESSURE: 113 MMHG | BODY MASS INDEX: 27.2 KG/M2 | HEART RATE: 60 BPM | TEMPERATURE: 98.4 F | HEIGHT: 70 IN | RESPIRATION RATE: 16 BRPM | DIASTOLIC BLOOD PRESSURE: 65 MMHG | WEIGHT: 190 LBS | OXYGEN SATURATION: 98 %

## 2019-11-04 DIAGNOSIS — N18.30 CKD (CHRONIC KIDNEY DISEASE) STAGE 3, GFR 30-59 ML/MIN (HCC): ICD-10-CM

## 2019-11-04 DIAGNOSIS — E78.2 MIXED DYSLIPIDEMIA: ICD-10-CM

## 2019-11-04 DIAGNOSIS — E11.21 TYPE 2 DIABETES WITH NEPHROPATHY (HCC): ICD-10-CM

## 2019-11-04 DIAGNOSIS — H72.93 PERFORATION OF BOTH TYMPANIC MEMBRANES: ICD-10-CM

## 2019-11-04 DIAGNOSIS — B35.3 TINEA PEDIS OF RIGHT FOOT: ICD-10-CM

## 2019-11-04 DIAGNOSIS — Z95.5 S/P CORONARY ARTERY STENT PLACEMENT: ICD-10-CM

## 2019-11-04 DIAGNOSIS — Z23 ENCOUNTER FOR IMMUNIZATION: ICD-10-CM

## 2019-11-04 DIAGNOSIS — F51.01 PRIMARY INSOMNIA: ICD-10-CM

## 2019-11-04 DIAGNOSIS — E11.40 TYPE 2 DIABETES MELLITUS WITH DIABETIC NEUROPATHY, WITHOUT LONG-TERM CURRENT USE OF INSULIN (HCC): Primary | ICD-10-CM

## 2019-11-04 DIAGNOSIS — I10 ESSENTIAL HYPERTENSION, BENIGN: ICD-10-CM

## 2019-11-04 RX ORDER — ZOLPIDEM TARTRATE 5 MG/1
5 TABLET ORAL
Qty: 30 TAB | Refills: 0 | Status: SHIPPED | OUTPATIENT
Start: 2019-11-04 | End: 2020-02-03

## 2019-11-04 RX ORDER — DICLOFENAC SODIUM 10 MG/G
GEL TOPICAL
Refills: 3 | COMMUNITY
Start: 2019-09-27 | End: 2019-11-04 | Stop reason: SDUPTHER

## 2019-11-04 RX ORDER — CICLOPIROX OLAMINE 7.7 MG/G
CREAM TOPICAL 2 TIMES DAILY
Qty: 15 G | Refills: 0 | Status: SHIPPED | OUTPATIENT
Start: 2019-11-04 | End: 2020-03-10 | Stop reason: ALTCHOICE

## 2019-11-04 RX ORDER — DICLOFENAC SODIUM 10 MG/G
GEL TOPICAL
COMMUNITY
Start: 2019-09-19 | End: 2020-11-03 | Stop reason: ALTCHOICE

## 2019-11-04 NOTE — PROGRESS NOTES
Chief Complaint   Patient presents with    Diabetes     Follow up, None Fasting    Cholesterol Problem    Hypertension     1. Have you been to the ER, urgent care clinic since your last visit? Hospitalized since your last visit? No    2. Have you seen or consulted any other health care providers outside of the [address] [place] [address] since your last visit? Include any pap smears or colon screening.  No

## 2019-11-04 NOTE — ASSESSMENT & PLAN NOTE
Well Controlled, based on history, physical exam and review of pertinent labs, studies and medications; meds reconciled; continue current treatment plan, lifestyle modifications recommended, medication compliance emphasized. Key Antihyperglycemic Medications             metFORMIN (GLUCOPHAGE) 850 mg tablet (Taking) TAKE 1 TABLET BY MOUTH TWICE DAILY WITH MEALS        Other Key Diabetic Medications             gabapentin (NEURONTIN) 300 mg capsule (Taking) Take 1 Cap by mouth two (2) times a day. Max Daily Amount: 600 mg.    fenofibrate (LOFIBRA) 160 mg tablet (Taking) TAKE 1 TABLET BY MOUTH ONCE DAILY    atorvastatin (LIPITOR) 10 mg tablet (Taking) Take 1 Tab by mouth daily.         Lab Results   Component Value Date/Time    Hemoglobin A1c 6.2 08/02/2019 09:41 AM    Glucose 120 08/02/2019 09:41 AM    Creatinine 1.41 08/02/2019 09:41 AM    Microalb/Creat ratio (ug/mg creat.) 13.4 03/12/2019 09:40 AM    Cholesterol, total 110 08/02/2019 09:41 AM    HDL Cholesterol 38 08/02/2019 09:41 AM    LDL, calculated 51 08/02/2019 09:41 AM    Triglyceride 104 08/02/2019 09:41 AM     Diabetic Foot and Eye Exam HM Status   Topic Date Due    Eye Exam  02/09/2020    Diabetic Foot Care  07/31/2020

## 2019-11-04 NOTE — PATIENT INSTRUCTIONS
Vaccine Information Statement    Influenza (Flu) Vaccine (Inactivated or Recombinant): What You Need to Know    Many Vaccine Information Statements are available in Welsh and other languages. See www.immunize.org/vis  Hojas de información sobre vacunas están disponibles en español y en muchos otros idiomas. Visite www.immunize.org/vis    1. Why get vaccinated? Influenza vaccine can prevent influenza (flu). Flu is a contagious disease that spreads around the [facility] every year, usually between October and May. Anyone can get the flu, but it is more dangerous for some people. Infants and young children, people 72years of age and older, pregnant women, and people with certain health conditions or a weakened immune system are at greatest risk of flu complications. Pneumonia, bronchitis, sinus infections and ear infections are examples of flu-related complications. If you have a medical condition, such as heart disease, cancer or diabetes, flu can make it worse. Flu can cause fever and chills, sore throat, muscle aches, fatigue, cough, headache, and runny or stuffy nose. Some people may have vomiting and diarrhea, though this is more common in children than adults. Each year thousands of people in the [facility] die from flu, and many more are hospitalized. Flu vaccine prevents millions of illnesses and flu-related visits to the doctor each year. 2. Influenza vaccines     CDC recommends everyone 10months of age and older get vaccinated every flu season. Children 6 months through 6years of age may need 2 doses during a single flu season. Everyone else needs only 1 dose each flu season. It takes about 2 weeks for protection to develop after vaccination. There are many flu viruses, and they are always changing. Each year a new flu vaccine is made to protect against three or four viruses that are likely to cause disease in the upcoming flu season.  Even when the vaccine doesnt exactly match these viruses, it may still provide some protection. Influenza vaccine does not cause flu. Influenza vaccine may be given at the same time as other vaccines. 3. Talk with your health care provider    Tell your vaccine provider if the person getting the vaccine:   Has had an allergic reaction after a previous dose of influenza vaccine, or has any severe, life-threatening allergies.  Has ever had Guillain-Barré Syndrome (also called GBS). In some cases, your health care provider may decide to postpone influenza vaccination to a future visit. People with minor illnesses, such as a cold, may be vaccinated. People who are moderately or severely ill should usually wait until they recover before getting influenza vaccine. Your health care provider can give you more information. 4. Risks of a reaction     Soreness, redness, and swelling where shot is given, fever, muscle aches, and headache can happen after influenza vaccine.  There may be a very small increased risk of Guillain-Barré Syndrome (GBS) after inactivated influenza vaccine (the flu shot). [facility] children who get the flu shot along with pneumococcal vaccine (PCV13), and/or DTaP vaccine at the same time might be slightly more likely to have a seizure caused by fever. Tell your health care provider if a child who is getting flu vaccine has ever had a seizure. People sometimes faint after medical procedures, including vaccination. Tell your provider if you feel dizzy or have vision changes or ringing in the ears. As with any medicine, there is a very remote chance of a vaccine causing a severe allergic reaction, other serious injury, or death. 5. What if there is a serious problem? An allergic reaction could occur after the vaccinated person leaves the clinic.  If you see signs of a severe allergic reaction (hives, swelling of the face and throat, difficulty breathing, a fast heartbeat, dizziness, or weakness), call 9-1-1 and get the person to the nearest hospital.    For other signs that concern you, call your health care provider. Adverse reactions should be reported to the Vaccine Adverse Event Reporting System (VAERS). Your health care provider will usually file this report, or you can do it yourself. Visit the VAERS website at www.vaers. [facility].gov or call 5-102.527.4477. VAERS is only for reporting reactions, and VAERS staff do not give medical advice. 6. The National Vaccine Injury Compensation Program    The [facility] Vaccine Injury Compensation Program (VICP) is a federal program that was created to compensate people who may have been injured by certain vaccines. Visit the VICP website at www.[facility].gov/vaccinecompensation or call 9-535.636.2368 to learn about the program and about filing a claim. There is a time limit to file a claim for compensation. 7. How can I learn more?  Ask your health care provider.  Call your local or state health department.  Contact the Centers for Disease Control and Prevention (CDC):  - Call 0-619.512.5842 (1-800-CDC-INFO) or  - Visit CDCs influenza website at www.cdc.gov/flu    Vaccine Information Statement (Interim)  Inactivated Influenza Vaccine   8/15/2019  42 JAMIE Morales [id number]   Department of Health and Human Services  Centers for Disease Control and Prevention    Office Use Only

## 2019-11-04 NOTE — PROGRESS NOTES
HISTORY OF PRESENT ILLNESS  Selvin Ballard is a 78 y.o. male. seen for follow up on DM,dyslipidemia, HTN, CAD and persistent ear pain and hearing loss. Following spine surgeon and ortho for his persistent knee pain.had epidural shot in 08/30/19,which worked well at this time. HPI  Cardiovascular Review  The patient has hypertension, hyperlipidemia, coronary artery disease and status post coronary artery stenting.  He reports taking medications as instructed, no medication side effects noted, home BP monitoring in range of 546'Y systolic over 71'Z diastolic, no chest pain on exertion, no dyspnea on exertion, no swelling of ankles, no orthostatic dizziness or lightheadedness, no orthopnea or paroxysmal nocturnal dyspnea, no palpitations, no muscle aches or pain.  Diet and Lifestyle: generally follows a low fat low cholesterol diet, generally follows a low sodium diet, exercises sporadically, chews tobacco.  Lab review: labs reviewed and discussed with patient.  He follows Dr Ardyce Severance   Cardiac History:   PCI 7- prox 75% LAD, D1 50% EF 60%; NAN 3 x 15 mm Xience to LAD  Stress echo 5-28-13=  Walked 3 minutes, Exercise stress echo is normal.    Medicines:  mg,  Metoprolol XL 50 mg, Lofibra 160 mg,and Lipitor 10 mg  Lab Results   Component Value Date/Time    Cholesterol, total 110 08/02/2019 09:41 AM    HDL Cholesterol 38 (L) 08/02/2019 09:41 AM    LDL, calculated 51 08/02/2019 09:41 AM    VLDL, calculated 21 08/02/2019 09:41 AM    Triglyceride 104 08/02/2019 09:41 AM    CHOL/HDL Ratio 5.8 (H) 07/30/2011 04:31 AM       Endocrine Review  He is seen for diabetes.  Since last visit he reports: no polyuria or polydipsia, no significant changes.  Home glucose monitoring: is performed regularly, fasting values range 100-120  He is checking his sugars one per day.  He reports medication compliance: compliant all of the time.  Medication side effects: none.  Diabetic diet compliance: compliant most of the time.  Lab review: labs reviewed and discussed with patient  On Metformin 850 mg bid. Lab Results   Component Value Date/Time    Hemoglobin A1c 6.2 (H) 08/02/2019 09:41 AM        Started on Gabapentin for his bilateral feet pain,talking inconsistently     CKD:  Stage of Chronic Kidney Disease III exhibited by:  lab  Lab Results   Component Value Date/Time    Sodium 150 (H) 08/02/2019 09:41 AM    Potassium 5.2 08/02/2019 09:41 AM    Chloride 112 (H) 08/02/2019 09:41 AM    CO2 22 08/02/2019 09:41 AM    Anion gap 4 (L) 08/02/2017 05:36 AM    Glucose 120 (H) 08/02/2019 09:41 AM    BUN 13 08/02/2019 09:41 AM    Creatinine 1.41 (H) 08/02/2019 09:41 AM    BUN/Creatinine ratio 9 (L) 08/02/2019 09:41 AM    GFR est AA 58 (L) 08/02/2019 09:41 AM    GFR est non-AA 50 (L) 08/02/2019 09:41 AM    Calcium 10.1 08/02/2019 09:41 AM       Review of Systems   Constitutional: Negative for chills, fever and malaise/fatigue. HENT: Positive for ear pain and hearing loss. Negative for congestion, sore throat and tinnitus. Eyes: Negative for blurred vision, double vision, pain and discharge. Respiratory: Negative for cough, shortness of breath and wheezing. Cardiovascular: Negative for chest pain, palpitations and leg swelling. Gastrointestinal: Negative for abdominal pain, blood in stool, constipation, diarrhea, nausea and vomiting. Genitourinary: Negative for dysuria, frequency, hematuria and urgency. Musculoskeletal: Negative for back pain, joint pain and myalgias. Right knee pain   Skin: Negative for rash. Itching right sole   Neurological: Negative for dizziness, tremors, seizures and headaches. Endo/Heme/Allergies: Negative for polydipsia. Does not bruise/bleed easily. Psychiatric/Behavioral: Negative for depression and substance abuse. The patient is not nervous/anxious. Physical Exam   Constitutional: He is oriented to person, place, and time. He appears well-developed and well-nourished.  No distress. HENT:   Head: Normocephalic and atraumatic. Right Ear: Tympanic membrane and external ear normal. No drainage. Tympanic membrane is not injected. No middle ear effusion. No decreased hearing is noted. Left Ear: Tympanic membrane normal. No drainage. Tympanic membrane is not injected. No middle ear effusion. No decreased hearing is noted. Nose: Mucosal edema present. No rhinorrhea. Right sinus exhibits maxillary sinus tenderness and frontal sinus tenderness. Left sinus exhibits maxillary sinus tenderness and frontal sinus tenderness. Mouth/Throat: Uvula is midline and oropharynx is clear and moist. No oropharyngeal exudate. Left TM shows no perforation, good graft healing   Eyes: Pupils are equal, round, and reactive to light. Conjunctivae and EOM are normal. No scleral icterus. Neck: Normal range of motion. Neck supple. No JVD present. No thyromegaly present. Cardiovascular: Normal rate, regular rhythm, normal heart sounds and intact distal pulses. No murmur heard. Pulmonary/Chest: Effort normal and breath sounds normal. He has no wheezes. He has no rales. Abdominal: Soft. Bowel sounds are normal. He exhibits no distension and no mass. Musculoskeletal: Normal range of motion. He exhibits no edema. Right knee: Tenderness (posterior) found. Feet:warm, good capillary refill, scaly skin on sole, normal DP and PT pulses, normal monofilament exam and normal sensory exam     Lymphadenopathy:        Head (right side): No tonsillar adenopathy present. Head (left side): No tonsillar adenopathy present. He has no cervical adenopathy. Neurological: He is alert and oriented to person, place, and time. He has normal reflexes. No cranial nerve deficit. Skin: Skin is warm and dry. No rash noted. He is not diaphoretic. Psychiatric: He has a normal mood and affect. Nursing note and vitals reviewed. ASSESSMENT and PLAN  Diagnoses and all orders for this visit:    1. Type 2 diabetes mellitus with diabetic neuropathy, without long-term current use of insulin ([facility])  -     METABOLIC PANEL, COMPREHENSIVE  -     HEMOGLOBIN A1C WITH EAG    2. Encounter for immunization  -     INFLUENZA VACCINE INACTIVATED (IIV), SUBUNIT, ADJUVANTED, IM  -     ADMIN INFLUENZA VIRUS VAC    3. CKD (chronic kidney disease) stage 3, GFR 30-59 ml/min ([facility])  -     METABOLIC PANEL, COMPREHENSIVE  -     MICROALBUMIN, UR, RAND W/ MICROALB/CREAT RATIO    4. Essential hypertension, benign  -     METABOLIC PANEL, COMPREHENSIVE    5. S/P coronary artery stent placement  -     METABOLIC PANEL, COMPREHENSIVE  -     LIPID PANEL    6. Type 2 diabetes with nephropathy [facility])  Assessment & Plan:  Well Controlled, based on history, physical exam and review of pertinent labs, studies and medications; meds reconciled; continue current treatment plan, lifestyle modifications recommended, medication compliance emphasized. Key Antihyperglycemic Medications             metFORMIN (GLUCOPHAGE) 850 mg tablet (Taking) TAKE 1 TABLET BY MOUTH TWICE DAILY WITH MEALS        Other Key Diabetic Medications             gabapentin (NEURONTIN) 300 mg capsule (Taking) Take 1 Cap by mouth two (2) times a day. Max Daily Amount: 600 mg.    fenofibrate (LOFIBRA) 160 mg tablet (Taking) TAKE 1 TABLET BY MOUTH ONCE DAILY    atorvastatin (LIPITOR) 10 mg tablet (Taking) Take 1 Tab by mouth daily.         Lab Results   Component Value Date/Time    Hemoglobin A1c 6.2 08/02/2019 09:41 AM    Glucose 120 08/02/2019 09:41 AM    Creatinine 1.41 08/02/2019 09:41 AM    Microalb/Creat ratio (ug/mg creat.) 13.4 03/12/2019 09:40 AM    Cholesterol, total 110 08/02/2019 09:41 AM    HDL Cholesterol 38 08/02/2019 09:41 AM    LDL, calculated 51 08/02/2019 09:41 AM    Triglyceride 104 08/02/2019 09:41 AM     Diabetic Foot and Eye Exam HM Status   Topic Date Due    Eye Exam  02/09/2020    Diabetic Foot Care  07/31/2020       Orders:  -     METABOLIC PANEL, COMPREHENSIVE    7. Tinea pedis of right foot  -     ciclopirox (LOPROX) 0.77 % topical cream; Apply  to affected area two (2) times a day. 8. Primary insomnia  -     zolpidem (AMBIEN) 5 mg tablet; Take 1 Tab by mouth nightly as needed for Sleep. Max Daily Amount: 5 mg. 9. Perforation of both tympanic membranes  -     REFERRAL TO ENT-OTOLARYNGOLOGY    10. Mixed dyslipidemia  -     LIPID PANEL      Discussed lifestyle issues and health guidance given  Patient was given an after visit summary which includes diagnoses, vital signs, current medications, instructions and references & authorized prescriptions . Results of labs will be conveyed to patient, once available. Pt verbalized instructions I provided and expressed understanding of discussion that was held today. Follow-up and Dispositions    · Return in about 4 months (around 3/4/2020) for fasting, follow up.

## 2019-11-09 DIAGNOSIS — E78.2 MIXED DYSLIPIDEMIA: ICD-10-CM

## 2019-11-09 RX ORDER — FENOFIBRATE 160 MG/1
TABLET ORAL
Qty: 90 TAB | Refills: 0 | Status: SHIPPED | OUTPATIENT
Start: 2019-11-09 | End: 2020-02-11

## 2019-11-12 LAB
ALBUMIN SERPL-MCNC: 3.9 G/DL (ref 3.5–4.8)
ALBUMIN/CREAT UR: 6.1 MG/G CREAT (ref 0–30)
ALBUMIN/GLOB SERPL: 1.7 {RATIO} (ref 1.2–2.2)
ALP SERPL-CCNC: 39 IU/L (ref 39–117)
ALT SERPL-CCNC: 17 IU/L (ref 0–44)
AST SERPL-CCNC: 24 IU/L (ref 0–40)
BILIRUB SERPL-MCNC: 0.4 MG/DL (ref 0–1.2)
BUN SERPL-MCNC: 18 MG/DL (ref 8–27)
BUN/CREAT SERPL: 14 (ref 10–24)
CALCIUM SERPL-MCNC: 9.4 MG/DL (ref 8.6–10.2)
CHLORIDE SERPL-SCNC: 106 MMOL/L (ref 96–106)
CHOLEST SERPL-MCNC: 120 MG/DL (ref 100–199)
CO2 SERPL-SCNC: 17 MMOL/L (ref 20–29)
CREAT SERPL-MCNC: 1.31 MG/DL (ref 0.76–1.27)
CREAT UR-MCNC: 61.1 MG/DL
EST. AVERAGE GLUCOSE BLD GHB EST-MCNC: 131 MG/DL
GLOBULIN SER CALC-MCNC: 2.3 G/DL (ref 1.5–4.5)
GLUCOSE SERPL-MCNC: 114 MG/DL (ref 65–99)
HBA1C MFR BLD: 6.2 % (ref 4.8–5.6)
HDLC SERPL-MCNC: 36 MG/DL
INTERPRETATION, 910389: NORMAL
INTERPRETATION: NORMAL
LDLC SERPL CALC-MCNC: 63 MG/DL (ref 0–99)
MICROALBUMIN UR-MCNC: 3.7 UG/ML
PDF IMAGE, 910387: NORMAL
POTASSIUM SERPL-SCNC: 5.2 MMOL/L (ref 3.5–5.2)
PROT SERPL-MCNC: 6.2 G/DL (ref 6–8.5)
SODIUM SERPL-SCNC: 140 MMOL/L (ref 134–144)
TRIGL SERPL-MCNC: 105 MG/DL (ref 0–149)
VLDLC SERPL CALC-MCNC: 21 MG/DL (ref 5–40)

## 2019-11-12 NOTE — PROGRESS NOTES
Please inform patient,  Kidney function abnormal, but very stable and at base line  Average sugar also stable at 6.2, and urine negative for protein  Cholesterol results normal except low good cholesterol,   no change in current medicines  thanks

## 2019-11-13 NOTE — PROGRESS NOTES
Inbound call from patient returning nurse call. Patients name and  verified. Reviewed recent labs with patient. He verbalized understanding.  Letter printed with results

## 2019-11-18 ENCOUNTER — OFFICE VISIT (OUTPATIENT)
Dept: CARDIOLOGY CLINIC | Age: 69
End: 2019-11-18

## 2019-11-18 VITALS
HEIGHT: 70 IN | BODY MASS INDEX: 27.63 KG/M2 | DIASTOLIC BLOOD PRESSURE: 60 MMHG | OXYGEN SATURATION: 97 % | RESPIRATION RATE: 16 BRPM | WEIGHT: 193 LBS | SYSTOLIC BLOOD PRESSURE: 110 MMHG | HEART RATE: 64 BPM

## 2019-11-18 DIAGNOSIS — E11.21 TYPE 2 DIABETES WITH NEPHROPATHY (HCC): ICD-10-CM

## 2019-11-18 DIAGNOSIS — E78.2 MIXED DYSLIPIDEMIA: ICD-10-CM

## 2019-11-18 DIAGNOSIS — I25.10 ATHEROSCLEROSIS OF NATIVE CORONARY ARTERY OF NATIVE HEART WITHOUT ANGINA PECTORIS: Primary | ICD-10-CM

## 2019-11-18 DIAGNOSIS — I10 ESSENTIAL HYPERTENSION, BENIGN: ICD-10-CM

## 2019-11-18 RX ORDER — ATORVASTATIN CALCIUM 10 MG/1
10 TABLET, FILM COATED ORAL DAILY
Qty: 90 TAB | Refills: 3 | Status: SHIPPED | OUTPATIENT
Start: 2019-11-18 | End: 2020-11-09

## 2019-11-18 RX ORDER — METOPROLOL SUCCINATE 50 MG/1
50 TABLET, EXTENDED RELEASE ORAL DAILY
Qty: 90 TAB | Refills: 3 | Status: SHIPPED | OUTPATIENT
Start: 2019-11-18 | End: 2021-01-04 | Stop reason: SDUPTHER

## 2019-11-18 NOTE — PROGRESS NOTES
Madalyn Garcia     1950       Mary Swanson MD, [facility]  Date of Visit-11/18/2019   PCP is Kristen Woodward MD   [facility] [place]  [facility]  HPI:  Madalyn Garcia is a 78 y.o. male   Six month follow up of CAD, HTN, hypercholesteremia. Last visit he had some chest pain and low BP. Amlodipine was stopped, stress test done 6/10/19.   06/10/19   NUCLEAR CARDIAC STRESS TEST 06/21/2019 6/25/2019    Narrative · Baseline ECG: Normal sinus rhythm. · Gated SPECT: Left ventricular function post-stress was normal.   Calculated ejection fraction is 74%. There is no evidence of transient   ischemic dilation (TID). The TID ratio is 0.96. · Left ventricular perfusion is normal.  · Negative myocardial perfusion imaging. Myocardial perfusion imaging   supports a low risk stress test.        Signed by: Alyx Milton MD       Today he reports doing well overall. Notes feeling dizzy when standing up, very rarely. Has not had to use any nitroglycerin. Patient denies any exertional chest pain, dyspnea, palpitations, syncope, edema or paroxysmal nocturnal dyspnea. Assessment/Plan:     1. Atherosclerosis of native coronary artery of native heart without angina pectoris  Stress test this year was normal, no further CP. Continue BB, statin, ASA. 2. Essential hypertension, benign  Some low BP and dizziness, have stopped amlodipine, stable now. 3. Mixed dyslipidemia  Lipids on high potency statin as appropriate for secondary prevention. 4. Type 2 diabetes with nephropathy ([address]  Follow up with PCP. Follow up in one year, sooner if needed. Future Appointments   Date Time Provider Tima Greenberg   3/10/2020 10:20 AM Kristen Woodward MD [facility]   11/20/2020  9:20 AM Alyx Milton  [address]      Patient Instructions   We will see you back for an annual follow up.       Key CAD CHF Meds             fenofibrate (LOFIBRA) 160 mg tablet (Taking) TAKE 1 TABLET BY MOUTH ONCE DAILY    atorvastatin (LIPITOR) 10 mg tablet (Taking) Take 1 Tab by mouth daily. metoprolol succinate (TOPROL-XL) 50 mg XL tablet (Taking) Take 1 Tab by mouth daily. nitroglycerin (NITROSTAT) 0.4 mg SL tablet (Taking) DISSOLVE ONE TABLET UNDER THE TONGUE EVERY 5 MINUTES AS NEEDED FOR CHEST PAIN. UP TO 3 DOSES    aspirin delayed-release 81 mg tablet (Taking) Take 162 mg by mouth daily. Impression:   1. Atherosclerosis of native coronary artery of native heart without angina pectoris    2. Essential hypertension, benign    3. Mixed dyslipidemia    4. Type 2 diabetes with nephropathy [facility])       Cardiac History:   PCI 7- prox 75% LAD, D1 50% EF 60%; NAN 3 x 15 mm Xience to LAD  Stress echo 5-28-13=  Walked 3 minutes, Exercise stress echo is normal.      ROS-except as noted above. . A complete cardiac and respiratory are reviewed and negative except as above ; Resp-denies wheezing  or productive cough,. Const- No unusual weight loss or fever; Neuro-no recent seizure or CVA ; GI- No BRBPR, abdom pain, bloating ; - no  hematuria   see supplement sheet, initialed and to be scanned by staff  Past Medical History:   Diagnosis Date    Arthritis     KNEES & BACK. 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 He has quit using smokeless tobacco. He reports that he drinks about 2.0 standard drinks of alcohol per week.  He reports that he does not use drugs. Exam and Labs:    Visit Vitals  /60 (BP 1 Location: Left arm, BP Patient Position: Sitting)   Pulse 64   Resp 16   Ht 5' 10\" (1.778 m)   Wt 193 lb (87.5 kg)   SpO2 97%   BMI 27.69 kg/m²    @Constitutional:  NAD, comfortable  Head: NC,AT. Eyes: No scleral icterus. Neck:  Neck supple. No JVD present. Throat: moist mucous membranes. Chest: Effort normal & normal respiratory excursion . Neurological: alert, conversant and oriented . Skin: Skin is not cold. No obvious systemic rash noted. Not diaphoretic. No erythema. Psychiatric:  Grossly normal mood and affect. Behavior appears normal. Extremities:  no clubbing or cyanosis. Abdomen: non distended    Lungs:breath sounds normal. No stridor. distress, wheezes or  Rales. Heart:    normal rate, regular rhythm, normal S1, S2, no murmurs, rubs, clicks or gallops , PMI non displaced. Edema: Edema is none.   Lab Results   Component Value Date/Time    Cholesterol, total 120 11/11/2019 10:18 AM    HDL Cholesterol 36 (L) 11/11/2019 10:18 AM    LDL, calculated 63 11/11/2019 10:18 AM    Triglyceride 105 11/11/2019 10:18 AM    CHOL/HDL Ratio 5.8 (H) 07/30/2011 04:31 AM     Lab Results   Component Value Date/Time    Sodium 140 11/11/2019 10:18 AM    Potassium 5.2 11/11/2019 10:18 AM    Chloride 106 11/11/2019 10:18 AM    CO2 17 (L) 11/11/2019 10:18 AM    Anion gap 4 (L) 08/02/2017 05:36 AM    Glucose 114 (H) 11/11/2019 10:18 AM    BUN 18 11/11/2019 10:18 AM    Creatinine 1.31 (H) 11/11/2019 10:18 AM    BUN/Creatinine ratio 14 11/11/2019 10:18 AM    GFR est AA 63 11/11/2019 10:18 AM    GFR est non-AA 55 (L) 11/11/2019 10:18 AM    Calcium 9.4 11/11/2019 10:18 AM      Wt Readings from Last 3 Encounters:   11/18/19 193 lb (87.5 kg)   11/04/19 190 lb (86.2 kg)   07/31/19 184 lb (83.5 kg)      BP Readings from Last 3 Encounters:   11/18/19 110/60   11/04/19 113/65   07/31/19 122/62      Current Outpatient Medications   Medication Sig    fenofibrate (LOFIBRA) 160 mg tablet TAKE 1 TABLET BY MOUTH ONCE DAILY    diclofenac (VOLTAREN) 1 % gel Apply 2 grams to the effected area (knees) BID prn    ciclopirox (LOPROX) 0.77 % topical cream Apply  to affected area two (2) times a day.  zolpidem (AMBIEN) 5 mg tablet Take 1 Tab by mouth nightly as needed for Sleep. Max Daily Amount: 5 mg.  glucose blood VI test strips (ACCU-CHEK RED PLUS TEST STRP) strip USE ONE STRIP TO CHECK GLUCOSE ONCE DAILY    gabapentin (NEURONTIN) 300 mg capsule Take 1 Cap by mouth two (2) times a day. Max Daily Amount: 600 mg.  triamcinolone acetonide (KENALOG) 0.1 % dental paste by Dental route two (2) times a day.  metFORMIN (GLUCOPHAGE) 850 mg tablet TAKE 1 TABLET BY MOUTH TWICE DAILY WITH MEALS    atorvastatin (LIPITOR) 10 mg tablet Take 1 Tab by mouth daily.  metoprolol succinate (TOPROL-XL) 50 mg XL tablet Take 1 Tab by mouth daily.  nitroglycerin (NITROSTAT) 0.4 mg SL tablet DISSOLVE ONE TABLET UNDER THE TONGUE EVERY 5 MINUTES AS NEEDED FOR CHEST PAIN. UP TO 3 DOSES    tamsulosin (FLOMAX) 0.4 mg capsule TAKE ONE CAPSULE BY MOUTH ONCE DAILY    aspirin delayed-release 81 mg tablet Take 162 mg by mouth daily.  acetaminophen (TYLENOL) 500 mg tablet Take 2 Tabs by mouth every six (6) hours.  esomeprazole (NEXIUM) 20 mg capsule Take 20 mg by mouth daily as needed.  varicella-zoster recombinant, PF, (SHINGRIX, PF,) 50 mcg/0.5 mL susr injection 0.5mL by IntraMUSCular route once now and then repeat in 2-6 months     No current facility-administered medications for this visit. Impression see above.        Transcribed by wKasi Juarez, as dictated by Jori Stanley MD.

## 2019-11-18 NOTE — PROGRESS NOTES
Visit Vitals  /60 (BP 1 Location: Left arm, BP Patient Position: Sitting)   Pulse 64   Resp 16   Ht 5' 10\" (1.778 m)   Wt 193 lb (87.5 kg)   SpO2 97%   BMI 27.69 kg/m²

## 2019-11-18 NOTE — Clinical Note
11/18/19 Patient: Judy Velazquez YOB: 1950 Date of Visit: 11/18/2019 Hair Marcial MD 
[address] [facility] [address] [id number] VIA In Basket Dear Hair Marcial MD, Thank you for referring Mr. Libertad Loco to [address] for evaluation. My notes for this consultation are attached. If you have questions, please do not hesitate to call me. I look forward to following your patient along with you.  
 
 
Sincerely, 
 
Sandra Lorenzo MD

## 2019-11-19 DIAGNOSIS — E11.21 TYPE 2 DIABETES WITH NEPHROPATHY (HCC): ICD-10-CM

## 2019-11-19 DIAGNOSIS — E11.40 TYPE 2 DIABETES MELLITUS WITH DIABETIC NEUROPATHY, WITHOUT LONG-TERM CURRENT USE OF INSULIN (HCC): ICD-10-CM

## 2020-02-03 DIAGNOSIS — E11.9 TYPE 2 DIABETES MELLITUS WITHOUT COMPLICATION, WITHOUT LONG-TERM CURRENT USE OF INSULIN (HCC): ICD-10-CM

## 2020-02-03 DIAGNOSIS — F51.01 PRIMARY INSOMNIA: ICD-10-CM

## 2020-02-03 RX ORDER — METFORMIN HYDROCHLORIDE 850 MG/1
TABLET ORAL
Qty: 180 TAB | Refills: 0 | Status: SHIPPED | OUTPATIENT
Start: 2020-02-03 | End: 2020-05-02

## 2020-02-03 RX ORDER — ZOLPIDEM TARTRATE 5 MG/1
TABLET ORAL
Qty: 30 TAB | Refills: 0 | Status: SHIPPED | OUTPATIENT
Start: 2020-02-03 | End: 2020-05-02

## 2020-02-11 DIAGNOSIS — E78.2 MIXED DYSLIPIDEMIA: ICD-10-CM

## 2020-02-11 RX ORDER — FENOFIBRATE 160 MG/1
TABLET ORAL
Qty: 90 TAB | Refills: 0 | Status: SHIPPED | OUTPATIENT
Start: 2020-02-11 | End: 2021-01-04

## 2020-02-26 VITALS
SYSTOLIC BLOOD PRESSURE: 161 MMHG | DIASTOLIC BLOOD PRESSURE: 78 MMHG | TEMPERATURE: 97.6 F | OXYGEN SATURATION: 97 % | HEART RATE: 74 BPM | RESPIRATION RATE: 16 BRPM

## 2020-02-26 PROCEDURE — 99283 EMERGENCY DEPT VISIT LOW MDM: CPT

## 2020-02-27 ENCOUNTER — APPOINTMENT (OUTPATIENT)
Dept: GENERAL RADIOLOGY | Age: 70
End: 2020-02-27
Attending: EMERGENCY MEDICINE
Payer: MEDICARE

## 2020-02-27 ENCOUNTER — HOSPITAL ENCOUNTER (EMERGENCY)
Age: 70
Discharge: HOME | End: 2020-02-27
Attending: EMERGENCY MEDICINE | Admitting: EMERGENCY MEDICINE
Payer: MEDICARE

## 2020-02-27 DIAGNOSIS — M25.512 PAIN IN JOINT OF LEFT SHOULDER: ICD-10-CM
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
## 2020-02-27 PROCEDURE — 73030 X-RAY EXAM OF SHOULDER: CPT

## 2020-02-27 PROCEDURE — 74011250637 HC RX REV CODE- 250/637: Performed by: NURSE PRACTITIONER

## 2020-02-27 RX ORDER — ACETAMINOPHEN 500 MG
1000 TABLET ORAL
Qty: 20 TAB | Refills: 0 | Status: SHIPPED | OUTPATIENT
Start: 2020-02-27 | End: 2020-03-10 | Stop reason: SDUPTHER

## 2020-02-27 RX ORDER — TRAMADOL HYDROCHLORIDE 50 MG/1
50 TABLET ORAL
Qty: 12 TAB | Refills: 0 | Status: SHIPPED | OUTPATIENT
Start: 2020-02-27 | End: 2020-03-01

## 2020-02-27 RX ORDER — ACETAMINOPHEN 325 MG/1
975 TABLET ORAL
Status: COMPLETED | OUTPATIENT
Start: 2020-02-27 | End: 2020-02-27

## 2020-02-27 RX ADMIN — ACETAMINOPHEN 975 MG: 325 TABLET ORAL at 00:10

## 2020-02-27 NOTE — ED TRIAGE NOTES
He stepped in a pot hole around 6:45pm and fell hurting his left shoulder. He has been unable to sleep due to the pain.

## 2020-02-27 NOTE — ED PROVIDER NOTES
Pt is a 65-year-old male, right-hand-dominant, who presents today with left shoulder pain after a ground-level fall. States he was getting out of a car and it was wet outside. He stepped in a pothole twisting his right ankle and landed on his left shoulder. He thought he was okay that day but he tried to go to bed tonight he was unable to get comfortable. Finding it difficulty to raise his shoulder above 45 degrees. He has taken no medication for it. Concerned about a fracture or other injury. NO other complaints. Past Medical History:   Diagnosis Date    Arthritis     KNEES & BACK. 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    S/P coronary artery stent placement July 29th, 2011    NAN to LAD       Past Surgical History:   Procedure Laterality Date    CARDIAC SURG PROCEDURE UNLIST  2009    CATHERIZATION WITH STENT X 1 PLACEMENT    COLONOSCOPY N/A 4/17/2018    COLONOSCOPY performed by [facility]uthi Capps [facility] at [facility] END[facility]COPY    HX GI  2010    COLONOSCOPY    HX GI  2010    ENDOSCOPY    HX HEENT      SEPTOPLASTY    HX HEENT      TONSILLECTOMY    HX KNEE REPLACEMENT Left 2008    DR ROSEN    HX ORTHOPAEDIC Right 2010, 2017    KNEE REPLACEMENT, SPACER 5/2017    HX ORTHOPAEDIC  2007    RIGHT SHOULDER ROTATOR CUFF REPAIR.          Family History:   Problem Relation Age of Onset    Diabetes Mother     Heart Disease Mother     Diabetes Son         TYPE 1    Alcohol abuse Son     Anesth Problems Neg Hx        Social History     Socioeconomic History    Marital status:      Spouse name: Not on file    Number of children: 1    Years of education: Not on file    Highest education level: Not on file   Occupational History    Not on file   Social Needs    Financial resource strain: Not on file    Food insecurity:     Worry: Not on file     Inability: Not on file    Transportation needs:     Medical: Not on file     Non-medical: Not on file   Tobacco Use    Smoking status: Never Smoker    Smokeless tobacco: Former User    Tobacco comment: Patient states on and off for chewing tobacco.     Substance and Sexual Activity    Alcohol use: Yes     Alcohol/week: 2.0 standard drinks     Types: 2 Cans of beer per week     Comment: NOT DRINKING AT THIS TIME    Drug use: No    Sexual activity: Yes     Partners: Female   Lifestyle    Physical activity:     Days per week: Not on file     Minutes per session: Not on file    Stress: Not on file   Relationships    Social connections:     Talks on phone: Not on file     Gets together: Not on file     Attends Holiness service: Not on file     Active member of club or organization: Not on file     Attends meetings of clubs or organizations: Not on file     Relationship status: Not on file    Intimate partner violence:     Fear of current or ex partner: Not on file     Emotionally abused: Not on file     Physically abused: Not on file     Forced sexual activity: Not on file   Other Topics Concern    Not on file   Social History Narrative    Not on file         ALLERGIES: Levofloxacin; Pcn [penicillins]; Adhesive tape-silicones; and Tape [adhesive]    Review of Systems   Musculoskeletal:        Left shoulder pain. Right ankle soreness   All other systems reviewed and are negative. Vitals:    02/26/20 2332   BP: 161/78   Pulse: 74   Resp: 16   Temp: 97.6 °F (36.4 °C)   SpO2: 97%            Physical Exam  Vitals signs and nursing note reviewed. Constitutional:       Appearance: Normal appearance.    Eyes:      Conjunctiva/sclera: Conjunctivae normal. Pulmonary:      Effort: Pulmonary effort is normal.   Musculoskeletal:      Comments: Left shoulder: mild pain anterior shoulder and left upper tapezius. Difficult to raise arm in abduction over 45 degrees. Skin:     General: Skin is warm and dry. Neurological:      Mental Status: He is alert and oriented to person, place, and time. Psychiatric:         Mood and Affect: Mood normal.         Behavior: Behavior normal.          MDM  Number of Diagnoses or Management Options  Fall, initial encounter:   Pain in joint of left shoulder:   Diagnosis management comments: Patient is a 30-year-old male who presents with left shoulder pain especially with abduction after a ground-level fall earlier today. Denies any acute pain in his right ankle. He is able to ambulate with no difficulty. His biggest complaint is having difficulty with abduction of that shoulder joint over 45 degrees. X-rays show no acute bony injury. Possible rotator cuff injury versus contusion. He was given Tylenol with improvement in his symptoms. He does have tramadol that he can take at home if he needs something stronger. He has had a right rotator cuff repair in the past so recommended follow-up with that orthopedist or he can follow-up with a new orthopedic doctor should his symptoms not improve over the next few days. He verbalizes understanding agrees with this plan.        Amount and/or Complexity of Data Reviewed  Tests in the radiology section of CPT®: reviewed and ordered    Patient Progress  Patient progress: improved         Procedures

## 2020-03-10 ENCOUNTER — HOSPITAL ENCOUNTER (OUTPATIENT)
Dept: LAB | Age: 70
Discharge: HOME | End: 2020-03-10
Payer: MEDICARE

## 2020-03-10 ENCOUNTER — OFFICE VISIT (OUTPATIENT)
Dept: FAMILY MEDICINE CLINIC | Age: 70
End: 2020-03-10

## 2020-03-10 VITALS
SYSTOLIC BLOOD PRESSURE: 122 MMHG | OXYGEN SATURATION: 97 % | WEIGHT: 189 LBS | BODY MASS INDEX: 27.06 KG/M2 | RESPIRATION RATE: 16 BRPM | HEART RATE: 59 BPM | HEIGHT: 70 IN | TEMPERATURE: 98.1 F | DIASTOLIC BLOOD PRESSURE: 69 MMHG

## 2020-03-10 DIAGNOSIS — E78.2 MIXED DYSLIPIDEMIA: ICD-10-CM

## 2020-03-10 DIAGNOSIS — E11.40 TYPE 2 DIABETES MELLITUS WITH DIABETIC NEUROPATHY, WITHOUT LONG-TERM CURRENT USE OF INSULIN (HCC): Primary | ICD-10-CM

## 2020-03-10 DIAGNOSIS — Z09 HOSPITAL DISCHARGE FOLLOW-UP: ICD-10-CM

## 2020-03-10 DIAGNOSIS — E11.21 TYPE 2 DIABETES WITH NEPHROPATHY (HCC): ICD-10-CM

## 2020-03-10 DIAGNOSIS — I25.10 ATHEROSCLEROSIS OF NATIVE CORONARY ARTERY OF NATIVE HEART WITHOUT ANGINA PECTORIS: ICD-10-CM

## 2020-03-10 DIAGNOSIS — N18.30 CKD (CHRONIC KIDNEY DISEASE) STAGE 3, GFR 30-59 ML/MIN (HCC): ICD-10-CM

## 2020-03-10 DIAGNOSIS — D50.8 IRON DEFICIENCY ANEMIA SECONDARY TO INADEQUATE DIETARY IRON INTAKE: ICD-10-CM

## 2020-03-10 PROCEDURE — 80053 COMPREHEN METABOLIC PANEL: CPT

## 2020-03-10 PROCEDURE — 36415 COLL VENOUS BLD VENIPUNCTURE: CPT

## 2020-03-10 PROCEDURE — 83036 HEMOGLOBIN GLYCOSYLATED A1C: CPT

## 2020-03-10 PROCEDURE — 80061 LIPID PANEL: CPT

## 2020-03-10 PROCEDURE — 82043 UR ALBUMIN QUANTITATIVE: CPT

## 2020-03-10 PROCEDURE — 85025 COMPLETE CBC W/AUTO DIFF WBC: CPT

## 2020-03-10 RX ORDER — DICLOFENAC SODIUM 10 MG/G
GEL TOPICAL
COMMUNITY
Start: 2020-02-05 | End: 2020-03-10 | Stop reason: SDUPTHER

## 2020-03-10 NOTE — PROGRESS NOTES
Chief Complaint   Patient presents with    Cholesterol Problem     Patient is in the office today for a follow up, Patient is fasting.  Diabetes    Hypertension     1. Have you been to the ER, urgent care clinic since your last visit? Hospitalized since your last visit? No    2. Have you seen or consulted any other health care providers outside of the [address] [place] [address] since your last visit? Include any pap smears or colon screening.  No

## 2020-03-10 NOTE — ASSESSMENT & PLAN NOTE
Improving, based on history, physical exam and review of pertinent labs, studies and medications; meds reconciled; continue current treatment plan, lifestyle modifications recommended, medication compliance emphasized. Key CKD Meds             tamsulosin (FLOMAX) 0.4 mg capsule (Taking) TAKE ONE CAPSULE BY MOUTH ONCE DAILY        Lab Results   Component Value Date/Time    GFR est non-AA 55 11/11/2019 10:18 AM    GFR est AA 63 11/11/2019 10:18 AM    Creatinine 1.31 11/11/2019 10:18 AM    BUN 18 11/11/2019 10:18 AM    Sodium 140 11/11/2019 10:18 AM    Potassium 5.2 11/11/2019 10:18 AM    Chloride 106 11/11/2019 10:18 AM    CO2 17 11/11/2019 10:18 AM    Calcium 9.4 11/11/2019 10:18 AM     Estimated Creatinine Clearance: 54.2 mL/min (A) (by C-G formula based on SCr of 1.31 mg/dL (H)).

## 2020-03-10 NOTE — PROGRESS NOTES
HISTORY OF PRESENT ILLNESS  Kelley Jaime is a 78 y.o. male. seen for follow up on DM, HTN,dyslipidemia, neuropathy, anemia, chronic knee pain, recent ER visit for fall. Following Dr Fredrick Mcmullen for his chronic knee pain  Follows Dr Pablo De La Vega for his chronic lumbar radiculopathy.   HPI    Cardiovascular Review  The patient has hypertension, hyperlipidemia, coronary artery disease and status post coronary artery stenting.  He reports taking medications as instructed, no medication side effects noted, home BP monitoring in range of 148'V systolic over 89'B diastolic, no chest pain on exertion, no dyspnea on exertion, no swelling of ankles, no orthostatic dizziness or lightheadedness, no orthopnea or paroxysmal nocturnal dyspnea, no palpitations, no muscle aches or pain.  Diet and Lifestyle: generally follows a low fat low cholesterol diet, generally follows a low sodium diet, exercises sporadically, chews tobacco.  Lab review: labs reviewed and discussed with patient.  He follows Dr Tavo Fuentes   Cardiac History:   PCI 7- prox 75% LAD, D1 50% EF 60%; NAN 3 x 15 mm Xience to LAD  Stress echo 5-28-13=  Walked 3 minutes, Exercise stress echo is normal.    Medicines:  mg,  Metoprolol XL 50 mg, Lofibra 160 mg,and Lipitor 10 mg  Lab Results   Component Value Date/Time    Cholesterol, total 120 11/11/2019 10:18 AM    HDL Cholesterol 36 (L) 11/11/2019 10:18 AM    LDL, calculated 63 11/11/2019 10:18 AM    VLDL, calculated 21 11/11/2019 10:18 AM    Triglyceride 105 11/11/2019 10:18 AM    CHOL/HDL Ratio 5.8 (H) 07/30/2011 04:31 AM          Endocrine Review  He is seen for diabetes.  Since last visit he reports: no polyuria or polydipsia, no significant changes.  Home glucose monitoring: is performed regularly, fasting values range 100-120  He is checking his sugars one per day.  He reports medication compliance: compliant all of the time.  Medication side effects: none.  Diabetic diet compliance: compliant most of the time.  Lab review: labs reviewed and discussed with patient  On Metformin 850 mg bid. Started on Gabapentin for his bilateral feet pain,talking inconsistently    Lab Results   Component Value Date/Time    Hemoglobin A1c 6.2 (H) 11/11/2019 10:18 AM           CKD:  Stage of Chronic Kidney Disease III exhibited by:  Lab Results   Component Value Date/Time    Sodium 140 11/11/2019 10:18 AM    Potassium 5.2 11/11/2019 10:18 AM    Chloride 106 11/11/2019 10:18 AM    CO2 17 (L) 11/11/2019 10:18 AM    Anion gap 4 (L) 08/02/2017 05:36 AM    Glucose 114 (H) 11/11/2019 10:18 AM    BUN 18 11/11/2019 10:18 AM    Creatinine 1.31 (H) 11/11/2019 10:18 AM    BUN/Creatinine ratio 14 11/11/2019 10:18 AM    GFR est AA 63 11/11/2019 10:18 AM    GFR est non-AA 55 (L) 11/11/2019 10:18 AM    Calcium 9.4 11/11/2019 10:18 AM     Anemia:  ANEMIA FIRST NOTED: several years  PERTINENT LABS:  Lab Results   Component Value Date/Time    WBC 5.1 08/02/2019 09:41 AM    HGB 12.8 (L) 08/02/2019 09:41 AM    HCT 39.5 08/02/2019 09:41 AM    PLATELET 003 78/38/4032 09:41 AM    MCV 84 08/02/2019 09:41 AM     No results found for: IRON, FE, TIBC, IBCT, PSAT, FERR  Lab Results   Component Value Date/Time    Folate 5.0 06/21/2018 08:50 AM       DESCRIPTION OF Symptoms:none  EVAL TO DATE: B12/ Folate  normal  ETIOLOGY KNOWN?: yes,likely dietary deficiency    Hospital Follow Up  Ashely Hobbs is seen for follow up from recent ED visit to [address] [facility] on 02/27/2020. We reviewed the active problem list, medication list, allergies, family history, health maintenance, lab results, imaging, ER summary. He presented with fall after tripping in pot hole and injuring left shoulder. In ER,he had Xray of left shoulder,that was normal He was given Tylenol 1 gm in ER and recommend to follow up with ortho,if pain getting worst.  He reports symptoms are significantly improved.       Review of Systems   Constitutional: Negative for chills, fever and malaise/fatigue. HENT: Negative for congestion, ear pain, sore throat and tinnitus. Eyes: Negative for blurred vision, double vision, pain and discharge. Respiratory: Negative for cough, shortness of breath and wheezing. Cardiovascular: Negative for chest pain, palpitations and leg swelling. Gastrointestinal: Negative for abdominal pain, blood in stool, constipation, diarrhea, nausea and vomiting. Genitourinary: Negative for dysuria, frequency, hematuria and urgency. Musculoskeletal: Negative for back pain, joint pain and myalgias. Right knee pain   Skin: Negative for rash. Neurological: Negative for dizziness, tremors, seizures and headaches. Burning of right foot   Endo/Heme/Allergies: Negative for polydipsia. Does not bruise/bleed easily. Psychiatric/Behavioral: Negative for depression and substance abuse. The patient is not nervous/anxious. Physical Exam  Vitals signs and nursing note reviewed. Constitutional:       General: He is not in acute distress. Appearance: He is well-developed. He is not diaphoretic. HENT:      Head: Normocephalic and atraumatic. Right Ear: Tympanic membrane and external ear normal. No decreased hearing noted. No drainage. No middle ear effusion. Tympanic membrane is not injected. Left Ear: Tympanic membrane normal. No decreased hearing noted. No drainage. No middle ear effusion. Tympanic membrane is not injected. Nose: Mucosal edema present. No rhinorrhea. Right Sinus: Maxillary sinus tenderness and frontal sinus tenderness present. Left Sinus: Maxillary sinus tenderness and frontal sinus tenderness present. Mouth/Throat:      Pharynx: Uvula midline. No oropharyngeal exudate. Eyes:      General: No scleral icterus. Conjunctiva/sclera: Conjunctivae normal.      Pupils: Pupils are equal, round, and reactive to light. Neck:      Musculoskeletal: Normal range of motion and neck supple.       Thyroid: No thyromegaly. Vascular: No JVD. Cardiovascular:      Rate and Rhythm: Normal rate and regular rhythm. Heart sounds: Normal heart sounds. No murmur. Pulmonary:      Effort: Pulmonary effort is normal.      Breath sounds: Normal breath sounds. No wheezing or rales. Abdominal:      General: Bowel sounds are normal. There is no distension. Palpations: Abdomen is soft. There is no mass. Musculoskeletal: Normal range of motion. Right knee: Tenderness (posterior) found. Comments: Feet:warm, good capillary refill, scaly skin on sole, normal DP and PT pulses, normal monofilament exam and normal sensory exam     Lymphadenopathy:      Head:      Right side of head: No tonsillar adenopathy. Left side of head: No tonsillar adenopathy. Cervical: No cervical adenopathy. Skin:     General: Skin is warm and dry. Findings: No rash. Neurological:      Mental Status: He is alert and oriented to person, place, and time. Cranial Nerves: No cranial nerve deficit. Deep Tendon Reflexes: Reflexes are normal and symmetric. ASSESSMENT and PLAN  Diagnoses and all orders for this visit:    1. Type 2 diabetes mellitus with diabetic neuropathy, without long-term current use of insulin ([facility])  Assessment & Plan:  Improving, based on history, physical exam and review of pertinent labs, studies and medications; meds reconciled; continue current treatment plan, lifestyle modifications recommended, medication compliance emphasized. Key Antihyperglycemic Medications             metFORMIN (GLUCOPHAGE) 850 mg tablet (Taking) TAKE 1 TABLET BY MOUTH TWICE DAILY WITH MEALS        Other Key Diabetic Medications             fenofibrate (LOFIBRA) 160 mg tablet (Taking) TAKE 1 TABLET BY MOUTH ONCE DAILY    atorvastatin (LIPITOR) 10 mg tablet (Taking) Take 1 Tab by mouth daily. gabapentin (NEURONTIN) 300 mg capsule (Taking) Take 1 Cap by mouth two (2) times a day. Max Daily Amount: 600 mg. Lab Results   Component Value Date/Time    Hemoglobin A1c 6.2 11/11/2019 10:18 AM    Glucose 114 11/11/2019 10:18 AM    Creatinine 1.31 11/11/2019 10:18 AM    Microalb/Creat ratio (ug/mg creat.) 6.1 11/11/2019 10:18 AM    Cholesterol, total 120 11/11/2019 10:18 AM    HDL Cholesterol 36 11/11/2019 10:18 AM    LDL, calculated 63 11/11/2019 10:18 AM    Triglyceride 105 11/11/2019 10:18 AM     Diabetic Foot and Eye Exam  Status   Topic Date Due    Eye Exam  02/09/2020    Diabetic Foot Care  07/31/2020       Orders:  -     METABOLIC PANEL, COMPREHENSIVE    2. Type 2 diabetes with nephropathy [facility])  Assessment & Plan:  Improving, based on history, physical exam and review of pertinent labs, studies and medications; meds reconciled; continue current treatment plan, lifestyle modifications recommended, medication compliance emphasized. Key Antihyperglycemic Medications             metFORMIN (GLUCOPHAGE) 850 mg tablet (Taking) TAKE 1 TABLET BY MOUTH TWICE DAILY WITH MEALS        Other Key Diabetic Medications             fenofibrate (LOFIBRA) 160 mg tablet (Taking) TAKE 1 TABLET BY MOUTH ONCE DAILY    atorvastatin (LIPITOR) 10 mg tablet (Taking) Take 1 Tab by mouth daily. gabapentin (NEURONTIN) 300 mg capsule (Taking) Take 1 Cap by mouth two (2) times a day. Max Daily Amount: 600 mg.         Lab Results   Component Value Date/Time    Hemoglobin A1c 6.2 11/11/2019 10:18 AM    Glucose 114 11/11/2019 10:18 AM    Creatinine 1.31 11/11/2019 10:18 AM    Microalb/Creat ratio (ug/mg creat.) 6.1 11/11/2019 10:18 AM    Cholesterol, total 120 11/11/2019 10:18 AM    HDL Cholesterol 36 11/11/2019 10:18 AM    LDL, calculated 63 11/11/2019 10:18 AM    Triglyceride 105 11/11/2019 10:18 AM     Diabetic Foot and Eye Exam HM Status   Topic Date Due    Eye Exam  02/09/2020    Diabetic Foot Care  07/31/2020       Orders:  -     HM DIABETES FOOT EXAM  -     METABOLIC PANEL, COMPREHENSIVE  -     HEMOGLOBIN A1C WITH EAG  - MICROALBUMIN, UR, RAND W/ MICROALB/CREAT RATIO    3. CKD (chronic kidney disease) stage 3, GFR 30-59 ml/min ([facility])  Assessment & Plan:  Improving, based on history, physical exam and review of pertinent labs, studies and medications; meds reconciled; continue current treatment plan, lifestyle modifications recommended, medication compliance emphasized. Key CKD Meds             tamsulosin (FLOMAX) 0.4 mg capsule (Taking) TAKE ONE CAPSULE BY MOUTH ONCE DAILY        Lab Results   Component Value Date/Time    GFR est non-AA 55 11/11/2019 10:18 AM    GFR est AA 63 11/11/2019 10:18 AM    Creatinine 1.31 11/11/2019 10:18 AM    BUN 18 11/11/2019 10:18 AM    Sodium 140 11/11/2019 10:18 AM    Potassium 5.2 11/11/2019 10:18 AM    Chloride 106 11/11/2019 10:18 AM    CO2 17 11/11/2019 10:18 AM    Calcium 9.4 11/11/2019 10:18 AM     Estimated Creatinine Clearance: 54.2 mL/min (A) (by C-G formula based on SCr of 1.31 mg/dL (H)). Orders:  -     CBC WITH AUTOMATED DIFF  -     METABOLIC PANEL, COMPREHENSIVE    4. Mixed dyslipidemia  -     METABOLIC PANEL, COMPREHENSIVE  -     LIPID PANEL    5. Atherosclerosis of native coronary artery of native heart without angina pectoris  -     LIPID PANEL    6. Iron deficiency anemia secondary to inadequate dietary iron intake  -     CBC WITH AUTOMATED DIFF    7. Hospital discharge follow-up  reviewed ER records  Discussed fall precautions    Discussed lifestyle issues and health guidance given  Patient was given an after visit summary which includes diagnoses, vital signs, current medications, instructions and references & authorized prescriptions . Results of labs will be conveyed to patient, once available. Pt verbalized instructions I provided and expressed understanding of discussion that was held today. Follow-up and Dispositions    · Return in about 4 months (around 7/10/2020) for fasting, follow up, medicare wellness.              Discussed lifestyle issues and health guidance given  Patient was given an after visit summary which includes diagnoses, vital signs, current medications, instructions and references & authorized prescriptions . Results of labs will be conveyed to patient, once available. Pt verbalized instructions I provided and expressed understanding of discussion that was held today. Follow-up and Dispositions    · Return in about 4 months (around 7/10/2020) for fasting, follow up, medicare wellness.

## 2020-03-10 NOTE — ASSESSMENT & PLAN NOTE
Improving, based on history, physical exam and review of pertinent labs, studies and medications; meds reconciled; continue current treatment plan, lifestyle modifications recommended, medication compliance emphasized. Key Antihyperglycemic Medications             metFORMIN (GLUCOPHAGE) 850 mg tablet (Taking) TAKE 1 TABLET BY MOUTH TWICE DAILY WITH MEALS        Other Key Diabetic Medications             fenofibrate (LOFIBRA) 160 mg tablet (Taking) TAKE 1 TABLET BY MOUTH ONCE DAILY    atorvastatin (LIPITOR) 10 mg tablet (Taking) Take 1 Tab by mouth daily. gabapentin (NEURONTIN) 300 mg capsule (Taking) Take 1 Cap by mouth two (2) times a day. Max Daily Amount: 600 mg.         Lab Results   Component Value Date/Time    Hemoglobin A1c 6.2 11/11/2019 10:18 AM    Glucose 114 11/11/2019 10:18 AM    Creatinine 1.31 11/11/2019 10:18 AM    Microalb/Creat ratio (ug/mg creat.) 6.1 11/11/2019 10:18 AM    Cholesterol, total 120 11/11/2019 10:18 AM    HDL Cholesterol 36 11/11/2019 10:18 AM    LDL, calculated 63 11/11/2019 10:18 AM    Triglyceride 105 11/11/2019 10:18 AM     Diabetic Foot and Eye Exam HM Status   Topic Date Due    Eye Exam  02/09/2020    Diabetic Foot Care  07/31/2020

## 2020-03-10 NOTE — PATIENT INSTRUCTIONS
Diabetes Foot Health: Care Instructions Your Care Instructions When you have diabetes, your feet need extra care and attention. Diabetes can damage the nerve endings and blood vessels in your feet, making you less likely to notice when your feet are injured. Diabetes also limits your body's ability to fight infection and get blood to areas that need it. If you get a minor foot injury, it could become an ulcer or a serious infection. With good foot care, you can prevent most of these problems. Caring for your feet can be quick and easy. Most of the care can be done when you are bathing or getting ready for bed. Follow-up care is a key part of your treatment and safety. Be sure to make and go to all appointments, and call your doctor if you are having problems. It's also a good idea to know your test results and keep a list of the medicines you take. How can you care for yourself at home? · Keep your blood sugar close to normal by watching what and how much you eat, monitoring blood sugar, taking medicines if prescribed, and getting regular exercise. · Do not smoke. Smoking affects blood flow and can make foot problems worse. If you need help quitting, talk to your doctor about stop-smoking programs and medicines. These can increase your chances of quitting for good. · Eat a diet that is low in fats. High fat intake can cause fat to build up in your blood vessels and decrease blood flow. · Inspect your feet daily for blisters, cuts, cracks, or sores. If you cannot see well, use a mirror or have someone help you. · Take care of your feet: 
? Wash your feet every day. Use warm (not hot) water. Check the water temperature with your wrists or other part of your body, not your feet. ? Dry your feet well. Pat them dry. Do not rub the skin on your feet too hard. Dry well between your toes. If the skin on your feet stays moist, bacteria or a fungus can grow, which can lead to infection. ? Keep your skin soft. Use moisturizing skin cream to keep the skin on your feet soft and prevent calluses and cracks. But do not put the cream between your toes, and stop using any cream that causes a rash. ? Clean underneath your toenails carefully. Do not use a sharp object to clean underneath your toenails. Use the blunt end of a nail file or other rounded tool. ? Trim and file your toenails straight across to prevent ingrown toenails. Use a nail clipper, not scissors. Use an emery board to smooth the edges. · Change socks daily. Socks without seams are best, because seams often rub the feet. You can find socks for people with diabetes from specialty catalogs. · Look inside your shoes every day for things like gravel or torn linings, which could cause blisters or sores. · Buy shoes that fit well: 
? Look for shoes that have plenty of space around the toes. This helps prevent bunions and blisters. ? Try on shoes while wearing the kind of socks you will usually wear with the shoes. ? Avoid plastic shoes. They may rub your feet and cause blisters. Good shoes should be made of materials that are flexible and breathable, such as leather or cloth. ? Break in new shoes slowly by wearing them for no more than an hour a day for several days. Take extra time to check your feet for red areas, blisters, or other problems after you wear new shoes. · Do not go barefoot. Do not wear sandals, and do not wear shoes with very thin soles. Thin soles are easy to puncture. They also do not protect your feet from hot pavement or cold weather. · Have your doctor check your feet during each visit. If you have a foot problem, see your doctor. Do not try to treat an early foot problem at home. Home remedies or treatments that you can buy without a prescription (such as corn removers) can be harmful. · Always get early treatment for foot problems. A minor irritation can lead to a major problem if not properly cared for early. When should you call for help? Call your doctor now or seek immediate medical care if: 
  · You have a foot sore, an ulcer or break in the skin that is not healing after 4 days, bleeding corns or calluses, or an ingrown toenail.  
  · You have blue or black areas, which can mean bruising or blood flow problems.  
  · You have peeling skin or tiny blisters between your toes or cracking or oozing of the skin.  
  · You have a fever for more than 24 hours and a foot sore.  
  · You have new numbness or tingling in your feet that does not go away after you move your feet or change positions.  
  · You have unexplained or unusual swelling of the foot or ankle.  
 Watch closely for changes in your health, and be sure to contact your doctor if: 
  · You cannot do proper foot care. Where can you learn more? Go to http://araseli-lv.info/. Enter A739 in the search box to learn more about \"Diabetes Foot Health: Care Instructions. \" Current as of: April 16, 2019 Content Version: 12.2 © 8026-4137 [employer]. Care instructions adapted under license by [employer] (which disclaims liability or warranty for this information). If you have questions about a medical condition or this instruction, always ask your healthcare professional. [address] any warranty or liability for your use of this information.

## 2020-03-11 LAB
ALBUMIN SERPL-MCNC: 4.3 G/DL (ref 3.8–4.8)
ALBUMIN/CREAT UR: 8 MG/G CREAT (ref 0–29)
ALBUMIN/GLOB SERPL: 1.7 {RATIO} (ref 1.2–2.2)
ALP SERPL-CCNC: 42 IU/L (ref 39–117)
ALT SERPL-CCNC: 19 IU/L (ref 0–44)
AST SERPL-CCNC: 19 IU/L (ref 0–40)
BASOPHILS # BLD AUTO: 0 X10E3/UL (ref 0–0.2)
BASOPHILS NFR BLD AUTO: 1 %
BILIRUB SERPL-MCNC: 0.7 MG/DL (ref 0–1.2)
BUN SERPL-MCNC: 16 MG/DL (ref 8–27)
BUN/CREAT SERPL: 11 (ref 10–24)
CALCIUM SERPL-MCNC: 9.6 MG/DL (ref 8.6–10.2)
CHLORIDE SERPL-SCNC: 104 MMOL/L (ref 96–106)
CHOLEST SERPL-MCNC: 132 MG/DL (ref 100–199)
CO2 SERPL-SCNC: 22 MMOL/L (ref 20–29)
CREAT SERPL-MCNC: 1.42 MG/DL (ref 0.76–1.27)
CREAT UR-MCNC: 182.2 MG/DL
EOSINOPHIL # BLD AUTO: 0.1 X10E3/UL (ref 0–0.4)
EOSINOPHIL NFR BLD AUTO: 2 %
ERYTHROCYTE [DISTWIDTH] IN BLOOD BY AUTOMATED COUNT: 13.5 % (ref 11.6–15.4)
EST. AVERAGE GLUCOSE BLD GHB EST-MCNC: 146 MG/DL
GLOBULIN SER CALC-MCNC: 2.5 G/DL (ref 1.5–4.5)
GLUCOSE SERPL-MCNC: 119 MG/DL (ref 65–99)
HBA1C MFR BLD: 6.7 % (ref 4.8–5.6)
HCT VFR BLD AUTO: 43.9 % (ref 37.5–51)
HDLC SERPL-MCNC: 46 MG/DL
HGB BLD-MCNC: 14.2 G/DL (ref 13–17.7)
IMM GRANULOCYTES # BLD AUTO: 0 X10E3/UL (ref 0–0.1)
IMM GRANULOCYTES NFR BLD AUTO: 0 %
INTERPRETATION, 910389: NORMAL
INTERPRETATION: NORMAL
LDLC SERPL CALC-MCNC: 70 MG/DL (ref 0–99)
LYMPHOCYTES # BLD AUTO: 2.3 X10E3/UL (ref 0.7–3.1)
LYMPHOCYTES NFR BLD AUTO: 36 %
MCH RBC QN AUTO: 27.8 PG (ref 26.6–33)
MCHC RBC AUTO-ENTMCNC: 32.3 G/DL (ref 31.5–35.7)
MCV RBC AUTO: 86 FL (ref 79–97)
MICROALBUMIN UR-MCNC: 15.3 UG/ML
MONOCYTES # BLD AUTO: 0.4 X10E3/UL (ref 0.1–0.9)
MONOCYTES NFR BLD AUTO: 6 %
NEUTROPHILS # BLD AUTO: 3.5 X10E3/UL (ref 1.4–7)
NEUTROPHILS NFR BLD AUTO: 55 %
PDF IMAGE, 910387: NORMAL
PLATELET # BLD AUTO: 268 X10E3/UL (ref 150–450)
POTASSIUM SERPL-SCNC: 5.3 MMOL/L (ref 3.5–5.2)
PROT SERPL-MCNC: 6.8 G/DL (ref 6–8.5)
RBC # BLD AUTO: 5.11 X10E6/UL (ref 4.14–5.8)
SODIUM SERPL-SCNC: 139 MMOL/L (ref 134–144)
TRIGL SERPL-MCNC: 81 MG/DL (ref 0–149)
VLDLC SERPL CALC-MCNC: 16 MG/DL (ref 5–40)
WBC # BLD AUTO: 6.4 X10E3/UL (ref 3.4–10.8)

## 2020-03-11 NOTE — PROGRESS NOTES
Please inform patient and send letter,  Kidney function is abnormal, but stable.  Good news is, his urine is negative for protein  Blood count,cholesterol results  are normal  A1C is up to 6.7, this time, just making sure he watches diet carefully for sweets, but no change in Metformin dose  thanks

## 2020-03-13 NOTE — PROGRESS NOTES
Outbound call to patient. Name and  verified. Reviewed recent labs with patient. He expressed understanding.

## 2020-03-26 ENCOUNTER — TELEPHONE (OUTPATIENT)
Dept: FAMILY MEDICINE CLINIC | Age: 70
End: 2020-03-26

## 2020-03-26 NOTE — TELEPHONE ENCOUNTER
----- Message from Lyssa Simon MD sent at 3/10/2020 10:39 AM EDT -----  Regarding: eye exam report  Aarti,  Patient had eye exam in 01/2020 with Dr Adelaida Hastings. Can we please get records ?   thanks

## 2020-03-27 ENCOUNTER — TELEPHONE (OUTPATIENT)
Dept: FAMILY MEDICINE CLINIC | Age: 70
End: 2020-03-27

## 2020-03-27 RX ORDER — DOXYCYCLINE 100 MG/1
100 CAPSULE ORAL 2 TIMES DAILY
Qty: 20 CAP | Refills: 0 | Status: SHIPPED | OUTPATIENT
Start: 2020-03-27 | End: 2020-04-06

## 2020-03-27 NOTE — TELEPHONE ENCOUNTER
Spoke with patient. Name and  verified. Advised that ABT was sent to the pharmacy. Advised of provider recommendations. He expressed understanding.

## 2020-03-27 NOTE — TELEPHONE ENCOUNTER
Let him know, I have sent Rx for Doxy. To continue with nasal spray and Zyrtec.  If sx don't improve, will need to be seen   He follows ENT specialist usually

## 2020-03-27 NOTE — TELEPHONE ENCOUNTER
Pt. is calling requesting a call back in regards to discuss some medicine to be called in for his sinus.      Best contact# [phone]#

## 2020-05-02 DIAGNOSIS — F51.01 PRIMARY INSOMNIA: ICD-10-CM

## 2020-05-02 DIAGNOSIS — E11.9 TYPE 2 DIABETES MELLITUS WITHOUT COMPLICATION, WITHOUT LONG-TERM CURRENT USE OF INSULIN (HCC): ICD-10-CM

## 2020-05-02 RX ORDER — ZOLPIDEM TARTRATE 5 MG/1
TABLET ORAL
Qty: 30 TAB | Refills: 0 | Status: SHIPPED | OUTPATIENT
Start: 2020-05-02 | End: 2020-07-10

## 2020-05-02 RX ORDER — METFORMIN HYDROCHLORIDE 850 MG/1
TABLET ORAL
Qty: 180 TAB | Refills: 0 | Status: SHIPPED | OUTPATIENT
Start: 2020-05-02 | End: 2020-08-04

## 2020-05-04 ENCOUNTER — TELEPHONE (OUTPATIENT)
Dept: FAMILY MEDICINE CLINIC | Age: 70
End: 2020-05-04

## 2020-05-04 DIAGNOSIS — E11.21 TYPE 2 DIABETES WITH NEPHROPATHY (HCC): ICD-10-CM

## 2020-05-04 DIAGNOSIS — E11.40 TYPE 2 DIABETES MELLITUS WITH DIABETIC NEUROPATHY, WITHOUT LONG-TERM CURRENT USE OF INSULIN (HCC): ICD-10-CM

## 2020-05-04 RX ORDER — GABAPENTIN 300 MG/1
300 CAPSULE ORAL
Qty: 30 CAP | Refills: 2 | Status: SHIPPED | OUTPATIENT
Start: 2020-05-04 | End: 2021-02-15 | Stop reason: ALTCHOICE

## 2020-05-04 RX ORDER — TAMSULOSIN HYDROCHLORIDE 0.4 MG/1
0.4 CAPSULE ORAL DAILY
Qty: 90 CAP | Refills: 0 | Status: SHIPPED | OUTPATIENT
Start: 2020-05-04 | End: 2020-09-12

## 2020-05-04 NOTE — TELEPHONE ENCOUNTER
Called, spoke to pt. Two pt identifiers confirmed. Prior Auth approved for [facility] patient , MD aware. Also faxed to pharmacy. Patient inquired about other meds.  Meds pend and sent to MD.

## 2020-05-04 NOTE — TELEPHONE ENCOUNTER
PCP: Elijah Alicea MD    Last appt: 3/10/2020  Future Appointments   Date Time Provider Tima Greenberg   7/20/2020 10:20 AM Elijah Alicea  [facility]   11/20/2020  9:20 AM Ryan Angela  [address]       Requested Prescriptions     Pending Prescriptions Disp Refills    gabapentin (NEURONTIN) 300 mg capsule 60 Cap 2     Sig: Take 1 Cap by mouth two (2) times a day. Max Daily Amount: 600 mg.    glucose blood VI test strips (Accu-Chek Cece Plus test strp) strip 100 Strip 5     Sig: USE ONE STRIP TO CHECK GLUCOSE ONCE DAILY    tamsulosin (FLOMAX) 0.4 mg capsule 90 Cap 0     Sig: Take 1 Cap by mouth daily.

## 2020-05-21 ENCOUNTER — TELEPHONE (OUTPATIENT)
Dept: FAMILY MEDICINE CLINIC | Age: 70
End: 2020-05-21

## 2020-05-21 NOTE — TELEPHONE ENCOUNTER
Called, left vm for pt to return call to office.   Need patient to schedule OV at 8: 40 am. Dr. Dial Daughters wants OV in AM. PRE OP

## 2020-05-21 NOTE — TELEPHONE ENCOUNTER
----- Message from Pedro Hayes sent at 2020 10:37 AM EDT -----  Regarding: Juani Sands/telephone  Patient returned a phone call from the practice.  Best contact number is [phone]    Outbound call; verified ; pt r/s for 10:20am

## 2020-05-28 ENCOUNTER — OFFICE VISIT (OUTPATIENT)
Dept: FAMILY MEDICINE CLINIC | Age: 70
End: 2020-05-28

## 2020-05-28 VITALS
HEIGHT: 70 IN | BODY MASS INDEX: 27.2 KG/M2 | OXYGEN SATURATION: 99 % | HEART RATE: 70 BPM | TEMPERATURE: 98.6 F | SYSTOLIC BLOOD PRESSURE: 128 MMHG | DIASTOLIC BLOOD PRESSURE: 78 MMHG | RESPIRATION RATE: 16 BRPM | WEIGHT: 190 LBS

## 2020-05-28 DIAGNOSIS — M75.82 ROTATOR CUFF TENDONITIS, LEFT: ICD-10-CM

## 2020-05-28 DIAGNOSIS — Z01.818 PREOP GENERAL PHYSICAL EXAM: Primary | ICD-10-CM

## 2020-05-28 NOTE — PATIENT INSTRUCTIONS
Learning About How to Prepare for Surgery How can you prepare before surgery? You can do some things that will help you safely prepare for surgery. · Understand exactly what surgery is planned. You should know the risks, benefits, and other options. · Tell your doctors ALL the medicines, vitamins, supplements, and herbal remedies you take. Some of these can increase the risk of bleeding. Or they may interact with anesthesia. · Follow your doctor's instructions about which medicines to take or stop before your surgery. ? You may need to stop taking some medicines a week or more before surgery. ? If you take aspirin or some other blood thinner, be sure to talk to your doctor. · Follow any other instructions your doctor gave you. · If you have an advance directive, let your doctor know, and bring a copy to the hospital.  
It may include a living will and a durable power of  for health care. It lets your doctor and loved ones know your health care wishes. If you don't have one, you may want to prepare one. How can you prepare on the day of surgery? Here are some tips about what to do at home before you leave for your surgery. · If your doctor told you to take your medicines on the day of surgery, take them with only a sip of water. · Follow the instructions about when to stop eating and drinking. If you don't, your surgery may be canceled. · Follow your doctor's instructions about when to bathe or shower before your surgery. · Do not shave the surgical site yourself. · Take off all jewelry and piercings. · Take out contact lenses, if you wear them. · Have a picture ID ready to take with you. Your ID will be checked before your surgery. · Know when to call your doctor. Call your doctor if you: 
? Become ill before surgery. ? Need to reschedule. ? Have changed your mind about having the surgery. What happens before surgery? Here are some things you can expect to happen before your surgery. · Your picture ID will be checked. · The area of your body that needs surgery is often marked to make sure there are no errors. · You will be kept comfortable and safe by your anesthesia provider. The anesthesia may make you sleep. Or it may just numb the area being worked on. What happens when you are ready to go home? Be sure you have someone drive you home. Anesthesia and pain medicine make it unsafe for you to drive. You will get instructions about recovering from your surgery. This is called a discharge plan. It will cover things like diet, wound care, follow-up care, driving, and getting back to your normal routine. Follow-up care is a key part of your treatment and safety. Be sure to make and go to all appointments, and call your doctor if you are having problems. It's also a good idea to know your test results and keep a list of the medicines you take. Where can you learn more? Go to http://araseli-lv.info/ Enter Q270 in the search box to learn more about \"Learning About How to Prepare for Surgery. \" Current as of: August 22, 2019               Content Version: 12.5 © 9405-4411 [employer]. Care instructions adapted under license by [employer] (which disclaims liability or warranty for this information). If you have questions about a medical condition or this instruction, always ask your healthcare professional. [address] any warranty or liability for your use of this information.

## 2020-05-28 NOTE — PROGRESS NOTES
Chief Complaint   Patient presents with    Pre-op Exam     rotor cuff left shoulder june 12th   1. Have you been to the ER, urgent care clinic since your last visit? Hospitalized since your last visit? No    2. Have you seen or consulted any other health care providers outside of the [address] [place] [address] since your last visit? Include any pap smears or colon screening. Dr Conrado Neal for shoulder  Surgeon Dr Mae Dempsey [phone]

## 2020-05-28 NOTE — PROGRESS NOTES
HISTORY OF PRESENT ILLNESS  Maribel Lynn is a 78 y.o. male. Seen for preop clearance for left rotator cuff repair. HPI  HPI:  Madeline Khan is 78 y.o. male (1950) who presents for preoperative evaluation. Procedure/Surgery: rotator cuff repair, left   Date of Procedure/Surgery: 06/12/2020  Surgeon: DR Fide Corrigan  [facility]: [facility]  Primary Physician: Waleska Collins MD       Reason for surgery: rotator cuff tear on MRI    Latex Allergy: no  Anesthesia Complications: None  History of abnormal bleeding : None  History of Blood Transfusions: no  Health Care Directive or Living Will: no  Recent use of: ASA  Tetanus up to date: no,       EKG: EKG FINDINGS - normal EKG, normal sinus rhythm, sinus bradycardia    Labs:   Lab Results   Component Value Date/Time    WBC 6.4 03/10/2020 11:02 AM    HGB 14.2 03/10/2020 11:02 AM    HCT 43.9 03/10/2020 11:02 AM    PLATELET 640 89/00/7714 11:02 AM    MCV 86 03/10/2020 11:02 AM     Lab Results   Component Value Date/Time    Sodium 139 03/10/2020 11:02 AM    Potassium 5.3 (H) 03/10/2020 11:02 AM    Chloride 104 03/10/2020 11:02 AM    CO2 22 03/10/2020 11:02 AM    Anion gap 4 (L) 08/02/2017 05:36 AM    Glucose 119 (H) 03/10/2020 11:02 AM    BUN 16 03/10/2020 11:02 AM    Creatinine 1.42 (H) 03/10/2020 11:02 AM    BUN/Creatinine ratio 11 03/10/2020 11:02 AM    GFR est AA 57 (L) 03/10/2020 11:02 AM    GFR est non-AA 50 (L) 03/10/2020 11:02 AM    Calcium 9.6 03/10/2020 11:02 AM      Lab Results   Component Value Date/Time    Hemoglobin A1c 6.7 (H) 03/10/2020 11:02 AM          After reviewing the patient's history & exam he is low risk for cardiac complications. No contraindications to planned surgery     ---------------------------------------     Prior to Admission medications    Medication Sig Start Date End Date Taking? Authorizing Provider   gabapentin (NEURONTIN) 300 mg capsule Take 1 Cap by mouth nightly.  Max Daily Amount: 300 mg. 5/4/20  Yes Joaquin Vila MD   glucose blood VI test strips (Accu-Chek Cece Plus test strp) strip USE ONE STRIP TO CHECK GLUCOSE ONCE DAILY 5/4/20  Yes Joaquin Vila MD   tamsulosin (FLOMAX) 0.4 mg capsule Take 1 Cap by mouth daily. 5/4/20  Yes Joaquin Vila MD   metFORMIN (GLUCOPHAGE) 850 mg tablet TAKE 1 TABLET BY MOUTH TWICE DAILY WITH MEALS 5/2/20  Yes Joaquin Vila MD   zolpidem (AMBIEN) 5 mg tablet TAKE 1 TABLET BY MOUTH EVERY DAY AT BEDTIME AS NEEDED SLEEP 5/2/20  Yes Joaquin Vila MD   fenofibrate (LOFIBRA) 160 mg tablet TAKE 1 TABLET BY MOUTH ONCE DAILY 2/11/20  Yes Joaquin Vila MD   atorvastatin (LIPITOR) 10 mg tablet Take 1 Tab by mouth daily. 11/18/19  Yes Carlos Ayala MD   metoprolol succinate (TOPROL-XL) 50 mg XL tablet Take 1 Tab by mouth daily. 11/18/19  Yes Carlos Ayala MD   diclofenac (VOLTAREN) 1 % gel Apply 2 grams to the effected area (knees) BID prn 9/19/19  Yes Provider, Historical   aspirin delayed-release 81 mg tablet Take 162 mg by mouth daily. Yes Provider, Historical   acetaminophen (TYLENOL) 500 mg tablet Take 2 Tabs by mouth every six (6) hours. 8/2/17  Yes Fern Fernandes PA-C   esomeprazole (NEXIUM) 20 mg capsule Take 20 mg by mouth daily as needed. Yes Provider, Historical   nitroglycerin (NITROSTAT) 0.4 mg SL tablet DISSOLVE ONE TABLET UNDER THE TONGUE EVERY 5 MINUTES AS NEEDED FOR CHEST PAIN. UP TO 3 DOSES 5/14/19   Carlos Ayala MD          Allergies   Allergen Reactions    Levofloxacin Rash    Pcn [Penicillins] Rash    Adhesive Tape-Silicones Unknown (comments)     Other reaction(s): Rash  Medipore tape caused large blisters when removed.  Tape [Adhesive] Rash     Medipore tape caused large blisters when removed.         Patient Active Problem List    Diagnosis Date Noted    Iron deficiency anemia secondary to inadequate dietary iron intake 03/10/2020    Type 2 diabetes with nephropathy [facility] [address] 11/04/2019    Type 2 diabetes mellitus with diabetic neuropathy, without long-term current use of insulin ([address] 05/14/2019    CKD (chronic kidney disease) stage 3, GFR 30-59 ml/min ([facility]) 10/31/2018    Vitamin B12 deficiency 06/21/2018    Chronic pansinusitis 06/21/2018    History of non anemic vitamin B12 deficiency 02/13/2018    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 9/27/2016    S/P coronary artery stent placement July 29th, 2011    NAN to LAD       Past Surgical History:   Procedure Laterality Date    CARDIAC SURG PROCEDURE UNLIST  2009    CATHERIZATION WITH STENT X 1 PLACEMENT    COLONOSCOPY N/A 4/17/2018    COLONOSCOPY performed by Juan Manuel Rivera MD at [address] [place] [address] ENDOSCOPY    HX GI  2010    COLONOSCOPY    HX GI  2010    ENDOSCOPY    HX HEENT      SEPTOPLASTY    HX HEENT      TONSILLECTOMY    HX KNEE REPLACEMENT Left 2008    DR ROSEN    HX ORTHOPAEDIC Right 2010, 2017    KNEE REPLACEMENT, SPACER 5/2017    HX ORTHOPAEDIC  2007    RIGHT SHOULDER ROTATOR CUFF REPAIR.        Social History     Tobacco Use    Smoking status: Never Smoker    Smokeless tobacco: Former User    Tobacco comment: Patient states on and off for chewing tobacco.     Substance Use Topics    Alcohol use: Yes     Alcohol/week: 2.0 standard drinks     Types: 2 Cans of beer per week     Comment: NOT DRINKING AT THIS TIME         --------------------------------------    Review of Systems   Constitutional: Negative for chills, fever and malaise/fatigue. HENT: Negative for congestion, ear pain, sore throat and tinnitus. Eyes: Negative for blurred vision, double vision, pain and discharge. Respiratory: Negative for cough, shortness of breath and wheezing. Cardiovascular: Negative for chest pain, palpitations and leg swelling. Gastrointestinal: Negative for abdominal pain, blood in stool, constipation, diarrhea, nausea and vomiting. Genitourinary: Negative for dysuria, frequency, hematuria and urgency. Musculoskeletal: Negative for back pain, joint pain and myalgias. Left shoulder pain   Skin: Negative for rash. Neurological: Negative for dizziness, tremors, seizures and headaches. Endo/Heme/Allergies: Negative for polydipsia. Does not bruise/bleed easily. Psychiatric/Behavioral: Negative for depression and substance abuse. The patient is not nervous/anxious. Physical Exam  Vitals signs and nursing note reviewed. Constitutional:       Appearance: He is well-developed. He is not diaphoretic. HENT:      Head: Normocephalic and atraumatic. Right Ear: External ear normal.      Mouth/Throat:      Pharynx: No oropharyngeal exudate. Eyes:      General: No scleral icterus. Conjunctiva/sclera: Conjunctivae normal.      Pupils: Pupils are equal, round, and reactive to light. Neck:      Musculoskeletal: Normal range of motion and neck supple. Thyroid: No thyromegaly. Vascular: No JVD. Cardiovascular:      Rate and Rhythm: Normal rate and regular rhythm. Heart sounds: Normal heart sounds. No murmur. Pulmonary:      Effort: Pulmonary effort is normal.      Breath sounds: Normal breath sounds. No wheezing. Abdominal:      General: Bowel sounds are normal. There is no distension. Palpations: Abdomen is soft. There is no mass. Musculoskeletal: Normal range of motion. General: No tenderness. Lymphadenopathy:      Cervical: No cervical adenopathy. Skin:     General: Skin is warm and dry. Findings: No rash. Neurological:      Mental Status: He is alert and oriented to person, place, and time. Cranial Nerves: No cranial nerve deficit. Deep Tendon Reflexes: Reflexes are normal and symmetric. Reflexes normal.         ASSESSMENT and PLAN  Diagnoses and all orders for this visit:    1. Preop general physical exam  -     AMB POC EKG ROUTINE W/ 12 LEADS, INTER & REP    2. Rotator cuff tendonitis, left      After reviewing the patient's history & exam he is low risk for cardiac complications. No contraindications to planned surgery     Discussed lifestyle issues and health guidance given  Patient was given an after visit summary which includes diagnoses, vital signs, current medications, instructions and references & authorized prescriptions . Results of labs will be conveyed to patient, once available. Pt verbalized instructions I provided and expressed understanding of discussion that was held today.

## 2020-06-03 ENCOUNTER — TELEPHONE (OUTPATIENT)
Dept: FAMILY MEDICINE CLINIC | Age: 70
End: 2020-06-03

## 2020-06-03 NOTE — TELEPHONE ENCOUNTER
Chief Complaint   Patient presents with    Letter     Letter of pre-op physical exam clearance completed by Dr. Maryam Gonzales and faxed to Eva Marsh at [facility] [phone] with confirmation received.

## 2020-06-04 ENCOUNTER — TELEPHONE (OUTPATIENT)
Dept: FAMILY MEDICINE CLINIC | Age: 70
End: 2020-06-04

## 2020-06-04 NOTE — TELEPHONE ENCOUNTER
Chief Complaint   Patient presents with   Revonda Rubinstein Pre Procedure Assessment     letter faxed to [phone] and [phone] with confirmation received. Fax number listed in information faxed to our office [phone] never worked .

## 2020-06-04 NOTE — TELEPHONE ENCOUNTER
Can you please fax my last office note for preoperative exam ? I have printed his EKG and lab too  thanks

## 2020-06-04 NOTE — TELEPHONE ENCOUNTER
Nimisha Graham from [facility], Dr. Melissa Pryor office needs you to fax her recent labs, last office notes, and surgery clearance paper work.   Their information is below:    Dr. Pj Badillo    Tel:  [phone]  Fax: [phone]

## 2020-06-04 NOTE — TELEPHONE ENCOUNTER
----- Message from Eitan Lemons sent at 6/3/2020  1:43 PM EDT -----  Regarding: /Telephone  General Message/Vendor Calls    Caller's first and last name:      Reason for call:  Preop information has still not been received     Callback required yes/no and why:  Yes, to let him know the status/     Best contact number(s):  [phone]    Details to clarify the request:      Eitan Lemons      Last Seen 6/3/20

## 2020-06-04 NOTE — TELEPHONE ENCOUNTER
Chief Complaint   Patient presents with    Other     pre-op physical.     Patient has been seen and all appropriate paper work faxed to [facility].   Destini Ivey LPN

## 2020-06-18 ENCOUNTER — VIRTUAL VISIT (OUTPATIENT)
Dept: FAMILY MEDICINE CLINIC | Age: 70
End: 2020-06-18

## 2020-06-18 DIAGNOSIS — L23.1 ALLERGIC CONTACT DERMATITIS DUE TO ADHESIVES: Primary | ICD-10-CM

## 2020-06-18 RX ORDER — PREDNISONE 20 MG/1
20 TABLET ORAL 2 TIMES DAILY
Qty: 14 TAB | Refills: 0 | Status: SHIPPED | OUTPATIENT
Start: 2020-06-18 | End: 2020-11-03 | Stop reason: ALTCHOICE

## 2020-06-18 RX ORDER — TRIAMCINOLONE ACETONIDE 1 MG/G
OINTMENT TOPICAL 2 TIMES DAILY
Qty: 30 G | Refills: 0 | Status: SHIPPED | OUTPATIENT
Start: 2020-06-18 | End: 2021-02-15 | Stop reason: ALTCHOICE

## 2020-06-18 NOTE — PROGRESS NOTES
David Kapoor is a 69 y.o. male who was seen by synchronous (real-time) audio-video technology on 6/18/2020. Consent: David Kapoor, who was seen by synchronous (real-time) audio-video technology, and/or his healthcare decision maker, is aware that this patient-initiated, Telehealth encounter on 6/18/2020 is a billable service, with coverage as determined by his insurance carrier. He is aware that he may receive a bill and has provided verbal consent to proceed: Yes. Assessment & Plan:   Diagnoses and all orders for this visit:    1. Allergic contact dermatitis due to adhesives  -     predniSONE (DELTASONE) 20 mg tablet; Take 20 mg by mouth two (2) times a day. -     triamcinolone acetonide (KENALOG) 0.1 % ointment; Apply  to affected area two (2) times a day. use thin layer        I spent at least 15 minutes on this visit with this established patient. Sx likely from contact vs allergic dermatitis during his recent shoulder surgery ( either skin cleanser or tape)  Subjective:   David Kapoor is a 69 y.o. male who was seen for Allergic Reaction  Dermatology Review  He is here to talk about itchy rash. He noticed it abrupt and 5 days ago, with no changes since that time. Location: left arm, neck and chest.  Symptoms include erythema and itching. He reports: he had left rotator cuff surgery on 06/12/2020. next day he noticed rash on his left side neck, left shoulder and left arm and also some part of left side chest.  He denies: recent travel, new medications, changed in soaps/detergents, change in diet, new pets. Treatment to date has included [facility] and started on Acyclovir tablet on Monday. No change in rash. He denies burning or pain over rash. Prior to Admission medications    Medication Sig Start Date End Date Taking? Authorizing Provider   predniSONE (DELTASONE) 20 mg tablet Take 20 mg by mouth two (2) times a day.  6/18/20 Yes Brenda Kimball MD   triamcinolone acetonide (KENALOG) 0.1 % ointment Apply  to affected area two (2) times a day. use thin layer 6/18/20  Yes Brenda Kimball MD   gabapentin (NEURONTIN) 300 mg capsule Take 1 Cap by mouth nightly. Max Daily Amount: 300 mg. 5/4/20  Yes Brenda Kimball MD   glucose blood VI test strips (Accu-Chek Cece Plus test strp) strip USE ONE STRIP TO CHECK GLUCOSE ONCE DAILY 5/4/20  Yes Brenda Kimball MD   tamsulosin (FLOMAX) 0.4 mg capsule Take 1 Cap by mouth daily. 5/4/20  Yes Brenda Kimball MD   metFORMIN (GLUCOPHAGE) 850 mg tablet TAKE 1 TABLET BY MOUTH TWICE DAILY WITH MEALS 5/2/20  Yes Brenda Kimball MD   zolpidem (AMBIEN) 5 mg tablet TAKE 1 TABLET BY MOUTH EVERY DAY AT BEDTIME AS NEEDED SLEEP 5/2/20  Yes Brenda Kimball MD   fenofibrate (LOFIBRA) 160 mg tablet TAKE 1 TABLET BY MOUTH ONCE DAILY 2/11/20  Yes Brenda Kimball MD   atorvastatin (LIPITOR) 10 mg tablet Take 1 Tab by mouth daily. 11/18/19  Yes Steve Hua MD   metoprolol succinate (TOPROL-XL) 50 mg XL tablet Take 1 Tab by mouth daily. 11/18/19  Yes Steve Hua MD   diclofenac (VOLTAREN) 1 % gel Apply 2 grams to the effected area (knees) BID prn 9/19/19  Yes Provider, Historical   nitroglycerin (NITROSTAT) 0.4 mg SL tablet DISSOLVE ONE TABLET UNDER THE TONGUE EVERY 5 MINUTES AS NEEDED FOR CHEST PAIN. UP TO 3 DOSES 5/14/19  Yes Steve Hua MD   aspirin delayed-release 81 mg tablet Take 162 mg by mouth daily. Yes Provider, Historical   acetaminophen (TYLENOL) 500 mg tablet Take 2 Tabs by mouth every six (6) hours. 8/2/17  Yes Fern Fernandes PA-C   esomeprazole (NEXIUM) 20 mg capsule Take 20 mg by mouth daily as needed. Yes Provider, Historical     Allergies   Allergen Reactions    Levofloxacin Rash    Pcn [Penicillins] Rash    Adhesive Tape-Silicones Unknown (comments)     Other reaction(s): Rash  Medipore tape caused large blisters when removed.     Tape [Adhesive] Rash Medipore tape caused large blisters when removed. Patient Active Problem List    Diagnosis Date Noted    Iron deficiency anemia secondary to inadequate dietary iron intake 03/10/2020    Type 2 diabetes with nephropathy ([facility] [address] [address] 11/04/2019    Type 2 diabetes mellitus with diabetic neuropathy, without long-term current use of insulin ([facility] 05/14/2019    CKD (chronic kidney disease) stage 3, GFR 30-59 ml/min ([facility] 10/31/2018    Vitamin B12 deficiency 06/21/2018    Chronic pansinusitis 06/21/2018    History of non anemic vitamin B12 deficiency 02/13/2018    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 14 Tab 0    triamcinolone acetonide (KENALOG) 0.1 % ointment Apply  to affected area two (2) times a day. use thin layer 30 g 0    gabapentin (NEURONTIN) 300 mg capsule Take 1 Cap by mouth nightly. Max Daily Amount: 300 mg. 30 Cap 2    glucose blood VI test strips (Accu-Chek Cece Plus test strp) strip USE ONE STRIP TO CHECK GLUCOSE ONCE DAILY 100 Strip 5    tamsulosin (FLOMAX) 0.4 mg capsule Take 1 Cap by mouth daily. 90 Cap 0    metFORMIN (GLUCOPHAGE) 850 mg tablet TAKE 1 TABLET BY MOUTH TWICE DAILY WITH MEALS 180 Tab 0    zolpidem (AMBIEN) 5 mg tablet TAKE 1 TABLET BY MOUTH EVERY DAY AT BEDTIME AS NEEDED SLEEP 30 Tab 0    fenofibrate (LOFIBRA) 160 mg tablet TAKE 1 TABLET BY MOUTH ONCE DAILY 90 Tab 0    atorvastatin (LIPITOR) 10 mg tablet Take 1 Tab by mouth daily. 90 Tab 3    metoprolol succinate (TOPROL-XL) 50 mg XL tablet Take 1 Tab by mouth daily.  90 Tab 3    diclofenac (VOLTAREN) 1 % gel Apply 2 grams to the effected area (knees) BID prn      nitroglycerin (NITROSTAT) 0.4 mg SL tablet DISSOLVE ONE TABLET UNDER THE TONGUE EVERY 5 MINUTES AS NEEDED FOR CHEST PAIN. UP TO 3 DOSES 1 Bottle 3    aspirin delayed-release 81 mg tablet Take 162 mg by mouth daily.  acetaminophen (TYLENOL) 500 mg tablet Take 2 Tabs by mouth every six (6) hours. 120 Tab 0    esomeprazole (NEXIUM) 20 mg capsule Take 20 mg by mouth daily as needed. Allergies   Allergen Reactions    Levofloxacin Rash    Pcn [Penicillins] Rash    Adhesive Tape-Silicones Unknown (comments)     Other reaction(s): Rash  Medipore tape caused large blisters when removed.  Tape [Adhesive] Rash     Medipore tape caused large blisters when removed. Past Medical History:   Diagnosis Date    Arthritis     KNEES & BACK. 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    S/P coronary artery stent placement July 29th, 2011    NAN to LAD     Past Surgical History:   Procedure Laterality Date    CARDIAC SURG PROCEDURE UNLIST  2009    CATHERIZATION WITH STENT X 1 PLACEMENT    COLONOSCOPY N/A 4/17/2018    COL[facility] END[facility]COPY    HX GI  2010    COLONOSCOPY    HX GI  2010    ENDOSCOPY    HX HEENT      SEPTOPLASTY    HX HEENT      TONSILLECTOMY    HX KNEE REPLACEMENT Left 2008    DR ROSEN    HX ORTHOPAEDIC Right 2010, 2017    KNEE REPLACEMENT, SPACER 5/2017    HX ORTHOPAEDIC  2007    RIGHT SHOULDER ROTATOR CUFF REPAIR.      Family History   Problem Relation Age of Onset    Diabetes Mother     Heart Disease Mother     Diabetes Son         TYPE 1    Alcohol abuse Son     Anesth Problems Neg Hx      Social History     Tobacco Use    Smoking status: Never Smoker    Smokeless tobacco: Former User    Tobacco comment: Patient states on and off for chewing tobacco.     Substance Use Topics    Alcohol use: Yes     Alcohol/week: 2.0 standard drinks     Types: 2 Cans of beer per week     Comment: NOT DRINKING AT THIS TIME       Review of Systems   Constitutional: Negative for chills, fever and malaise/fatigue. HENT: Negative for congestion, ear pain, sore throat and tinnitus. Eyes: Negative for blurred vision, double vision, pain and discharge. Respiratory: Negative for cough, shortness of breath and wheezing. Cardiovascular: Negative for chest pain, palpitations and leg swelling. Gastrointestinal: Negative for abdominal pain, blood in stool, constipation, diarrhea, nausea and vomiting. Genitourinary: Negative for dysuria, frequency, hematuria and urgency. Musculoskeletal: Negative for back pain, joint pain and myalgias. Skin: Positive for itching and rash (on neck and left arm). Neurological: Negative for dizziness, tremors, seizures and headaches. Endo/Heme/Allergies: Negative for polydipsia. Does not bruise/bleed easily. Psychiatric/Behavioral: Negative for depression and substance abuse. The patient is not nervous/anxious. Objective:   Vital Signs: (As obtained by patient/caregiver at home)  There were no vitals taken for this visit.      [INSTRUCTIONS:  \"[x]\" Indicates a positive item  \"[]\" Indicates a negative item  -- DELETE ALL ITEMS NOT EXAMINED]    Constitutional: [x] Appears well-developed and well-nourished [x] No apparent distress      [] Abnormal -     Mental status: [x] Alert and awake  [x] Oriented to person/place/time [x] Able to follow commands    [] Abnormal -     Eyes:   EOM    [x]  Normal    [] Abnormal -   Sclera  [x]  Normal    [] Abnormal -          Discharge [x]  None visible   [] Abnormal -     HENT: [x] Normocephalic, atraumatic  [] Abnormal -   [x] Mouth/Throat: Mucous membranes are moist    External Ears [x] Normal  [] Abnormal -    Neck: [x] No visualized mass [] Abnormal -     Pulmonary/Chest: [x] Respiratory effort normal   [x] No visualized signs of difficulty breathing or respiratory distress        [] Abnormal -      Musculoskeletal:   [x] Normal gait with no signs of ataxia         [x] Normal range of motion of neck        [] Abnormal -     Neurological:        [x] No Facial Asymmetry (Cranial nerve 7 motor function) (limited exam due to video visit)          [x] No gaze palsy        [] Abnormal -          Skin:        [x] No significant exanthematous lesions or discoloration noted on facial skin         [x] Abnormal - erythematous patches ( bruise like) on left side neck, upper back, left arm and left side chest          Psychiatric:       [x] Normal Affect [] Abnormal -        [x] No Hallucinations    Other pertinent observable physical exam findings:-        We discussed the expected course, resolution and complications of the diagnosis(es) in detail. Medication risks, benefits, costs, interactions, and alternatives were discussed as indicated. I advised him to contact the office if his condition worsens, changes or fails to improve as anticipated. He expressed understanding with the diagnosis(es) and plan. Magali Johnson is a 69 y.o. male who was evaluated by a video visit encounter for concerns as above. Patient identification was verified prior to start of the visit. A caregiver was present when appropriate. Due to this being a TeleHealth encounter (During LIOCA-03 public health emergency), evaluation of the following organ systems was limited: Vitals/Constitutional/EENT/Resp/CV/GI//MS/Neuro/Skin/Heme-Lymph-Imm.   Pursuant to the emergency declaration under the [facility] [address] waiver authority and the [employer] Act, this Virtual  Visit was conducted, with patient's (and/or legal guardian's) consent, to reduce the patient's risk of exposure to COVID-19 and provide necessary medical care. Services were provided through a video synchronous discussion virtually to substitute for in-person clinic visit. This service was provided through telehealth, between patient Delroy CartwrightMeli Blankenship participating from home and Elsa Yen MD from [facility]     I discussed with the patient the nature of our telemedicine visits, that:      I would evaluate the patient and recommend diagnostics and treatments based on my assessment   Our sessions are not being recorded and that personal health information is protected   Our team would provide follow up care in person if/when the patient needs it        Humberto Baugh MD

## 2020-06-23 ENCOUNTER — TELEPHONE (OUTPATIENT)
Dept: FAMILY MEDICINE CLINIC | Age: 70
End: 2020-06-23

## 2020-06-23 NOTE — TELEPHONE ENCOUNTER
----- Message from Marco Oliva sent at 2020  2:50 PM EDT -----  Regarding: Dr. Gamboa Adams: [phone]  Caller's first and last name:  Pt  Reason for call:  Pt spoke to Dr. Jeffrey Cunningham last week and prescribed medications to treat the symptoms discussed. Pt states Dr. Jeffrey Cunningham asked him to call today to report on status. Pt noted his symptoms are improving. Callback required yes/no and why:  Yes, to confirm what course the patient should continue.   Best contact number(s):  [phone]  Details to clarify the request: n/a

## 2020-07-10 DIAGNOSIS — F51.01 PRIMARY INSOMNIA: ICD-10-CM

## 2020-07-10 RX ORDER — ZOLPIDEM TARTRATE 5 MG/1
TABLET ORAL
Qty: 30 TAB | Refills: 0 | Status: SHIPPED | OUTPATIENT
Start: 2020-07-10 | End: 2020-09-01

## 2020-07-22 ENCOUNTER — OFFICE VISIT (OUTPATIENT)
Dept: FAMILY MEDICINE CLINIC | Age: 70
End: 2020-07-22

## 2020-07-22 VITALS
WEIGHT: 189 LBS | SYSTOLIC BLOOD PRESSURE: 124 MMHG | HEART RATE: 71 BPM | BODY MASS INDEX: 27.06 KG/M2 | OXYGEN SATURATION: 96 % | DIASTOLIC BLOOD PRESSURE: 77 MMHG | HEIGHT: 70 IN | RESPIRATION RATE: 18 BRPM | TEMPERATURE: 99.2 F

## 2020-07-22 DIAGNOSIS — H72.93 PERFORATION OF BOTH TYMPANIC MEMBRANES: ICD-10-CM

## 2020-07-22 DIAGNOSIS — E11.21 TYPE 2 DIABETES WITH NEPHROPATHY (HCC): ICD-10-CM

## 2020-07-22 DIAGNOSIS — Z71.89 ADVANCED DIRECTIVES, COUNSELING/DISCUSSION: ICD-10-CM

## 2020-07-22 DIAGNOSIS — Z13.31 SCREENING FOR DEPRESSION: ICD-10-CM

## 2020-07-22 DIAGNOSIS — E55.9 VITAMIN D DEFICIENCY: ICD-10-CM

## 2020-07-22 DIAGNOSIS — N18.30 CKD (CHRONIC KIDNEY DISEASE) STAGE 3, GFR 30-59 ML/MIN (HCC): ICD-10-CM

## 2020-07-22 DIAGNOSIS — Z13.39 SCREENING FOR ALCOHOLISM: ICD-10-CM

## 2020-07-22 DIAGNOSIS — D50.8 IRON DEFICIENCY ANEMIA SECONDARY TO INADEQUATE DIETARY IRON INTAKE: ICD-10-CM

## 2020-07-22 DIAGNOSIS — E11.40 TYPE 2 DIABETES MELLITUS WITH DIABETIC NEUROPATHY, WITHOUT LONG-TERM CURRENT USE OF INSULIN (HCC): ICD-10-CM

## 2020-07-22 DIAGNOSIS — F51.01 PRIMARY INSOMNIA: ICD-10-CM

## 2020-07-22 DIAGNOSIS — E78.2 MIXED DYSLIPIDEMIA: ICD-10-CM

## 2020-07-22 DIAGNOSIS — Z00.00 MEDICARE ANNUAL WELLNESS VISIT, SUBSEQUENT: Primary | ICD-10-CM

## 2020-07-22 DIAGNOSIS — E53.8 VITAMIN B12 DEFICIENCY: ICD-10-CM

## 2020-07-22 RX ORDER — ZOSTER VACCINE RECOMBINANT, ADJUVANTED 50 MCG/0.5
KIT INTRAMUSCULAR
Qty: 0.5 ML | Refills: 1 | Status: SHIPPED | OUTPATIENT
Start: 2020-07-22 | End: 2021-02-15 | Stop reason: ALTCHOICE

## 2020-07-22 RX ORDER — AMITRIPTYLINE HYDROCHLORIDE 25 MG/1
25 TABLET, FILM COATED ORAL
Qty: 90 TAB | Refills: 0 | Status: SHIPPED | OUTPATIENT
Start: 2020-07-22 | End: 2020-11-03 | Stop reason: SINTOL

## 2020-07-22 RX ORDER — ACYCLOVIR 800 MG/1
TABLET ORAL
COMMUNITY
Start: 2020-06-15 | End: 2022-03-29 | Stop reason: ALTCHOICE

## 2020-07-22 RX ORDER — OXYCODONE AND ACETAMINOPHEN 5; 325 MG/1; MG/1
TABLET ORAL
COMMUNITY
Start: 2020-06-11 | End: 2020-07-22 | Stop reason: ALTCHOICE

## 2020-07-22 NOTE — PATIENT INSTRUCTIONS
Medicare Wellness Visit, Male    The best way to live healthy is to have a lifestyle where you eat a well-balanced diet, exercise regularly, limit alcohol use, and quit all forms of tobacco/nicotine, if applicable. Regular preventive services are another way to keep healthy. Preventive services (vaccines, screening tests, monitoring & exams) can help personalize your care plan, which helps you manage your own care. Screening tests can find health problems at the earliest stages, when they are easiest to treat. Maria Isabeljanelle follows the current, evidence-based guidelines published by the [facility] ([facility]) when recommending preventive services for our patients. Because we follow these guidelines, sometimes recommendations change over time as research supports it. (For example, a prostate screening blood test is no longer routinely recommended for men with no symptoms). Of course, you and your doctor may decide to screen more often for some diseases, based on your risk and co-morbidities (chronic disease you are already diagnosed with). Preventive services for you include:  - Medicare offers their members a free annual wellness visit, which is time for you and your primary care provider to discuss and plan for your preventive service needs. Take advantage of this benefit every year!  -All adults over age 72 should receive the recommended pneumonia vaccines. Current USPSTF guidelines recommend a series of two vaccines for the best pneumonia protection.   -All adults should have a flu vaccine yearly and tetanus vaccine every 10 years.  -All adults age 48 and older should receive the shingles vaccines (series of two vaccines).        -All adults age 38-68 who are overweight should have a diabetes screening test once every three years.   -Other screening tests & preventive services for persons with diabetes include: an eye exam to screen for diabetic retinopathy, a kidney function test, a foot exam, and stricter control over your cholesterol.   -Cardiovascular screening for adults with routine risk involves an electrocardiogram (ECG) at intervals determined by the provider.   -Colorectal cancer screening should be done for adults age 54-65 with no increased risk factors for colorectal cancer. There are a number of acceptable methods of screening for this type of cancer. Each test has its own benefits and drawbacks. Discuss with your provider what is most appropriate for you during your annual wellness visit. The different tests include: colonoscopy (considered the best screening method), a fecal occult blood test, a fecal DNA test, and sigmoidoscopy.  -All adults born between [facility] should be screened once for Hepatitis C.  -An Abdominal Aortic Aneurysm (AAA) Screening is recommended for men age 73-68 who has ever smoked in their lifetime. Here is a list of your current Health Maintenance items (your personalized list of preventive services) with a due date:  Health Maintenance Due   Topic Date Due    DTaP/Tdap/Td  (1 - Tdap) 05/30/1970    Shingles Vaccine (1 of 2) 05/30/1999    Annual Well Visit  07/31/2020          Learning About Living Trino Snyder  What is a living will? A living will is a legal form you use to write down the kind of care you want at the end of your life. It is used by the health professionals who will treat you if you aren't able to decide for yourself. If you put your wishes in writing, your loved ones and others will know what kind of care you want. They won't need to guess. This can ease your mind and be helpful to others. A living will is not the same as an estate or property will. An estate will explains what you want to happen with your money and property after you die. Is a living will a legal document? A living will is a legal document. Each state has its own laws about living el.  If you move to another state, make sure that your living will is legal in the state where you now live. Or you might use a universal form that has been approved by many states. This kind of form can sometimes be completed and stored online. Your electronic copy will then be available wherever you have a connection to the Internet. In most cases, doctors will respect your wishes even if you have a form from a different state. · You don't need an  to complete a living will. But legal advice can be helpful if your state's laws are unclear, your health history is complicated, or your family can't agree on what should be in your living will. · You can change your living will at any time. Some people find that their wishes about end-of-life care change as their health changes. · In addition to making a living will, think about completing a medical power of  form. This form lets you name the person you want to make end-of-life treatment decisions for you (your \"health care agent\") if you're not able to. Many hospitals and nursing homes will give you the forms you need to complete a living will and a medical power of . · Your living will is used only if you can't make or communicate decisions for yourself anymore. If you become able to make decisions again, you can accept or refuse any treatment, no matter what you wrote in your living will. · Your state may offer an online registry. This is a place where you can store your living will online so the doctors and nurses who need to treat you can find it right away. What should you think about when creating a living will? Talk about your end-of-life wishes with your family members and your doctor. Let them know what you want. That way the people making decisions for you won't be surprised by your choices. Think about these questions as you make your living will:  · Do you know enough about life support methods that might be used?  If not, talk to your doctor so you know what might be done if you can't breathe on your own, your heart stops, or you're unable to swallow. · What things would you still want to be able to do after you receive life-support methods? Would you want to be able to walk? To speak? To eat on your own? To live without the help of machines? · If you have a choice, where do you want to be cared for? In your home? At a hospital or nursing home? · Do you want certain Yazdanism practices performed if you become very ill? · If you have a choice at the end of your life, where would you prefer to die? At home? In a hospital or nursing home? Somewhere else? · Would you prefer to be buried or cremated? · Do you want your organs to be donated after you die? What should you do with your living will? · Make sure that your family members and your health care agent have copies of your living will. · Give your doctor a copy of your living will to keep in your medical record. If you have more than one doctor, make sure that each one has a copy. · You may want to put a copy of your living will where it can be easily found. Where can you learn more? Go to http://araseli-lv.info/. Enter I986 in the search box to learn more about \"Learning About Living José Manuel Marie. \"  Current as of: August 8, 2016  Content Version: 11.3  © 6474-1468 [employer]. Care instructions adapted under license by [employer] (which disclaims liability or warranty for this information). If you have questions about a medical condition or this instruction, always ask your healthcare professional. [address] any warranty or liability for your use of this information. Preventing Falls: Care Instructions  Your Care Instructions    Getting around your home safely can be a challenge if you have injuries or health problems that make it easy for you to fall.  Loose rugs and furniture in walkways are among the dangers for many older people who have problems walking or who have poor eyesight. People who have conditions such as arthritis, osteoporosis, or dementia also have to be careful not to fall. You can make your home safer with a few simple measures. Follow-up care is a key part of your treatment and safety. Be sure to make and go to all appointments, and call your doctor if you are having problems. It's also a good idea to know your test results and keep a list of the medicines you take. How can you care for yourself at home? Taking care of yourself  · You may get dizzy if you do not drink enough water. To prevent dehydration, drink plenty of fluids, enough so that your urine is light yellow or clear like water. Choose water and other caffeine-free clear liquids. If you have kidney, heart, or liver disease and have to limit fluids, talk with your doctor before you increase the amount of fluids you drink. · Exercise regularly to improve your strength, muscle tone, and balance. Walk if you can. Swimming may be a good choice if you cannot walk easily. · Have your vision and hearing checked each year or any time you notice a change. If you have trouble seeing and hearing, you might not be able to avoid objects and could lose your balance. · Know the side effects of the medicines you take. Ask your doctor or pharmacist whether the medicines you take can affect your balance. Sleeping pills or sedatives can affect your balance. · Limit the amount of alcohol you drink. Alcohol can impair your balance and other senses. · Ask your doctor whether calluses or corns on your feet need to be removed. If you wear loose-fitting shoes because of calluses or corns, you can lose your balance and fall. · Talk to your doctor if you have numbness in your feet. Preventing falls at home  · Remove raised doorway thresholds, throw rugs, and clutter. Repair loose carpet or raised areas in the floor.   · Move furniture and electrical cords to keep them out of walking paths.  · Use nonskid floor wax, and wipe up spills right away, especially on ceramic tile floors. · If you use a walker or cane, put rubber tips on it. If you use crutches, clean the bottoms of them regularly with an abrasive pad, such as steel wool. · Keep your house well lit, especially Aliza Lather, and outside walkways. Use night-lights in areas such as hallways and bathrooms. Add extra light switches or use remote switches (such as switches that go on or off when you clap your hands) to make it easier to turn lights on if you have to get up during the night. · Install sturdy handrails on stairways. · Move items in your cabinets so that the things you use a lot are on the lower shelves (about waist level). · Keep a cordless phone and a flashlight with new batteries by your bed. If possible, put a phone in each of the main rooms of your house, or carry a cell phone in case you fall and cannot reach a phone. Or, you can wear a device around your neck or wrist. You push a button that sends a signal for help. · Wear low-heeled shoes that fit well and give your feet good support. Use footwear with nonskid soles. Check the heels and soles of your shoes for wear. Repair or replace worn heels or soles. · Do not wear socks without shoes on wood floors. · Walk on the grass when the sidewalks are slippery. If you live in an area that gets snow and ice in the winter, sprinkle salt on slippery steps and sidewalks. Preventing falls in the bath  · Install grab bars and nonskid mats inside and outside your shower or tub and near the toilet and sinks. · Use shower chairs and bath benches. · Use a hand-held shower head that will allow you to sit while showering. · Get into a tub or shower by putting the weaker leg in first. Get out of a tub or shower with your strong side first.  · Repair loose toilet seats and consider installing a raised toilet seat to make getting on and off the toilet easier.   · Keep your bathroom door unlocked while you are in the shower. Where can you learn more? Go to http://www.robles.com/. Enter [phone] in the search box to learn more about \"Preventing Falls: Care Instructions. \"  Current as of: March 16, 2018  Content Version: 11.8  © 5751-2056 [email or web address]. Care instructions adapted under license by [employer] (which disclaims liability or warranty for this information). If you have questions about a medical condition or this instruction, always ask your healthcare professional. [place] [address] any warranty or liability for your use of this information.

## 2020-07-22 NOTE — PROGRESS NOTES
This is the Subsequent Medicare Annual Wellness Exam, performed 12 months or more after the Initial AWV or the last Subsequent AWV    I have reviewed the patient's medical history in detail and updated the computerized patient record. We did a Medicare Wellness evaluation including a review of past, family, and social histories with attention to age/sex appropriate screening, risk assessment, preventive care and counseling. Any cognitive, fall risk, or ADL issues identified are addressed separately. A patient specific preventive care plan was provided and appropriate screening tests were ordered as specified. 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 with presence of antibiotic-impregnated cement spacer Z89.529    History of non anemic vitamin B12 deficiency Z86.39    Vitamin B12 deficiency E53.8    Chronic pansinusitis J32.4    CKD (chronic kidney disease) stage 3, GFR 30-59 ml/min ([facility]) N18.3    Type 2 diabetes mellitus with diabetic neuropathy, without long-term current use of insulin ([facility]) E11.40    Type 2 diabetes with nephropathy ([facility]) E11.21    Iron deficiency anemia secondary to inadequate dietary iron intake D50.8     Past Medical History:   Diagnosis Date    Arthritis     KNEES & BACK.    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    S/P coronary artery stent placement July 29th, 2011    NAN to LAD      Past Surgical History:   Procedure Laterality Date    CARDIAC SURG PROCEDURE UNLIST  2009    CATHERIZATION WITH STENT X 1 PLACEMENT    COLONOSCOPY N/A 4/17/2018    COLONOS[facility]a [facility]    HX GI  2010    COLONOSCOPY    HX GI  2010    ENDOSCOPY    HX HEENT      SEPTOPLASTY    HX HEENT      TONSILLECTOMY    HX KNEE REPLACEMENT Left 2008    DR ROSEN    HX ORTHOPAEDIC Right 2010, 2017    KNEE REPLACEMENT, SPACER 5/2017    HX ORTHOPAEDIC  2007    RIGHT SHOULDER ROTATOR CUFF REPAIR. Current Outpatient Medications   Medication Sig Dispense Refill    acyclovir (ZOVIRAX) 800 mg tablet TAKE 1 TABLET BY MOUTH EVERY 4 HOURS WHILE AWAKE      amitriptyline (ELAVIL) 25 mg tablet Take 1 Tab by mouth nightly. 90 Tab 0    varicella-zoster recombinant, PF, (Shingrix, PF,) 50 mcg/0.5 mL susr injection 0.5mL by IntraMUSCular route once now and then repeat in 2-6 months 0.5 mL 1    diph,pertuss,acel,,tetanus vac,PF, (ADACEL) 2 Lf-(2.5-5-3-5 mcg)-5Lf/0.5 mL syrg vaccine 0.5 mL by IntraMUSCular route once for 1 dose. 0.5 mL 0    triamcinolone acetonide (KENALOG) 0.1 % ointment Apply  to affected area two (2) times a day. use thin layer 30 g 0    gabapentin (NEURONTIN) 300 mg capsule Take 1 Cap by mouth nightly. Max Daily Amount: 300 mg. 30 Cap 2    glucose blood VI test strips (Accu-Chek Cece Plus test strp) strip USE ONE STRIP TO CHECK GLUCOSE ONCE DAILY 100 Strip 5    tamsulosin (FLOMAX) 0.4 mg capsule Take 1 Cap by mouth daily. 90 Cap 0    metFORMIN (GLUCOPHAGE) 850 mg tablet TAKE 1 TABLET BY MOUTH TWICE DAILY WITH MEALS 180 Tab 0    fenofibrate (LOFIBRA) 160 mg tablet TAKE 1 TABLET BY MOUTH ONCE DAILY 90 Tab 0    atorvastatin (LIPITOR) 10 mg tablet Take 1 Tab by mouth daily. 90 Tab 3    metoprolol succinate (TOPROL-XL) 50 mg XL tablet Take 1 Tab by mouth daily.  [address] Tab 3    diclofenac (VOLTAREN) 1 % gel Apply 2 grams to the effected area (knees) BID prn      nitroglycerin (NITROSTAT) 0.4 mg SL tablet DISSOLVE ONE TABLET UNDER THE TONGUE EVERY 5 MINUTES AS NEEDED FOR CHEST PAIN. UP TO 3 DOSES 1 Bottle 3    aspirin delayed-release 81 mg tablet Take 162 mg by mouth daily.  acetaminophen (TYLENOL) 500 mg tablet Take 2 Tabs by mouth every six (6) hours. 120 Tab 0    zolpidem (AMBIEN) 5 mg tablet TAKE 1 TABLET BY MOUTH ONCE DAILY AT BEDTIME AS NEEDED FOR SLEEP 30 Tab 0    predniSONE (DELTASONE) 20 mg tablet Take 20 mg by mouth two (2) times a day. 14 Tab 0    esomeprazole (NEXIUM) 20 mg capsule Take 20 mg by mouth daily as needed. Allergies   Allergen Reactions    Levofloxacin Rash    Pcn [Penicillins] Rash    Adhesive Tape-Silicones Unknown (comments)     Other reaction(s): Rash  Medipore tape caused large blisters when removed.  Tape [Adhesive] Rash     Medipore tape caused large blisters when removed. Family History   Problem Relation Age of Onset    Diabetes Mother     Heart Disease Mother     Diabetes Son         TYPE 1    Alcohol abuse Son     Anesth Problems Neg Hx      Social History     Tobacco Use    Smoking status: Never Smoker    Smokeless tobacco: Former User    Tobacco comment: Patient states on and off for chewing tobacco.     Substance Use Topics    Alcohol use: Yes     Alcohol/week: 2.0 standard drinks     Types: 2 Cans of beer per week     Comment: NOT DRINKING AT THIS TIME       Depression Risk Factor Screening:     3 most recent PHQ Screens 5/28/2020   Little interest or pleasure in doing things Not at all   Feeling down, depressed, irritable, or hopeless Not at all   Total Score PHQ 2 0       Alcohol Risk Factor Screening (MALE > 65):    Do you average more 1 drink per night or more than 7 drinks a week: No    In the past three months have you have had more than 4 drinks containing alcohol on one occasion: No      Functional Ability and Level of Safety:   Hearing: The patient wears hearing aids. Activities of Daily Living: The home contains: no safety equipment. Patient does total self care     Ambulation: with no difficulty     Fall Risk:  Fall Risk Assessment, last 12 mths 7/22/2020   Able to walk? Yes   Fall in past 12 months? No     Abuse Screen:  Patient is not abused       Cognitive Screening   Has your family/caregiver stated any concerns about your memory: no     Cognitive Screening: Normal - MMSE (Mini Mental Status Exam)    Patient Care Team   Patient Care Team:  Nani Restrepo MD as PCP - General (Family Medicine)  Nani Restrepo MD as PCP [facility]  Sandra Marquez MD (Orthopedic Surgery)  Abdoulaye Anderson MD (Cardiology)  Rosa Benoit MD (Urology)    Assessment/Plan   Education and counseling provided:  Are appropriate based on today's review and evaluation    Diagnoses and all orders for this visit:    1. Medicare annual wellness visit, subsequent    2. Type 2 diabetes mellitus with diabetic neuropathy, without long-term current use of insulin ([facility])  -     amitriptyline (ELAVIL) 25 mg tablet; Take 1 Tab by mouth nightly. -     METABOLIC PANEL, COMPREHENSIVE  -     HEMOGLOBIN A1C WITH EAG  -     MICROALBUMIN, UR, RAND W/ MICROALB/CREAT RATIO    3. Type 2 diabetes with nephropathy ([facility])  -     METABOLIC PANEL, COMPREHENSIVE  -     HEMOGLOBIN A1C WITH EAG  -     MICROALBUMIN, UR, RAND W/ MICROALB/CREAT RATIO    4. CKD (chronic kidney disease) stage 3, GFR 30-59 ml/min ([facility])  -     CBC WITH AUTOMATED DIFF  -     METABOLIC PANEL, COMPREHENSIVE    5. Mixed dyslipidemia  -     METABOLIC PANEL, COMPREHENSIVE  -     LIPID PANEL    6. Iron deficiency anemia secondary to inadequate dietary iron intake  -     CBC WITH AUTOMATED DIFF    7. Primary insomnia    8. Perforation of both tympanic membranes  -     REFERRAL TO ENT-OTOLARYNGOLOGY    9.  Advanced directives, counseling/discussion  - ADVANCE CARE PLANNING FIRST 30 MINS    10. Screening for alcoholism  -     [place] ANNUAL ALCOHOL SCREEN 15 MIN    11. Screening for depression  -     DEPRESSION SCREEN ANNUAL    12. Vitamin B12 deficiency  -     VITAMIN B12    13. Vitamin D deficiency  -     VITAMIN D, 25 HYDROXY    Other orders  -     varicella-zoster recombinant, PF, (Shingrix, PF,) 50 mcg/0.5 mL susr injection; 0.5mL by IntraMUSCular route once now and then repeat in 2-6 months  -     diph,pertuss,acel,,tetanus vac,PF, (ADACEL) 2 Lf-(2.5-5-3-5 mcg)-5Lf/0.5 mL syrg vaccine; 0.5 mL by IntraMUSCular route once for 1 dose. Health Maintenance Due   Topic Date Due    DTaP/Tdap/Td series (1 - Tdap) 05/30/1970    Shingrix Vaccine Age 50> (1 of 2) 05/30/1999    Medicare Yearly Exam  07/31/2020     Discussed lifestyle issues and health guidance given  Patient was given an after visit summary which includes diagnoses, vital signs, current medications, instructions and references & authorized prescriptions . Results of labs will be conveyed to patient, once available. Pt verbalized instructions I provided and expressed understanding of discussion that was held today.

## 2020-07-22 NOTE — ACP (ADVANCE CARE PLANNING)
Advance Care Planning       Advance Care Planning (ACP) Physician/NP/PA (Provider) Conversation        Date of ACP Conversation: 7/22/2020    Conversation Conducted with:   Patient with [address] Maker:    Current Designated Health Care Decision Maker:   Primary Decision Maker (Active): Harvey Luis - Daughter - [phone]  (If there is a valid [address] named in the \"Healthcare Decision Makers\" box in the ACP activity, but it is not visible above, be sure to open that field and then select the health care decision maker relationship (ie \"primary\") in the blank space to the right of the name.)    Note: Assess and validate information in current ACP documents, as indicated. Note: If the relationship of these Decision-Makers to the patient does NOT follow your state's Next of Kin hierarchy, recommend that patient complete ACP document that meets state-specific requirements to allow them to act on the patient's behalf when appropriate. Care Preferences:    Hospitalization: \"If your health worsens and it becomes clear that your chance of recovery is unlikely, what would your preference be regarding hospitalization? \"  If the patient would want hospitalization, answer \"yes\". If the patient would prefer comfort-focused treatment without hospitalization, answer \"no\". yes      Ventilation: \"If you were in your present state of health and suddenly became very ill and were unable to breathe on your own, what would your preference be about the use of a ventilator (breathing machine) if it was available to you? \"    If patient would desire the use of a ventilator (breathing machine), answer \"yes\", if not answer \"no\":yes    \"If your health worsens and it becomes clear that your chance of recovery is unlikely, what would your preference be about the use of a ventilator (breathing machine) if it was available to you? \"   yes      Resuscitation:  \"CPR works best to restart the heart when there is a sudden event, like a heart attack, in someone who is otherwise healthy. Unfortunately, CPR does not typically restart the heart for people who have serious health conditions or who are very sick. \"    \"In the event your heart stopped as a result of an underlying serious health condition, would you want attempts to be made to restart your heart (answer \"yes\" for attempt to resuscitate) or would you prefer a natural death (answer \"no\" for do not attempt to resuscitate)? \"   yes    NOTE: If the patient has a valid advance directive AND provides care preference(s) that are inconsistent with that prior directive, advise the patient to consider either: creating a new advance directive that complies with state-specific requirements; or, if that is not possible, orally revoking that prior directive in accordance with state-specific requirements, which must be documented in the EHR. Conversation Outcomes / Follow-Up Plan:   Recommended completion of Advance Directive  strongly recommended to complete directive as daughter in law would be primary, not son. recommended to document his preferences. as per him he prefers to get every thing tried at this moment.       Length of Voluntary ACP Conversation in minutes:  16 minutes      Amy Price MD

## 2020-07-22 NOTE — PROGRESS NOTES
Chief Complaint   Patient presents with   [facility] Annual Wellness Visit     1. Have you been to the ER, urgent care clinic since your last visit? Hospitalized since your last visit? No    2. Have you seen or consulted any other health care providers outside of the [address] [place] [address] since your last visit? Include any pap smears or colon screening.  No

## 2020-07-22 NOTE — PROGRESS NOTES
HISTORY OF PRESENT ILLNESS  Annalise Ordoñez is a 69 y.o. male. Seen for follow up on diabetes, dyslipidemia, CKD, HTN along with his wellness visit  HPI  Cardiovascular Review  The patient has hypertension, hyperlipidemia, coronary artery disease and status post coronary artery stenting.  He reports taking medications as instructed, no medication side effects noted, home BP monitoring in range of 407'O systolic over 56'J diastolic, no chest pain on exertion, no dyspnea on exertion, no swelling of ankles, no orthostatic dizziness or lightheadedness, no orthopnea or paroxysmal nocturnal dyspnea, no palpitations, no muscle aches or pain.  Diet and Lifestyle: generally follows a low fat low cholesterol diet, generally follows a low sodium diet, exercises sporadically, chews tobacco.  Lab review: labs reviewed and discussed with patient.  He follows Dr Zamora Degree   Cardiac History:   PCI 7- prox 75% LAD, D1 50% EF 60%; NAN 3 x 15 mm Xience to LAD  Stress echo 5-28-13=  Walked 3 minutes, Exercise stress echo is normal.    Medicines:  mg,  Metoprolol XL 50 mg, Lofibra 160 mg,and Lipitor 10 mg  Lab Results   Component Value Date/Time    Cholesterol, total 132 03/10/2020 11:02 AM    HDL Cholesterol 46 03/10/2020 11:02 AM    LDL, calculated 70 03/10/2020 11:02 AM    VLDL, calculated 16 03/10/2020 11:02 AM    Triglyceride 81 03/10/2020 11:02 AM    CHOL/HDL Ratio 5.8 (H) 07/30/2011 04:31 AM         Endocrine Review  He is seen for diabetes.  Since last visit he reports: no polyuria or polydipsia, no significant changes.  Home glucose monitoring: is performed regularly, fasting values range 100-120  He is checking his sugars one per day.  He reports medication compliance: compliant all of the time.  Medication side effects: none.  Diabetic diet compliance: compliant most of the time.  Lab review: labs reviewed and discussed with patient  On Metformin 850 mg bid.   Started on Gabapentin for his bilateral feet pain,not working as per him. Has very bad burning in feet, mainly at night  Lab Results   Component Value Date/Time    Hemoglobin A1c 6.7 (H) 03/10/2020 11:02 AM           CKD:  Stage of Chronic Kidney Disease III exhibited by:  Lab Results   Component Value Date/Time    Sodium 139 03/10/2020 11:02 AM    Potassium 5.3 (H) 03/10/2020 11:02 AM    Chloride 104 03/10/2020 11:02 AM    CO2 22 03/10/2020 11:02 AM    Anion gap 4 (L) 08/02/2017 05:36 AM    Glucose 119 (H) 03/10/2020 11:02 AM    BUN 16 03/10/2020 11:02 AM    Creatinine 1.42 (H) 03/10/2020 11:02 AM    BUN/Creatinine ratio 11 03/10/2020 11:02 AM    GFR est AA 57 (L) 03/10/2020 11:02 AM    GFR est non-AA 50 (L) 03/10/2020 11:02 AM    Calcium 9.6 03/10/2020 11:02 AM       Anemia:  ANEMIA FIRST NOTED: several years  Has improved with iron supplement  Lab Results   Component Value Date/Time    WBC 6.4 03/10/2020 11:02 AM    HGB 14.2 03/10/2020 11:02 AM    HCT 43.9 03/10/2020 11:02 AM    PLATELET 349 59/34/2841 11:02 AM    MCV 86 03/10/2020 11:02 AM       Review of Systems   Constitutional: Negative for chills, fever and malaise/fatigue. HENT: Negative for congestion, ear pain, sore throat and tinnitus. Eyes: Negative for blurred vision, double vision, pain and discharge. Respiratory: Negative for cough, shortness of breath and wheezing. Cardiovascular: Negative for chest pain, palpitations and leg swelling. Gastrointestinal: Negative for abdominal pain, blood in stool, constipation, diarrhea, nausea and vomiting. Genitourinary: Negative for dysuria, frequency, hematuria and urgency. Musculoskeletal: Negative for back pain, joint pain and myalgias. Burning in both feet   Skin: Negative for rash. Neurological: Negative for dizziness, tremors, seizures and headaches. Endo/Heme/Allergies: Negative for polydipsia. Does not bruise/bleed easily. Psychiatric/Behavioral: Negative for depression and substance abuse.  The patient is not nervous/anxious. Physical Exam  Vitals signs and nursing note reviewed. Constitutional:       Appearance: He is well-developed. He is not diaphoretic. HENT:      Head: Normocephalic and atraumatic. Right Ear: External ear normal. Tympanic membrane is perforated. Left Ear: Tympanic membrane is perforated. Mouth/Throat:      Pharynx: No oropharyngeal exudate. Eyes:      General: No scleral icterus. Conjunctiva/sclera: Conjunctivae normal.      Pupils: Pupils are equal, round, and reactive to light. Neck:      Musculoskeletal: Normal range of motion and neck supple. Thyroid: No thyromegaly. Vascular: No JVD. Cardiovascular:      Rate and Rhythm: Normal rate and regular rhythm. Heart sounds: Normal heart sounds. No murmur. Pulmonary:      Effort: Pulmonary effort is normal.      Breath sounds: Normal breath sounds. No wheezing. Abdominal:      General: Bowel sounds are normal. There is no distension. Palpations: Abdomen is soft. There is no mass. Musculoskeletal: Normal range of motion. General: No tenderness. Lymphadenopathy:      Cervical: No cervical adenopathy. Skin:     General: Skin is warm and dry. Findings: No rash. Neurological:      Mental Status: He is alert and oriented to person, place, and time. Cranial Nerves: No cranial nerve deficit. Deep Tendon Reflexes: Reflexes are normal and symmetric. Reflexes normal.         ASSESSMENT and PLAN  Diagnoses and all orders for this visit:    1. Medicare annual wellness visit, subsequent    2. Type 2 diabetes mellitus with diabetic neuropathy, without long-term current use of insulin (HCC)  -     amitriptyline (ELAVIL) 25 mg tablet; Take 1 Tab by mouth nightly. -     METABOLIC PANEL, COMPREHENSIVE  -     HEMOGLOBIN A1C WITH EAG  -     MICROALBUMIN, UR, RAND W/ MICROALB/CREAT RATIO    3.  Type 2 diabetes with nephropathy (HCC)  -     METABOLIC PANEL, COMPREHENSIVE  - HEMOGLOBIN A1C WITH EAG  -     MICROALBUMIN, UR, RAND W/ MICROALB/CREAT RATIO    4. CKD (chronic kidney disease) stage 3, GFR 30-59 ml/min ([facility])  -     CBC WITH AUTOMATED DIFF  -     METABOLIC PANEL, COMPREHENSIVE    5. Mixed dyslipidemia  -     METABOLIC PANEL, COMPREHENSIVE  -     LIPID PANEL    6. Iron deficiency anemia secondary to inadequate dietary iron intake  -     CBC WITH AUTOMATED DIFF    7. Primary insomnia    8. Perforation of both tympanic membranes  -     REFERRAL TO ENT-OTOLARYNGOLOGY    9. Advanced directives, counseling/discussion  -     ADVANCE CARE PLANNING FIRST 27 MINS    10. Screening for alcoholism  -     [place] ANNUAL ALCOHOL SCREEN 15 MIN    11. Screening for depression  -     DEPRESSION SCREEN ANNUAL    12. Vitamin B12 deficiency  -     VITAMIN B12    13. Vitamin D deficiency  -     VITAMIN D, 25 HYDROXY    Other orders  -     varicella-zoster recombinant, PF, (Shingrix, PF,) 50 mcg/0.5 mL susr injection; 0.5mL by IntraMUSCular route once now and then repeat in 2-6 months  -     diph,pertuss,acel,,tetanus vac,PF, (ADACEL) 2 Lf-(2.5-5-3-5 mcg)-5Lf/0.5 mL syrg vaccine; 0.5 mL by IntraMUSCular route once for 1 dose. will give trial of Amitryptiline  Discussed lifestyle issues and health guidance given  Patient was given an after visit summary which includes diagnoses, vital signs, current medications, instructions and references & authorized prescriptions . Results of labs will be conveyed to patient, once available. Pt verbalized instructions I provided and expressed understanding of discussion that was held today.

## 2020-07-30 LAB
25(OH)D3+25(OH)D2 SERPL-MCNC: 10 NG/ML (ref 30–100)
ALBUMIN SERPL-MCNC: 4.3 G/DL (ref 3.7–4.7)
ALBUMIN/CREAT UR: 10 MG/G CREAT (ref 0–29)
ALBUMIN/GLOB SERPL: 2 {RATIO} (ref 1.2–2.2)
ALP SERPL-CCNC: 47 IU/L (ref 39–117)
ALT SERPL-CCNC: 17 IU/L (ref 0–44)
AST SERPL-CCNC: 17 IU/L (ref 0–40)
BASOPHILS # BLD AUTO: 0.1 X10E3/UL (ref 0–0.2)
BASOPHILS NFR BLD AUTO: 1 %
BILIRUB SERPL-MCNC: 0.6 MG/DL (ref 0–1.2)
BUN SERPL-MCNC: 12 MG/DL (ref 8–27)
BUN/CREAT SERPL: 9 (ref 10–24)
CALCIUM SERPL-MCNC: 9.5 MG/DL (ref 8.6–10.2)
CHLORIDE SERPL-SCNC: 107 MMOL/L (ref 96–106)
CHOLEST SERPL-MCNC: 129 MG/DL (ref 100–199)
CO2 SERPL-SCNC: 18 MMOL/L (ref 20–29)
CREAT SERPL-MCNC: 1.28 MG/DL (ref 0.76–1.27)
CREAT UR-MCNC: 164.8 MG/DL
EOSINOPHIL # BLD AUTO: 0.2 X10E3/UL (ref 0–0.4)
EOSINOPHIL NFR BLD AUTO: 3 %
ERYTHROCYTE [DISTWIDTH] IN BLOOD BY AUTOMATED COUNT: 14.1 % (ref 11.6–15.4)
EST. AVERAGE GLUCOSE BLD GHB EST-MCNC: 140 MG/DL
GLOBULIN SER CALC-MCNC: 2.1 G/DL (ref 1.5–4.5)
GLUCOSE SERPL-MCNC: 131 MG/DL (ref 65–99)
HBA1C MFR BLD: 6.5 % (ref 4.8–5.6)
HCT VFR BLD AUTO: 38.9 % (ref 37.5–51)
HDLC SERPL-MCNC: 39 MG/DL
HGB BLD-MCNC: 12.9 G/DL (ref 13–17.7)
IMM GRANULOCYTES # BLD AUTO: 0 X10E3/UL (ref 0–0.1)
IMM GRANULOCYTES NFR BLD AUTO: 0 %
INTERPRETATION, 910389: NORMAL
INTERPRETATION: NORMAL
LDLC SERPL CALC-MCNC: 66 MG/DL (ref 0–99)
LYMPHOCYTES # BLD AUTO: 2.3 X10E3/UL (ref 0.7–3.1)
LYMPHOCYTES NFR BLD AUTO: 40 %
MCH RBC QN AUTO: 29.1 PG (ref 26.6–33)
MCHC RBC AUTO-ENTMCNC: 33.2 G/DL (ref 31.5–35.7)
MCV RBC AUTO: 88 FL (ref 79–97)
MICROALBUMIN UR-MCNC: 17.3 UG/ML
MONOCYTES # BLD AUTO: 0.4 X10E3/UL (ref 0.1–0.9)
MONOCYTES NFR BLD AUTO: 6 %
NEUTROPHILS # BLD AUTO: 2.9 X10E3/UL (ref 1.4–7)
NEUTROPHILS NFR BLD AUTO: 50 %
PDF IMAGE, 910387: NORMAL
PLATELET # BLD AUTO: 259 X10E3/UL (ref 150–450)
POTASSIUM SERPL-SCNC: 4.9 MMOL/L (ref 3.5–5.2)
PROT SERPL-MCNC: 6.4 G/DL (ref 6–8.5)
RBC # BLD AUTO: 4.44 X10E6/UL (ref 4.14–5.8)
SODIUM SERPL-SCNC: 141 MMOL/L (ref 134–144)
TRIGL SERPL-MCNC: 121 MG/DL (ref 0–149)
VIT B12 SERPL-MCNC: 345 PG/ML (ref 232–1245)
VLDLC SERPL CALC-MCNC: 24 MG/DL (ref 5–40)
WBC # BLD AUTO: 5.9 X10E3/UL (ref 3.4–10.8)

## 2020-07-31 DIAGNOSIS — E55.9 VITAMIN D DEFICIENCY: Primary | ICD-10-CM

## 2020-07-31 RX ORDER — ERGOCALCIFEROL 1.25 MG/1
50000 CAPSULE ORAL
Qty: 12 CAP | Refills: 1 | Status: SHIPPED | OUTPATIENT
Start: 2020-07-31 | End: 2021-05-16

## 2020-07-31 NOTE — PROGRESS NOTES
Outbound call to patient name and  verified. Patient ws advised of all recent lab results and aware that rx has been sent to pharmacy.

## 2020-07-31 NOTE — PROGRESS NOTES
Please inform patient,  CBC shows borderline low hemoglobin again, to make sure he increase food rich in iron   Kidney function is abnormal, but has improved from previous results  Sugar is stable at 6.5  Cholesterol and urine protein results are normal  Vitamin b12 normal, but vitamin d is very low, will send Rx of high dose vitamin D, to takeone tablete every week  thanks

## 2020-08-04 DIAGNOSIS — E11.9 TYPE 2 DIABETES MELLITUS WITHOUT COMPLICATION, WITHOUT LONG-TERM CURRENT USE OF INSULIN (HCC): ICD-10-CM

## 2020-08-04 RX ORDER — METFORMIN HYDROCHLORIDE 850 MG/1
TABLET ORAL
Qty: 180 TAB | Refills: 0 | Status: SHIPPED | OUTPATIENT
Start: 2020-08-04 | End: 2020-11-02

## 2020-09-01 DIAGNOSIS — F51.01 PRIMARY INSOMNIA: ICD-10-CM

## 2020-09-01 RX ORDER — ZOLPIDEM TARTRATE 5 MG/1
TABLET ORAL
Qty: 30 TAB | Refills: 0 | Status: SHIPPED | OUTPATIENT
Start: 2020-09-01 | End: 2020-09-29

## 2020-09-12 RX ORDER — TAMSULOSIN HYDROCHLORIDE 0.4 MG/1
CAPSULE ORAL
Qty: 90 CAP | Refills: 0 | Status: SHIPPED | OUTPATIENT
Start: 2020-09-12 | End: 2020-12-11

## 2020-09-14 NOTE — ROUTINE PROCESS
[address] [facility]  [address] [address]  Dept: [phone]     Patient:  Lili Curling  :      1978  MRN:      [id number]    Chief Complaint:  Patient presents with:   Follow - Up  Weigh Patient: Kahlil Montero MRN: [id number]  SSN: [id number]   YOB: 1950  Age: 75 y.o. Sex: male     Patient is status post Procedure(s):  REVISION RIGHT TOTAL KNEE ARTHROPLASTY  . Surgeon(s) and Role:     * Renata Tipton MD - Primary    Local/Dose/Irrigation: INTRA ARTICULAR AND SUBCUTANEOUS INJECTION:  0.5%ROPIVICAINE (60ML) + MORPHINE 5MG (0.5ML) + EPINEPHRINE 1/1000 0.6MG (0.6ML) + KETOROLAC 10MG (1ML) + 0.9%NACL 37. 9ML - TOTAL 100ML INJECTION            PICC Single Lumen 50/57/22 Right;Basilic (Active)       PICC Single Lumen 48/91/36 Right;Basilic (Active)          Peripheral IV 07/31/17 Right Hand (Active)   Site Assessment Clean, dry, & intact 7/31/2017  8:55 AM   Phlebitis Assessment 0 7/31/2017  8:55 AM   Infiltration Assessment 0 7/31/2017  8:55 AM   Dressing Status Clean, dry, & intact; New 7/31/2017  8:55 AM   Dressing Type Tape;Transparent 7/31/2017  8:55 AM   Hub Color/Line Status Pink; Infusing 7/31/2017  8:55 AM   Action Taken Open ports on tubing capped;Blood drawn 7/31/2017  8:55 AM   Alcohol Cap Used Yes 7/31/2017  8:55 AM          Hemovac Right Knee (Active)                     Dressing/Packing:  Wound Knee Right; Anterior-DRESSING TYPE: 4 x 4;ABD pad;Cast padding;Elastic bandage; Oil emulsion (07/31/17 1110)  Splint/Cast:  ]    Other:  rick (106.5 kg)      Patient Medications:    Current Outpatient Medications   Medication Sig Dispense Refill   • topiramate 25 MG Oral Tab Take 1 tablet (25 mg total) by mouth daily.  30 tablet 1   • fluconazole (DIFLUCAN) 150 MG Oral Tab Take 1 tablet (150 mg t insecurity:        Worry: Not on file        Inability: Not on file      Transportation needs:        Medical: Not on file        Non-medical: Not on file    Tobacco Use      Smoking status: Never Smoker      Smokeless tobacco: Never Used    Substance and file    Surgical History:  History reviewed. No pertinent surgical history.   Family History:    Family History   Problem Relation Age of Onset   • Diabetes Father    • Hypertension Father    • Diabetes Mother    • Hypertension Mother    • Stroke Maternal G Constitutional: positive for fatigue  Respiratory: negative  Cardiovascular: negative  Gastrointestinal: negative  Musculoskeletal:positive for arthralgias, back pain and worsening right knee pain   Neurological: negative  Behavioral/Psych: positive for CHOLEST 165 06/22/2018 10:40 AM     (H) 06/22/2018 10:40 AM    HDL 39 06/22/2018 10:40 AM    TRIG 67 06/22/2018 10:40 AM     Vitamin D Deficiency: Take 4,000 IU capsule daily. OBESITY:  Goals for next month:  1. Keep a food log.   2. Drink at [facility] 3 glucose. EKG done 1/2/2020. Follow up with Dr. Destiny Castro as recommended. Meet with RD- unable to attend due to work schedule conflict. Complete a 5 Day Challenge by next visit- include one animal product/day.      Start magnesium 250 mg/day- leg cr

## 2020-09-29 DIAGNOSIS — F51.01 PRIMARY INSOMNIA: ICD-10-CM

## 2020-09-29 RX ORDER — ZOLPIDEM TARTRATE 5 MG/1
TABLET ORAL
Qty: 30 TAB | Refills: 0 | Status: SHIPPED | OUTPATIENT
Start: 2020-09-29 | End: 2020-11-03 | Stop reason: SDUPTHER

## 2020-11-02 DIAGNOSIS — F51.01 PRIMARY INSOMNIA: ICD-10-CM

## 2020-11-02 DIAGNOSIS — E11.9 TYPE 2 DIABETES MELLITUS WITHOUT COMPLICATION, WITHOUT LONG-TERM CURRENT USE OF INSULIN (HCC): ICD-10-CM

## 2020-11-02 RX ORDER — METFORMIN HYDROCHLORIDE 850 MG/1
TABLET ORAL
Qty: 180 TAB | Refills: 0 | Status: SHIPPED | OUTPATIENT
Start: 2020-11-02 | End: 2021-02-05

## 2020-11-02 RX ORDER — ZOLPIDEM TARTRATE 5 MG/1
TABLET ORAL
Qty: 30 TAB | Refills: 0 | OUTPATIENT
Start: 2020-11-02

## 2020-11-03 ENCOUNTER — OFFICE VISIT (OUTPATIENT)
Dept: FAMILY MEDICINE CLINIC | Age: 70
End: 2020-11-03
Payer: MEDICARE

## 2020-11-03 VITALS
WEIGHT: 185 LBS | RESPIRATION RATE: 18 BRPM | HEART RATE: 90 BPM | HEIGHT: 70 IN | DIASTOLIC BLOOD PRESSURE: 72 MMHG | TEMPERATURE: 97.4 F | BODY MASS INDEX: 26.48 KG/M2 | SYSTOLIC BLOOD PRESSURE: 118 MMHG | OXYGEN SATURATION: 96 %

## 2020-11-03 DIAGNOSIS — E11.40 TYPE 2 DIABETES MELLITUS WITH DIABETIC NEUROPATHY, WITHOUT LONG-TERM CURRENT USE OF INSULIN (HCC): Primary | ICD-10-CM

## 2020-11-03 DIAGNOSIS — E11.21 TYPE 2 DIABETES WITH NEPHROPATHY (HCC): ICD-10-CM

## 2020-11-03 DIAGNOSIS — I10 ESSENTIAL HYPERTENSION, BENIGN: ICD-10-CM

## 2020-11-03 DIAGNOSIS — N18.31 STAGE 3A CHRONIC KIDNEY DISEASE (HCC): ICD-10-CM

## 2020-11-03 DIAGNOSIS — R05.3 CHRONIC COUGH: ICD-10-CM

## 2020-11-03 DIAGNOSIS — D50.8 IRON DEFICIENCY ANEMIA SECONDARY TO INADEQUATE DIETARY IRON INTAKE: ICD-10-CM

## 2020-11-03 DIAGNOSIS — J32.4 CHRONIC PANSINUSITIS: ICD-10-CM

## 2020-11-03 DIAGNOSIS — F51.01 PRIMARY INSOMNIA: ICD-10-CM

## 2020-11-03 DIAGNOSIS — E78.2 MIXED DYSLIPIDEMIA: ICD-10-CM

## 2020-11-03 PROCEDURE — G0463 HOSPITAL OUTPT CLINIC VISIT: HCPCS | Performed by: FAMILY MEDICINE

## 2020-11-03 PROCEDURE — 3017F COLORECTAL CA SCREEN DOC REV: CPT | Performed by: FAMILY MEDICINE

## 2020-11-03 PROCEDURE — 1101F PT FALLS ASSESS-DOCD LE1/YR: CPT | Performed by: FAMILY MEDICINE

## 2020-11-03 PROCEDURE — G8752 SYS BP LESS 140: HCPCS | Performed by: FAMILY MEDICINE

## 2020-11-03 PROCEDURE — 99215 OFFICE O/P EST HI 40 MIN: CPT | Performed by: FAMILY MEDICINE

## 2020-11-03 PROCEDURE — G8754 DIAS BP LESS 90: HCPCS | Performed by: FAMILY MEDICINE

## 2020-11-03 PROCEDURE — G8432 DEP SCR NOT DOC, RNG: HCPCS | Performed by: FAMILY MEDICINE

## 2020-11-03 PROCEDURE — G8427 DOCREV CUR MEDS BY ELIG CLIN: HCPCS | Performed by: FAMILY MEDICINE

## 2020-11-03 PROCEDURE — G8419 CALC BMI OUT NRM PARAM NOF/U: HCPCS | Performed by: FAMILY MEDICINE

## 2020-11-03 PROCEDURE — G8536 NO DOC ELDER MAL SCRN: HCPCS | Performed by: FAMILY MEDICINE

## 2020-11-03 PROCEDURE — 2022F DILAT RTA XM EVC RTNOPTHY: CPT | Performed by: FAMILY MEDICINE

## 2020-11-03 PROCEDURE — 3044F HG A1C LEVEL LT 7.0%: CPT | Performed by: FAMILY MEDICINE

## 2020-11-03 RX ORDER — BLOOD SUGAR DIAGNOSTIC
STRIP MISCELLANEOUS
Qty: 100 STRIP | Refills: 5 | Status: SHIPPED | OUTPATIENT
Start: 2020-11-03

## 2020-11-03 RX ORDER — DOXYCYCLINE 100 MG/1
100 CAPSULE ORAL 2 TIMES DAILY
Qty: 20 CAP | Refills: 0 | Status: SHIPPED | OUTPATIENT
Start: 2020-11-03 | End: 2021-06-21 | Stop reason: SDUPTHER

## 2020-11-03 RX ORDER — FLUTICASONE PROPIONATE 50 MCG
SPRAY, SUSPENSION (ML) NASAL
Qty: 1 BOTTLE | Refills: 0 | Status: SHIPPED | OUTPATIENT
Start: 2020-11-03 | End: 2022-07-13

## 2020-11-03 RX ORDER — AZELASTINE 1 MG/ML
1 SPRAY, METERED NASAL 2 TIMES DAILY
Qty: 1 BOTTLE | Refills: 0 | Status: SHIPPED | OUTPATIENT
Start: 2020-11-03 | End: 2022-08-02 | Stop reason: ALTCHOICE

## 2020-11-03 RX ORDER — ZOLPIDEM TARTRATE 5 MG/1
TABLET ORAL
Qty: 30 TAB | Refills: 0 | Status: SHIPPED | OUTPATIENT
Start: 2020-11-03 | End: 2020-11-30

## 2020-11-03 NOTE — PROGRESS NOTES
Chief Complaint   Patient presents with    Cholesterol Problem     3 month follow up    Diabetes    Hypertension     1. Have you been to the ER, urgent care clinic since your last visit? Hospitalized since your last visit? No    2. Have you seen or consulted any other health care providers outside of the [address] [place] [address] since your last visit? Include any pap smears or colon screening.  No

## 2020-11-03 NOTE — PATIENT INSTRUCTIONS
Chronic Sinusitis: Care Instructions  Your Care Instructions     Sinusitis is an infection of the lining of the sinus cavities in your head. It causes pain and pressure in your head and face. Sinusitis can be short-term (acute) or long-term (chronic). Chronic sinusitis lasts 12 weeks or longer. It is often caused by a bacterial or fungal infection. Other things, such as allergies, may also be involved. Chronic sinusitis may be hard to treat. It can lead to permanent changes in the mucous membranes that line the sinuses. It may make future sinus infections more likely. The infection may take some time to treat. Antibiotics are usually used if the infection is caused by bacteria. You may also need to use a corticosteroid nasal spray. If the infection is not cured after you try two or more different antibiotics, you may want to talk with your doctor about surgery or allergy testing. If the sinusitis is caused by a fungal infection, you may need to take antifungals or other medicines. You may also need surgery. Follow-up care is a key part of your treatment and safety. Be sure to make and go to all appointments, and call your doctor if you are having problems. It's also a good idea to know your test results and keep a list of the medicines you take. How can you care for yourself at home? Medicines    · Be safe with medicines. Take your medicines exactly as prescribed. Call your doctor if you think you are having a problem with your medicine. You will get more details on the specific medicines your doctor prescribes.     · Take your antibiotics as directed. Do not stop taking them just because you feel better. You need to take the full course of antibiotics.     · Your doctor may recommend a corticosteroid nasal spray, wash, drops, or pills. Take this medicine exactly as prescribed. At home    · Breathe warm, moist air. You can use a steamy shower, a hot bath, or a sink filled with hot water.  Avoid cold, dry air. Using a humidifier in your home may help. Follow the instructions for cleaning the machine.     · Use saline (saltwater) nasal washes every day. This helps keep your nasal passages open. It also can wash out mucus and bacteria. ? You can buy saline nose drops at a grocery store or drugstore. ? You can make your own at home. Add 1 teaspoon of salt and 1 teaspoon of baking soda to 2 cups of distilled water. If you make your own, fill a bulb syringe with the solution. Then insert the tip into your nostril and squeeze gently. Kevin Saunas your nose.     · Put a warm, wet towel or a warm gel pack on your face 3 or 4 times a day. Leave it on 5 to 10 minutes each time.     · Do not smoke or breathe secondhand smoke. Smoking can make sinusitis worse. If you need help quitting, talk to your doctor about stop-smoking programs and medicines. These can increase your chances of quitting for good. When should you call for help? Call your doctor now or seek immediate medical care if:    · You have new or worse symptoms of infection, such as:  ? Increased pain, swelling, warmth, or redness. ? Red streaks leading from the area. ? Pus draining from the area. ? A fever. Watch closely for changes in your health, and be sure to contact your doctor if:    · The mucus from your nose becomes thicker (like pus) or has new blood in it.     · You do not get better as expected. Where can you learn more? Go to http://www.[employer].com/  Enter Y689 in the search box to learn more about \"Chronic Sinusitis: Care Instructions. \"  Current as of: April 15, 2020               Content Version: 12.6  © 4604-5774 [employer]. Care instructions adapted under license by [employer] (which disclaims liability or warranty for this information).  If you have questions about a medical condition or this instruction, always ask your healthcare professional. Edilma Resendez disclaims any warranty or liability for your use of this information.

## 2020-11-03 NOTE — PROGRESS NOTES
HISTORY OF PRESENT ILLNESS  Nathan Canales is a 69 y.o. male. seen for follow up on chronic medical conditions  sa pain management ( Dr Meredith Ahmadi ) last month for chronic back pain with radiculopathy and had cortisone shot in her back.   HPI    Cardiovascular Review  The patient has hypertension, hyperlipidemia, coronary artery disease and status post coronary artery stenting.  He reports taking medications as instructed, no medication side effects noted, home BP monitoring in range of 249'S systolic over 06'H diastolic, no chest pain on exertion, no dyspnea on exertion, no swelling of ankles, no orthostatic dizziness or lightheadedness, no orthopnea or paroxysmal nocturnal dyspnea, no palpitations, no muscle aches or pain.  Diet and Lifestyle: generally follows a low fat low cholesterol diet, generally follows a low sodium diet, exercises sporadically, chews tobacco.  Lab review: labs reviewed and discussed with patient.  He follows Dr Dioni Nevarez   Cardiac History:   PCI 7- prox 75% LAD, D1 50% EF 60%; NAN 3 x 15 mm Xience to LAD  Stress echo 5-28-13=  Walked 3 minutes, Exercise stress echo is normal.    Medicines:  mg,  Metoprolol XL 50 mg, Lofibra 160 mg,and Lipitor 10 mg  Lab Results   Component Value Date/Time    Cholesterol, total 129 07/29/2020 09:17 AM    HDL Cholesterol 39 (L) 07/29/2020 09:17 AM    LDL, calculated 66 07/29/2020 09:17 AM    VLDL, calculated 24 07/29/2020 09:17 AM    Triglyceride 121 07/29/2020 09:17 AM    CHOL/HDL Ratio 5.8 (H) 07/30/2011 04:31 AM             Endocrine Review  He is seen for diabetes.  Since last visit he reports: no polyuria or polydipsia, no significant changes.  Home glucose monitoring: is performed regularly, fasting values range 100-120  He is checking his sugars one per day.  He reports medication compliance: compliant all of the time.  Medication side effects: none.  Diabetic diet compliance: compliant most of the time.  Lab review: labs reviewed and discussed with patient  On Metformin 850 mg bid. Started on Gabapentin for his bilateral feet pain,not working as per him. Has very bad burning in feet, mainly at night  Changed to Amitriptyline,stopped due to concern of side effects  Lab Results   Component Value Date/Time    Hemoglobin A1c 6.5 (H) 07/29/2020 09:17 AM           CKD:  Stage of Chronic Kidney Disease III exhibited by:  Lab Results   Component Value Date/Time    Sodium 141 07/29/2020 09:17 AM    Potassium 4.9 07/29/2020 09:17 AM    Chloride 107 (H) 07/29/2020 09:17 AM    CO2 18 (L) 07/29/2020 09:17 AM    Anion gap 4 (L) 08/02/2017 05:36 AM    Glucose 131 (H) 07/29/2020 09:17 AM    BUN 12 07/29/2020 09:17 AM    Creatinine 1.28 (H) 07/29/2020 09:17 AM    BUN/Creatinine ratio 9 (L) 07/29/2020 09:17 AM    GFR est AA 65 07/29/2020 09:17 AM    GFR est non-AA 56 (L) 07/29/2020 09:17 AM    Calcium 9.5 07/29/2020 09:17 AM        Anemia:  ANEMIA FIRST NOTED: several years  Has improved with iron supplement  Lab Results   Component Value Date/Time    WBC 5.9 07/29/2020 09:17 AM    HGB 12.9 (L) 07/29/2020 09:17 AM    HCT 38.9 07/29/2020 09:17 AM    PLATELET 426 37/69/6851 09:17 AM    MCV 88 07/29/2020 09:17 AM     Insomnia:  ONSET: problem is longstanding  SEEN PREVIOUSLY: several months ago by me  DESCRIPTION OF SX:  patient estimates 4 hours of sleep per nite. difficulty initiating sleep.  frequent awakening  ASSOCIATED ISSUES: situational anxiety related to family issues  chronic pain involving the both knees is interfering with sleep  DENIES: depression and ETOH overuse  TREATMENT TO DATE: benzodiazapines ,effective    URI Review  Jayjay Toney returns to clinic today to talk about: URI symptoms, headache, both sides sinus pain, sinus congestion, post nasal drip, dry cough and chest congestion that started gradual and several years ago, and is unchanged since that time.   He reports he has been to several specialists and has been on several inhalers and antibiotics  He denies a history of: fever, rhinorrhea, hoarseness, swollen glands, wheezing, SOB and CRENSHAW. Treatments have included: decongestants, antihistamines, nasal steroids, which have been not very effective. Relevant PMH: Sinusitis and allergic rhinitis. Patient reports sick contacts: no.        Review of Systems   Constitutional: Negative for chills, fever and malaise/fatigue. HENT: Positive for congestion and sinus pain. Negative for ear pain, sore throat and tinnitus. Eyes: Negative for blurred vision, double vision, pain and discharge. Respiratory: Positive for cough. Negative for shortness of breath and wheezing. Cardiovascular: Negative for chest pain, palpitations and leg swelling. Gastrointestinal: Negative for abdominal pain, blood in stool, constipation, diarrhea, nausea and vomiting. Genitourinary: Negative for dysuria, frequency, hematuria and urgency. Musculoskeletal: Negative for back pain, joint pain and myalgias. Skin: Negative for rash. Neurological: Negative for dizziness, tremors, seizures and headaches. Burning feet syndrome   Endo/Heme/Allergies: Negative for polydipsia. Does not bruise/bleed easily. Psychiatric/Behavioral: Negative for depression and substance abuse. The patient has insomnia. The patient is not nervous/anxious. Physical Exam  Vitals signs and nursing note reviewed. Constitutional:       General: He is not in acute distress. Appearance: He is well-developed. He is not diaphoretic. HENT:      Head: Normocephalic and atraumatic. Right Ear: Tympanic membrane and external ear normal. No decreased hearing noted. No drainage. No middle ear effusion. Tympanic membrane is not injected. Left Ear: Tympanic membrane normal. No decreased hearing noted. No drainage. No middle ear effusion. Tympanic membrane is not injected. Nose: Mucosal edema present. No rhinorrhea.       Right Sinus: Maxillary sinus tenderness and frontal sinus tenderness present. Left Sinus: Maxillary sinus tenderness and frontal sinus tenderness present. Mouth/Throat:      Pharynx: Uvula midline. Posterior oropharyngeal erythema present. No oropharyngeal exudate. Eyes:      General: No scleral icterus. Conjunctiva/sclera: Conjunctivae normal.      Pupils: Pupils are equal, round, and reactive to light. Neck:      Musculoskeletal: Normal range of motion and neck supple. Thyroid: No thyromegaly. Vascular: No JVD. Cardiovascular:      Rate and Rhythm: Normal rate and regular rhythm. Heart sounds: Normal heart sounds. No murmur. Pulmonary:      Effort: Pulmonary effort is normal.      Breath sounds: Normal breath sounds. No wheezing or rales. Abdominal:      General: Bowel sounds are normal. There is no distension. Palpations: Abdomen is soft. There is no mass. Musculoskeletal: Normal range of motion. General: No tenderness. Lymphadenopathy:      Head:      Right side of head: No tonsillar adenopathy. Left side of head: No tonsillar adenopathy. Cervical: No cervical adenopathy. Skin:     General: Skin is warm and dry. Findings: No rash. Neurological:      Mental Status: He is alert and oriented to person, place, and time. Cranial Nerves: No cranial nerve deficit. Deep Tendon Reflexes: Reflexes are normal and symmetric. Reflexes normal.         ASSESSMENT and PLAN  Diagnoses and all orders for this visit:    1. Type 2 diabetes mellitus with diabetic neuropathy, without long-term current use of insulin ([facility])  -     glucose blood VI test strips (Accu-Chek Cece Plus test strp) strip; USE ONE STRIP TO CHECK GLUCOSE ONCE DAILY  -     METABOLIC PANEL, COMPREHENSIVE; Future  -     HEMOGLOBIN A1C WITH EAG; Future    2.  Type 2 diabetes with nephropathy ([facility])  -     glucose blood VI test strips (Accu-Chek Cece Plus test strp) strip; USE ONE STRIP TO CHECK GLUCOSE ONCE DAILY  - METABOLIC PANEL, COMPREHENSIVE; Future  -     HEMOGLOBIN A1C WITH EAG; Future    3. Mixed dyslipidemia  -     METABOLIC PANEL, COMPREHENSIVE; Future  -     LIPID PANEL; Future    4. Iron deficiency anemia secondary to inadequate dietary iron intake  -     CBC WITH AUTOMATED DIFF; Future    5. Primary insomnia  -     zolpidem (AMBIEN) 5 mg tablet; TAKE 1 TABLET BY MOUTH ONCE DAILY AT BEDTIME AS NEEDED FOR SLEEP    6. Chronic cough  -     fluticasone propionate (FLONASE) 50 mcg/actuation nasal spray; Use one spray in each nostril twice daily  -     azelastine (ASTELIN) 137 mcg (0.1 %) nasal spray; 1 [place] by Both Nostrils route two (2) times a day. Use in each nostril as directed    7. Stage 3a chronic kidney disease  -     METABOLIC PANEL, COMPREHENSIVE; Future    8. Chronic pansinusitis  -     fluticasone propionate (FLONASE) 50 mcg/actuation nasal spray; Use one spray in each nostril twice daily  -     azelastine (ASTELIN) 137 mcg (0.1 %) nasal spray; 1 [place] by Both Nostrils route two (2) times a day. Use in each nostril as directed  -     doxycycline (VIBRAMYCIN) 100 mg capsule; Take 1 Cap by mouth two (2) times a day. 9. Essential hypertension, benign  -     METABOLIC PANEL, COMPREHENSIVE; Future      Discussed lifestyle issues and health guidance given  Patient was given an after visit summary which includes diagnoses, vital signs, current medications, instructions and references & authorized prescriptions . Results of labs will be conveyed to patient, once available. Pt verbalized instructions I provided and expressed understanding of discussion that was held today. Follow-up and Dispositions    · Return in about 4 months (around 3/3/2021) for follow up, fasting.

## 2020-11-07 DIAGNOSIS — E78.2 MIXED DYSLIPIDEMIA: ICD-10-CM

## 2020-11-09 RX ORDER — ATORVASTATIN CALCIUM 10 MG/1
10 TABLET, FILM COATED ORAL
Qty: 90 TAB | Refills: 3 | Status: SHIPPED | OUTPATIENT
Start: 2020-11-09 | End: 2021-01-04 | Stop reason: SDUPTHER

## 2020-11-09 NOTE — TELEPHONE ENCOUNTER
Request for Lipitor 10mg QHS. Last office visit 11-18-19, next office visit 11-20-20.  Refills per verbal order from Dr. Rosa Hudson.

## 2020-11-19 LAB
ALBUMIN SERPL-MCNC: 4.1 G/DL (ref 3.7–4.7)
ALBUMIN/GLOB SERPL: 1.7 {RATIO} (ref 1.2–2.2)
ALP SERPL-CCNC: 50 IU/L (ref 39–117)
ALT SERPL-CCNC: 16 IU/L (ref 0–44)
AST SERPL-CCNC: 17 IU/L (ref 0–40)
BASOPHILS # BLD AUTO: 0 X10E3/UL (ref 0–0.2)
BASOPHILS NFR BLD AUTO: 0 %
BILIRUB SERPL-MCNC: 0.6 MG/DL (ref 0–1.2)
BUN SERPL-MCNC: 16 MG/DL (ref 8–27)
BUN/CREAT SERPL: 12 (ref 10–24)
CALCIUM SERPL-MCNC: 9.8 MG/DL (ref 8.6–10.2)
CHLORIDE SERPL-SCNC: 102 MMOL/L (ref 96–106)
CHOLEST SERPL-MCNC: 102 MG/DL (ref 100–199)
CO2 SERPL-SCNC: 22 MMOL/L (ref 20–29)
CREAT SERPL-MCNC: 1.35 MG/DL (ref 0.76–1.27)
EOSINOPHIL # BLD AUTO: 0.1 X10E3/UL (ref 0–0.4)
EOSINOPHIL NFR BLD AUTO: 1 %
ERYTHROCYTE [DISTWIDTH] IN BLOOD BY AUTOMATED COUNT: 13.6 % (ref 11.6–15.4)
EST. AVERAGE GLUCOSE BLD GHB EST-MCNC: 140 MG/DL
GLOBULIN SER CALC-MCNC: 2.4 G/DL (ref 1.5–4.5)
GLUCOSE SERPL-MCNC: 123 MG/DL (ref 65–99)
HBA1C MFR BLD: 6.5 % (ref 4.8–5.6)
HCT VFR BLD AUTO: 41.1 % (ref 37.5–51)
HDLC SERPL-MCNC: 44 MG/DL
HGB BLD-MCNC: 13.2 G/DL (ref 13–17.7)
IMM GRANULOCYTES # BLD AUTO: 0.1 X10E3/UL (ref 0–0.1)
IMM GRANULOCYTES NFR BLD AUTO: 1 %
INTERPRETATION, 910389: NORMAL
INTERPRETATION: NORMAL
LDLC SERPL CALC-MCNC: 42 MG/DL (ref 0–99)
LYMPHOCYTES # BLD AUTO: 2.4 X10E3/UL (ref 0.7–3.1)
LYMPHOCYTES NFR BLD AUTO: 32 %
MCH RBC QN AUTO: 27.9 PG (ref 26.6–33)
MCHC RBC AUTO-ENTMCNC: 32.1 G/DL (ref 31.5–35.7)
MCV RBC AUTO: 87 FL (ref 79–97)
MONOCYTES # BLD AUTO: 0.5 X10E3/UL (ref 0.1–0.9)
MONOCYTES NFR BLD AUTO: 6 %
NEUTROPHILS # BLD AUTO: 4.5 X10E3/UL (ref 1.4–7)
NEUTROPHILS NFR BLD AUTO: 60 %
PDF IMAGE, 910387: NORMAL
PLATELET # BLD AUTO: 287 X10E3/UL (ref 150–450)
POTASSIUM SERPL-SCNC: 5.4 MMOL/L (ref 3.5–5.2)
PROT SERPL-MCNC: 6.5 G/DL (ref 6–8.5)
RBC # BLD AUTO: 4.73 X10E6/UL (ref 4.14–5.8)
SODIUM SERPL-SCNC: 139 MMOL/L (ref 134–144)
TRIGL SERPL-MCNC: 77 MG/DL (ref 0–149)
VLDLC SERPL CALC-MCNC: 16 MG/DL (ref 5–40)
WBC # BLD AUTO: 7.5 X10E3/UL (ref 3.4–10.8)

## 2020-11-20 NOTE — PROGRESS NOTES
Please inform patient,  Kidney function is abnormal but stable  Blood count,cholesterol results are stable  A1C is stable at 6.5 , to continue on same medicines  thanks

## 2020-11-30 DIAGNOSIS — F51.01 PRIMARY INSOMNIA: ICD-10-CM

## 2020-11-30 RX ORDER — ZOLPIDEM TARTRATE 5 MG/1
TABLET ORAL
Qty: 30 TAB | Refills: 0 | Status: SHIPPED | OUTPATIENT
Start: 2020-11-30 | End: 2021-01-04

## 2020-12-11 RX ORDER — TAMSULOSIN HYDROCHLORIDE 0.4 MG/1
CAPSULE ORAL
Qty: 90 CAP | Refills: 0 | Status: SHIPPED | OUTPATIENT
Start: 2020-12-11 | End: 2021-04-02 | Stop reason: SDUPTHER

## 2021-01-04 ENCOUNTER — OFFICE VISIT (OUTPATIENT)
Dept: CARDIOLOGY CLINIC | Age: 71
End: 2021-01-04
Payer: MEDICARE

## 2021-01-04 VITALS
WEIGHT: 187 LBS | RESPIRATION RATE: 18 BRPM | SYSTOLIC BLOOD PRESSURE: 100 MMHG | DIASTOLIC BLOOD PRESSURE: 60 MMHG | OXYGEN SATURATION: 96 % | HEART RATE: 59 BPM | HEIGHT: 70 IN | BODY MASS INDEX: 26.77 KG/M2

## 2021-01-04 DIAGNOSIS — I25.10 ATHEROSCLEROSIS OF NATIVE CORONARY ARTERY OF NATIVE HEART WITHOUT ANGINA PECTORIS: Primary | ICD-10-CM

## 2021-01-04 DIAGNOSIS — E78.2 MIXED DYSLIPIDEMIA: ICD-10-CM

## 2021-01-04 DIAGNOSIS — E11.21 TYPE 2 DIABETES WITH NEPHROPATHY (HCC): ICD-10-CM

## 2021-01-04 DIAGNOSIS — F51.01 PRIMARY INSOMNIA: ICD-10-CM

## 2021-01-04 DIAGNOSIS — I10 ESSENTIAL HYPERTENSION, BENIGN: ICD-10-CM

## 2021-01-04 PROCEDURE — G8419 CALC BMI OUT NRM PARAM NOF/U: HCPCS | Performed by: SPECIALIST

## 2021-01-04 PROCEDURE — 93010 ELECTROCARDIOGRAM REPORT: CPT | Performed by: SPECIALIST

## 2021-01-04 PROCEDURE — 3017F COLORECTAL CA SCREEN DOC REV: CPT | Performed by: SPECIALIST

## 2021-01-04 PROCEDURE — G0463 HOSPITAL OUTPT CLINIC VISIT: HCPCS | Performed by: SPECIALIST

## 2021-01-04 PROCEDURE — 3046F HEMOGLOBIN A1C LEVEL >9.0%: CPT | Performed by: SPECIALIST

## 2021-01-04 PROCEDURE — 1101F PT FALLS ASSESS-DOCD LE1/YR: CPT | Performed by: SPECIALIST

## 2021-01-04 PROCEDURE — G8754 DIAS BP LESS 90: HCPCS | Performed by: SPECIALIST

## 2021-01-04 PROCEDURE — G8536 NO DOC ELDER MAL SCRN: HCPCS | Performed by: SPECIALIST

## 2021-01-04 PROCEDURE — 99213 OFFICE O/P EST LOW 20 MIN: CPT | Performed by: SPECIALIST

## 2021-01-04 PROCEDURE — 2022F DILAT RTA XM EVC RTNOPTHY: CPT | Performed by: SPECIALIST

## 2021-01-04 PROCEDURE — G8510 SCR DEP NEG, NO PLAN REQD: HCPCS | Performed by: SPECIALIST

## 2021-01-04 PROCEDURE — G8427 DOCREV CUR MEDS BY ELIG CLIN: HCPCS | Performed by: SPECIALIST

## 2021-01-04 PROCEDURE — 93005 ELECTROCARDIOGRAM TRACING: CPT | Performed by: SPECIALIST

## 2021-01-04 PROCEDURE — G8752 SYS BP LESS 140: HCPCS | Performed by: SPECIALIST

## 2021-01-04 RX ORDER — ATORVASTATIN CALCIUM 10 MG/1
10 TABLET, FILM COATED ORAL
Qty: 90 TAB | Refills: 3 | Status: SHIPPED | OUTPATIENT
Start: 2021-01-04 | End: 2022-01-12

## 2021-01-04 RX ORDER — FENOFIBRATE 160 MG/1
TABLET ORAL
Qty: 90 TAB | Refills: 0 | Status: SHIPPED | OUTPATIENT
Start: 2021-01-04 | End: 2021-05-11

## 2021-01-04 RX ORDER — METOPROLOL SUCCINATE 50 MG/1
50 TABLET, EXTENDED RELEASE ORAL DAILY
Qty: 90 TAB | Refills: 3 | Status: SHIPPED | OUTPATIENT
Start: 2021-01-04 | End: 2021-06-21 | Stop reason: DRUGHIGH

## 2021-01-04 RX ORDER — ZOLPIDEM TARTRATE 5 MG/1
TABLET ORAL
Qty: 30 TAB | Refills: 0 | Status: SHIPPED | OUTPATIENT
Start: 2021-01-04 | End: 2021-02-05

## 2021-01-04 NOTE — PROGRESS NOTES
Visit Vitals  /60 (BP 1 Location: Left arm, BP Patient Position: Sitting)   Pulse (!) 59   Resp 18   Ht 5' 10\" (1.778 m)   Wt 187 lb (84.8 kg)   SpO2 96%   BMI 26.83 kg/m²

## 2021-01-04 NOTE — PROGRESS NOTES
Sharifa Monge     1950       Mary Lazo MD, [facility] [place]  Date of Visit-1/4/2021   PCP is Majo Phoenix MD   [facility] [place]  [facility]  HPI:  Sharifa Monge is a 69 y.o. male   1 year f/u of CAD, HTN, hypercholesteremia. In May 2019 he had some chest pain and low BP. Amlodipine was stopped, stress test done 6/10/19.   06/10/19   NUCLEAR CARDIAC STRESS TEST 06/21/2019 6/25/2019    Narrative · Baseline ECG: Normal sinus rhythm. · Gated SPECT: Left ventricular function post-stress was normal.   Calculated ejection fraction is 74%. There is no evidence of transient   ischemic dilation (TID). The TID ratio is 0.96. · Left ventricular perfusion is normal.  · Negative myocardial perfusion imaging. Myocardial perfusion imaging   supports a low risk stress test.        Signed by: Petrona Frank MD      He had blood work with PCP in November that is reviewed. K was 5.4, creatinine 1.35, lipids were excellent. Overall the pt states he is doing well. Pt notes a little bit of SOB with exertion, but not often. Pt had surgery on his left shoulder since the last visit and had no complications from that. Denies chest pain, edema, syncope or shortness of breath at rest, has no tachycardia, palpitations or sense of arrhythmia. EKG: SB WNL sinus bradycardia rate 59   QRS 92    Assessment/Plan:     1. Atherosclerosis of native coronary artery of native heart without angina pectoris  PCI in 2011. Main risk factor is DM, pleased to see good control. He describes mild CRENSHAW. I don't see CHF.   - AMB POC EKG ROUTINE W/ 12 LEADS, INTER & REP    2. Essential hypertension, benign  Well controlled. At goal , meds and possible side effects reviewed and patient denies  Key CAD CHF Meds             metoprolol succinate (TOPROL-XL) 50 mg XL tablet (Taking) Take 1 Tab by mouth daily.     atorvastatin (LIPITOR) 10 mg tablet (Taking) Take 1 Tab by mouth nightly. fenofibrate (LOFIBRA) 160 mg tablet (Taking/Discontinued) TAKE 1 TABLET BY MOUTH ONCE DAILY    nitroglycerin (NITROSTAT) 0.4 mg SL tablet (Taking) DISSOLVE ONE TABLET UNDER THE TONGUE EVERY 5 MINUTES AS NEEDED FOR CHEST PAIN. UP TO 3 DOSES    aspirin delayed-release 81 mg tablet (Taking) Take 162 mg by mouth daily. BP Readings from Last 6 Encounters:   01/04/21 100/60   11/03/20 118/72   07/22/20 124/77   05/28/20 128/78   03/10/20 122/69   02/26/20 161/78        3. Mixed dyslipidemia  Lipids on high potency statin as appropriate for secondary prevention. At goal , denies excess muscle aches or new liver issues  Key Antihyperlipidemia Meds             atorvastatin (LIPITOR) 10 mg tablet (Taking) Take 1 Tab by mouth nightly. fenofibrate (LOFIBRA) 160 mg tablet (Taking/Discontinued) TAKE 1 TABLET BY MOUTH ONCE DAILY         Lab Results   Component Value Date/Time    LDL, calculated 42 11/18/2020 09:12 AM    LDL, calculated 66 07/29/2020 09:17 AM       4. Type 2 diabetes with nephropathy ([facility]  Lab Results   Component Value Date/Time    Hemoglobin A1c 6.5 (H) 11/18/2020 09:12 AM     Future Appointments   Date Time Provider Tima Greenberg   2/15/2021  2:00 PM MD MAYELIN Stearns BS AMB   1/4/2022  9:00 AM MICHAEL GIMENEZ BS AMB   1/10/2022  9:00 AM Mary Manrique MD CAVREY BS AMB         F/u in 1 year with lexiscan     Patient Instructions   We will see you back for an annual follow up with a lexiscan nuclear stress test one week prior. Wear comfortable clothing (shorts or pants with a shirt or blouse- no underwire bras) and walking or athletic shoes. Do not eat or drink anything, except water, for at least 2 hours prior to your appointment.  Avoid tobacco products for at least 6 hours prior to your test.    Do not eat or drink anything containing caffeine, including but not limited to the following: chocolate, regular and decaffeinated coffee, soft drinks, or tea for at least 12-24 hours prior to your test.    Do not hold your scheduled medications prior to your test.    Your test will be performed on a 1 day protocol. This is determined by your height, weight, and other risk factors. For a 1 day test, please allow for 4 hours in the office that day. The radioactive isotope used for your testing is different from any of the dyes that are commonly used in x-ray procedures, and is ordered specially for your test. Please call to cancel or reschedule your appointment at least 24 hours prior to your scheduled appointment to avoid being billed for the expensive isotope. Impression:   1. Atherosclerosis of native coronary artery of native heart without angina pectoris    2. Essential hypertension, benign    3. Mixed dyslipidemia    4. Type 2 diabetes with nephropathy [facility])       Cardiac History:   PCI 7- prox 75% LAD, D1 50% EF 60%; NAN 3 x 15 mm Xience to LAD  Stress echo 5-28-13=  Walked 3 minutes, Exercise stress echo is normal.      ROS-except as noted above. . A complete cardiac and respiratory are reviewed and negative except as above ; Resp-denies wheezing  or productive cough,. Const- No unusual weight loss or fever; Neuro-no recent seizure or CVA ; GI- No BRBPR, abdom pain, bloating ; - no  hematuria   see supplement sheet, initialed and to be scanned by staff  Past Medical History:   Diagnosis Date    Arthritis     KNEES & BACK.    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 He has quit using smokeless tobacco. He reports current alcohol use of about 2.0 standard drinks of alcohol per week. He reports that he does not use drugs. Exam and Labs:  /60 (BP 1 Location: Left arm, BP Patient Position: Sitting)   Pulse (!) 59   Resp 18   Ht 5' 10\" (1.778 m)   Wt 187 lb (84.8 kg)   SpO2 96%   BMI 26.83 kg/m² Constitutional:  NAD, comfortable  Head: NC,AT. Eyes: No scleral icterus. Neck:  Neck supple. No JVD present. Throat: moist mucous membranes. Chest: Effort normal & normal respiratory excursion . Neurological: alert, conversant and oriented . Skin: Skin is not cold. No obvious systemic rash noted. Not diaphoretic. No erythema. Psychiatric:  Grossly normal mood and affect. Behavior appears normal. Extremities:  no clubbing or cyanosis. Abdomen: non distended    Lungs:breath sounds normal. No stridor. distress, wheezes or  Rales. Heart: normal rate, regular rhythm, normal S1, S2, no murmurs, rubs, clicks or gallops , PMI non displaced. Edema: Edema is none.   Lab Results   Component Value Date/Time    Cholesterol, total 102 11/18/2020 09:12 AM    HDL Cholesterol 44 11/18/2020 09:12 AM    LDL, calculated 42 11/18/2020 09:12 AM    LDL, calculated 66 07/29/2020 09:17 AM    Triglyceride 77 11/18/2020 09:12 AM    CHOL/HDL Ratio 5.8 (H) 07/30/2011 04:31 AM     Lab Results   Component Value Date/Time    Sodium 139 11/18/2020 09:12 AM    Potassium 5.4 (H) 11/18/2020 09:12 AM    Chloride 102 11/18/2020 09:12 AM    CO2 22 11/18/2020 09:12 AM    Anion gap 4 (L) 08/02/2017 05:36 AM    Glucose 123 (H) 11/18/2020 09:12 AM    BUN 16 11/18/2020 09:12 AM    Creatinine 1.35 (H) 11/18/2020 09:12 AM    BUN/Creatinine ratio 12 11/18/2020 09:12 AM    GFR est AA 61 11/18/2020 09:12 AM    GFR est non-AA 52 (L) 11/18/2020 09:12 AM    Calcium 9.8 11/18/2020 09:12 AM      Wt Readings from Last 3 Encounters:   01/04/21 187 lb (84.8 kg)   11/03/20 185 lb (83.9 kg) 07/22/20 189 lb (85.7 kg)      BP Readings from Last 3 Encounters:   01/04/21 100/60   11/03/20 118/72   07/22/20 124/77      Current Outpatient Medications   Medication Sig    zolpidem (AMBIEN) 5 mg tablet TAKE 1 TABLET BY MOUTH ONCE DAILY AT BEDTIME AS NEEDED FOR SLEEP    tamsulosin (FLOMAX) 0.4 mg capsule Take 1 capsule by mouth once daily    atorvastatin (LIPITOR) 10 mg tablet Take 1 Tab by mouth nightly.  glucose blood VI test strips (Accu-Chek Cece Plus test strp) strip USE ONE STRIP TO CHECK GLUCOSE ONCE DAILY    fluticasone propionate (FLONASE) 50 mcg/actuation nasal spray Use one spray in each nostril twice daily    azelastine (ASTELIN) 137 mcg (0.1 %) nasal spray 1 [place] by Both Nostrils route two (2) times a day. Use in each nostril as directed    doxycycline (VIBRAMYCIN) 100 mg capsule Take 1 Cap by mouth two (2) times a day.  metFORMIN (GLUCOPHAGE) 850 mg tablet TAKE 1 TABLET BY MOUTH TWICE DAILY WITH MEALS    ergocalciferol (ERGOCALCIFEROL) 1,250 mcg (50,000 unit) capsule Take 1 Cap by mouth every seven (7) days.  acyclovir (ZOVIRAX) 800 mg tablet TAKE 1 TABLET BY MOUTH EVERY 4 HOURS WHILE AWAKE    varicella-zoster recombinant, PF, (Shingrix, PF,) 50 mcg/0.5 mL susr injection 0.5mL by IntraMUSCular route once now and then repeat in 2-6 months    triamcinolone acetonide (KENALOG) 0.1 % ointment Apply  to affected area two (2) times a day. use thin layer    gabapentin (NEURONTIN) 300 mg capsule Take 1 Cap by mouth nightly. Max Daily Amount: 300 mg.    fenofibrate (LOFIBRA) 160 mg tablet TAKE 1 TABLET BY MOUTH ONCE DAILY    metoprolol succinate (TOPROL-XL) 50 mg XL tablet Take 1 Tab by mouth daily.  nitroglycerin (NITROSTAT) 0.4 mg SL tablet DISSOLVE ONE TABLET UNDER THE TONGUE EVERY 5 MINUTES AS NEEDED FOR CHEST PAIN. UP TO 3 DOSES    aspirin delayed-release 81 mg tablet Take 162 mg by mouth daily.     acetaminophen (TYLENOL) 500 mg tablet Take 2 Tabs by mouth every six (6) hours.    esomeprazole (NEXIUM) 20 mg capsule Take 20 mg by mouth daily as needed. No current facility-administered medications for this visit. Impression see above.       Written by Norah Chowdary, as dictated by Ana Berger MD.

## 2021-01-04 NOTE — Clinical Note
1/4/2021 Patient: Tobi Lemus YOB: 1950 Date of Visit: 1/4/2021 Bhavesh Prajapati MD 
[address] [id number] [address] [employer] Dear Bhavesh Prajapati MD, Thank you for referring Mr. Karma Hood to [address] for evaluation. My notes for this consultation are attached. If you have questions, please do not hesitate to call me. I look forward to following your patient along with you.  
 
 
Sincerely, 
 
Alissa Sena MD

## 2021-01-04 NOTE — PATIENT INSTRUCTIONS
We will see you back for an annual follow up with a lexiscan nuclear stress test one week prior. Wear comfortable clothing (shorts or pants with a shirt or blouse- no underwire bras) and walking or athletic shoes. Do not eat or drink anything, except water, for at least 2 hours prior to your appointment. Avoid tobacco products for at least 6 hours prior to your test. 
 
Do not eat or drink anything containing caffeine, including but not limited to the following: chocolate, regular and decaffeinated coffee, soft drinks, or tea for at least 12-24 hours prior to your test. 
 
Do not hold your scheduled medications prior to your test. 
 
Your test will be performed on a 1 day protocol. This is determined by your height, weight, and other risk factors. For a 1 day test, please allow for 4 hours in the office that day. The radioactive isotope used for your testing is different from any of the dyes that are commonly used in x-ray procedures, and is ordered specially for your test. Please call to cancel or reschedule your appointment at least 24 hours prior to your scheduled appointment to avoid being billed for the expensive isotope.

## 2021-02-05 DIAGNOSIS — F51.01 PRIMARY INSOMNIA: ICD-10-CM

## 2021-02-05 DIAGNOSIS — E11.9 TYPE 2 DIABETES MELLITUS WITHOUT COMPLICATION, WITHOUT LONG-TERM CURRENT USE OF INSULIN (HCC): ICD-10-CM

## 2021-02-05 RX ORDER — METFORMIN HYDROCHLORIDE 850 MG/1
TABLET ORAL
Qty: 180 TAB | Refills: 0 | Status: SHIPPED | OUTPATIENT
Start: 2021-02-05 | End: 2021-05-23

## 2021-02-05 RX ORDER — ZOLPIDEM TARTRATE 5 MG/1
TABLET ORAL
Qty: 30 TAB | Refills: 0 | Status: SHIPPED | OUTPATIENT
Start: 2021-02-05 | End: 2021-03-04

## 2021-02-08 RX ORDER — BLOOD SUGAR DIAGNOSTIC
STRIP MISCELLANEOUS
Qty: 100 STRIP | Refills: 5 | Status: SHIPPED | OUTPATIENT
Start: 2021-02-08 | End: 2021-02-15 | Stop reason: SDUPTHER

## 2021-02-08 NOTE — TELEPHONE ENCOUNTER
Dr. Alley Ho,          Patient has changed Blood Glucose meters and is now using the Acc-Chek Guide and is requesting a new prescription to be sent to [facility]     Last visit 11/30/2020 MD Alley Ho   Next appointment 2/15/2021 MD Alley Ho       Requested Prescriptions     Pending Prescriptions Disp Refills    glucose blood VI test strips (Accu-Chek Guide test strips) strip 100 Strip 5     Sig: Use one strip to check glucose once daily

## 2021-02-10 DIAGNOSIS — E11.21 TYPE 2 DIABETES WITH NEPHROPATHY (HCC): ICD-10-CM

## 2021-02-10 DIAGNOSIS — E11.40 TYPE 2 DIABETES MELLITUS WITH DIABETIC NEUROPATHY, WITHOUT LONG-TERM CURRENT USE OF INSULIN (HCC): Primary | ICD-10-CM

## 2021-02-15 ENCOUNTER — OFFICE VISIT (OUTPATIENT)
Dept: FAMILY MEDICINE CLINIC | Age: 71
End: 2021-02-15
Payer: MEDICARE

## 2021-02-15 VITALS
RESPIRATION RATE: 18 BRPM | WEIGHT: 188.4 LBS | HEIGHT: 70 IN | TEMPERATURE: 97.4 F | SYSTOLIC BLOOD PRESSURE: 125 MMHG | OXYGEN SATURATION: 97 % | BODY MASS INDEX: 26.97 KG/M2 | HEART RATE: 67 BPM | DIASTOLIC BLOOD PRESSURE: 74 MMHG

## 2021-02-15 DIAGNOSIS — N18.31 STAGE 3A CHRONIC KIDNEY DISEASE (HCC): ICD-10-CM

## 2021-02-15 DIAGNOSIS — E55.9 VITAMIN D DEFICIENCY: ICD-10-CM

## 2021-02-15 DIAGNOSIS — F51.01 PRIMARY INSOMNIA: ICD-10-CM

## 2021-02-15 DIAGNOSIS — E11.21 TYPE 2 DIABETES WITH NEPHROPATHY (HCC): ICD-10-CM

## 2021-02-15 DIAGNOSIS — Z86.39 HISTORY OF NON ANEMIC VITAMIN B12 DEFICIENCY: ICD-10-CM

## 2021-02-15 DIAGNOSIS — H72.93 PERFORATION OF BOTH TYMPANIC MEMBRANES: ICD-10-CM

## 2021-02-15 DIAGNOSIS — E78.2 MIXED DYSLIPIDEMIA: ICD-10-CM

## 2021-02-15 DIAGNOSIS — E11.40 TYPE 2 DIABETES MELLITUS WITH DIABETIC NEUROPATHY, WITHOUT LONG-TERM CURRENT USE OF INSULIN (HCC): Primary | ICD-10-CM

## 2021-02-15 DIAGNOSIS — I10 ESSENTIAL HYPERTENSION, BENIGN: ICD-10-CM

## 2021-02-15 PROCEDURE — 2022F DILAT RTA XM EVC RTNOPTHY: CPT | Performed by: FAMILY MEDICINE

## 2021-02-15 PROCEDURE — G8427 DOCREV CUR MEDS BY ELIG CLIN: HCPCS | Performed by: FAMILY MEDICINE

## 2021-02-15 PROCEDURE — G8536 NO DOC ELDER MAL SCRN: HCPCS | Performed by: FAMILY MEDICINE

## 2021-02-15 PROCEDURE — 3046F HEMOGLOBIN A1C LEVEL >9.0%: CPT | Performed by: FAMILY MEDICINE

## 2021-02-15 PROCEDURE — G8754 DIAS BP LESS 90: HCPCS | Performed by: FAMILY MEDICINE

## 2021-02-15 PROCEDURE — 99215 OFFICE O/P EST HI 40 MIN: CPT | Performed by: FAMILY MEDICINE

## 2021-02-15 PROCEDURE — G8419 CALC BMI OUT NRM PARAM NOF/U: HCPCS | Performed by: FAMILY MEDICINE

## 2021-02-15 PROCEDURE — 1101F PT FALLS ASSESS-DOCD LE1/YR: CPT | Performed by: FAMILY MEDICINE

## 2021-02-15 PROCEDURE — G8752 SYS BP LESS 140: HCPCS | Performed by: FAMILY MEDICINE

## 2021-02-15 PROCEDURE — G0463 HOSPITAL OUTPT CLINIC VISIT: HCPCS | Performed by: FAMILY MEDICINE

## 2021-02-15 PROCEDURE — 3017F COLORECTAL CA SCREEN DOC REV: CPT | Performed by: FAMILY MEDICINE

## 2021-02-15 PROCEDURE — G8510 SCR DEP NEG, NO PLAN REQD: HCPCS | Performed by: FAMILY MEDICINE

## 2021-02-15 RX ORDER — BLOOD SUGAR DIAGNOSTIC
STRIP MISCELLANEOUS
Qty: 100 STRIP | Refills: 5 | Status: SHIPPED | OUTPATIENT
Start: 2021-02-15

## 2021-02-15 NOTE — PROGRESS NOTES
HISTORY OF PRESENT ILLNESS  Bobby Castro is a 69 y.o. male. seen for several reasons including follow up on DM,dyslipidemia, HTN CAD,CKD,insomnia and concern of worsening back pain and burning in both feet. Reviewed all records from cardiology and urology   HPI       Cardiovascular Review  The patient has hypertension, hyperlipidemia, coronary artery disease and status post coronary artery stenting.  He reports taking medications as instructed, no medication side effects noted, home BP monitoring in range of 957'X systolic over 09'E diastolic, no chest pain on exertion, no dyspnea on exertion, no swelling of ankles, no orthostatic dizziness or lightheadedness, no orthopnea or paroxysmal nocturnal dyspnea, no palpitations, no muscle aches or pain.  Diet and Lifestyle: generally follows a low fat low cholesterol diet, generally follows a low sodium diet, exercises sporadically, chews tobacco.  Lab review: labs reviewed and discussed with patient.  He follows Dr Sandra Yates   Cardiac History:   PCI 7- prox 75% LAD, D1 50% EF 60%; NAN 3 x 15 mm Xience to LAD  Stress echo 5-28-13=  Walked 3 minutes, Exercise stress echo is normal.    Medicines:  mg,  Metoprolol XL 50 mg, Lofibra 160 mg,and Lipitor 10 mg  Last EKG with cardiology on 01/04/2021 normal ( except bradycardia)        Endocrine Review  He is seen for diabetes.  Since last visit he reports: no polyuria or polydipsia, no significant changes.  Home glucose monitoring: is performed regularly, fasting values range 100-120  He is checking his sugars one per day.  He reports medication compliance: compliant all of the time.  Medication side effects: none.  Diabetic diet compliance: compliant most of the time.  Lab review: labs reviewed and discussed with patient  On Metformin 850 mg bid. ( which he takes half bid)  Lab Results   Component Value Date/Time    Hemoglobin A1c 6.5 (H) 11/18/2020 09:12 AM        Started on Gabapentin for his bilateral feet pain,not working as per him. Has very bad burning in feet, mainly at night was Changed to Amitriptyline, but stopped due to concern of side effects       CKD:  Stage of Chronic Kidney Disease III exhibited by:  Lab Results   Component Value Date/Time    Sodium 139 11/18/2020 09:12 AM    Potassium 5.4 (H) 11/18/2020 09:12 AM    Chloride 102 11/18/2020 09:12 AM    CO2 22 11/18/2020 09:12 AM    Anion gap 4 (L) 08/02/2017 05:36 AM    Glucose 123 (H) 11/18/2020 09:12 AM    BUN 16 11/18/2020 09:12 AM    Creatinine 1.35 (H) 11/18/2020 09:12 AM    BUN/Creatinine ratio 12 11/18/2020 09:12 AM    GFR est AA 61 11/18/2020 09:12 AM    GFR est non-AA 52 (L) 11/18/2020 09:12 AM    Calcium 9.8 11/18/2020 09:12 AM          Anemia:  ANEMIA FIRST NOTED: several years, work up was negative including colonoscopy   Has improved with iron supplement  Lab Results   Component Value Date/Time    WBC 7.5 11/18/2020 09:12 AM    HGB 13.2 11/18/2020 09:12 AM    HCT 41.1 11/18/2020 09:12 AM    PLATELET 071 64/88/1491 09:12 AM    MCV 87 11/18/2020 09:12 AM     insomnia:  ONSET: problem is longstanding  SEEN PREVIOUSLY: several months ago by me  DESCRIPTION OF SX:  patient estimates 4 hours of sleep per nite. difficulty initiating sleep.  frequent awakening  ASSOCIATED ISSUES: situational anxiety related to family issues  chronic pain involving the both knees is interfering with sleep  DENIES: depression and ETOH overuse  TREATMENT TO  DATE:   benzodiazepines and Gabapentin ,effective      He has h/o bilateral TM perforation and was following Dr Andrea Sin and s/p TM reconstruction. Recently his ear pain and deafness are getting worst.  Review of Systems   Constitutional: Negative for chills, fever and malaise/fatigue. HENT: Negative for congestion, ear pain, sore throat and tinnitus. Eyes: Negative for blurred vision, double vision, pain and discharge. Respiratory: Negative for cough, shortness of breath and wheezing.     Cardiovascular: Negative for chest pain, palpitations and leg swelling. Gastrointestinal: Negative for abdominal pain, blood in stool, constipation, diarrhea, nausea and vomiting. Genitourinary: Negative for dysuria, frequency, hematuria and urgency. Musculoskeletal: Positive for back pain. Negative for joint pain and myalgias. Skin: Negative for rash. Neurological: Negative for dizziness, tremors, seizures and headaches. Burning pain in both feet   Endo/Heme/Allergies: Negative for polydipsia. Does not bruise/bleed easily. Psychiatric/Behavioral: Negative for depression and substance abuse. The patient has insomnia. The patient is not nervous/anxious. Physical Exam  Vitals signs and nursing note reviewed. Constitutional:       Appearance: He is well-developed. He is not diaphoretic. HENT:      Head: Normocephalic and atraumatic. Right Ear: External ear normal.      Mouth/Throat:      Pharynx: No oropharyngeal exudate. Eyes:      General: No scleral icterus. Conjunctiva/sclera: Conjunctivae normal.      Pupils: Pupils are equal, round, and reactive to light. Neck:      Musculoskeletal: Normal range of motion and neck supple. Thyroid: No thyromegaly. Vascular: No JVD. Cardiovascular:      Rate and Rhythm: Normal rate and regular rhythm. Pulses:           Dorsalis pedis pulses are 2+ on the right side and 2+ on the left side. Posterior tibial pulses are 2+ on the right side and 2+ on the left side. Heart sounds: Normal heart sounds. No murmur. Pulmonary:      Effort: Pulmonary effort is normal.      Breath sounds: Normal breath sounds. No wheezing. Abdominal:      General: Bowel sounds are normal. There is no distension. Palpations: Abdomen is soft. There is no mass. Musculoskeletal: Normal range of motion. General: No tenderness. Feet:      Right foot:      Protective Sensation: 10 sites tested. 8 sites sensed. Skin integrity: Callus present. Toenail Condition: Right toenails are normal.      Left foot:      Protective Sensation: 10 sites tested. 9 sites sensed. Skin integrity: Callus present. Toenail Condition: Left toenails are normal.      Comments: Decreased sensation on both toes, more on plantar aspect  Lymphadenopathy:      Cervical: No cervical adenopathy. Skin:     General: Skin is warm and dry. Findings: No rash. Neurological:      Mental Status: He is alert and oriented to person, place, and time. Cranial Nerves: No cranial nerve deficit. Deep Tendon Reflexes: Reflexes are normal and symmetric. Reflexes normal.         ASSESSMENT and PLAN  Diagnoses and all orders for this visit:    1. Type 2 diabetes mellitus with diabetic neuropathy, without long-term current use of insulin ([facility] [address]  Assessment & Plan:  Well Controlled, based on history, physical exam and review of pertinent labs, studies and medications; meds reconciled; continue current treatment plan, lifestyle modifications recommended, medication compliance emphasized. Orders:  -      DIABETES FOOT EXAM  -     METABOLIC PANEL, COMPREHENSIVE  -     MICROALBUMIN, UR, RAND W/ MICROALB/CREAT RATIO  -     HEMOGLOBIN A1C WITH EAG    2. Type 2 diabetes with nephropathy [facility])  Assessment & Plan:  Well Controlled, based on history, physical exam and review of pertinent labs, studies and medications; meds reconciled; continue current treatment plan, lifestyle modifications recommended, medication compliance emphasized. Orders:  -      DIABETES FOOT EXAM  -     METABOLIC PANEL, COMPREHENSIVE  -     MICROALBUMIN, UR, RAND W/ MICROALB/CREAT RATIO  -     HEMOGLOBIN A1C WITH EAG    3. Essential hypertension, benign  -     METABOLIC PANEL, COMPREHENSIVE    4. Mixed dyslipidemia  -     METABOLIC PANEL, COMPREHENSIVE  -     LIPID PANEL    5. History of non anemic vitamin B12 deficiency  -     VITAMIN B12    6.  Stage 3a chronic kidney disease  -     METABOLIC PANEL, COMPREHENSIVE  -     MICROALBUMIN, UR, RAND W/ MICROALB/CREAT RATIO    7. Vitamin D deficiency  -     VITAMIN D, 25 HYDROXY    8. Primary insomnia  Worsening. Recommended to resume Gabapentin along with Ambien    9. Perforation of both tympanic membranes  -     REFERRAL TO ENT-OTOLARYNGOLOGY    Discussed lifestyle issues and health guidance given  Patient was given an after visit summary which includes diagnoses, vital signs, current medications, instructions and references & authorized prescriptions . Results of labs will be conveyed to patient, once available. Pt verbalized instructions I provided and expressed understanding of discussion that was held today. Follow-up and Dispositions    · Return in about 4 months (around 6/15/2021) for follow up, fasting.

## 2021-02-15 NOTE — TELEPHONE ENCOUNTER
MD Dee Handing sending fax stating signature, diagnosis code, refills, and date not filled out on fax? Added diagnosis code. Please review. Opal Ravi    Previous Refill Encounter(s): 2/8/21 100 + 5    Requested Prescriptions     Pending Prescriptions Disp Refills    glucose blood VI test strips (Accu-Chek Guide test strips) strip 100 Strip 5     Sig: Use one strip to check glucose once daily.   Dx: E11.4

## 2021-02-15 NOTE — PROGRESS NOTES
Chief Complaint   Patient presents with    Diabetes     4 months follow up    Hypertension     4 months follow up       1. Have you been to the ER, urgent care clinic since your last visit? Hospitalized since your last visit? No    2. Have you seen or consulted any other health care providers outside of the [address] [place] [address] since your last visit? Include any pap smears or colon screening. No    Have you had the covid vaccine. Yes. . when 2/2/21    3 most recent PHQ Screens 2/15/2021   Little interest or pleasure in doing things Not at all   Feeling down, depressed, irritable, or hopeless Not at all   Total Score PHQ 2 0       Health Maintenance Due   Topic Date Due    Shingrix Vaccine Age 50> (2 of 2) 12/18/2020    Foot Exam Q1  03/10/2021

## 2021-02-15 NOTE — PATIENT INSTRUCTIONS
Diabetes Foot Health: Care Instructions Your Care Instructions When you have diabetes, your feet need extra care and attention. Diabetes can damage the nerve endings and blood vessels in your feet, making you less likely to notice when your feet are injured. Diabetes also limits your body's ability to fight infection and get blood to areas that need it. If you get a minor foot injury, it could become an ulcer or a serious infection. With good foot care, you can prevent most of these problems. Caring for your feet can be quick and easy. Most of the care can be done when you are bathing or getting ready for bed. Follow-up care is a key part of your treatment and safety. Be sure to make and go to all appointments, and call your doctor if you are having problems. It's also a good idea to know your test results and keep a list of the medicines you take. How can you care for yourself at home? · Keep your blood sugar close to normal by watching what and how much you eat, monitoring blood sugar, taking medicines if prescribed, and getting regular exercise. · Do not smoke. Smoking affects blood flow and can make foot problems worse. If you need help quitting, talk to your doctor about stop-smoking programs and medicines. These can increase your chances of quitting for good. · Eat a diet that is low in fats. High fat intake can cause fat to build up in your blood vessels and decrease blood flow. · Inspect your feet daily for blisters, cuts, cracks, or sores. If you cannot see well, use a mirror or have someone help you. · Take care of your feet: 
? Wash your feet every day. Use warm (not hot) water. Check the water temperature with your wrists or other part of your body, not your feet. ? Dry your feet well. Pat them dry. Do not rub the skin on your feet too hard. Dry well between your toes. If the skin on your feet stays moist, bacteria or a fungus can grow, which can lead to infection. ? Keep your skin soft. Use moisturizing skin cream to keep the skin on your feet soft and prevent calluses and cracks. But do not put the cream between your toes, and stop using any cream that causes a rash. ? Clean underneath your toenails carefully. Do not use a sharp object to clean underneath your toenails. Use the blunt end of a nail file or other rounded tool. ? Trim and file your toenails straight across to prevent ingrown toenails. Use a nail clipper, not scissors. Use an emery board to smooth the edges. · Change socks daily. Socks without seams are best, because seams often rub the feet. You can find socks for people with diabetes from specialty catalogs. · Look inside your shoes every day for things like gravel or torn linings, which could cause blisters or sores. · Buy shoes that fit well: 
? Look for shoes that have plenty of space around the toes. This helps prevent bunions and blisters. ? Try on shoes while wearing the kind of socks you will usually wear with the shoes. ? Avoid plastic shoes. They may rub your feet and cause blisters. Good shoes should be made of materials that are flexible and breathable, such as leather or cloth. ? Break in new shoes slowly by wearing them for no more than an hour a day for several days. Take extra time to check your feet for red areas, blisters, or other problems after you wear new shoes. · Do not go barefoot. Do not wear sandals, and do not wear shoes with very thin soles. Thin soles are easy to puncture. They also do not protect your feet from hot pavement or cold weather. · Have your doctor check your feet during each visit. If you have a foot problem, see your doctor. Do not try to treat an early foot problem at home. Home remedies or treatments that you can buy without a prescription (such as corn removers) can be harmful. · Always get early treatment for foot problems. A minor irritation can lead to a major problem if not properly cared for early. When should you call for help? Call your doctor now or seek immediate medical care if: 
  · You have a foot sore, an ulcer or break in the skin that is not healing after 4 days, bleeding corns or calluses, or an ingrown toenail.  
  · You have blue or black areas, which can mean bruising or blood flow problems.  
  · You have peeling skin or tiny blisters between your toes or cracking or oozing of the skin.  
  · You have a fever for more than 24 hours and a foot sore.  
  · You have new numbness or tingling in your feet that does not go away after you move your feet or change positions.  
  · You have unexplained or unusual swelling of the foot or ankle. Watch closely for changes in your health, and be sure to contact your doctor if: 
  · You cannot do proper foot care. Where can you learn more? Go to http://www.gray.com/ Enter A739 in the search box to learn more about \"Diabetes Foot Health: Care Instructions. \" Current as of: December 20, 2019               Content Version: 12.6 © 5353-7154 [employer]wise[employer]. Care instructions adapted under license by [employer] (which disclaims liability or warranty for this information). If you have questions about a medical condition or this instruction, always ask your healthcare professional. [address] any warranty or liability for your use of this information.

## 2021-02-16 RX ORDER — BLOOD SUGAR DIAGNOSTIC
STRIP MISCELLANEOUS
Qty: 100 STRIP | Refills: 5 | Status: SHIPPED | OUTPATIENT
Start: 2021-02-16 | End: 2022-05-11

## 2021-02-16 NOTE — TELEPHONE ENCOUNTER
Chief Complaint   Patient presents with    Medication Refill     ACCU CHEK GUIDE TEST STRIPS     Received fax from [address] the  script has the incorrect coding E11.4.   Reviewed chart E11.40 and E11.21 is noted in problem list.  Alexei Hernandez LPN

## 2021-03-04 DIAGNOSIS — F51.01 PRIMARY INSOMNIA: ICD-10-CM

## 2021-03-04 RX ORDER — ZOLPIDEM TARTRATE 5 MG/1
TABLET ORAL
Qty: 30 TAB | Refills: 0 | Status: SHIPPED | OUTPATIENT
Start: 2021-03-04 | End: 2021-05-23

## 2021-03-09 ENCOUNTER — TELEPHONE (OUTPATIENT)
Dept: CARDIOLOGY CLINIC | Age: 71
End: 2021-03-09

## 2021-03-09 NOTE — TELEPHONE ENCOUNTER
Verified patient with two types of identifiers. Faxed cath report to [phone] per patient request. Received confirmation.

## 2021-03-09 NOTE — TELEPHONE ENCOUNTER
Patient stated that he is supposed to have an MRI and they are requesting information on the patient's stents prior to scheduling. Please advise.     Phone #: [phone]  Thanks

## 2021-03-13 LAB
25(OH)D3+25(OH)D2 SERPL-MCNC: 28.4 NG/ML (ref 30–100)
ALBUMIN SERPL-MCNC: 4 G/DL (ref 3.7–4.7)
ALBUMIN/CREAT UR: 5 MG/G CREAT (ref 0–29)
ALBUMIN/GLOB SERPL: 1.6 {RATIO} (ref 1.2–2.2)
ALP SERPL-CCNC: 45 IU/L (ref 39–117)
ALT SERPL-CCNC: 23 IU/L (ref 0–44)
AST SERPL-CCNC: 24 IU/L (ref 0–40)
BILIRUB SERPL-MCNC: 0.6 MG/DL (ref 0–1.2)
BUN SERPL-MCNC: 18 MG/DL (ref 8–27)
BUN/CREAT SERPL: 13 (ref 10–24)
CALCIUM SERPL-MCNC: 9.6 MG/DL (ref 8.6–10.2)
CHLORIDE SERPL-SCNC: 107 MMOL/L (ref 96–106)
CHOLEST SERPL-MCNC: 114 MG/DL (ref 100–199)
CO2 SERPL-SCNC: 21 MMOL/L (ref 20–29)
CREAT SERPL-MCNC: 1.41 MG/DL (ref 0.76–1.27)
CREAT UR-MCNC: 228.6 MG/DL
EST. AVERAGE GLUCOSE BLD GHB EST-MCNC: 140 MG/DL
GLOBULIN SER CALC-MCNC: 2.5 G/DL (ref 1.5–4.5)
GLUCOSE SERPL-MCNC: 110 MG/DL (ref 65–99)
HBA1C MFR BLD: 6.5 % (ref 4.8–5.6)
HDLC SERPL-MCNC: 34 MG/DL
IMP & REVIEW OF LAB RESULTS: NORMAL
INTERPRETATION: NORMAL
LDLC SERPL CALC-MCNC: 60 MG/DL (ref 0–99)
MICROALBUMIN UR-MCNC: 12.2 UG/ML
PDF IMAGE, 910387: NORMAL
POTASSIUM SERPL-SCNC: 4.8 MMOL/L (ref 3.5–5.2)
PROT SERPL-MCNC: 6.5 G/DL (ref 6–8.5)
SODIUM SERPL-SCNC: 141 MMOL/L (ref 134–144)
TRIGL SERPL-MCNC: 104 MG/DL (ref 0–149)
VIT B12 SERPL-MCNC: 471 PG/ML (ref 232–1245)
VLDLC SERPL CALC-MCNC: 20 MG/DL (ref 5–40)

## 2021-03-15 NOTE — PROGRESS NOTES
Called patient. Two patient identifiers verified. Discussed lab results. He Verbalized understanding.

## 2021-03-15 NOTE — PROGRESS NOTES
Please inform patient ,  Kidney function is abnormal, but stable as per previous results. Liver function,urine protein, cholesterol results re normal and average sugar is stable at 6.5. Vitamin D has improved from last result, but to continue on high dose. Vitamin B12 is normal.no change in current medicines.   thanks

## 2021-04-02 RX ORDER — TAMSULOSIN HYDROCHLORIDE 0.4 MG/1
CAPSULE ORAL
Qty: 90 CAP | Refills: 0 | Status: SHIPPED | OUTPATIENT
Start: 2021-04-02 | End: 2021-07-01

## 2021-04-15 ENCOUNTER — TELEPHONE (OUTPATIENT)
Dept: FAMILY MEDICINE CLINIC | Age: 71
End: 2021-04-15

## 2021-04-15 DIAGNOSIS — E11.40 TYPE 2 DIABETES MELLITUS WITH DIABETIC NEUROPATHY, WITHOUT LONG-TERM CURRENT USE OF INSULIN (HCC): ICD-10-CM

## 2021-04-15 RX ORDER — GABAPENTIN 300 MG/1
300 CAPSULE ORAL
Qty: 30 CAP | Refills: 2 | Status: SHIPPED | OUTPATIENT
Start: 2021-04-15 | End: 2021-10-07 | Stop reason: SINTOL

## 2021-04-15 NOTE — TELEPHONE ENCOUNTER
MD Dillon Townsend,    Patient call requesting rx for gabapentin. This is not on his current med list.  Discontinued 2/15/21 with therapy completed? Please advise.

## 2021-05-11 DIAGNOSIS — E78.2 MIXED DYSLIPIDEMIA: ICD-10-CM

## 2021-05-11 RX ORDER — FENOFIBRATE 160 MG/1
TABLET ORAL
Qty: 90 TAB | Refills: 0 | Status: SHIPPED | OUTPATIENT
Start: 2021-05-11 | End: 2021-08-10

## 2021-05-15 DIAGNOSIS — E55.9 VITAMIN D DEFICIENCY: ICD-10-CM

## 2021-05-16 RX ORDER — ERGOCALCIFEROL 1.25 MG/1
CAPSULE ORAL
Qty: 12 CAP | Refills: 0 | Status: SHIPPED | OUTPATIENT
Start: 2021-05-16 | End: 2022-03-29 | Stop reason: SDUPTHER

## 2021-05-22 DIAGNOSIS — F51.01 PRIMARY INSOMNIA: ICD-10-CM

## 2021-05-22 DIAGNOSIS — E11.9 TYPE 2 DIABETES MELLITUS WITHOUT COMPLICATION, WITHOUT LONG-TERM CURRENT USE OF INSULIN (HCC): ICD-10-CM

## 2021-05-23 RX ORDER — METFORMIN HYDROCHLORIDE 850 MG/1
TABLET ORAL
Qty: 180 TABLET | Refills: 0 | Status: SHIPPED | OUTPATIENT
Start: 2021-05-23 | End: 2021-08-20

## 2021-05-23 RX ORDER — ZOLPIDEM TARTRATE 5 MG/1
TABLET ORAL
Qty: 30 TABLET | Refills: 0 | Status: SHIPPED | OUTPATIENT
Start: 2021-05-23 | End: 2021-06-21 | Stop reason: SDUPTHER

## 2021-06-21 ENCOUNTER — OFFICE VISIT (OUTPATIENT)
Dept: FAMILY MEDICINE CLINIC | Age: 71
End: 2021-06-21
Payer: MEDICARE

## 2021-06-21 VITALS
TEMPERATURE: 98 F | HEART RATE: 71 BPM | OXYGEN SATURATION: 98 % | SYSTOLIC BLOOD PRESSURE: 128 MMHG | HEIGHT: 70 IN | RESPIRATION RATE: 14 BRPM | BODY MASS INDEX: 27.14 KG/M2 | DIASTOLIC BLOOD PRESSURE: 73 MMHG | WEIGHT: 189.6 LBS

## 2021-06-21 DIAGNOSIS — E78.2 MIXED DYSLIPIDEMIA: Primary | ICD-10-CM

## 2021-06-21 DIAGNOSIS — F51.01 PRIMARY INSOMNIA: ICD-10-CM

## 2021-06-21 DIAGNOSIS — J32.4 CHRONIC PANSINUSITIS: ICD-10-CM

## 2021-06-21 DIAGNOSIS — E11.21 TYPE 2 DIABETES WITH NEPHROPATHY (HCC): ICD-10-CM

## 2021-06-21 DIAGNOSIS — Z95.5 S/P CORONARY ARTERY STENT PLACEMENT: ICD-10-CM

## 2021-06-21 DIAGNOSIS — N18.31 STAGE 3A CHRONIC KIDNEY DISEASE (HCC): ICD-10-CM

## 2021-06-21 DIAGNOSIS — I10 ESSENTIAL HYPERTENSION, BENIGN: ICD-10-CM

## 2021-06-21 DIAGNOSIS — I25.10 ATHEROSCLEROSIS OF NATIVE CORONARY ARTERY OF NATIVE HEART WITHOUT ANGINA PECTORIS: ICD-10-CM

## 2021-06-21 DIAGNOSIS — D50.8 IRON DEFICIENCY ANEMIA SECONDARY TO INADEQUATE DIETARY IRON INTAKE: ICD-10-CM

## 2021-06-21 DIAGNOSIS — E11.40 TYPE 2 DIABETES MELLITUS WITH DIABETIC NEUROPATHY, WITHOUT LONG-TERM CURRENT USE OF INSULIN (HCC): ICD-10-CM

## 2021-06-21 PROCEDURE — G0463 HOSPITAL OUTPT CLINIC VISIT: HCPCS | Performed by: FAMILY MEDICINE

## 2021-06-21 PROCEDURE — G8427 DOCREV CUR MEDS BY ELIG CLIN: HCPCS | Performed by: FAMILY MEDICINE

## 2021-06-21 PROCEDURE — 3044F HG A1C LEVEL LT 7.0%: CPT | Performed by: FAMILY MEDICINE

## 2021-06-21 PROCEDURE — G8754 DIAS BP LESS 90: HCPCS | Performed by: FAMILY MEDICINE

## 2021-06-21 PROCEDURE — G8536 NO DOC ELDER MAL SCRN: HCPCS | Performed by: FAMILY MEDICINE

## 2021-06-21 PROCEDURE — G8419 CALC BMI OUT NRM PARAM NOF/U: HCPCS | Performed by: FAMILY MEDICINE

## 2021-06-21 PROCEDURE — G8510 SCR DEP NEG, NO PLAN REQD: HCPCS | Performed by: FAMILY MEDICINE

## 2021-06-21 PROCEDURE — 1101F PT FALLS ASSESS-DOCD LE1/YR: CPT | Performed by: FAMILY MEDICINE

## 2021-06-21 PROCEDURE — 99215 OFFICE O/P EST HI 40 MIN: CPT | Performed by: FAMILY MEDICINE

## 2021-06-21 PROCEDURE — G8752 SYS BP LESS 140: HCPCS | Performed by: FAMILY MEDICINE

## 2021-06-21 PROCEDURE — 2022F DILAT RTA XM EVC RTNOPTHY: CPT | Performed by: FAMILY MEDICINE

## 2021-06-21 PROCEDURE — 3017F COLORECTAL CA SCREEN DOC REV: CPT | Performed by: FAMILY MEDICINE

## 2021-06-21 RX ORDER — LOSARTAN POTASSIUM 25 MG/1
25 TABLET ORAL DAILY
Qty: 90 TABLET | Refills: 0 | Status: SHIPPED | OUTPATIENT
Start: 2021-06-21 | End: 2021-09-20

## 2021-06-21 RX ORDER — DOXYCYCLINE 100 MG/1
100 CAPSULE ORAL 2 TIMES DAILY
Qty: 20 CAPSULE | Refills: 0 | Status: SHIPPED | OUTPATIENT
Start: 2021-06-21 | End: 2021-10-07 | Stop reason: ALTCHOICE

## 2021-06-21 RX ORDER — TRAMADOL HYDROCHLORIDE 50 MG/1
TABLET ORAL
COMMUNITY
Start: 2021-05-03 | End: 2021-06-21 | Stop reason: ALTCHOICE

## 2021-06-21 RX ORDER — ZOLPIDEM TARTRATE 5 MG/1
TABLET ORAL
Qty: 30 TABLET | Refills: 0 | Status: SHIPPED | OUTPATIENT
Start: 2021-06-21 | End: 2021-09-29

## 2021-06-21 RX ORDER — METOPROLOL SUCCINATE 25 MG/1
25 TABLET, EXTENDED RELEASE ORAL DAILY
Qty: 90 TABLET | Refills: 0 | Status: SHIPPED | OUTPATIENT
Start: 2021-06-21 | End: 2021-09-20

## 2021-06-21 NOTE — PATIENT INSTRUCTIONS
Orthostatic Hypotension: Care Instructions Your Care Instructions Orthostatic hypotension is a quick drop in blood pressure. It happens when you get up from sitting or lying down. You may feel faint, lightheaded, or dizzy. When a person sits up or stands up, the body changes the way it pumps blood. This can slow the flow of blood to the brain for a very short time. And that can make you feel lightheaded. Many medicines can cause this problem, especially in older people. Lack of fluids (dehydration) or illnesses such as diabetes or heart disease also can cause it. Follow-up care is a key part of your treatment and safety. Be sure to make and go to all appointments, and call your doctor if you are having problems. It's also a good idea to know your test results and keep a list of the medicines you take. How can you care for yourself at home? · Be safe with medicines. Call your doctor if you think you are having a problem with your medicine. You will get more details on the specific medicines your doctor prescribes. · If you feel dizzy or lightheaded, sit down or lie down for a few minutes. Or you can sit down and put your head between your knees. This will help your blood pressure go back to normal and help your symptoms go away. · Follow your doctor's suggestions for ways to prevent symptoms like dizziness. These suggestions may include: ? Get up slowly from bed or after sitting for a long time. If you are in bed, roll to your side and swing your legs over the edge of the bed and onto the floor. Push your body up to a sitting position. Wait for a while before you slowly stand up. 
? Add more salt to your diet, if your doctor recommends it. ? Drink plenty of fluids. Choose water and other caffeine-free clear liquids. If you have kidney, heart, or liver disease and have to limit fluids, talk with your doctor before you increase the amount of fluids you drink. ?  Avoid or limit alcohol to 2 drinks a day for men and 1 drink a day for women. Alcohol may interfere with your medicine. In addition, alcohol can make your low blood pressure worse by causing your body to lose water. ? Avoid or limit caffeine. Caffeine can cause your body to lose water. ? Wear compression stockings to help improve blood flow. When should you call for help? Call 911 anytime you think you may need emergency care. For example, call if: 
  · You passed out (lost consciousness). Watch closely for changes in your health, and be sure to contact your doctor if: 
  · You do not get better as expected. Where can you learn more? Go to http://www.gray.com/ Enter K159 in the search box to learn more about \"Orthostatic Hypotension: Care Instructions. \" Current as of: August 31, 2020               Content Version: 12.8 © 7000-9938 Healthwise, Incorporated. Care instructions adapted under license by [employer] (which disclaims liability or warranty for this information). If you have questions about a medical condition or this instruction, always ask your healthcare professional. [place] [address] any warranty or liability for your use of this information.

## 2021-06-21 NOTE — PROGRESS NOTES
Chief Complaint   Patient presents with    Hypertension     3 months follow up    Diabetes       1. Have you been to the ER, urgent care clinic since your last visit? Hospitalized since your last visit? No    2. Have you seen or consulted any other health care providers outside of the [address] [place] [address] since your last visit? Include any pap smears or colon screening.  No        3 most recent PHQ Screens 6/21/2021   Little interest or pleasure in doing things Not at all   Feeling down, depressed, irritable, or hopeless Not at all   Total Score PHQ 2 0       Health Maintenance Due   Topic Date Due    Shingrix Vaccine Age 49> (2 of 2) 12/18/2020    COVID-19 Vaccine (2 - Moderna 2-dose series) 03/02/2021

## 2021-06-21 NOTE — Clinical Note
Patient having more frequent dizzy spells. I have decreased metoprolol dose from 50 to 25 mg and added losartan 25 mg.   Thanks

## 2021-06-21 NOTE — PROGRESS NOTES
HISTORY OF PRESENT ILLNESS  Mary Webster is a 66 y.o. male. Patient was seen today for follow-up on chronic medical conditions including diabetes, dyslipidemia, chronic kidney disease, coronary artery disease, chronic sinusitis and also concern about recent feeling of lightheadedness that led to one fall last month. HPI     Cardiovascular Review  The patient has hypertension, hyperlipidemia, coronary artery disease and status post coronary artery stenting.  He reports taking medications as instructed, no medication side effects noted, home BP monitoring in range of 314'T systolic over 72'Y diastolic, no chest pain on exertion, no dyspnea on exertion, no swelling of ankles, no orthostatic dizziness or lightheadedness, no orthopnea or paroxysmal nocturnal dyspnea, no palpitations, no muscle aches or pain.  Diet and Lifestyle: generally follows a low fat low cholesterol diet, generally follows a low sodium diet, exercises sporadically, chews tobacco.  Lab review: labs reviewed and discussed with patient.  He follows Dr Alon Olmos   Cardiac History:   PCI 7- prox 75% LAD, D1 50% EF 60%; NAN 3 x 15 mm Xience to LAD  Stress echo 5-28-13=  Walked 3 minutes, Exercise stress echo is normal.    Medicines:  mg,  Metoprolol XL 50 mg, Lofibra 160 mg,and Lipitor 10 mg  Last EKG with cardiology on 01/04/2021 normal ( except bradycardia)        Endocrine Review  He is seen for diabetes.  Since last visit he reports: no polyuria or polydipsia, no significant changes.  Home glucose monitoring: is performed regularly, fasting values range 100-120  He is checking his sugars one per day.  He reports medication compliance: compliant all of the time.  Medication side effects: none.  Diabetic diet compliance: compliant most of the time.  Lab review: labs reviewed and discussed with patient  On Metformin 850 mg bid. ( which he takes half bid).   He stopped his gabapentin due to concern about dry mouth and dizzy spells. CKD:  Stage of Chronic Kidney Disease III exhibited by:     Lab Results   Component Value Date/Time    Sodium 141 03/11/2021 08:26 AM    Potassium 4.8 03/11/2021 08:26 AM    Chloride 107 (H) 03/11/2021 08:26 AM    CO2 21 03/11/2021 08:26 AM    Anion gap 4 (L) 08/02/2017 05:36 AM    Glucose 110 (H) 03/11/2021 08:26 AM    BUN 18 03/11/2021 08:26 AM    Creatinine 1.41 (H) 03/11/2021 08:26 AM    BUN/Creatinine ratio 13 03/11/2021 08:26 AM    GFR est AA 58 (L) 03/11/2021 08:26 AM    GFR est non-AA 50 (L) 03/11/2021 08:26 AM    Calcium 9.6 03/11/2021 08:26 AM     Dizziness  Patient complains of faintness/lightheadedness. The symptoms started a few days ago and are unchanged. The attacks occur every a few days and last a fewminutes. Positions that worsen symptoms: standing up. Previous workup/treatments: none. Associated ear symptoms: none. Associated CNS symptoms: none. Recent infections: sinus infection. Head trauma: denied. Drug ingestion: none Noise exposure: Patient has bilateral TM perforation. Family history: non-contributory    Vitals:    06/21/21 1436 06/21/21 1504 06/21/21 1506 06/21/21 1507   BP: 128/73      BP 2:  143/76 127/71 124/69   BP 1 Location:  Left upper arm Left upper arm Left upper arm   BP Patient Position: Sitting Sitting Lying Standing   BP Cuff Size:  Adult long Adult long Adult long   Pulse: 71      Pulse 2:  68 64 73   Temp: 98 °F (36.7 °C)      TempSrc: Temporal      Resp: 14      Height: 5' 10\" (1.778 m)      Weight: 189 lb 9.6 oz (86 kg)      SpO2: 98%          Patient has history of chronic sinusitis and has followed with several ENT specialist.  At present, he is having worsening of sinus congestion, sinus pressure, postnasal drainage, headaches and nasal drainage. Review of Systems   Constitutional: Negative for chills, fever and malaise/fatigue. HENT: Positive for congestion and sinus pain. Negative for ear pain, sore throat and tinnitus.     Eyes: Negative for blurred vision, double vision, pain and discharge. Respiratory: Negative for cough, shortness of breath and wheezing. Cardiovascular: Negative for chest pain, palpitations and leg swelling. Gastrointestinal: Negative for abdominal pain, blood in stool, constipation, diarrhea, nausea and vomiting. Genitourinary: Negative for dysuria, frequency, hematuria and urgency. Musculoskeletal: Negative for back pain, joint pain and myalgias. Skin: Negative for rash. Neurological: Positive for dizziness and headaches. Negative for tremors and seizures. Endo/Heme/Allergies: Negative for polydipsia. Does not bruise/bleed easily. Psychiatric/Behavioral: Negative for depression and substance abuse. The patient is not nervous/anxious. Physical Exam  Vitals and nursing note reviewed. Constitutional:       Appearance: He is well-developed. He is not diaphoretic. HENT:      Head: Normocephalic and atraumatic. Right Ear: External ear normal.      Mouth/Throat:      Pharynx: No oropharyngeal exudate. Eyes:      General: No scleral icterus. Conjunctiva/sclera: Conjunctivae normal.      Pupils: Pupils are equal, round, and reactive to light. Neck:      Thyroid: No thyromegaly. Vascular: No JVD. Cardiovascular:      Rate and Rhythm: Normal rate and regular rhythm. Heart sounds: Normal heart sounds. No murmur heard. Pulmonary:      Effort: Pulmonary effort is normal.      Breath sounds: Normal breath sounds. No wheezing. Abdominal:      General: Bowel sounds are normal. There is no distension. Palpations: Abdomen is soft. There is no mass. Musculoskeletal:         General: No tenderness. Normal range of motion. Cervical back: Normal range of motion and neck supple. Lymphadenopathy:      Cervical: No cervical adenopathy. Skin:     General: Skin is warm and dry. Findings: No rash. Neurological:      Mental Status: He is alert and oriented to person, place, and time. Cranial Nerves: No cranial nerve deficit. Deep Tendon Reflexes: Reflexes are normal and symmetric. Reflexes normal.         ASSESSMENT and PLAN  Diagnoses and all orders for this visit:    1. Mixed dyslipidemia  -     LIPID PANEL; Future  -     METABOLIC PANEL, COMPREHENSIVE; Future    2. Stage 3a chronic kidney disease (HCC)  -     losartan (COZAAR) 25 mg tablet; Take 1 Tablet by mouth daily.  -     CBC WITH AUTOMATED DIFF; Future  -     METABOLIC PANEL, COMPREHENSIVE; Future    3. Type 2 diabetes mellitus with diabetic neuropathy, without long-term current use of insulin (HCC)  -     losartan (COZAAR) 25 mg tablet; Take 1 Tablet by mouth daily.  -     HEMOGLOBIN A1C WITH EAG; Future  -     METABOLIC PANEL, COMPREHENSIVE; Future    4. Type 2 diabetes with nephropathy (HCC)  -     losartan (COZAAR) 25 mg tablet; Take 1 Tablet by mouth daily.  -     HEMOGLOBIN A1C WITH EAG; Future  -     METABOLIC PANEL, COMPREHENSIVE; Future    5. Essential hypertension, benign  -     losartan (COZAAR) 25 mg tablet; Take 1 Tablet by mouth daily. -     metoprolol succinate (TOPROL-XL) 25 mg XL tablet; Take 1 Tablet by mouth daily.  -     METABOLIC PANEL, COMPREHENSIVE; Future    6. S/P coronary artery stent placement  -     losartan (COZAAR) 25 mg tablet; Take 1 Tablet by mouth daily. -     metoprolol succinate (TOPROL-XL) 25 mg XL tablet; Take 1 Tablet by mouth daily.  -     LIPID PANEL; Future    7. Atherosclerosis of native coronary artery of native heart without angina pectoris  -     losartan (COZAAR) 25 mg tablet; Take 1 Tablet by mouth daily. -     metoprolol succinate (TOPROL-XL) 25 mg XL tablet; Take 1 Tablet by mouth daily.  -     LIPID PANEL; Future    8. Iron deficiency anemia secondary to inadequate dietary iron intake  -     CBC WITH AUTOMATED DIFF; Future    9. Chronic pansinusitis  -     doxycycline (VIBRAMYCIN) 100 mg capsule; Take 1 Capsule by mouth two (2) times a day.     10. Primary insomnia  - zolpidem (AMBIEN) 5 mg tablet; TAKE 1 TABLET BY MOUTH ONCE DAILY AT BEDTIME AS NEEDED FOR SLEEP    Reviewed orthostatic vitals. Last EKG showing bradycardia. Seems his symptoms are related to bradycardia and worsening neuropathy. Also sinusitis is contributing to dizzy spells. Decrease metoprolol from 50 to 25 mg and added losartan 25 mg. Recommended to keep checking blood pressure and heart rate and update me in 2 weeks. Discussed lifestyle issues and health guidance given  Patient was given an after visit summary which includes diagnoses, vital signs, current medications, instructions and references & authorized prescriptions . Results of labs will be conveyed to patient, once available. Pt verbalized instructions I provided and expressed understanding of discussion that was held today. Follow-up and Dispositions    · Return in about 4 months (around 10/21/2021) for follow up, medicare wellness.

## 2021-06-21 NOTE — ASSESSMENT & PLAN NOTE
at goal, continue current medications pending work up below, medication adherence emphasized, lifestyle modifications recommended

## 2021-07-01 RX ORDER — TAMSULOSIN HYDROCHLORIDE 0.4 MG/1
CAPSULE ORAL
Qty: 90 CAPSULE | Refills: 0 | Status: SHIPPED | OUTPATIENT
Start: 2021-07-01 | End: 2021-09-26

## 2021-07-05 LAB
ALBUMIN SERPL-MCNC: 3.9 G/DL (ref 3.7–4.7)
ALBUMIN/GLOB SERPL: 1.6 {RATIO} (ref 1.2–2.2)
ALP SERPL-CCNC: 46 IU/L (ref 48–121)
ALT SERPL-CCNC: 21 IU/L (ref 0–44)
AST SERPL-CCNC: 23 IU/L (ref 0–40)
BASOPHILS # BLD AUTO: 0.1 X10E3/UL (ref 0–0.2)
BASOPHILS NFR BLD AUTO: 1 %
BILIRUB SERPL-MCNC: 0.6 MG/DL (ref 0–1.2)
BUN SERPL-MCNC: 19 MG/DL (ref 8–27)
BUN/CREAT SERPL: 15 (ref 10–24)
CALCIUM SERPL-MCNC: 9.5 MG/DL (ref 8.6–10.2)
CHLORIDE SERPL-SCNC: 103 MMOL/L (ref 96–106)
CHOLEST SERPL-MCNC: 96 MG/DL (ref 100–199)
CO2 SERPL-SCNC: 19 MMOL/L (ref 20–29)
CREAT SERPL-MCNC: 1.28 MG/DL (ref 0.76–1.27)
EOSINOPHIL # BLD AUTO: 0.2 X10E3/UL (ref 0–0.4)
EOSINOPHIL NFR BLD AUTO: 3 %
ERYTHROCYTE [DISTWIDTH] IN BLOOD BY AUTOMATED COUNT: 13.9 % (ref 11.6–15.4)
EST. AVERAGE GLUCOSE BLD GHB EST-MCNC: 137 MG/DL
GLOBULIN SER CALC-MCNC: 2.4 G/DL (ref 1.5–4.5)
GLUCOSE SERPL-MCNC: 121 MG/DL (ref 65–99)
HBA1C MFR BLD: 6.4 % (ref 4.8–5.6)
HCT VFR BLD AUTO: 38.5 % (ref 37.5–51)
HDLC SERPL-MCNC: 27 MG/DL
HGB BLD-MCNC: 12.5 G/DL (ref 13–17.7)
IMM GRANULOCYTES # BLD AUTO: 0 X10E3/UL (ref 0–0.1)
IMM GRANULOCYTES NFR BLD AUTO: 0 %
IMP & REVIEW OF LAB RESULTS: NORMAL
INTERPRETATION: NORMAL
LDLC SERPL CALC-MCNC: 44 MG/DL (ref 0–99)
LYMPHOCYTES # BLD AUTO: 3 X10E3/UL (ref 0.7–3.1)
LYMPHOCYTES NFR BLD AUTO: 43 %
MCH RBC QN AUTO: 28.2 PG (ref 26.6–33)
MCHC RBC AUTO-ENTMCNC: 32.5 G/DL (ref 31.5–35.7)
MCV RBC AUTO: 87 FL (ref 79–97)
MONOCYTES # BLD AUTO: 0.4 X10E3/UL (ref 0.1–0.9)
MONOCYTES NFR BLD AUTO: 6 %
NEUTROPHILS # BLD AUTO: 3.3 X10E3/UL (ref 1.4–7)
NEUTROPHILS NFR BLD AUTO: 47 %
PLATELET # BLD AUTO: 284 X10E3/UL (ref 150–450)
POTASSIUM SERPL-SCNC: 4.7 MMOL/L (ref 3.5–5.2)
PROT SERPL-MCNC: 6.3 G/DL (ref 6–8.5)
RBC # BLD AUTO: 4.44 X10E6/UL (ref 4.14–5.8)
SODIUM SERPL-SCNC: 137 MMOL/L (ref 134–144)
TRIGL SERPL-MCNC: 143 MG/DL (ref 0–149)
VLDLC SERPL CALC-MCNC: 25 MG/DL (ref 5–40)
WBC # BLD AUTO: 7.1 X10E3/UL (ref 3.4–10.8)

## 2021-07-05 NOTE — PROGRESS NOTES
Please inform patient and send a letter,CBC, blood count is normal.  Hemoglobin is also stable at 12. 5. Kidney function is abnormal but has improved from his previous results. Average sugar and cholesterol numbers are also at goal.Overall, very reassuring results and to continue on current medications. Thanks

## 2021-07-06 ENCOUNTER — TELEPHONE (OUTPATIENT)
Dept: FAMILY MEDICINE CLINIC | Age: 71
End: 2021-07-06

## 2021-07-06 NOTE — TELEPHONE ENCOUNTER
----- Message from Zaina Conrad sent at 7/6/2021 10:20 AM EDT -----  Regarding: Lili Márquez MD/telephone  Patient return call    Caller's first and last name and relationship (if not the patient):      Best contact number(s): [phone]      Whose call is being returned: Lili Márquez MD      Details to clarify the request: Patient returned a call he received from the doctor and would like a call back.        April [address] [facility]

## 2021-07-06 NOTE — TELEPHONE ENCOUNTER
Called patient. Two patient identifiers verified. Discussed lab results. He Verbalized understanding. Letter placed in the mail     Patient stated that he stopped taking the antibiotic that was prescribed for sinuitis. It has not improved and still has headache.      Please advise

## 2021-07-06 NOTE — TELEPHONE ENCOUNTER
Please let him know, he does not need to be on any antibiotic. He has symptoms from chronic sinusitis from allergies. I just gave him doxycycline as doxy also works as anti-inflammatory. He informed me that prescription of azithromycin did not work for him, that was the reason I gave him doxy. He can follow-up with his ENT specialist for his persistent symptoms.

## 2021-07-06 NOTE — TELEPHONE ENCOUNTER
Outbound call to patient. Nmae and  verified. Informed patient that he doesn't need to be on antibiotic and was given doxycyline. Recommended to go to the [facility] specialist if symptoms persists. He verbalized understanding.

## 2021-08-10 DIAGNOSIS — E78.2 MIXED DYSLIPIDEMIA: ICD-10-CM

## 2021-08-10 RX ORDER — FENOFIBRATE 160 MG/1
TABLET ORAL
Qty: 90 TABLET | Refills: 0 | Status: SHIPPED | OUTPATIENT
Start: 2021-08-10 | End: 2021-11-10

## 2021-08-16 ENCOUNTER — TRANSCRIBE ORDER (OUTPATIENT)
Dept: SCHEDULING | Age: 71
End: 2021-08-16

## 2021-08-16 DIAGNOSIS — J32.0 CHRONIC MAXILLARY SINUSITIS: Primary | ICD-10-CM

## 2021-08-17 ENCOUNTER — HOSPITAL ENCOUNTER (OUTPATIENT)
Dept: CT IMAGING | Age: 71
Discharge: HOME | End: 2021-08-17
Payer: MEDICARE

## 2021-08-17 DIAGNOSIS — J32.0 CHRONIC MAXILLARY SINUSITIS: ICD-10-CM

## 2021-08-17 PROCEDURE — 70486 CT MAXILLOFACIAL W/O DYE: CPT | Performed by: OTOLARYNGOLOGY

## 2021-08-20 DIAGNOSIS — E11.9 TYPE 2 DIABETES MELLITUS WITHOUT COMPLICATION, WITHOUT LONG-TERM CURRENT USE OF INSULIN (HCC): ICD-10-CM

## 2021-08-20 RX ORDER — METFORMIN HYDROCHLORIDE 850 MG/1
TABLET ORAL
Qty: 180 TABLET | Refills: 0 | Status: SHIPPED | OUTPATIENT
Start: 2021-08-20 | End: 2021-11-10

## 2021-09-16 ENCOUNTER — TELEPHONE (OUTPATIENT)
Dept: CARDIOLOGY CLINIC | Age: 71
End: 2021-09-16

## 2021-09-16 NOTE — TELEPHONE ENCOUNTER
Received fax from [facility] office for cardiac clearance and Asprin clearance prior to L3-5 laminectomy on 10-25-21.     Fax: [phone]

## 2021-09-17 DIAGNOSIS — N18.31 STAGE 3A CHRONIC KIDNEY DISEASE (HCC): ICD-10-CM

## 2021-09-17 DIAGNOSIS — I25.10 ATHEROSCLEROSIS OF NATIVE CORONARY ARTERY OF NATIVE HEART WITHOUT ANGINA PECTORIS: ICD-10-CM

## 2021-09-17 DIAGNOSIS — Z95.5 S/P CORONARY ARTERY STENT PLACEMENT: ICD-10-CM

## 2021-09-17 DIAGNOSIS — E11.21 TYPE 2 DIABETES WITH NEPHROPATHY (HCC): ICD-10-CM

## 2021-09-17 DIAGNOSIS — E11.40 TYPE 2 DIABETES MELLITUS WITH DIABETIC NEUROPATHY, WITHOUT LONG-TERM CURRENT USE OF INSULIN (HCC): ICD-10-CM

## 2021-09-17 DIAGNOSIS — I10 ESSENTIAL HYPERTENSION, BENIGN: ICD-10-CM

## 2021-09-20 RX ORDER — METOPROLOL SUCCINATE 25 MG/1
TABLET, EXTENDED RELEASE ORAL
Qty: 90 TABLET | Refills: 0 | Status: SHIPPED | OUTPATIENT
Start: 2021-09-20 | End: 2021-09-26

## 2021-09-20 RX ORDER — LOSARTAN POTASSIUM 25 MG/1
TABLET ORAL
Qty: 90 TABLET | Refills: 0 | Status: SHIPPED | OUTPATIENT
Start: 2021-09-20 | End: 2021-09-26

## 2021-09-20 NOTE — TELEPHONE ENCOUNTER
Patient calling to follow up on clearance needed for his surgery scheduled for 10/25/21, please advise              [phone]

## 2021-09-20 NOTE — TELEPHONE ENCOUNTER
TC to pt, ID verified.  Advised pt we would let him know about cardiac clearance once reviewed by Dr. Stacie Aceves.

## 2021-09-24 NOTE — TELEPHONE ENCOUNTER
Re: Ly Silvestre 1950   Request for pre operative/procedure cardiovascular risk assessment  The planned procedure, date and physician as available:      [facility] Dr. Nathaly Gill L3-L5 stenosis with L3-L5 laminectomy on 10/25/2021    I have no objection to the planned  surgery. Patient seems to be at a low risk for steven-operative severe adverse cardiac events. Patients on aspirin can stop it for 5-7 days prior  if deemed absolutely necessary. 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 He also has no past medical history of Complication of anesthesia.   PCI 7- prox 75% LAD, D1 50% EF 60%; NAN 3 x 15 mm Xience to LAD  Stress echo 5-28-13=  Walked 3 minutes, Exercise stress echo is normal.     Current Outpatient Medications:     losartan (COZAAR) 25 mg tablet, Take 1 tablet by mouth once daily, Disp: 90 Tablet, Rfl: 0    metoprolol succinate (TOPROL-XL) 25 mg XL tablet, Take 1 tablet by mouth once daily, Disp: 90 Tablet, Rfl: 0    metFORMIN (GLUCOPHAGE) 850 mg tablet, TAKE 1 TABLET BY MOUTH TWICE DAILY WITH MEALS, Disp: 180 Tablet, Rfl: 0    fenofibrate (LOFIBRA) 160 mg tablet, Take 1 tablet by mouth once daily, Disp: 90 Tablet, Rfl: 0    tamsulosin (FLOMAX) 0.4 mg capsule, Take 1 capsule by mouth once daily, Disp: 90 Capsule, Rfl: 0    doxycycline (VIBRAMYCIN) 100 mg capsule, Take 1 Capsule by mouth two (2) times a day., Disp: 20 Capsule, Rfl: 0    zolpidem (AMBIEN) 5 mg tablet, TAKE 1 TABLET BY MOUTH ONCE DAILY AT BEDTIME AS NEEDED FOR SLEEP, Disp: 30 Tablet, Rfl: 0    ergocalciferol (ERGOCALCIFEROL) 1,250 mcg (50,000 unit) capsule, Take 1 capsule by mouth once a week, Disp: 12 Cap, Rfl: 0    gabapentin (NEURONTIN) 300 mg capsule, Take 1 Cap by mouth nightly. Max Daily Amount: 300 mg. (Patient not taking: Reported on 6/21/2021), Disp: 30 Cap, Rfl: 2    glucose blood VI test strips (Accu-Chek Guide test strips) strip, Use one strip to check glucose once daily, Disp: 100 Strip, Rfl: 5    glucose blood VI test strips (Accu-Chek Guide test strips) strip, Use one strip to check glucose once daily. Dx: E11.4, Disp: 100 Strip, Rfl: 5    atorvastatin (LIPITOR) 10 mg tablet, Take 1 Tab by mouth nightly., Disp: 90 Tab, Rfl: 3    glucose blood VI test strips (Accu-Chek Cece Plus test strp) strip, USE ONE STRIP TO CHECK GLUCOSE ONCE DAILY, Disp: 100 Strip, Rfl: 5    fluticasone propionate (FLONASE) 50 mcg/actuation nasal spray, Use one spray in each nostril twice daily, Disp: 1 Bottle, Rfl: 0    azelastine (ASTELIN) 137 mcg (0.1 %) nasal spray, 1 [place] by Both Nostrils route two (2) times a day. Use in each nostril as directed, Disp: 1 Bottle, Rfl: 0    acyclovir (ZOVIRAX) 800 mg tablet, TAKE 1 TABLET BY MOUTH EVERY 4 HOURS WHILE AWAKE (Patient not taking: Reported on 6/21/2021), Disp: , Rfl:     nitroglycerin (NITROSTAT) 0.4 mg SL tablet, DISSOLVE ONE TABLET UNDER THE TONGUE EVERY 5 MINUTES AS NEEDED FOR CHEST PAIN. UP TO 3 DOSES (Patient not taking: Reported on 6/21/2021), Disp: 1 Bottle, Rfl: 3    aspirin delayed-release 81 mg tablet, Take 162 mg by mouth daily. , Disp: , Rfl:     acetaminophen (TYLENOL) 500 mg tablet, Take 2 Tabs by mouth every six (6) hours. , Disp: 120 Tab, Rfl: 0    esomeprazole (NEXIUM) 20 mg capsule, Take 20 mg by mouth daily as needed. , Disp: , Rfl:      Thank you  Questions?   Call our office [phone]   [facility] [address] [place] [id number] [facility], [address] [place] [address],[address], [place] [id number]       Electronically signed  Isrrael Jo MD  [facility] 9/24/2021 1:17 PM

## 2021-09-26 DIAGNOSIS — N18.31 STAGE 3A CHRONIC KIDNEY DISEASE (HCC): ICD-10-CM

## 2021-09-26 DIAGNOSIS — Z95.5 S/P CORONARY ARTERY STENT PLACEMENT: ICD-10-CM

## 2021-09-26 DIAGNOSIS — E11.40 TYPE 2 DIABETES MELLITUS WITH DIABETIC NEUROPATHY, WITHOUT LONG-TERM CURRENT USE OF INSULIN (HCC): ICD-10-CM

## 2021-09-26 DIAGNOSIS — E11.21 TYPE 2 DIABETES WITH NEPHROPATHY (HCC): ICD-10-CM

## 2021-09-26 DIAGNOSIS — I10 ESSENTIAL HYPERTENSION, BENIGN: ICD-10-CM

## 2021-09-26 DIAGNOSIS — I25.10 ATHEROSCLEROSIS OF NATIVE CORONARY ARTERY OF NATIVE HEART WITHOUT ANGINA PECTORIS: ICD-10-CM

## 2021-09-26 RX ORDER — LOSARTAN POTASSIUM 25 MG/1
TABLET ORAL
Qty: 90 TABLET | Refills: 0 | Status: SHIPPED | OUTPATIENT
Start: 2021-09-26 | End: 2022-03-10

## 2021-09-26 RX ORDER — METOPROLOL SUCCINATE 25 MG/1
TABLET, EXTENDED RELEASE ORAL
Qty: 90 TABLET | Refills: 0 | Status: SHIPPED | OUTPATIENT
Start: 2021-09-26 | End: 2022-05-11

## 2021-09-26 RX ORDER — TAMSULOSIN HYDROCHLORIDE 0.4 MG/1
CAPSULE ORAL
Qty: 90 CAPSULE | Refills: 0 | Status: SHIPPED | OUTPATIENT
Start: 2021-09-26 | End: 2022-01-12

## 2021-09-29 DIAGNOSIS — F51.01 PRIMARY INSOMNIA: ICD-10-CM

## 2021-09-29 RX ORDER — ZOLPIDEM TARTRATE 5 MG/1
TABLET ORAL
Qty: 30 TABLET | Refills: 0 | Status: SHIPPED | OUTPATIENT
Start: 2021-09-29 | End: 2021-10-31

## 2021-10-07 ENCOUNTER — OFFICE VISIT (OUTPATIENT)
Dept: FAMILY MEDICINE CLINIC | Age: 71
End: 2021-10-07
Payer: MEDICARE

## 2021-10-07 VITALS
TEMPERATURE: 97.5 F | HEART RATE: 86 BPM | SYSTOLIC BLOOD PRESSURE: 123 MMHG | BODY MASS INDEX: 27.17 KG/M2 | WEIGHT: 189.8 LBS | RESPIRATION RATE: 16 BRPM | OXYGEN SATURATION: 96 % | HEIGHT: 70 IN | DIASTOLIC BLOOD PRESSURE: 73 MMHG

## 2021-10-07 DIAGNOSIS — I10 ESSENTIAL HYPERTENSION, BENIGN: ICD-10-CM

## 2021-10-07 DIAGNOSIS — E78.2 MIXED DYSLIPIDEMIA: ICD-10-CM

## 2021-10-07 DIAGNOSIS — Z01.818 PREOP GENERAL PHYSICAL EXAM: ICD-10-CM

## 2021-10-07 DIAGNOSIS — Z12.5 SPECIAL SCREENING FOR MALIGNANT NEOPLASM OF PROSTATE: ICD-10-CM

## 2021-10-07 DIAGNOSIS — Z00.00 MEDICARE ANNUAL WELLNESS VISIT, SUBSEQUENT: ICD-10-CM

## 2021-10-07 DIAGNOSIS — E53.8 VITAMIN B12 DEFICIENCY: ICD-10-CM

## 2021-10-07 DIAGNOSIS — Z95.5 S/P CORONARY ARTERY STENT PLACEMENT: ICD-10-CM

## 2021-10-07 DIAGNOSIS — E55.9 VITAMIN D DEFICIENCY: ICD-10-CM

## 2021-10-07 DIAGNOSIS — Z71.89 ADVANCED DIRECTIVES, COUNSELING/DISCUSSION: ICD-10-CM

## 2021-10-07 DIAGNOSIS — E11.40 TYPE 2 DIABETES MELLITUS WITH DIABETIC NEUROPATHY, WITHOUT LONG-TERM CURRENT USE OF INSULIN (HCC): ICD-10-CM

## 2021-10-07 DIAGNOSIS — J32.4 CHRONIC PANSINUSITIS: ICD-10-CM

## 2021-10-07 DIAGNOSIS — Z23 ENCOUNTER FOR IMMUNIZATION: ICD-10-CM

## 2021-10-07 DIAGNOSIS — N18.31 STAGE 3A CHRONIC KIDNEY DISEASE (HCC): ICD-10-CM

## 2021-10-07 DIAGNOSIS — M54.16 LUMBAR RADICULOPATHY: ICD-10-CM

## 2021-10-07 DIAGNOSIS — E11.21 TYPE 2 DIABETES WITH NEPHROPATHY (HCC): ICD-10-CM

## 2021-10-07 DIAGNOSIS — I25.10 ATHEROSCLEROSIS OF NATIVE CORONARY ARTERY OF NATIVE HEART WITHOUT ANGINA PECTORIS: ICD-10-CM

## 2021-10-07 DIAGNOSIS — Z13.31 SCREENING FOR DEPRESSION: ICD-10-CM

## 2021-10-07 LAB
25(OH)D3 SERPL-MCNC: 47.9 NG/ML (ref 30–100)
ALBUMIN SERPL-MCNC: 3.5 G/DL (ref 3.5–5)
ALBUMIN/GLOB SERPL: 1 {RATIO} (ref 1.1–2.2)
ALP SERPL-CCNC: 40 U/L (ref 45–117)
ALT SERPL-CCNC: 28 U/L (ref 12–78)
ANION GAP SERPL CALC-SCNC: 6 MMOL/L (ref 5–15)
APPEARANCE UR: CLEAR
AST SERPL-CCNC: 22 U/L (ref 15–37)
BACTERIA URNS QL MICRO: NEGATIVE /HPF
BASOPHILS # BLD: 0.1 K/UL (ref 0–0.1)
BASOPHILS NFR BLD: 1 % (ref 0–1)
BILIRUB SERPL-MCNC: 0.6 MG/DL (ref 0.2–1)
BILIRUB UR QL: NEGATIVE
BUN SERPL-MCNC: 16 MG/DL (ref 6–20)
BUN/CREAT SERPL: 13 (ref 12–20)
CALCIUM SERPL-MCNC: 9.5 MG/DL (ref 8.5–10.1)
CHLORIDE SERPL-SCNC: 109 MMOL/L (ref 97–108)
CHOLEST SERPL-MCNC: 113 MG/DL
CO2 SERPL-SCNC: 23 MMOL/L (ref 21–32)
COLOR UR: ABNORMAL
COMMENT, HOLDF: NORMAL
CREAT SERPL-MCNC: 1.22 MG/DL (ref 0.7–1.3)
DIFFERENTIAL METHOD BLD: ABNORMAL
EOSINOPHIL # BLD: 0.3 K/UL (ref 0–0.4)
EOSINOPHIL NFR BLD: 4 % (ref 0–7)
EPITH CASTS URNS QL MICRO: ABNORMAL /LPF
ERYTHROCYTE [DISTWIDTH] IN BLOOD BY AUTOMATED COUNT: 13.7 % (ref 11.5–14.5)
EST. AVERAGE GLUCOSE BLD GHB EST-MCNC: 146 MG/DL
GLOBULIN SER CALC-MCNC: 3.4 G/DL (ref 2–4)
GLUCOSE SERPL-MCNC: 162 MG/DL (ref 65–100)
GLUCOSE UR STRIP.AUTO-MCNC: NEGATIVE MG/DL
HBA1C MFR BLD: 6.7 % (ref 4–5.6)
HCT VFR BLD AUTO: 39.6 % (ref 36.6–50.3)
HDLC SERPL-MCNC: 31 MG/DL
HDLC SERPL: 3.6 {RATIO} (ref 0–5)
HGB BLD-MCNC: 12.8 G/DL (ref 12.1–17)
HGB UR QL STRIP: ABNORMAL
HYALINE CASTS URNS QL MICRO: ABNORMAL /LPF (ref 0–5)
IMM GRANULOCYTES # BLD AUTO: 0 K/UL (ref 0–0.04)
IMM GRANULOCYTES NFR BLD AUTO: 1 % (ref 0–0.5)
KETONES UR QL STRIP.AUTO: ABNORMAL MG/DL
LDLC SERPL CALC-MCNC: 46 MG/DL (ref 0–100)
LEUKOCYTE ESTERASE UR QL STRIP.AUTO: NEGATIVE
LYMPHOCYTES # BLD: 2 K/UL (ref 0.8–3.5)
LYMPHOCYTES NFR BLD: 31 % (ref 12–49)
MCH RBC QN AUTO: 28.8 PG (ref 26–34)
MCHC RBC AUTO-ENTMCNC: 32.3 G/DL (ref 30–36.5)
MCV RBC AUTO: 89 FL (ref 80–99)
MONOCYTES # BLD: 0.4 K/UL (ref 0–1)
MONOCYTES NFR BLD: 6 % (ref 5–13)
NEUTS SEG # BLD: 3.8 K/UL (ref 1.8–8)
NEUTS SEG NFR BLD: 57 % (ref 32–75)
NITRITE UR QL STRIP.AUTO: NEGATIVE
NRBC # BLD: 0 K/UL (ref 0–0.01)
NRBC BLD-RTO: 0 PER 100 WBC
PH UR STRIP: 5 [PH] (ref 5–8)
PLATELET # BLD AUTO: 289 K/UL (ref 150–400)
PMV BLD AUTO: 10.4 FL (ref 8.9–12.9)
POTASSIUM SERPL-SCNC: 4.5 MMOL/L (ref 3.5–5.1)
PROT SERPL-MCNC: 6.9 G/DL (ref 6.4–8.2)
PROT UR STRIP-MCNC: NEGATIVE MG/DL
PSA SERPL-MCNC: 0.5 NG/ML (ref 0.01–4)
RBC # BLD AUTO: 4.45 M/UL (ref 4.1–5.7)
RBC #/AREA URNS HPF: ABNORMAL /HPF (ref 0–5)
SAMPLES BEING HELD,HOLD: NORMAL
SODIUM SERPL-SCNC: 138 MMOL/L (ref 136–145)
SP GR UR REFRACTOMETRY: 1.02 (ref 1–1.03)
TRIGL SERPL-MCNC: 180 MG/DL (ref ?–150)
TSH SERPL DL<=0.05 MIU/L-ACNC: 2.54 UIU/ML (ref 0.36–3.74)
UA: UC IF INDICATED,UAUC: ABNORMAL
UROBILINOGEN UR QL STRIP.AUTO: 0.2 EU/DL (ref 0.2–1)
VIT B12 SERPL-MCNC: 1138 PG/ML (ref 193–986)
VLDLC SERPL CALC-MCNC: 36 MG/DL
WBC # BLD AUTO: 6.5 K/UL (ref 4.1–11.1)
WBC URNS QL MICRO: ABNORMAL /HPF (ref 0–4)

## 2021-10-07 PROCEDURE — 90694 VACC AIIV4 NO PRSRV 0.5ML IM: CPT | Performed by: FAMILY MEDICINE

## 2021-10-07 PROCEDURE — 1101F PT FALLS ASSESS-DOCD LE1/YR: CPT | Performed by: FAMILY MEDICINE

## 2021-10-07 PROCEDURE — 3017F COLORECTAL CA SCREEN DOC REV: CPT | Performed by: FAMILY MEDICINE

## 2021-10-07 PROCEDURE — 99497 ADVNCD CARE PLAN 30 MIN: CPT | Performed by: FAMILY MEDICINE

## 2021-10-07 PROCEDURE — 3044F HG A1C LEVEL LT 7.0%: CPT | Performed by: FAMILY MEDICINE

## 2021-10-07 PROCEDURE — G0463 HOSPITAL OUTPT CLINIC VISIT: HCPCS | Performed by: FAMILY MEDICINE

## 2021-10-07 PROCEDURE — G8754 DIAS BP LESS 90: HCPCS | Performed by: FAMILY MEDICINE

## 2021-10-07 PROCEDURE — G0439 PPPS, SUBSEQ VISIT: HCPCS | Performed by: FAMILY MEDICINE

## 2021-10-07 PROCEDURE — G8752 SYS BP LESS 140: HCPCS | Performed by: FAMILY MEDICINE

## 2021-10-07 PROCEDURE — 99214 OFFICE O/P EST MOD 30 MIN: CPT | Performed by: FAMILY MEDICINE

## 2021-10-07 PROCEDURE — G8510 SCR DEP NEG, NO PLAN REQD: HCPCS | Performed by: FAMILY MEDICINE

## 2021-10-07 PROCEDURE — 2022F DILAT RTA XM EVC RTNOPTHY: CPT | Performed by: FAMILY MEDICINE

## 2021-10-07 PROCEDURE — G8536 NO DOC ELDER MAL SCRN: HCPCS | Performed by: FAMILY MEDICINE

## 2021-10-07 PROCEDURE — G8419 CALC BMI OUT NRM PARAM NOF/U: HCPCS | Performed by: FAMILY MEDICINE

## 2021-10-07 PROCEDURE — G8427 DOCREV CUR MEDS BY ELIG CLIN: HCPCS | Performed by: FAMILY MEDICINE

## 2021-10-07 NOTE — PROGRESS NOTES
This is the Subsequent Medicare Annual Wellness Exam, performed 12 months or more after the Initial AWV or the last Subsequent AWV    I have reviewed the patient's medical history in detail and updated the computerized patient record. We did a Medicare Wellness evaluation including a review of past, family, and social histories with attention to age/sex appropriate screening, risk assessment, preventive care and counseling. Any cognitive, fall risk, or ADL issues identified are addressed separately. A patient specific preventive care plan was provided and appropriate screening tests were ordered as specified. Assessment/Plan   Education and counseling provided:  Are appropriate based on today's review and evaluation    1. Medicare annual wellness visit, subsequent  -     URINALYSIS W/ REFLEX CULTURE; Future  2. Advanced directives, counseling/discussion  -     ADVANCE CARE PLANNING FIRST 30 MINS  3. Screening for depression  -     DEPRESSION SCREEN ANNUAL  4. Preop general physical exam  5. Stage 3a chronic kidney disease (HCC)  -     CBC WITH AUTOMATED DIFF; Future  -     METABOLIC PANEL, COMPREHENSIVE; Future  6. Type 2 diabetes mellitus with diabetic neuropathy, without long-term current use of insulin (HCC)  -     HEMOGLOBIN A1C WITH EAG; Future  -     LIPID PANEL; Future  -     METABOLIC PANEL, COMPREHENSIVE; Future  7. Type 2 diabetes with nephropathy (HCC)  -     HEMOGLOBIN A1C WITH EAG; Future  -     LIPID PANEL; Future  -     METABOLIC PANEL, COMPREHENSIVE; Future  8. Chronic pansinusitis  9. Atherosclerosis of native coronary artery of native heart without angina pectoris  -     LIPID PANEL; Future  10. Essential hypertension, benign  -     METABOLIC PANEL, COMPREHENSIVE; Future  11. Mixed dyslipidemia  -     LIPID PANEL; Future  -     METABOLIC PANEL, COMPREHENSIVE; Future  -     TSH 3RD GENERATION; Future  12. S/P coronary artery stent placement  -     LIPID PANEL; Future  13.  Vitamin B12 deficiency  -     VITAMIN B12; Future  14. Vitamin D deficiency  -     VITAMIN D, 25 HYDROXY; Future  15. Lumbar radiculopathy  16. Special screening for malignant neoplasm of prostate  -     PSA SCREENING (SCREENING); Future  17. Encounter for immunization  -     FLU (FLUAD QUAD INFLUENZA VACCINE,QUAD,ADJUVANTED)  -     ADMIN INFLUENZA VIRUS VAC       Depression Risk Factor Screening     3 most recent PHQ Screens 10/7/2021   Little interest or pleasure in doing things Not at all   Feeling down, depressed, irritable, or hopeless Not at all   Total Score PHQ 2 0   We spent 10 minutes on importance of depression screening . Depression screening is a way to see if you have depression symptoms. It may be done by a doctor or counselor. It's often part of a routine checkup. That's because your mental health is just as important as your physical health. Depression is a medical illness that affects how you feel, think, and act. You may:  · Have less energy. · Lose interest in your daily activities. · Feel sad and grouchy for a long time. Depression is very common. It affects men and women of all ages. Many things can trigger depression. Some people become depressed after they have a stroke or find out they have a major illness like cancer or heart disease. The death of a loved one, a breakup, or changes in the natural brain chemicals may lead to depression. It can run in families. Most experts believe that a combination of inherited genes and stressful life events can cause it. Alcohol Risk Screen    Do you average more than 1 drink per night or more than 7 drinks a week: No    In the past three months have you have had more than 4 drinks containing alcohol on one occasion: No        Functional Ability and Level of Safety    Hearing: The patient wears hearing aids. The patient needs further evaluation. follows ENT specialist      Activities of Daily Living:   The home contains: handrails and grab bars  Patient does total self care      Ambulation: with no difficulty     Fall Risk:  Fall Risk Assessment, last 12 mths 10/7/2021   Able to walk? Yes   Fall in past 12 months? 0   Do you feel unsteady? 0   Are you worried about falling 0      Abuse Screen:  Patient is not abused       Cognitive Screening    Has your family/caregiver stated any concerns about your memory: no     Cognitive Screening: Normal - MMSE (Mini Mental Status Exam)   28/30 missed 2/3 word recalls    Health Maintenance Due     Health Maintenance Due   Topic Date Due    Shingrix Vaccine Age 49> (2 of 2) 12/18/2020       Patient Care Team   Patient Care Team:  Madhu Villagomez MD as PCP - General (Family Medicine)  Madhu Villagomez MD as PCP - [facility] Empaneled Provider  Manuel Mendoza MD (Orthopedic Surgery)  Rachelle Jo MD (Cardiology)  Kirsty Boone MD (Urology)  Alexey Campa MD (Physical Medicine and Rehabilitation)    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 with presence of antibiotic-impregnated cement spacer Z89.529    History of non anemic vitamin B12 deficiency Z86.39    Vitamin B12 deficiency E53.8    Chronic pansinusitis J32.4    CKD (chronic kidney disease) stage 3, GFR 30-59 ml/min ([facility]) N18.30    Type 2 diabetes mellitus with diabetic neuropathy, without long-term current use of insulin ([facility] [address] E11.40    Type 2 diabetes with nephropathy ([facility]) E11.21    Iron deficiency anemia secondary to inadequate dietary iron intake D50.8     Past Medical History:   Diagnosis Date    Arthritis     KNEES & BACK.    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 HX GI  2010    COLONOSCOPY    HX GI  2010    ENDOSCOPY    HX HEENT      SEPTOPLASTY    HX HEENT      TONSILLECTOMY    HX KNEE REPLACEMENT Left 2008    DR ROSEN    HX ORTHOPAEDIC Right 2010, 2017    KNEE REPLACEMENT, SPACER 5/2017    HX ORTHOPAEDIC  2007    RIGHT SHOULDER ROTATOR CUFF REPAIR.  [place] CARDIAC SURG PROCEDURE UNLIST  2009    CATHERIZATION WITH STENT X 1 PLACEMENT     Current Outpatient Medications   Medication Sig Dispense Refill    zolpidem (AMBIEN) 5 mg tablet TAKE 1 TABLET BY MOUTH ONCE DAILY AT BEDTIME AS NEEDED FOR SLEEP 30 Tablet 0    tamsulosin (FLOMAX) 0.4 mg capsule Take 1 capsule by mouth once daily 90 Capsule 0    losartan (COZAAR) 25 mg tablet Take 1 tablet by mouth once daily 90 Tablet 0    metoprolol succinate (TOPROL-XL) 25 mg XL tablet Take 1 tablet by mouth once daily 90 Tablet 0    metFORMIN (GLUCOPHAGE) 850 mg tablet TAKE 1 TABLET BY MOUTH TWICE DAILY WITH MEALS 180 Tablet 0    fenofibrate (LOFIBRA) 160 mg tablet Take 1 tablet by mouth once daily 90 Tablet 0    ergocalciferol (ERGOCALCIFEROL) 1,250 mcg (50,000 unit) capsule Take 1 capsule by mouth once a week 12 Cap 0    glucose blood VI test strips (Accu-Chek Guide test strips) strip Use one strip to check glucose once daily 100 Strip 5    glucose blood VI test strips (Accu-Chek Guide test strips) strip Use one strip to check glucose once daily.   Dx: E11.4 100 Strip 5    atorvastatin (LIPITOR) 10 mg tablet Take 1 Tab by mouth nightly. 90 Tab 3    glucose blood VI test strips (Accu-Chek Cece Plus test strp) strip USE ONE STRIP TO CHECK GLUCOSE ONCE DAILY 100 Strip 5    fluticasone propionate (FLONASE) 50 mcg/actuation nasal spray Use one spray in each nostril twice daily 1 Bottle 0    azelastine (ASTELIN) 137 mcg (0.1 %) nasal spray 1 Lilly by Both Nostrils route two (2) times a day. Use in each nostril as directed 1 Bottle 0    aspirin delayed-release 81 mg tablet Take 162 mg by mouth daily.  acetaminophen (TYLENOL) 500 mg tablet Take 2 Tabs by mouth every six (6) hours. 120 Tab 0    esomeprazole (NEXIUM) 20 mg capsule Take 20 mg by mouth daily as needed.  acyclovir (ZOVIRAX) 800 mg tablet TAKE 1 TABLET BY MOUTH EVERY 4 HOURS WHILE AWAKE (Patient not taking: Reported on 6/21/2021)      nitroglycerin (NITROSTAT) 0.4 mg SL tablet DISSOLVE ONE TABLET UNDER THE TONGUE EVERY 5 MINUTES AS NEEDED FOR CHEST PAIN. UP TO 3 DOSES (Patient not taking: Reported on 6/21/2021) 1 Bottle 3     Allergies   Allergen Reactions    Levofloxacin Rash    Pcn [Penicillins] Rash    Adhesive Tape-Silicones Unknown (comments)     Other reaction(s): Rash  Medipore tape caused large blisters when removed.  Tape [Adhesive] Rash     Medipore tape caused large blisters when removed. Family History   Problem Relation Age of Onset    Diabetes Mother     Heart Disease Mother     Diabetes Son         TYPE 1    Alcohol abuse Son     Anesth Problems Neg Hx      Social History     Tobacco Use    Smoking status: Never Smoker    Smokeless tobacco: Former User    Tobacco comment: Patient states on and off for chewing tobacco.     Substance Use Topics    Alcohol use:  Yes     Alcohol/week: 2.0 standard drinks     Types: 2 Cans of beer per week     Comment: NOT DRINKING AT Tyrone Peters MD

## 2021-10-07 NOTE — PROGRESS NOTES
Chief Complaint   Patient presents with    Pre-op Exam     back surgery 10/25/21         1. \"Have you been to the ER, urgent care clinic since your last visit? Hospitalized since your last visit? \" No    2. \"Have you seen or consulted any other health care providers outside of the [address] [place] [address] since your last visit? \" Yes When: a month ago Where: [address] [place] [address] Reason for visit: x ray     3. For patients over 45: Has the patient had a colonoscopy?  Yes,  satisfied with blue hyperlink         3 most recent PHQ Screens 10/7/2021   Little interest or pleasure in doing things Not at all   Feeling down, depressed, irritable, or hopeless Not at all   Total Score PHQ 2 0       Health Maintenance Due   Topic Date Due    Shingrix Vaccine Age 49> (2 of 2) 12/18/2020    Medicare Yearly Exam  07/23/2021    Flu Vaccine (1) 09/01/2021

## 2021-10-07 NOTE — PROGRESS NOTES
HISTORY OF PRESENT ILLNESS  Ness Davison is a 66 y.o. male. Patient was seen for Medicare wellness visit but he also need clearance for her upcoming back surgery on October 25. Confirmed with patient and surgeon's office, do not need any paperwork and patient is also scheduled to get some labs before surgery at [facility]. He has history of chronic knee pain even after knee replacement, back pain with radiculopathy and numbness in both lower extremity and feet. Follows ENT for chronic sinusitis and bilateral hearing deficit he has chronic perforation for both TM. HPI  HPI:  Saadia Yates is 66 y.o. male (1950) who presents for preoperative evaluation. Procedure/Surgery: L3-5 laminectomy   Date of Procedure/Surgery: 10/25/21  Surgeon[facility] Dr. Amanda [facility]  Hospital/Surgical Facility: [facility]  Primary Physician: Thelma Cline MD       Reason for surgery: Lumbar radiculopathy with worsening weakness and pain    Latex Allergy: No  Anesthesia Complications: None  History of abnormal bleeding : None  History of Blood Transfusions: no  Health Care Directive or Living Will: no  Recent use of: ASA  Tetanus up to date: last tetanus booster within 10 years    Patient has nuclear stress test scheduled with cardiology on October 15 and preop lab work schedule on 17 October at [facility]. Will get all appropriate blood work to determine his risk before his surgery and also for review for cardiology to get clearance.       HPI     Cardiovascular Review  The patient has hypertension, hyperlipidemia, coronary artery disease and status post coronary artery stenting.  He reports taking medications as instructed, no medication side effects noted, home BP monitoring in range of 736'N systolic over 52'K diastolic, no chest pain on exertion, no dyspnea on exertion, no swelling of ankles, no orthostatic dizziness or lightheadedness, no orthopnea or paroxysmal nocturnal dyspnea, no palpitations, no muscle aches or pain.  Diet and Lifestyle: generally follows a low fat low cholesterol diet, generally follows a low sodium diet, exercises sporadically, chews tobacco.  Lab review: labs reviewed and discussed with patient.  He follows Dr Stacie Aceves   Cardiac History:   PCI 7- prox 75% LAD, D1 50% EF 60%; NAN 3 x 15 mm Xience to LAD  Stress echo 5-28-13=  Walked 3 minutes, Exercise stress echo is normal.    Medicines:  mg,  Metoprolol XL 25 mg, Losartan 25 mg,Lofibra 160 mg,and Lipitor 10 mg  Last EKG with cardiology on 01/04/2021 normal ( except bradycardia)  Metoprolol dose was decreased and losartan was added on last visit due to his concern of persistent lightheadedness        Endocrine Review  He is seen for diabetes.  Since last visit he reports: no polyuria or polydipsia, no significant changes.  Home glucose monitoring: is performed regularly, fasting values range 100-120  He is checking his sugars one per day.  He reports medication compliance: compliant all of the time.  Medication side effects: none.  Diabetic diet compliance: compliant most of the time.  Lab review: labs reviewed and discussed with patient  On Metformin 850 mg bid. ( which he takes half bid).   He stopped his gabapentin due to concern about dry mouth and dizzy spells.     CKD:  Stage of Chronic Kidney Disease III exhibited by:  Lab Results   Component Value Date/Time    Sodium 137 07/01/2021 09:05 AM    Potassium 4.7 07/01/2021 09:05 AM    Chloride 103 07/01/2021 09:05 AM    CO2 19 (L) 07/01/2021 09:05 AM    Anion gap 4 (L) 08/02/2017 05:36 AM    Glucose 121 (H) 07/01/2021 09:05 AM    BUN 19 07/01/2021 09:05 AM    Creatinine 1.28 (H) 07/01/2021 09:05 AM    BUN/Creatinine ratio 15 07/01/2021 09:05 AM    GFR est AA 64 07/01/2021 09:05 AM    GFR est non-AA 56 (L) 07/01/2021 09:05 AM    Calcium 9.5 07/01/2021 09:05 AM       ---------------------------------------     Prior to Admission medications    Medication Sig Start Date End Date Taking? Authorizing Provider   zolpidem (AMBIEN) 5 mg tablet TAKE 1 TABLET BY MOUTH ONCE DAILY AT BEDTIME AS NEEDED FOR SLEEP 9/29/21  Yes Shari Alvarez MD   tamsulosin [facility]) 0.4 mg capsule Take 1 capsule by mouth once daily 9/26/21  Yes Shari Alvarez MD   losartan (COZAAR) 25 mg tablet Take 1 tablet by mouth once daily 9/26/21  Yes Shari Alvarez MD   metoprolol succinate (TOPROL-XL) 25 mg XL tablet Take 1 tablet by mouth once daily 9/26/21  Yes Shari Alvarez MD   metFORMIN (GLUCOPHAGE) 850 mg tablet TAKE 1 TABLET BY MOUTH TWICE DAILY WITH MEALS 8/20/21  Yes Shari Alvarez MD   fenofibrate (LOFIBRA) 160 mg tablet Take 1 tablet by mouth once daily 8/10/21  Yes Shari Alvarez MD   ergocalciferol (ERGOCALCIFEROL) 1,250 mcg (50,000 unit) capsule Take 1 capsule by mouth once a week 5/16/21  Yes Shari Alvarez MD   glucose blood VI test strips (Accu-Chek Guide test strips) strip Use one strip to check glucose once daily 2/16/21  Yes Shari Alvarez MD   glucose blood VI test strips (Accu-Chek Guide test strips) strip Use one strip to check glucose once daily. Dx: E11.4 2/15/21  Yes Shari Alvarez MD   atorvastatin (LIPITOR) 10 mg tablet Take 1 Tab by mouth nightly. 1/4/21  Yes Robles Ferreira MD   glucose blood VI test strips (Accu-Chek Cece Plus test strp) strip USE ONE STRIP TO CHECK GLUCOSE ONCE DAILY 11/3/20  Yes Shari Alvarez MD   fluticasone propionate (FLONASE) 50 mcg/actuation nasal spray Use one spray in each nostril twice daily 11/3/20  Yes Shari Alvarez MD   azelastine (ASTELIN) 137 mcg (0.1 %) nasal spray 1 [place] by Both Nostrils route two (2) times a day. Use in each nostril as directed 11/3/20  Yes Shari Alvarez MD   aspirin delayed-release 81 mg tablet Take 162 mg by mouth daily. Yes Provider, Historical   acetaminophen (TYLENOL) 500 mg tablet Take 2 Tabs by mouth every six (6) hours.  8/2/17  Yes Ginette Croft, Fern GRACIA PA-C   esomeprazole (NEXIUM) 20 mg capsule Take 20 mg by mouth daily as needed. Yes Provider, Historical   tamsulosin (FLOMAX) 0.4 mg capsule Take 1 capsule by mouth once daily 12/11/20   Isamar Villalba MD   acyclovir (ZOVIRAX) 800 mg tablet TAKE 1 TABLET BY MOUTH EVERY 4 HOURS WHILE AWAKE  Patient not taking: Reported on 6/21/2021 6/15/20   Provider, Historical   nitroglycerin (NITROSTAT) 0.4 mg SL tablet DISSOLVE ONE TABLET UNDER THE TONGUE EVERY 5 MINUTES AS NEEDED FOR CHEST PAIN. UP TO 3 DOSES  Patient not taking: Reported on 6/21/2021 5/14/19   Ric Ashford MD          Allergies   Allergen Reactions    Levofloxacin Rash    Pcn [Penicillins] Rash    Adhesive Tape-Silicones Unknown (comments)     Other reaction(s): Rash  Medipore tape caused large blisters when removed.  Tape [Adhesive] Rash     Medipore tape caused large blisters when removed. Patient Active Problem List    Diagnosis Date Noted    Iron deficiency anemia secondary to inadequate dietary iron intake 03/10/2020    Type 2 diabetes with nephropathy ([address] 11/04/2019    Type 2 diabetes mellitus with diabetic neuropathy, without long-term current use of insulin ([address] 05/14/2019    CKD (chronic kidney disease) stage 3, GFR 30-59 ml/min ([address] 10/31/2018    Vitamin B12 deficiency 06/21/2018    Chronic pansinusitis 06/21/2018    History of non anemic vitamin B12 deficiency 02/13/2018    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 HX GI  2010    COLONOSCOPY    HX GI  2010    ENDOSCOPY    HX HEENT      SEPTOPLASTY    HX HEENT      TONSILLECTOMY    HX KNEE REPLACEMENT Left 2008    DR ROSEN    HX ORTHOPAEDIC Right 2010, 2017    KNEE REPLACEMENT, SPACER 5/2017    HX ORTHOPAEDIC  2007    RIGHT SHOULDER ROTATOR CUFF REPAIR.  [place] CARDIAC SURG PROCEDURE UNLIST  2009    CATHERIZATION WITH STENT X 1 PLACEMENT       Social History     Tobacco Use    Smoking status: Never Smoker    Smokeless tobacco: Former User    Tobacco comment: Patient states on and off for chewing tobacco.     Substance Use Topics    Alcohol use: Yes     Alcohol/week: 2.0 standard drinks     Types: 2 Cans of beer per week     Comment: NOT DRINKING AT THIS TIME         --------------------------------------    Review of Systems   Constitutional: Negative for chills, fever and malaise/fatigue. HENT: Positive for congestion, hearing loss and sinus pain. Negative for ear pain, sore throat and tinnitus. Eyes: Negative for blurred vision, double vision, pain and discharge. Respiratory: Negative for cough, shortness of breath and wheezing. Cardiovascular: Negative for chest pain, palpitations and leg swelling. Gastrointestinal: Negative for abdominal pain, blood in stool, constipation, diarrhea, nausea and vomiting. Genitourinary: Negative for dysuria, frequency, hematuria and urgency. Musculoskeletal: Positive for back pain. Negative for joint pain and myalgias. Skin: Negative for rash. Neurological: Negative for dizziness, tremors, seizures and headaches. Endo/Heme/Allergies: Negative for polydipsia. Does not bruise/bleed easily. Psychiatric/Behavioral: Negative for depression and substance abuse. The patient is not nervous/anxious. Physical Exam  Vitals and nursing note reviewed. Constitutional:       General: He is not in acute distress. Appearance: He is well-developed. He is not diaphoretic. HENT:      Head: Normocephalic and atraumatic. Right Ear: External ear normal. Decreased hearing noted. No drainage. No middle ear effusion. Tympanic membrane is perforated. Tympanic membrane is not injected. Left Ear: Decreased hearing noted. No drainage. No middle ear effusion. Tympanic membrane is perforated. Tympanic membrane is not injected. Nose: Mucosal edema present. No rhinorrhea. Right Sinus: Maxillary sinus tenderness and frontal sinus tenderness present. Left Sinus: Maxillary sinus tenderness and frontal sinus tenderness present. Mouth/Throat:      Pharynx: Uvula midline. Posterior oropharyngeal erythema present. No oropharyngeal exudate. Eyes:      General: No scleral icterus. Conjunctiva/sclera: Conjunctivae normal.      Pupils: Pupils are equal, round, and reactive to light. Neck:      Thyroid: No thyromegaly. Vascular: No JVD. Cardiovascular:      Rate and Rhythm: Normal rate and regular rhythm. Heart sounds: Normal heart sounds. No murmur heard. Pulmonary:      Effort: Pulmonary effort is normal.      Breath sounds: Normal breath sounds. No wheezing or rales. Abdominal:      General: Bowel sounds are normal. There is no distension. Palpations: Abdomen is soft. There is no mass. Musculoskeletal:         General: No tenderness. Normal range of motion. Cervical back: Normal range of motion and neck supple.    Lymphadenopathy:      Head: Right side of head: No tonsillar adenopathy. Left side of head: No tonsillar adenopathy. Cervical: No cervical adenopathy. Skin:     General: Skin is warm and dry. Findings: No rash. Neurological:      Mental Status: He is alert and oriented to person, place, and time. Cranial Nerves: No cranial nerve deficit. Deep Tendon Reflexes: Reflexes are normal and symmetric. Reflexes normal.         ASSESSMENT and PLAN  Diagnoses and all orders for this visit:    1. Medicare annual wellness visit, subsequent  -     URINALYSIS W/ REFLEX CULTURE; Future    2. Advanced directives, counseling/discussion  -     ADVANCE CARE PLANNING FIRST 30 MINS    3. Screening for depression  -     DEPRESSION SCREEN ANNUAL    4. Preop general physical exam    5. Stage 3a chronic kidney disease (HCC)  -     CBC WITH AUTOMATED DIFF; Future  -     METABOLIC PANEL, COMPREHENSIVE; Future    6. Type 2 diabetes mellitus with diabetic neuropathy, without long-term current use of insulin (HCC)  -     HEMOGLOBIN A1C WITH EAG; Future  -     LIPID PANEL; Future  -     METABOLIC PANEL, COMPREHENSIVE; Future    7. Type 2 diabetes with nephropathy (HCC)  -     HEMOGLOBIN A1C WITH EAG; Future  -     LIPID PANEL; Future  -     METABOLIC PANEL, COMPREHENSIVE; Future    8. Chronic pansinusitis    9. Atherosclerosis of native coronary artery of native heart without angina pectoris  -     LIPID PANEL; Future    10. Essential hypertension, benign  -     METABOLIC PANEL, COMPREHENSIVE; Future    11. Mixed dyslipidemia  -     LIPID PANEL; Future  -     METABOLIC PANEL, COMPREHENSIVE; Future  -     TSH 3RD GENERATION; Future    12. S/P coronary artery stent placement  -     LIPID PANEL; Future    13. Vitamin B12 deficiency  -     VITAMIN B12; Future    14. Vitamin D deficiency  -     VITAMIN D, 25 HYDROXY; Future    15. Lumbar radiculopathy    16.  Special screening for malignant neoplasm of prostate  -     PSA SCREENING (SCREENING); Future    17. Encounter for immunization  -     FLU (FLUAD QUAD INFLUENZA VACCINE,QUAD,ADJUVANTED)  -     ADMIN INFLUENZA VIRUS VAC      Discussed lifestyle issues and health guidance given  Patient was given an after visit summary which includes diagnoses, vital signs, current medications, instructions and references & authorized prescriptions . Results of labs will be conveyed to patient, once available. Pt verbalized instructions I provided and expressed understanding of discussion that was held today. Follow-up and Dispositions    · Return in about 4 months (around 2/7/2022) for follow up. Please note that this dictation was completed with [employer]ge, the [employer] voice recognition software. Quite often unanticipated grammatical, syntax, homophones, and other interpretive errors are inadvertently transcribed by the computer software. Please disregard these errors. Please excuse any errors that have escaped final proofreading. Thank you.

## 2021-10-07 NOTE — ACP (ADVANCE CARE PLANNING)
Advance Care Planning     Advance Care Planning (ACP) Physician/NP/PA Conversation      Date of Conversation: 10/7/2021  Conducted with: Patient with Decision Making Capacity    Healthcare Decision Maker:     Primary Decision Maker: Parmjit [facility] - [phone]    Secondary Decision Maker: Allegra Lopes - Daughter - [phone]  Click here to complete [address] including selection of the Healthcare Decision Maker Relationship (ie \"Primary\")        Today we documented Decision Maker(s). The patient will provide ACP documents. Care Preferences:    Hospitalization: \"If your health worsens and it becomes clear that your chance of recovery is unlikely, what would be your preference regarding hospitalization? \"  The patient would prefer hospitalization. Ventilation: \"If you were unable to breathe on your own and your chance of recovery was unlikely, what would be your preference about the use of a ventilator (breathing machine) if it was available to you? \"   The patient would desire the use of a ventilator. Resuscitation: \"In the event your heart stopped as a result of an underlying serious health condition, would you want attempts to be made to restart your heart, or would you prefer a natural death? \"   Yes, attempt to resuscitate. Additional topics discussed: treatment goals, benefit/burden of treatment options, artificial nutrition, ventilation preferences, hospitalization preferences, resuscitation preferences, end of life care preferences (vegetative state/imminent death) and hospice care    Conversation Outcomes / Follow-Up Plan:   ACP in process - information provided, considering goals and options  strongly recommended to complete directive as daughter in law would be primary, not son. recommended to document his preferences.  as per him he prefers to get every thing tried at this moment  Reviewed DNR/DNI and patient elects Full Code (Attempt Resuscitation)     Length of Voluntary ACP Conversation in minutes:  16 minutes    Diane Renner MD

## 2021-10-07 NOTE — PATIENT INSTRUCTIONS
Medicare Wellness Visit, Male    The best way to live healthy is to have a lifestyle where you eat a well-balanced diet, exercise regularly, limit alcohol use, and quit all forms of tobacco/nicotine, if applicable. Regular preventive services are another way to keep healthy. Preventive services (vaccines, screening tests, monitoring & exams) can help personalize your care plan, which helps you manage your own care. Screening tests can find health problems at the earliest stages, when they are easiest to treat. [facility] follows the current, evidence-based guidelines published by the [facility] ([facility]) when recommending preventive services for our patients. Because we follow these guidelines, sometimes recommendations change over time as research supports it. (For example, a prostate screening blood test is no longer routinely recommended for men with no symptoms). Of course, you and your doctor may decide to screen more often for some diseases, based on your risk and co-morbidities (chronic disease you are already diagnosed with). Preventive services for you include:  - Medicare offers their members a free annual wellness visit, which is time for you and your primary care provider to discuss and plan for your preventive service needs. Take advantage of this benefit every year!  -All adults over age 72 should receive the recommended pneumonia vaccines. Current USPSTF guidelines recommend a series of two vaccines for the best pneumonia protection.   -All adults should have a flu vaccine yearly and tetanus vaccine every 10 years.  -All adults age 48 and older should receive the shingles vaccines (series of two vaccines).        -All adults age 38-68 who are overweight should have a diabetes screening test once every three years.   -Other screening tests & preventive services for persons with diabetes include: an eye exam to screen for diabetic retinopathy, a kidney function test, a foot exam, and stricter control over your cholesterol.   -Cardiovascular screening for adults with routine risk involves an electrocardiogram (ECG) at intervals determined by the provider.   -Colorectal cancer screening should be done for adults age 54-65 with no increased risk factors for colorectal cancer. There are a number of acceptable methods of screening for this type of cancer. Each test has its own benefits and drawbacks. Discuss with your provider what is most appropriate for you during your annual wellness visit. The different tests include: colonoscopy (considered the best screening method), a fecal occult blood test, a fecal DNA test, and sigmoidoscopy.  -All adults born between [facility] should be screened once for Hepatitis C.  -An Abdominal Aortic Aneurysm (AAA) Screening is recommended for men age 73-68 who has ever smoked in their lifetime. Here is a list of your current Health Maintenance items (your personalized list of preventive services) with a due date:  Health Maintenance Due   Topic Date Due    Shingles Vaccine (2 of 2) 12/18/2020    Yearly Flu Vaccine (1) 09/01/2021          Learning About Living [place]  What is a living will? A living will is a legal form you use to write down the kind of care you want at the end of your life. It is used by the health professionals who will treat you if you aren't able to decide for yourself. If you put your wishes in writing, your loved ones and others will know what kind of care you want. They won't need to guess. This can ease your mind and be helpful to others. A living will is not the same as an estate or property will. An estate will explains what you want to happen with your money and property after you die. Is a living will a legal document? A living will is a legal document. Each state has its own laws about living el.  If you move to another state, make sure that your living will is legal in the state where you now live. Or you might use a universal form that has been approved by many states. This kind of form can sometimes be completed and stored online. Your electronic copy will then be available wherever you have a connection to the Internet. In most cases, doctors will respect your wishes even if you have a form from a different state. · You don't need an  to complete a living will. But legal advice can be helpful if your state's laws are unclear, your health history is complicated, or your family can't agree on what should be in your living will. · You can change your living will at any time. Some people find that their wishes about end-of-life care change as their health changes. · In addition to making a living will, think about completing a medical power of  form. This form lets you name the person you want to make end-of-life treatment decisions for you (your \"health care agent\") if you're not able to. Many hospitals and nursing homes will give you the forms you need to complete a living will and a medical power of . · Your living will is used only if you can't make or communicate decisions for yourself anymore. If you become able to make decisions again, you can accept or refuse any treatment, no matter what you wrote in your living will. · Your state may offer an online registry. This is a place where you can store your living will online so the doctors and nurses who need to treat you can find it right away. What should you think about when creating a living will? Talk about your end-of-life wishes with your family members and your doctor. Let them know what you want. That way the people making decisions for you won't be surprised by your choices. Think about these questions as you make your living will:  · Do you know enough about life support methods that might be used?  If not, talk to your doctor so you know what might be done if you can't breathe on your own, your heart stops, or you're unable to swallow. · What things would you still want to be able to do after you receive life-support methods? Would you want to be able to walk? To speak? To eat on your own? To live without the help of machines? · If you have a choice, where do you want to be cared for? In your home? At a hospital or nursing home? · Do you want certain Spiritism practices performed if you become very ill? · If you have a choice at the end of your life, where would you prefer to die? At home? In a hospital or nursing home? Somewhere else? · Would you prefer to be buried or cremated? · Do you want your organs to be donated after you die? What should you do with your living will? · Make sure that your family members and your health care agent have copies of your living will. · Give your doctor a copy of your living will to keep in your medical record. If you have more than one doctor, make sure that each one has a copy. · You may want to put a copy of your living will where it can be easily found. Where can you learn more? Go to http://www.gray.com/. Enter W766 in the search box to learn more about \"Learning About Living [place]. \"  Current as of: August 8, 2016  Content Version: 11.3  © 1988-5165 [employer]. Care instructions adapted under license by [employer] (which disclaims liability or warranty for this information). If you have questions about a medical condition or this instruction, always ask your healthcare professional. [place] [address] any warranty or liability for your use of this information. Preventing Falls: Care Instructions  Your Care Instructions    Getting around your home safely can be a challenge if you have injuries or health problems that make it easy for you to fall.  Loose rugs and furniture in walkways are among the dangers for many older people who have problems walking or who have poor eyesight. People who have conditions such as arthritis, osteoporosis, or dementia also have to be careful not to fall. You can make your home safer with a few simple measures. Follow-up care is a key part of your treatment and safety. Be sure to make and go to all appointments, and call your doctor if you are having problems. It's also a good idea to know your test results and keep a list of the medicines you take. How can you care for yourself at home? Taking care of yourself  · You may get dizzy if you do not drink enough water. To prevent dehydration, drink plenty of fluids, enough so that your urine is light yellow or clear like water. Choose water and other caffeine-free clear liquids. If you have kidney, heart, or liver disease and have to limit fluids, talk with your doctor before you increase the amount of fluids you drink. · Exercise regularly to improve your strength, muscle tone, and balance. Walk if you can. Swimming may be a good choice if you cannot walk easily. · Have your vision and hearing checked each year or any time you notice a change. If you have trouble seeing and hearing, you might not be able to avoid objects and could lose your balance. · Know the side effects of the medicines you take. Ask your doctor or pharmacist whether the medicines you take can affect your balance. Sleeping pills or sedatives can affect your balance. · Limit the amount of alcohol you drink. Alcohol can impair your balance and other senses. · Ask your doctor whether calluses or corns on your feet need to be removed. If you wear loose-fitting shoes because of calluses or corns, you can lose your balance and fall. · Talk to your doctor if you have numbness in your feet. Preventing falls at home  · Remove raised doorway thresholds, throw rugs, and clutter. Repair loose carpet or raised areas in the floor. · Move furniture and electrical cords to keep them out of walking paths.   · Use nonskid floor wax, and wipe up spills right away, especially on ceramic tile floors. · If you use a walker or cane, put rubber tips on it. If you use crutches, clean the bottoms of them regularly with an abrasive pad, such as steel wool. · Keep your house well lit, especially [place], and outside walkways. Use night-lights in areas such as hallways and bathrooms. Add extra light switches or use remote switches (such as switches that go on or off when you clap your hands) to make it easier to turn lights on if you have to get up during the night. · Install sturdy handrails on stairways. · Move items in your cabinets so that the things you use a lot are on the lower shelves (about waist level). · Keep a cordless phone and a flashlight with new batteries by your bed. If possible, put a phone in each of the main rooms of your house, or carry a cell phone in case you fall and cannot reach a phone. Or, you can wear a device around your neck or wrist. You push a button that sends a signal for help. · Wear low-heeled shoes that fit well and give your feet good support. Use footwear with nonskid soles. Check the heels and soles of your shoes for wear. Repair or replace worn heels or soles. · Do not wear socks without shoes on wood floors. · Walk on the grass when the sidewalks are slippery. If you live in an area that gets snow and ice in the winter, sprinkle salt on slippery steps and sidewalks. Preventing falls in the bath  · Install grab bars and nonskid mats inside and outside your shower or tub and near the toilet and sinks. · Use shower chairs and bath benches. · Use a hand-held shower head that will allow you to sit while showering. · Get into a tub or shower by putting the weaker leg in first. Get out of a tub or shower with your strong side first.  · Repair loose toilet seats and consider installing a raised toilet seat to make getting on and off the toilet easier.   · Keep your bathroom door unlocked while you are in the shower. Where can you learn more? Go to http://www.DivvyDown.com/. Enter 0476 [id number] in the search box to learn more about \"Preventing Falls: Care Instructions. \"  Current as of: March 16, 2018  Content Version: 11.8  © 0187-1786 Health[employer], [employer]. Care instructions adapted under license by [employer] (which disclaims liability or warranty for this information). If you have questions about a medical condition or this instruction, always ask your healthcare professional. [place] [address] any warranty or liability for your use of this information.

## 2021-10-08 NOTE — PROGRESS NOTES
Please inform patient,His kidney function and liver numbers are normal this time except elevated blood sugar, likely as he was not fasting. Cholesterol results are also very reassuring except borderline elevated triglycerides, but also because he was not fasting. Results for vitamin D, vitamin B12, urine analysis, thyroid function, blood count and prostate cancer screen are very normal and reassuring. Average sugar, A1c is stable at 6.7. No change in current medications.   Thanks

## 2021-10-08 NOTE — PROGRESS NOTES
Attempted to call patient. left message on voicemail to return call back. Letter placed in the mail.

## 2021-10-08 NOTE — PROGRESS NOTES
Called patient. Two patient identifiers verified. Discussed lab results per provider's note. He Verbalized understanding.

## 2021-10-15 ENCOUNTER — ANCILLARY PROCEDURE (OUTPATIENT)
Dept: CARDIOLOGY CLINIC | Age: 71
End: 2021-10-15
Payer: MEDICARE

## 2021-10-15 DIAGNOSIS — E11.40 TYPE 2 DIABETES MELLITUS WITH DIABETIC NEUROPATHY, WITHOUT LONG-TERM CURRENT USE OF INSULIN (HCC): ICD-10-CM

## 2021-10-15 DIAGNOSIS — N18.31 STAGE 3A CHRONIC KIDNEY DISEASE (HCC): ICD-10-CM

## 2021-10-15 DIAGNOSIS — E78.2 MIXED DYSLIPIDEMIA: ICD-10-CM

## 2021-10-15 DIAGNOSIS — I25.10 ATHEROSCLEROSIS OF NATIVE CORONARY ARTERY OF NATIVE HEART WITHOUT ANGINA PECTORIS: ICD-10-CM

## 2021-10-15 DIAGNOSIS — Z86.39 HISTORY OF NON ANEMIC VITAMIN B12 DEFICIENCY: ICD-10-CM

## 2021-10-15 DIAGNOSIS — Z95.5 S/P CORONARY ARTERY STENT PLACEMENT: ICD-10-CM

## 2021-10-15 DIAGNOSIS — I10 ESSENTIAL HYPERTENSION, BENIGN: ICD-10-CM

## 2021-10-15 PROCEDURE — 93017 CV STRESS TEST TRACING ONLY: CPT | Performed by: SPECIALIST

## 2021-10-15 PROCEDURE — 93016 CV STRESS TEST SUPVJ ONLY: CPT | Performed by: SPECIALIST

## 2021-10-15 PROCEDURE — 93018 CV STRESS TEST I&R ONLY: CPT | Performed by: SPECIALIST

## 2021-10-15 PROCEDURE — 78452 HT MUSCLE IMAGE SPECT MULT: CPT | Performed by: SPECIALIST

## 2021-10-15 RX ORDER — TETRAKIS(2-METHOXYISOBUTYLISOCYANIDE)COPPER(I) TETRAFLUOROBORATE 1 MG/ML
10 INJECTION, POWDER, LYOPHILIZED, FOR SOLUTION INTRAVENOUS ONCE
Status: COMPLETED | OUTPATIENT
Start: 2021-10-15 | End: 2021-10-15

## 2021-10-15 RX ADMIN — TETRAKIS(2-METHOXYISOBUTYLISOCYANIDE)COPPER(I) TETRAFLUOROBORATE 8.2 MILLICURIE: 1 INJECTION, POWDER, LYOPHILIZED, FOR SOLUTION INTRAVENOUS at 09:20

## 2021-10-20 ENCOUNTER — TELEPHONE (OUTPATIENT)
Dept: FAMILY MEDICINE CLINIC | Age: 71
End: 2021-10-20

## 2021-10-20 NOTE — TELEPHONE ENCOUNTER
[facility] call for pts most recent progress note.  Will fax to office    Cathy Lopes [phone]

## 2021-10-21 ENCOUNTER — APPOINTMENT (OUTPATIENT)
Dept: CARDIOLOGY CLINIC | Age: 71
End: 2021-10-21

## 2021-10-21 VITALS
HEIGHT: 70 IN | BODY MASS INDEX: 27.06 KG/M2 | DIASTOLIC BLOOD PRESSURE: 72 MMHG | SYSTOLIC BLOOD PRESSURE: 122 MMHG | WEIGHT: 189 LBS

## 2021-10-21 RX ORDER — TETRAKIS(2-METHOXYISOBUTYLISOCYANIDE)COPPER(I) TETRAFLUOROBORATE 1 MG/ML
30 INJECTION, POWDER, LYOPHILIZED, FOR SOLUTION INTRAVENOUS ONCE
Status: COMPLETED | OUTPATIENT
Start: 2021-10-21 | End: 2021-10-21

## 2021-10-21 RX ADMIN — REGADENOSON 0.4 MG: 0.08 INJECTION, SOLUTION INTRAVENOUS at 13:40

## 2021-10-21 RX ADMIN — TETRAKIS(2-METHOXYISOBUTYLISOCYANIDE)COPPER(I) TETRAFLUOROBORATE 25.8 MILLICURIE: 1 INJECTION, POWDER, LYOPHILIZED, FOR SOLUTION INTRAVENOUS at 13:40

## 2021-10-25 LAB
STRESS BASELINE DIAS BP: 72 MMHG
STRESS BASELINE HR: 69 BPM
STRESS BASELINE SYS BP: 122 MMHG
STRESS O2 SAT PEAK: 98 %
STRESS O2 SAT REST: 96 %
STRESS PEAK DIAS BP: 60 MMHG
STRESS PEAK SYS BP: 130 MMHG
STRESS PERCENT HR ACHIEVED: 57 %
STRESS POST PEAK HR: 85 BPM
STRESS RATE PRESSURE PRODUCT: NORMAL BPM*MMHG
STRESS ST DEPRESSION: 0 MM
STRESS ST ELEVATION: 0 MM
STRESS TARGET HR: 148 BPM

## 2021-10-26 NOTE — PROGRESS NOTES
Two patient identifiers verified. Per MD patient called and given results. Confirmed follow up however patient wishes to cancel this and call back to r/s. Patient verbalized understanding and denies any further questions or concerns at this time.

## 2021-10-29 DIAGNOSIS — F51.01 PRIMARY INSOMNIA: ICD-10-CM

## 2021-10-31 RX ORDER — ZOLPIDEM TARTRATE 5 MG/1
TABLET ORAL
Qty: 30 TABLET | Refills: 0 | Status: SHIPPED | OUTPATIENT
Start: 2021-10-31 | End: 2021-12-15

## 2021-11-10 DIAGNOSIS — E11.9 TYPE 2 DIABETES MELLITUS WITHOUT COMPLICATION, WITHOUT LONG-TERM CURRENT USE OF INSULIN (HCC): ICD-10-CM

## 2021-11-10 DIAGNOSIS — E78.2 MIXED DYSLIPIDEMIA: ICD-10-CM

## 2021-11-10 RX ORDER — METFORMIN HYDROCHLORIDE 850 MG/1
TABLET ORAL
Qty: 180 TABLET | Refills: 0 | Status: SHIPPED | OUTPATIENT
Start: 2021-11-10 | End: 2022-02-09

## 2021-11-10 RX ORDER — FENOFIBRATE 160 MG/1
TABLET ORAL
Qty: 90 TABLET | Refills: 0 | Status: SHIPPED | OUTPATIENT
Start: 2021-11-10 | End: 2022-02-09

## 2021-11-23 RX ORDER — NITROGLYCERIN 0.4 MG/1
TABLET SUBLINGUAL
Qty: 25 TABLET | Refills: 3 | Status: SHIPPED | OUTPATIENT
Start: 2021-11-23

## 2021-12-15 DIAGNOSIS — F51.01 PRIMARY INSOMNIA: ICD-10-CM

## 2021-12-15 RX ORDER — ZOLPIDEM TARTRATE 5 MG/1
TABLET ORAL
Qty: 30 TABLET | Refills: 0 | Status: SHIPPED | OUTPATIENT
Start: 2021-12-15 | End: 2022-01-12

## 2022-01-12 DIAGNOSIS — F51.01 PRIMARY INSOMNIA: ICD-10-CM

## 2022-01-12 DIAGNOSIS — E78.2 MIXED DYSLIPIDEMIA: ICD-10-CM

## 2022-01-12 RX ORDER — ZOLPIDEM TARTRATE 5 MG/1
TABLET ORAL
Qty: 30 TABLET | Refills: 0 | Status: SHIPPED | OUTPATIENT
Start: 2022-01-12 | End: 2022-02-09

## 2022-01-12 RX ORDER — ATORVASTATIN CALCIUM 10 MG/1
10 TABLET, FILM COATED ORAL
Qty: 90 TABLET | Refills: 3 | Status: SHIPPED | OUTPATIENT
Start: 2022-01-12

## 2022-01-12 RX ORDER — TAMSULOSIN HYDROCHLORIDE 0.4 MG/1
CAPSULE ORAL
Qty: 90 CAPSULE | Refills: 0 | Status: SHIPPED | OUTPATIENT
Start: 2022-01-12 | End: 2022-07-13

## 2022-02-09 DIAGNOSIS — F51.01 PRIMARY INSOMNIA: ICD-10-CM

## 2022-02-09 DIAGNOSIS — E78.2 MIXED DYSLIPIDEMIA: ICD-10-CM

## 2022-02-09 DIAGNOSIS — E11.9 TYPE 2 DIABETES MELLITUS WITHOUT COMPLICATION, WITHOUT LONG-TERM CURRENT USE OF INSULIN (HCC): ICD-10-CM

## 2022-02-09 RX ORDER — ZOLPIDEM TARTRATE 5 MG/1
TABLET ORAL
Qty: 30 TABLET | Refills: 0 | Status: SHIPPED | OUTPATIENT
Start: 2022-02-09 | End: 2022-03-11

## 2022-02-09 RX ORDER — METFORMIN HYDROCHLORIDE 850 MG/1
TABLET ORAL
Qty: 180 TABLET | Refills: 0 | Status: SHIPPED | OUTPATIENT
Start: 2022-02-09 | End: 2022-05-08

## 2022-02-09 RX ORDER — FENOFIBRATE 160 MG/1
TABLET ORAL
Qty: 90 TABLET | Refills: 0 | Status: SHIPPED | OUTPATIENT
Start: 2022-02-09 | End: 2022-05-08

## 2022-02-09 NOTE — TELEPHONE ENCOUNTER
Patient is far due for his 4 months follow-up appointment. We will send prescription at the time but will not refill zolpidem until patient has an appointment.   Please let him know and schedule

## 2022-03-10 DIAGNOSIS — I10 ESSENTIAL HYPERTENSION, BENIGN: ICD-10-CM

## 2022-03-10 DIAGNOSIS — E11.21 TYPE 2 DIABETES WITH NEPHROPATHY (HCC): ICD-10-CM

## 2022-03-10 DIAGNOSIS — N18.31 STAGE 3A CHRONIC KIDNEY DISEASE (HCC): ICD-10-CM

## 2022-03-10 DIAGNOSIS — I25.10 ATHEROSCLEROSIS OF NATIVE CORONARY ARTERY OF NATIVE HEART WITHOUT ANGINA PECTORIS: ICD-10-CM

## 2022-03-10 DIAGNOSIS — Z95.5 S/P CORONARY ARTERY STENT PLACEMENT: ICD-10-CM

## 2022-03-10 DIAGNOSIS — E11.40 TYPE 2 DIABETES MELLITUS WITH DIABETIC NEUROPATHY, WITHOUT LONG-TERM CURRENT USE OF INSULIN (HCC): ICD-10-CM

## 2022-03-10 RX ORDER — LOSARTAN POTASSIUM 25 MG/1
TABLET ORAL
Qty: 90 TABLET | Refills: 0 | Status: SHIPPED | OUTPATIENT
Start: 2022-03-10 | End: 2022-05-08

## 2022-03-11 DIAGNOSIS — F51.01 PRIMARY INSOMNIA: ICD-10-CM

## 2022-03-11 RX ORDER — ZOLPIDEM TARTRATE 5 MG/1
TABLET ORAL
Qty: 30 TABLET | Refills: 0 | Status: SHIPPED | OUTPATIENT
Start: 2022-03-11 | End: 2022-04-08

## 2022-03-18 PROBLEM — D50.8 IRON DEFICIENCY ANEMIA SECONDARY TO INADEQUATE DIETARY IRON INTAKE: Status: ACTIVE | Noted: 2020-03-10

## 2022-03-19 PROBLEM — N18.30 CKD (CHRONIC KIDNEY DISEASE) STAGE 3, GFR 30-59 ML/MIN (HCC): Status: ACTIVE | Noted: 2018-10-31

## 2022-03-19 PROBLEM — E11.21 TYPE 2 DIABETES WITH NEPHROPATHY (HCC): Status: ACTIVE | Noted: 2019-11-04

## 2022-03-19 PROBLEM — Z86.39 HISTORY OF NON ANEMIC VITAMIN B12 DEFICIENCY: Status: ACTIVE | Noted: 2018-02-13

## 2022-03-19 PROBLEM — J32.4 CHRONIC PANSINUSITIS: Status: ACTIVE | Noted: 2018-06-21

## 2022-03-19 PROBLEM — E53.8 VITAMIN B12 DEFICIENCY: Status: ACTIVE | Noted: 2018-06-21

## 2022-03-19 PROBLEM — E11.40 TYPE 2 DIABETES MELLITUS WITH DIABETIC NEUROPATHY, WITHOUT LONG-TERM CURRENT USE OF INSULIN (HCC): Status: ACTIVE | Noted: 2019-05-14

## 2022-03-19 PROBLEM — Z89.529 ACQUIRED ABSENCE OF KNEE JOINT FOLLOWING EXPLANTATION OF JOINT PROSTHESIS WITH PRESENCE OF ANTIBIOTIC-IMPREGNATED CEMENT SPACER: Status: ACTIVE | Noted: 2017-07-31

## 2022-03-29 ENCOUNTER — OFFICE VISIT (OUTPATIENT)
Dept: FAMILY MEDICINE CLINIC | Age: 72
End: 2022-03-29
Payer: MEDICARE

## 2022-03-29 VITALS
WEIGHT: 186.2 LBS | HEART RATE: 65 BPM | BODY MASS INDEX: 26.66 KG/M2 | HEIGHT: 70 IN | RESPIRATION RATE: 18 BRPM | OXYGEN SATURATION: 97 % | TEMPERATURE: 97.4 F | DIASTOLIC BLOOD PRESSURE: 77 MMHG | SYSTOLIC BLOOD PRESSURE: 134 MMHG

## 2022-03-29 DIAGNOSIS — J32.4 CHRONIC PANSINUSITIS: ICD-10-CM

## 2022-03-29 DIAGNOSIS — N18.31 STAGE 3A CHRONIC KIDNEY DISEASE (HCC): ICD-10-CM

## 2022-03-29 DIAGNOSIS — F51.01 PRIMARY INSOMNIA: ICD-10-CM

## 2022-03-29 DIAGNOSIS — Z95.5 S/P CORONARY ARTERY STENT PLACEMENT: ICD-10-CM

## 2022-03-29 DIAGNOSIS — E55.9 VITAMIN D DEFICIENCY: ICD-10-CM

## 2022-03-29 DIAGNOSIS — R05.3 CHRONIC COUGH: ICD-10-CM

## 2022-03-29 DIAGNOSIS — E78.2 MIXED DYSLIPIDEMIA: ICD-10-CM

## 2022-03-29 DIAGNOSIS — E11.21 TYPE 2 DIABETES WITH NEPHROPATHY (HCC): Primary | ICD-10-CM

## 2022-03-29 DIAGNOSIS — D63.8 ANEMIA OF CHRONIC DISEASE: ICD-10-CM

## 2022-03-29 DIAGNOSIS — I10 ESSENTIAL HYPERTENSION, BENIGN: ICD-10-CM

## 2022-03-29 DIAGNOSIS — E11.40 TYPE 2 DIABETES MELLITUS WITH DIABETIC NEUROPATHY, WITHOUT LONG-TERM CURRENT USE OF INSULIN (HCC): ICD-10-CM

## 2022-03-29 LAB
ALBUMIN SERPL-MCNC: 3.8 G/DL (ref 3.5–5)
ALBUMIN/GLOB SERPL: 1.2 {RATIO} (ref 1.1–2.2)
ALP SERPL-CCNC: 49 U/L (ref 45–117)
ALT SERPL-CCNC: 30 U/L (ref 12–78)
ANION GAP SERPL CALC-SCNC: 5 MMOL/L (ref 5–15)
AST SERPL-CCNC: 25 U/L (ref 15–37)
BASOPHILS # BLD: 0 K/UL (ref 0–0.1)
BASOPHILS NFR BLD: 1 % (ref 0–1)
BILIRUB SERPL-MCNC: 0.6 MG/DL (ref 0.2–1)
BUN SERPL-MCNC: 13 MG/DL (ref 6–20)
BUN/CREAT SERPL: 9 (ref 12–20)
CALCIUM SERPL-MCNC: 9.4 MG/DL (ref 8.5–10.1)
CHLORIDE SERPL-SCNC: 109 MMOL/L (ref 97–108)
CHOLEST SERPL-MCNC: 124 MG/DL
CO2 SERPL-SCNC: 24 MMOL/L (ref 21–32)
CREAT SERPL-MCNC: 1.41 MG/DL (ref 0.7–1.3)
CREAT UR-MCNC: 207 MG/DL
DIFFERENTIAL METHOD BLD: ABNORMAL
EOSINOPHIL # BLD: 0.2 K/UL (ref 0–0.4)
EOSINOPHIL NFR BLD: 5 % (ref 0–7)
ERYTHROCYTE [DISTWIDTH] IN BLOOD BY AUTOMATED COUNT: 15.9 % (ref 11.5–14.5)
EST. AVERAGE GLUCOSE BLD GHB EST-MCNC: 134 MG/DL
GLOBULIN SER CALC-MCNC: 3.1 G/DL (ref 2–4)
GLUCOSE SERPL-MCNC: 125 MG/DL (ref 65–100)
HBA1C MFR BLD: 6.3 % (ref 4–5.6)
HCT VFR BLD AUTO: 36.8 % (ref 36.6–50.3)
HDLC SERPL-MCNC: 39 MG/DL
HDLC SERPL: 3.2 {RATIO} (ref 0–5)
HGB BLD-MCNC: 11 G/DL (ref 12.1–17)
IMM GRANULOCYTES # BLD AUTO: 0 K/UL (ref 0–0.04)
IMM GRANULOCYTES NFR BLD AUTO: 0 % (ref 0–0.5)
LDLC SERPL CALC-MCNC: 58.8 MG/DL (ref 0–100)
LYMPHOCYTES # BLD: 2 K/UL (ref 0.8–3.5)
LYMPHOCYTES NFR BLD: 40 % (ref 12–49)
MCH RBC QN AUTO: 25 PG (ref 26–34)
MCHC RBC AUTO-ENTMCNC: 29.9 G/DL (ref 30–36.5)
MCV RBC AUTO: 83.6 FL (ref 80–99)
MICROALBUMIN UR-MCNC: 1.87 MG/DL
MICROALBUMIN/CREAT UR-RTO: 9 MG/G (ref 0–30)
MONOCYTES # BLD: 0.4 K/UL (ref 0–1)
MONOCYTES NFR BLD: 7 % (ref 5–13)
NEUTS SEG # BLD: 2.3 K/UL (ref 1.8–8)
NEUTS SEG NFR BLD: 47 % (ref 32–75)
NRBC # BLD: 0 K/UL (ref 0–0.01)
NRBC BLD-RTO: 0 PER 100 WBC
PLATELET # BLD AUTO: 276 K/UL (ref 150–400)
PMV BLD AUTO: 11 FL (ref 8.9–12.9)
POTASSIUM SERPL-SCNC: 5.3 MMOL/L (ref 3.5–5.1)
PROT SERPL-MCNC: 6.9 G/DL (ref 6.4–8.2)
RBC # BLD AUTO: 4.4 M/UL (ref 4.1–5.7)
SODIUM SERPL-SCNC: 138 MMOL/L (ref 136–145)
TRIGL SERPL-MCNC: 131 MG/DL (ref ?–150)
VLDLC SERPL CALC-MCNC: 26.2 MG/DL
WBC # BLD AUTO: 4.9 K/UL (ref 4.1–11.1)

## 2022-03-29 PROCEDURE — 1101F PT FALLS ASSESS-DOCD LE1/YR: CPT | Performed by: FAMILY MEDICINE

## 2022-03-29 PROCEDURE — 99214 OFFICE O/P EST MOD 30 MIN: CPT | Performed by: FAMILY MEDICINE

## 2022-03-29 PROCEDURE — 3046F HEMOGLOBIN A1C LEVEL >9.0%: CPT | Performed by: FAMILY MEDICINE

## 2022-03-29 PROCEDURE — 3017F COLORECTAL CA SCREEN DOC REV: CPT | Performed by: FAMILY MEDICINE

## 2022-03-29 PROCEDURE — G8419 CALC BMI OUT NRM PARAM NOF/U: HCPCS | Performed by: FAMILY MEDICINE

## 2022-03-29 PROCEDURE — G8510 SCR DEP NEG, NO PLAN REQD: HCPCS | Performed by: FAMILY MEDICINE

## 2022-03-29 PROCEDURE — G0463 HOSPITAL OUTPT CLINIC VISIT: HCPCS | Performed by: FAMILY MEDICINE

## 2022-03-29 PROCEDURE — G8754 DIAS BP LESS 90: HCPCS | Performed by: FAMILY MEDICINE

## 2022-03-29 PROCEDURE — 2022F DILAT RTA XM EVC RTNOPTHY: CPT | Performed by: FAMILY MEDICINE

## 2022-03-29 PROCEDURE — G8427 DOCREV CUR MEDS BY ELIG CLIN: HCPCS | Performed by: FAMILY MEDICINE

## 2022-03-29 PROCEDURE — G8752 SYS BP LESS 140: HCPCS | Performed by: FAMILY MEDICINE

## 2022-03-29 PROCEDURE — G8536 NO DOC ELDER MAL SCRN: HCPCS | Performed by: FAMILY MEDICINE

## 2022-03-29 RX ORDER — ERGOCALCIFEROL 1.25 MG/1
CAPSULE ORAL
Qty: 12 CAPSULE | Refills: 0 | Status: SHIPPED | OUTPATIENT
Start: 2022-03-29

## 2022-03-29 RX ORDER — HYDROGEN PEROXIDE 3 %
20 SOLUTION, NON-ORAL MISCELLANEOUS
Qty: 90 CAPSULE | Refills: 0 | Status: SHIPPED | OUTPATIENT
Start: 2022-03-29

## 2022-03-29 RX ORDER — DULOXETIN HYDROCHLORIDE 20 MG/1
20 CAPSULE, DELAYED RELEASE ORAL
Qty: 30 CAPSULE | Refills: 0 | Status: SHIPPED | OUTPATIENT
Start: 2022-03-29 | End: 2022-04-28

## 2022-03-29 RX ORDER — MONTELUKAST SODIUM 10 MG/1
10 TABLET ORAL DAILY
Qty: 90 TABLET | Refills: 0 | Status: SHIPPED | OUTPATIENT
Start: 2022-03-29 | End: 2022-06-28

## 2022-03-29 NOTE — PATIENT INSTRUCTIONS
Insomnia: Care Instructions  Overview     Insomnia is the inability to sleep well. Insomnia may make it hard for you to get to sleep, stay asleep, or sleep as long as you need to. This can make you tired and grouchy during the day. It can also make you forgetful, less effective at work, and unhappy. Insomnia can be linked to many things. These include health problems, medicines, and stressful events. Treatment may include treating problems that may be linked with your insomnia. Treatment also includes behavior and lifestyle changes. This may include cognitive-behavioral therapy for insomnia (CBT-I). CBT-I uses different ways to help you change your thoughts and behaviors that may interfere with sleep. Your doctor can recommend specific things you can try. Examples include doing relaxation exercises, keeping regular bedtimes and wake times, limiting alcohol, and making healthy sleep habits. Some people decide to take medicine for a while to help with sleep. Follow-up care is a key part of your treatment and safety. Be sure to make and go to all appointments, and call your doctor if you are having problems. It's also a good idea to know your test results and keep a list of the medicines you take. How can you care for yourself at home? Cognitive-behavioral therapy for insomnia (CBT-I)  · If your doctor recommends CBT-I, follow your treatment plan. Your doctor will give you instructions that are unique for you. · Your plan will likely include a few things that you can try at home. For example:  ? Try meditation or other relaxation techniques before you go to bed. ? Go to bed at the same time every night, and wake up at the same time every morning. Do not take naps during the day. ? Do not stay in bed awake for too long.  If you can't fall asleep, or if you wake up in the middle of the night and can't get back to sleep within about 15 to 20 minutes, get out of bed and go to another room until you feel sleepy. ? If watching the clock makes you anxious, turn it facing away from you so you cannot see the time. ? If you worry when you lie down, start a worry book. Well before bedtime, write down your worries, and then set the book and your concerns aside. Healthy sleep habits  · If your doctor recommends it, try making healthy sleep habits. For example:  ? Keep your bedroom quiet, dark, and cool. ? Do not have drinks with caffeine, such as coffee or black tea, for 8 hours before bed. ? Do not smoke or use other types of tobacco near bedtime. Nicotine is a stimulant and can keep you awake. ? Avoid drinking alcohol late in the evening, because it can cause you to wake in the middle of the night. ? Do not eat a big meal close to bedtime. If you are hungry, eat a light snack. ? Do not drink a lot of water close to bedtime, because the need to urinate may wake you up during the night. ? Do not read, watch TV, or use your phone in bed. Use the bed only for sleeping and sex. Medicine  · Be safe with medicines. Take your medicines exactly as prescribed. Call your doctor if you think you are having a problem with your medicine. · Talk with your doctor before you try an over-the-counter medicine, herbal product, or supplement to try to improve your sleep. Your doctor can recommend how much to take and when to take it. Make sure your doctor knows all of the medicines, vitamins, herbal products, and supplements you take. · You will get more details on the specific medicines your doctor prescribes. When should you call for help? Watch closely for changes in your health, and be sure to contact your doctor if:    · Your efforts to improve your sleep do not work.     · Your insomnia gets worse.     · You have been feeling down, depressed, or hopeless or have lost interest in things that you usually enjoy. Where can you learn more?   Go to http://www.gray.com/  Enter P513 in the search box to learn more about \"Insomnia: Care Instructions. \"  Current as of: June 16, 2021               Content Version: 13.2  © 0266-9387 Healthwise[employer]. Care instructions adapted under license by [employer] (which disclaims liability or warranty for this information). If you have questions about a medical condition or this instruction, always ask your healthcare professional. [address] any warranty or liability for your use of this information.

## 2022-03-29 NOTE — ASSESSMENT & PLAN NOTE
borderline controlled, changes made today: Cymbalta was added to help with neuropathy, medication adherence emphasized, lifestyle modifications recommended

## 2022-03-29 NOTE — PROGRESS NOTES
HISTORY OF PRESENT ILLNESS  Mary Green is a 66 y.o. male. Patient was seen today for 4 months follow-up appointment on array of issues. He had several concerns to discuss including persistent cough, persistent headache, persistent pain in right foot as well as lower back pain and right knee pain along with persistent insomnia. He has been on several medications, he has been to several specialists for his concerns with no much improvement. He also complains of heartburn and requesting medication. He is also asking me today to prescribe cough syrup with codeine to help with his chronic cough. HPI  Cardiovascular Review  The patient has hypertension, hyperlipidemia, coronary artery disease and status post coronary artery stenting.  He reports taking medications as instructed, no medication side effects noted, home BP monitoring in range of 611'X systolic over 65'J diastolic, no chest pain on exertion, no dyspnea on exertion, no swelling of ankles, no orthostatic dizziness or lightheadedness, no orthopnea or paroxysmal nocturnal dyspnea, no palpitations, no muscle aches or pain.  Diet and Lifestyle: generally follows a low fat low cholesterol diet, generally follows a low sodium diet, exercises sporadically, chews tobacco.  Lab review: labs reviewed and discussed with patient.  He follows Dr Carmita Duggan   Cardiac History:   PCI 7- prox 75% LAD, D1 50% EF 60%; NAN 3 x 15 mm Xience to LAD  Patient had nuclear stress test on 10/15/21 that was essentially normal and at low risk   medicines:  mg,  Metoprolol XL 25 mg, Losartan 25 mg,Lofibra 160 mg,and Lipitor 10 mg  Metoprolol 25 mg XL.          Endocrine Review  He is seen for diabetes.  Since last visit he reports: no polyuria or polydipsia, no significant changes.  Home glucose monitoring: is performed regularly, fasting values range 100-120  He is checking his sugars one per day.  He reports medication compliance: compliant all of the time.  Medication side effects: none.  Diabetic diet compliance: compliant most of the time.  Lab review: labs reviewed and discussed with patient  On Metformin 850 mg bid. ( which he takes half bid). He stopped his gabapentin due to concern about dry mouth and dizzy spells.     CKD:  Stage of Chronic Kidney Disease III exhibited by:  Lab Results   Component Value Date/Time    Sodium 138 10/07/2021 11:00 AM    Potassium 4.5 10/07/2021 11:00 AM    Chloride 109 (H) 10/07/2021 11:00 AM    CO2 23 10/07/2021 11:00 AM    Anion gap 6 10/07/2021 11:00 AM    Glucose 162 (H) 10/07/2021 11:00 AM    BUN 16 10/07/2021 11:00 AM    Creatinine 1.22 10/07/2021 11:00 AM    BUN/Creatinine ratio 13 10/07/2021 11:00 AM    GFR est AA >60 10/07/2021 11:00 AM    GFR est non-AA 58 (L) 10/07/2021 11:00 AM    Calcium 9.5 10/07/2021 11:00 AM       Referrals orthopedic surgery for chronic right knee pain after total knee replacement on the right side. Following Dr. Gaurav Sheth, ENT and s/p rhinoplasty for his chronic maxillary sinusitis. insomnia:  ONSET: problem is longstanding  SEEN PREVIOUSLY: several months ago by me  DESCRIPTION OF SX:  patient estimates 4 hours of sleep per nite. difficulty initiating sleep.  frequent awakening  ASSOCIATED ISSUES: situational anxiety related to family issues  chronic pain involving the both knees is interfering with sleep  DENIES: depression and ETOH overuse  TREATMENT TO  DATE:  Zolpidem,  benzodiazepines and Gabapentin ,  Today he tells me that Ambien is also not effective. He has history of chronic cough along with chronic acid reflux. Review of Systems   Constitutional: Negative for chills, fever and malaise/fatigue. HENT: Positive for congestion and sinus pain. Negative for ear pain, sore throat and tinnitus. Eyes: Negative for blurred vision, double vision, pain and discharge. Respiratory: Positive for cough. Negative for shortness of breath and wheezing.     Cardiovascular: Negative for chest pain, palpitations and leg swelling. Gastrointestinal: Positive for heartburn. Negative for abdominal pain, blood in stool, constipation, diarrhea, nausea and vomiting. Genitourinary: Negative for dysuria, frequency, hematuria and urgency. Musculoskeletal: Positive for back pain. Negative for joint pain and myalgias. Right knee pain   Skin: Negative for rash. Neurological: Negative for dizziness, tremors, seizures and headaches. Endo/Heme/Allergies: Negative for polydipsia. Does not bruise/bleed easily. Psychiatric/Behavioral: Negative for depression and substance abuse. The patient has insomnia. The patient is not nervous/anxious. Physical Exam  Vitals and nursing note reviewed. Constitutional:       Appearance: He is well-developed. He is not diaphoretic. HENT:      Head: Normocephalic and atraumatic. Right Ear: External ear normal.      Mouth/Throat:      Pharynx: No oropharyngeal exudate. Eyes:      General: No scleral icterus. Conjunctiva/sclera: Conjunctivae normal.      Pupils: Pupils are equal, round, and reactive to light. Neck:      Thyroid: No thyromegaly. Vascular: No JVD. Cardiovascular:      Rate and Rhythm: Normal rate and regular rhythm. Pulses:           Dorsalis pedis pulses are 2+ on the right side and 2+ on the left side. Posterior tibial pulses are 2+ on the right side and 2+ on the left side. Heart sounds: Normal heart sounds. No murmur heard. Pulmonary:      Effort: Pulmonary effort is normal.      Breath sounds: Normal breath sounds. No wheezing. Abdominal:      General: Bowel sounds are normal. There is no distension. Palpations: Abdomen is soft. There is no mass. Musculoskeletal:         General: No tenderness. Normal range of motion. Cervical back: Normal range of motion and neck supple. Feet:      Right foot:      Protective Sensation: 10 sites tested. 8 sites sensed. Skin integrity: Callus present. Toenail Condition: Right toenails are normal.      Left foot:      Protective Sensation: 10 sites tested. 9 sites sensed. Skin integrity: Callus present. Toenail Condition: Left toenails are normal.      Comments: Decreased sensation on both toes, more on plantar aspect  Lymphadenopathy:      Cervical: No cervical adenopathy. Skin:     General: Skin is warm and dry. Findings: No rash. Neurological:      Mental Status: He is alert and oriented to person, place, and time. Cranial Nerves: No cranial nerve deficit. Deep Tendon Reflexes: Reflexes are normal and symmetric. Reflexes normal.         ASSESSMENT and PLAN  Diagnoses and all orders for this visit:    1. Type 2 diabetes with nephropathy ([facility]  Assessment & Plan:   well controlled, continue current medications pending work up below, medication adherence emphasized, lifestyle modifications recommended    Orders:  -      DIABETES FOOT EXAM  -     MICROALBUMIN, UR, RAND W/ MICROALB/CREAT RATIO; Future  -     METABOLIC PANEL, COMPREHENSIVE; Future  -     LIPID PANEL; Future  -     HEMOGLOBIN A1C WITH EAG; Future    2. Stage 3a chronic kidney disease ([facility]  Assessment & Plan:   well controlled, continue current medications, continue current treatment plan, medication adherence emphasized, lifestyle modifications recommended    Orders:  -     MICROALBUMIN, UR, RAND W/ MICROALB/CREAT RATIO; Future  -     METABOLIC PANEL, COMPREHENSIVE; Future    3. Type 2 diabetes mellitus with diabetic neuropathy, without long-term current use of insulin ([facility])  Assessment & Plan:   borderline controlled, changes made today: Cymbalta was added to help with neuropathy, medication adherence emphasized, lifestyle modifications recommended    Orders:  -      DIABETES FOOT EXAM  -     MICROALBUMIN, UR, RAND W/ MICROALB/CREAT RATIO; Future  -     METABOLIC PANEL, COMPREHENSIVE; Future  -     LIPID PANEL; Future  -     HEMOGLOBIN A1C WITH EAG;  Future  - DULoxetine (CYMBALTA) 20 mg capsule; Take 1 Capsule by mouth nightly. 4. Essential hypertension, benign  -     METABOLIC PANEL, COMPREHENSIVE; Future    5. S/P coronary artery stent placement  Assessment & Plan:   monitored by specialist. No acute findings meriting change in the plan    Orders:  -     LIPID PANEL; Future    6. Mixed dyslipidemia  -     LIPID PANEL; Future    7. Chronic pansinusitis  -     CBC WITH AUTOMATED DIFF; Future  -     montelukast (SINGULAIR) 10 mg tablet; Take 1 Tablet by mouth daily. 8. Chronic cough  -     esomeprazole (NexIUM) 20 mg capsule; Take 1 Capsule by mouth daily as needed for Gastroesophageal Reflux Disease (GERD) or Cough. -     CBC WITH AUTOMATED DIFF; Future    9. Primary insomnia    10. Vitamin D deficiency  -     ergocalciferol (ERGOCALCIFEROL) 1,250 mcg (50,000 unit) capsule; Take 1 capsule by mouth once a week    Added Cymbalta to help with his diabetic neuropathy as well as chronic pain syndrome. Declined for his request for cough syrup with codeine due to his risk of drug dependence. Discussed lifestyle issues and health guidance given  Patient was given an after visit summary which includes diagnoses, vital signs, current medications, instructions and references & authorized prescriptions . Results of labs will be conveyed to patient, once available. Pt verbalized instructions I provided and expressed understanding of discussion that was held today. Follow-up and Dispositions    · Return in about 4 months (around 7/29/2022) for follow up, fasting. Please note that this dictation was completed with [employer], the computer voice recognition software. Quite often unanticipated grammatical, syntax, homophones, and other interpretive errors are inadvertently transcribed by the computer software. Please disregard these errors. Please excuse any errors that have escaped final proofreading. Thank you.

## 2022-03-30 ENCOUNTER — TELEPHONE (OUTPATIENT)
Dept: FAMILY MEDICINE CLINIC | Age: 72
End: 2022-03-30

## 2022-03-30 DIAGNOSIS — D63.8 ANEMIA OF CHRONIC DISEASE: Primary | ICD-10-CM

## 2022-03-30 RX ORDER — FERROUS SULFATE 325(65) MG
325 TABLET, DELAYED RELEASE (ENTERIC COATED) ORAL
Qty: 90 TABLET | Refills: 0 | Status: SHIPPED | OUTPATIENT
Start: 2022-03-30

## 2022-03-30 NOTE — PROGRESS NOTES
Please inform patient,His kidney function is again significantly abnormal.  This time his potassium has been high too. He had similar results about a year ago. He is not on any medications that can do kidney damage. Need to make sure that he stays hydrated and limit any over-the-counter cough medications or pain medications except Tylenol. Blood count also shows low hemoglobin, can be from kidney damage but making sure he increase his diet rich in iron. I will also send a prescription for iron tablets to pharmacy, to take 1 tablet daily with food. Sugar showing persistent improvement. Cholesterol results are also very normal and urine protein is negative. To continue with current medications, to start iron tablet and to stay hydrated. Thanks

## 2022-03-30 NOTE — TELEPHONE ENCOUNTER
Patient calling because his RX ferric citrate (AURYXIA) 210 mg iron tablet     Is not covered by insurance and would like a generic version    Juan Carpenter (Self) [phone] (

## 2022-04-01 ENCOUNTER — TELEPHONE (OUTPATIENT)
Dept: FAMILY MEDICINE CLINIC | Age: 72
End: 2022-04-01

## 2022-04-01 NOTE — TELEPHONE ENCOUNTER
Patient called and stated that he is still feeling ill since taking medication ferrous sulfate. He has been feeling bad since yesterday and wants to know what to do.     Requesting a call back    Best call back [phone]

## 2022-04-02 NOTE — TELEPHONE ENCOUNTER
Spoke to patient. Two pt identifiers confirmed. Patient stated that this is Day 3 of Ferrous Sulfate. The first day his abd was burning and hurting. The second day it only hurt in evening after taking medicine on an empty stomach. When Writer spoke to patient this AM, he had already taking it on an empty Stomach. Writer encouraged him to eat then take, due to some patient get an upset stomach, when they take this medicine on an empty stomach. Patient stated that he will try it on Tomorrow. He also stated that his abdomen is not hurting as bad. Writer also encouraged him if abdomen do not get better or keep hurting. Please let Provider know. Pt verbalized understanding of information discussed w/ no further questions at this time.

## 2022-04-02 NOTE — TELEPHONE ENCOUNTER
Called patient. Informed him that if he is having gastric side effects from iron tablets, he should take it with food. He verbalized my understanding and informed that today he is feeling better.   Thanks

## 2022-04-08 DIAGNOSIS — F51.01 PRIMARY INSOMNIA: ICD-10-CM

## 2022-04-08 RX ORDER — ZOLPIDEM TARTRATE 5 MG/1
TABLET ORAL
Qty: 30 TABLET | Refills: 0 | Status: SHIPPED | OUTPATIENT
Start: 2022-04-08 | End: 2022-05-08

## 2022-04-13 ENCOUNTER — TRANSCRIBE ORDER (OUTPATIENT)
Dept: SCHEDULING | Age: 72
End: 2022-04-13

## 2022-04-13 DIAGNOSIS — M54.31 BILATERAL SCIATICA: Primary | ICD-10-CM

## 2022-04-13 DIAGNOSIS — M54.32 BILATERAL SCIATICA: Primary | ICD-10-CM

## 2022-04-14 ENCOUNTER — TELEPHONE (OUTPATIENT)
Dept: CARDIOLOGY CLINIC | Age: 72
End: 2022-04-14

## 2022-04-14 NOTE — TELEPHONE ENCOUNTER
[facility] called to speak to the nurse to receive information on the patient's stents, [facility] is trying to schedule a MRI for the patient and will need to know if the stents are MRI compatible, please advise       [facility]/Dr. Malick Coelho   [phone]  Fax [phone]

## 2022-04-21 ENCOUNTER — TRANSCRIBE ORDER (OUTPATIENT)
Dept: SCHEDULING | Age: 72
End: 2022-04-21

## 2022-04-21 DIAGNOSIS — M54.31 RIGHT SIDED SCIATICA: Primary | ICD-10-CM

## 2022-04-21 DIAGNOSIS — M54.31 SCIATICA OF RIGHT SIDE: Primary | ICD-10-CM

## 2022-04-28 DIAGNOSIS — E11.40 TYPE 2 DIABETES MELLITUS WITH DIABETIC NEUROPATHY, WITHOUT LONG-TERM CURRENT USE OF INSULIN (HCC): ICD-10-CM

## 2022-04-28 RX ORDER — DULOXETIN HYDROCHLORIDE 20 MG/1
20 CAPSULE, DELAYED RELEASE ORAL
Qty: 30 CAPSULE | Refills: 0 | Status: SHIPPED | OUTPATIENT
Start: 2022-04-28 | End: 2022-06-05

## 2022-05-08 DIAGNOSIS — I25.10 ATHEROSCLEROSIS OF NATIVE CORONARY ARTERY OF NATIVE HEART WITHOUT ANGINA PECTORIS: ICD-10-CM

## 2022-05-08 DIAGNOSIS — F51.01 PRIMARY INSOMNIA: ICD-10-CM

## 2022-05-08 DIAGNOSIS — N18.31 STAGE 3A CHRONIC KIDNEY DISEASE (HCC): ICD-10-CM

## 2022-05-08 DIAGNOSIS — E78.2 MIXED DYSLIPIDEMIA: ICD-10-CM

## 2022-05-08 DIAGNOSIS — Z95.5 S/P CORONARY ARTERY STENT PLACEMENT: ICD-10-CM

## 2022-05-08 DIAGNOSIS — E11.9 TYPE 2 DIABETES MELLITUS WITHOUT COMPLICATION, WITHOUT LONG-TERM CURRENT USE OF INSULIN (HCC): ICD-10-CM

## 2022-05-08 DIAGNOSIS — I10 ESSENTIAL HYPERTENSION, BENIGN: ICD-10-CM

## 2022-05-08 DIAGNOSIS — E11.40 TYPE 2 DIABETES MELLITUS WITH DIABETIC NEUROPATHY, WITHOUT LONG-TERM CURRENT USE OF INSULIN (HCC): ICD-10-CM

## 2022-05-08 DIAGNOSIS — E11.21 TYPE 2 DIABETES WITH NEPHROPATHY (HCC): ICD-10-CM

## 2022-05-08 RX ORDER — LOSARTAN POTASSIUM 25 MG/1
TABLET ORAL
Qty: 90 TABLET | Refills: 0 | Status: SHIPPED | OUTPATIENT
Start: 2022-05-08 | End: 2022-08-07

## 2022-05-08 RX ORDER — FENOFIBRATE 160 MG/1
TABLET ORAL
Qty: 90 TABLET | Refills: 0 | Status: SHIPPED | OUTPATIENT
Start: 2022-05-08 | End: 2022-08-07

## 2022-05-08 RX ORDER — ZOLPIDEM TARTRATE 5 MG/1
TABLET ORAL
Qty: 30 TABLET | Refills: 0 | Status: SHIPPED | OUTPATIENT
Start: 2022-05-08 | End: 2022-06-08

## 2022-05-08 RX ORDER — METFORMIN HYDROCHLORIDE 850 MG/1
TABLET ORAL
Qty: 180 TABLET | Refills: 0 | Status: SHIPPED | OUTPATIENT
Start: 2022-05-08 | End: 2022-08-07

## 2022-05-08 RX ORDER — LOSARTAN POTASSIUM 25 MG/1
TABLET ORAL
Qty: 90 TABLET | Refills: 0 | Status: CANCELLED | OUTPATIENT
Start: 2022-05-08

## 2022-05-10 NOTE — PROGRESS NOTES
Pharmacist Note  Warfarin Dosing  Consult provided for this 78 y.o. male to manage warfarin for Orthopedic Surgery (VTE prophylaxis), right total knee   replacement. INR Goal: 1.7- 2.2    Therapy Day: 2    Drugs that may increase INR:None  Drugs that may decrease INR: None  Other current anticoagulants/ drugs that may increase bleeding risk: None  Risk factors: Age > 65  Daily INR ordered: YES    Recent Labs      05/17/17   0633  05/16/17   1011   HGB  10.6*  13.4   INR  1.4*  1.1     Date               INR                 Dose  5/16                1.1                  3 mg   5/17                1.4                  3 mg              Assessment/ Plan: Will order warfarin 3 mg PO x 1 dose. Pharmacy will continue to monitor daily and adjust therapy as indicated. Please contact the pharmacist at  or  for discharge recommendations if needed. show

## 2022-05-11 DIAGNOSIS — Z95.5 S/P CORONARY ARTERY STENT PLACEMENT: ICD-10-CM

## 2022-05-11 DIAGNOSIS — I10 ESSENTIAL HYPERTENSION, BENIGN: ICD-10-CM

## 2022-05-11 DIAGNOSIS — E11.40 TYPE 2 DIABETES MELLITUS WITH DIABETIC NEUROPATHY, WITHOUT LONG-TERM CURRENT USE OF INSULIN (HCC): ICD-10-CM

## 2022-05-11 DIAGNOSIS — I25.10 ATHEROSCLEROSIS OF NATIVE CORONARY ARTERY OF NATIVE HEART WITHOUT ANGINA PECTORIS: ICD-10-CM

## 2022-05-11 DIAGNOSIS — E11.21 TYPE 2 DIABETES WITH NEPHROPATHY (HCC): ICD-10-CM

## 2022-05-11 RX ORDER — METOPROLOL SUCCINATE 25 MG/1
TABLET, EXTENDED RELEASE ORAL
Qty: 90 TABLET | Refills: 0 | Status: SHIPPED | OUTPATIENT
Start: 2022-05-11 | End: 2022-08-07

## 2022-05-11 RX ORDER — BLOOD SUGAR DIAGNOSTIC
STRIP MISCELLANEOUS
Qty: 100 STRIP | Refills: 0 | Status: SHIPPED | OUTPATIENT
Start: 2022-05-11 | End: 2022-09-08

## 2022-06-05 DIAGNOSIS — E11.40 TYPE 2 DIABETES MELLITUS WITH DIABETIC NEUROPATHY, WITHOUT LONG-TERM CURRENT USE OF INSULIN (HCC): ICD-10-CM

## 2022-06-05 RX ORDER — DULOXETIN HYDROCHLORIDE 20 MG/1
20 CAPSULE, DELAYED RELEASE ORAL
Qty: 30 CAPSULE | Refills: 0 | Status: SHIPPED | OUTPATIENT
Start: 2022-06-05 | End: 2022-07-05

## 2022-06-08 DIAGNOSIS — F51.01 PRIMARY INSOMNIA: ICD-10-CM

## 2022-06-08 RX ORDER — ZOLPIDEM TARTRATE 5 MG/1
TABLET ORAL
Qty: 30 TABLET | Refills: 0 | Status: SHIPPED | OUTPATIENT
Start: 2022-06-08 | End: 2022-07-07

## 2022-06-28 DIAGNOSIS — J32.4 CHRONIC PANSINUSITIS: ICD-10-CM

## 2022-06-28 RX ORDER — MONTELUKAST SODIUM 10 MG/1
TABLET ORAL
Qty: 90 TABLET | Refills: 0 | Status: SHIPPED | OUTPATIENT
Start: 2022-06-28 | End: 2022-09-06

## 2022-07-05 DIAGNOSIS — F51.01 PRIMARY INSOMNIA: ICD-10-CM

## 2022-07-05 DIAGNOSIS — E11.40 TYPE 2 DIABETES MELLITUS WITH DIABETIC NEUROPATHY, WITHOUT LONG-TERM CURRENT USE OF INSULIN (HCC): ICD-10-CM

## 2022-07-05 RX ORDER — DULOXETIN HYDROCHLORIDE 20 MG/1
20 CAPSULE, DELAYED RELEASE ORAL
Qty: 30 CAPSULE | Refills: 0 | Status: SHIPPED | OUTPATIENT
Start: 2022-07-05 | End: 2022-08-22

## 2022-07-05 RX ORDER — ZOLPIDEM TARTRATE 5 MG/1
TABLET ORAL
Qty: 30 TABLET | Refills: 0 | OUTPATIENT
Start: 2022-07-05

## 2022-07-07 DIAGNOSIS — F51.01 PRIMARY INSOMNIA: ICD-10-CM

## 2022-07-07 RX ORDER — ZOLPIDEM TARTRATE 5 MG/1
TABLET ORAL
Qty: 30 TABLET | Refills: 0 | Status: SHIPPED | OUTPATIENT
Start: 2022-07-07 | End: 2022-08-07

## 2022-07-11 NOTE — PROGRESS NOTES
Airway  Performed by: Andrew Rubio CRNA  Authorized by: Radhika Lee MD     Final Airway Type:  Supraglottic airway  Final Supraglottic Airway:  Classic  SGA Size*:  5  Attempts*:  1  Ventilation Between Attempts:  None  Number of Other Approaches Attempted:  0   Patient Identified, Procedure confirmed, Emergency equipment available and Safety protocols followed  Location:  OR  Urgency:  Elective  Difficult Airway: No    Indications for Airway Management:  Anesthesia  Spontaneous Ventilation: present    Sedation Level:  Anesthetized  Manual In-line Stabilization Maintained Throughout: Yes    Mask Difficulty Assessment:  0 - not attempted  Performed By:  CRNA  CRNA:  Andrew Rubio CRNA     Chief Complaint   Patient presents with    Hypertension     follow up    Diabetes     follow up         1. \"Have you been to the ER, urgent care clinic since your last visit? Hospitalized since your last visit? \" No    2. \"Have you seen or consulted any other health care providers outside of the [address] [place] [address] since your last visit? \" No     3. For patients aged 39-70: Has the patient had a colonoscopy / FIT/ Cologuard? No      If the patient is female:    4. For patients aged 41-77: Has the patient had a mammogram within the past 2 years? NA - based on age or sex      11. For patients aged 21-65: Has the patient had a pap smear?  NA - based on age or sex         3 most recent PHQ Screens 3/29/2022   Little interest or pleasure in doing things Not at all   Feeling down, depressed, irritable, or hopeless Not at all   Total Score PHQ 2 0       Health Maintenance Due   Topic Date Due    Shingrix Vaccine Age 49> (2 of 2) 12/18/2020    COVID-19 Vaccine (3 - Booster for Moderna series) 08/02/2021    Foot Exam Q1  02/15/2022    Eye Exam Retinal or Dilated  02/24/2022    MICROALBUMIN Q1  03/11/2022

## 2022-07-13 ENCOUNTER — OFFICE VISIT (OUTPATIENT)
Dept: URGENT CARE | Age: 72
End: 2022-07-13
Payer: MEDICARE

## 2022-07-13 VITALS
TEMPERATURE: 98.1 F | HEART RATE: 82 BPM | OXYGEN SATURATION: 97 % | SYSTOLIC BLOOD PRESSURE: 120 MMHG | RESPIRATION RATE: 18 BRPM | DIASTOLIC BLOOD PRESSURE: 70 MMHG

## 2022-07-13 DIAGNOSIS — U07.1 COVID-19 VIRUS INFECTION: ICD-10-CM

## 2022-07-13 DIAGNOSIS — J32.4 CHRONIC PANSINUSITIS: ICD-10-CM

## 2022-07-13 DIAGNOSIS — Z20.822 SUSPECTED COVID-19 VIRUS INFECTION: Primary | ICD-10-CM

## 2022-07-13 DIAGNOSIS — R05.3 CHRONIC COUGH: ICD-10-CM

## 2022-07-13 LAB — SARS-COV-2 PCR, POC: POSITIVE

## 2022-07-13 PROCEDURE — G8752 SYS BP LESS 140: HCPCS | Performed by: FAMILY MEDICINE

## 2022-07-13 PROCEDURE — G8432 DEP SCR NOT DOC, RNG: HCPCS | Performed by: FAMILY MEDICINE

## 2022-07-13 PROCEDURE — G8417 CALC BMI ABV UP PARAM F/U: HCPCS | Performed by: FAMILY MEDICINE

## 2022-07-13 PROCEDURE — 87635 SARS-COV-2 COVID-19 AMP PRB: CPT | Performed by: FAMILY MEDICINE

## 2022-07-13 PROCEDURE — G8427 DOCREV CUR MEDS BY ELIG CLIN: HCPCS | Performed by: FAMILY MEDICINE

## 2022-07-13 PROCEDURE — G8754 DIAS BP LESS 90: HCPCS | Performed by: FAMILY MEDICINE

## 2022-07-13 PROCEDURE — 99202 OFFICE O/P NEW SF 15 MIN: CPT | Performed by: FAMILY MEDICINE

## 2022-07-13 PROCEDURE — 1123F ACP DISCUSS/DSCN MKR DOCD: CPT | Performed by: FAMILY MEDICINE

## 2022-07-13 PROCEDURE — 3017F COLORECTAL CA SCREEN DOC REV: CPT | Performed by: FAMILY MEDICINE

## 2022-07-13 PROCEDURE — G8536 NO DOC ELDER MAL SCRN: HCPCS | Performed by: FAMILY MEDICINE

## 2022-07-13 PROCEDURE — 1101F PT FALLS ASSESS-DOCD LE1/YR: CPT | Performed by: FAMILY MEDICINE

## 2022-07-13 RX ORDER — NIRMATRELVIR AND RITONAVIR 300-100 MG
KIT ORAL
Qty: 1 BOX | Refills: 0 | Status: SHIPPED | OUTPATIENT
Start: 2022-07-13 | End: 2022-08-02 | Stop reason: ALTCHOICE

## 2022-07-13 RX ORDER — FLUTICASONE PROPIONATE 50 MCG
SPRAY, SUSPENSION (ML) NASAL
Qty: 1 EACH | Refills: 0
Start: 2022-07-13 | End: 2022-08-02 | Stop reason: ALTCHOICE

## 2022-07-13 RX ORDER — TAMSULOSIN HYDROCHLORIDE 0.4 MG/1
0.4 CAPSULE ORAL DAILY
Qty: 90 CAPSULE | Refills: 0
Start: 2022-07-13

## 2022-07-13 NOTE — PROGRESS NOTES
Cough  This is a new problem. The current episode started 2 days ago. The problem occurs constantly. The cough is non-productive. There has been no fever. Associated symptoms include chills and myalgias. Pertinent negatives include no shortness of breath and no wheezing. He has tried nothing for the symptoms. Risk factors: exposed from covid. He is not a smoker. His past medical history is significant for bronchitis. His past medical history does not include asthma. Past Medical History:   Diagnosis Date    Arthritis     KNEES & BACK. 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 HX GI  2010    COLONOSCOPY    HX GI  2010    ENDOSCOPY    HX HEENT      SEPTOPLASTY    HX HEENT      TONSILLECTOMY    HX KNEE REPLACEMENT Left 2008    DR ROSEN    HX ORTHOPAEDIC Right 2010, 2017    KNEE REPLACEMENT, SPACER 5/2017    HX ORTHOPAEDIC  2007    RIGHT SHOULDER ROTATOR CUFF REPAIR.     NM CARDIAC SURG PROCEDURE UNLIST  2009    CATHERIZATION WITH STENT X 1 PLACEMENT         Family History   Problem Relation Age of Onset    Diabetes Mother     Heart Disease Mother     Diabetes Son         TYPE 1    Alcohol abuse Son     Anesth Problems Neg Hx         Social History     Socioeconomic History    Marital status:      Spouse name: Not on file    Number of children: 1    Years of education: Not on file    Highest education level: Not on file   Occupational History    Not on file   Tobacco Use    Smoking status: Never Smoker    Smokeless tobacco: Former User    Tobacco comment: Patient states on and off for chewing tobacco.     Vaping Use    Vaping Use: Never used   Substance and Sexual Activity    Alcohol use: Yes     Alcohol/week: 2.0 standard drinks     Types: 2 Cans of beer per week     Comment: NOT DRINKING AT THIS TIME    Drug use: No    Sexual activity: Yes     Partners: Female   Other Topics Concern    Not on file   Social History Narrative    Not on file     Social Determinants of Health     Financial Resource Strain:     Difficulty of Paying Living Expenses: Not on file   Food Insecurity:     Worried About Running Out of Food in the Last Year: Not on file    Kris of Food in the Last Year: Not on file   Transportation Needs:     Lack of Transportation (Medical): Not on file    Lack of Transportation (Non-Medical):  Not on file   Physical Activity:     Days of Exercise per Week: Not on file    Minutes of Exercise per Session: Not on file   Stress:     Feeling of Stress : Not on file   Social Connections:     Frequency of Communication with Friends and Family: Not on file    Frequency of Social Gatherings with Friends and Family: Not on file    Attends Taoist Services: Not on file    Active Member of [address] [place] [address] [employer] or Organizations: Not on file    Attends Club or Organization Meetings: Not on file    Marital Status: Not on file   Intimate Partner Violence:     Fear of Current or Ex-Partner: Not on file    Emotionally Abused: Not on file    Physically Abused: Not on file    Sexually Abused: Not on file   Housing Stability:     Unable to Pay for Housing in the Last Year: Not on file    Number of [place] in the Last Year: Not on file    Unstable Housing in the Last Year: Not on file                ALLERGIES: Levofloxacin, Pcn [penicillins], Adhesive tape-silicones, and Tape [adhesive]    Review of Systems   Constitutional: Positive for chills. Respiratory: Positive for cough. Negative for chest tightness, shortness of breath and wheezing. Musculoskeletal: Positive for myalgias. All other systems reviewed and are negative. Vitals:    22 1138   BP: 120/70   Pulse: 82   Resp: 18   Temp: 98.1 °F (36.7 °C)   SpO2: 97%       Physical Exam  Vitals and nursing note reviewed. Constitutional:       General: He is not in acute distress. Appearance: He is not ill-appearing. Pulmonary:      Effort: Pulmonary effort is normal. No respiratory distress. Breath sounds: Normal breath sounds. No rhonchi or rales. MDM    Procedures        ICD-10-CM ICD-9-CM    1. Suspected COVID-19 virus infection  Z20.822 V01.79 POCT COVID-19, SARS-COV-2, PCR   2. COVID-19 virus infection  U07.1 079.89    3. Chronic cough  R05.3 786.2 fluticasone propionate (FLONASE) 50 mcg/actuation nasal spray   4. Chronic pansinusitis  J32.4 473.8 fluticasone propionate (FLONASE) 50 mcg/actuation nasal spray     Medications Ordered Today   Medications    nirmatrelvir-ritonavir (Paxlovid, EUA,) 150 mg x 2- 100 mg tablet     Si tab every 12 hours x 5 days, as directed     Dispense:  1 Box     Refill:  0     Order Specific Question:   Does this patient qualify for COVID-19 antiviral treatment based on criteria for treatment? Answer:   Yes     Comments:   22    fluticasone propionate (FLONASE) 50 mcg/actuation nasal spray     Sig: Use one spray in each nostril twice daily     Dispense:  1 Each     Refill:  0    tamsulosin (FLOMAX) 0.4 mg capsule     Sig: Take 1 Capsule by mouth daily.      Dispense:  90 Capsule     Refill:  0     Results for orders placed or performed in visit on 22   POCT COVID-19, SARS-COV-2, PCR   Result Value Ref Range    SARS-COV-2 PCR, POC Positive (A) Negative     The patients condition was discussed with the patient and they understand. The patient is to follow up with primary care doctor. If signs and symptoms become worse the pt is to go to the ER. The patient is to take medications as prescribed.

## 2022-08-02 ENCOUNTER — OFFICE VISIT (OUTPATIENT)
Dept: FAMILY MEDICINE CLINIC | Age: 72
End: 2022-08-02
Payer: MEDICARE

## 2022-08-02 VITALS
DIASTOLIC BLOOD PRESSURE: 71 MMHG | HEIGHT: 70 IN | WEIGHT: 187.6 LBS | TEMPERATURE: 97.2 F | OXYGEN SATURATION: 98 % | HEART RATE: 73 BPM | SYSTOLIC BLOOD PRESSURE: 121 MMHG | BODY MASS INDEX: 26.86 KG/M2 | RESPIRATION RATE: 16 BRPM

## 2022-08-02 DIAGNOSIS — E78.2 MIXED DYSLIPIDEMIA: ICD-10-CM

## 2022-08-02 DIAGNOSIS — E11.21 TYPE 2 DIABETES WITH NEPHROPATHY (HCC): ICD-10-CM

## 2022-08-02 DIAGNOSIS — J32.4 CHRONIC PANSINUSITIS: ICD-10-CM

## 2022-08-02 DIAGNOSIS — E53.8 VITAMIN B12 DEFICIENCY: ICD-10-CM

## 2022-08-02 DIAGNOSIS — D50.8 IRON DEFICIENCY ANEMIA SECONDARY TO INADEQUATE DIETARY IRON INTAKE: ICD-10-CM

## 2022-08-02 DIAGNOSIS — N18.31 STAGE 3A CHRONIC KIDNEY DISEASE (HCC): ICD-10-CM

## 2022-08-02 DIAGNOSIS — K29.30 CHRONIC SUPERFICIAL GASTRITIS WITHOUT BLEEDING: ICD-10-CM

## 2022-08-02 DIAGNOSIS — Z95.5 S/P CORONARY ARTERY STENT PLACEMENT: ICD-10-CM

## 2022-08-02 DIAGNOSIS — E11.40 TYPE 2 DIABETES MELLITUS WITH DIABETIC NEUROPATHY, WITHOUT LONG-TERM CURRENT USE OF INSULIN (HCC): Primary | ICD-10-CM

## 2022-08-02 DIAGNOSIS — I10 ESSENTIAL HYPERTENSION, BENIGN: ICD-10-CM

## 2022-08-02 DIAGNOSIS — F51.01 PRIMARY INSOMNIA: ICD-10-CM

## 2022-08-02 DIAGNOSIS — I25.10 ATHEROSCLEROSIS OF NATIVE CORONARY ARTERY OF NATIVE HEART WITHOUT ANGINA PECTORIS: ICD-10-CM

## 2022-08-02 LAB
ALBUMIN SERPL-MCNC: 3.4 G/DL (ref 3.5–5)
ALBUMIN/GLOB SERPL: 1.1 {RATIO} (ref 1.1–2.2)
ALP SERPL-CCNC: 39 U/L (ref 45–117)
ALT SERPL-CCNC: 26 U/L (ref 12–78)
ANION GAP SERPL CALC-SCNC: 6 MMOL/L (ref 5–15)
AST SERPL-CCNC: 14 U/L (ref 15–37)
BASOPHILS # BLD: 0 K/UL (ref 0–0.1)
BASOPHILS NFR BLD: 1 % (ref 0–1)
BILIRUB SERPL-MCNC: 0.6 MG/DL (ref 0.2–1)
BUN SERPL-MCNC: 19 MG/DL (ref 6–20)
BUN/CREAT SERPL: 15 (ref 12–20)
CALCIUM SERPL-MCNC: 9.5 MG/DL (ref 8.5–10.1)
CHLORIDE SERPL-SCNC: 111 MMOL/L (ref 97–108)
CHOLEST SERPL-MCNC: 117 MG/DL
CO2 SERPL-SCNC: 24 MMOL/L (ref 21–32)
CREAT SERPL-MCNC: 1.3 MG/DL (ref 0.7–1.3)
DIFFERENTIAL METHOD BLD: ABNORMAL
EOSINOPHIL # BLD: 0.2 K/UL (ref 0–0.4)
EOSINOPHIL NFR BLD: 4 % (ref 0–7)
ERYTHROCYTE [DISTWIDTH] IN BLOOD BY AUTOMATED COUNT: 15.4 % (ref 11.5–14.5)
EST. AVERAGE GLUCOSE BLD GHB EST-MCNC: 143 MG/DL
GLOBULIN SER CALC-MCNC: 3 G/DL (ref 2–4)
GLUCOSE SERPL-MCNC: 139 MG/DL (ref 65–100)
HBA1C MFR BLD: 6.6 % (ref 4–5.6)
HCT VFR BLD AUTO: 38.2 % (ref 36.6–50.3)
HDLC SERPL-MCNC: 44 MG/DL
HDLC SERPL: 2.7 {RATIO} (ref 0–5)
HGB BLD-MCNC: 12.1 G/DL (ref 12.1–17)
IMM GRANULOCYTES # BLD AUTO: 0 K/UL (ref 0–0.04)
IMM GRANULOCYTES NFR BLD AUTO: 0 % (ref 0–0.5)
LDLC SERPL CALC-MCNC: 50 MG/DL (ref 0–100)
LYMPHOCYTES # BLD: 1.8 K/UL (ref 0.8–3.5)
LYMPHOCYTES NFR BLD: 35 % (ref 12–49)
MCH RBC QN AUTO: 28 PG (ref 26–34)
MCHC RBC AUTO-ENTMCNC: 31.7 G/DL (ref 30–36.5)
MCV RBC AUTO: 88.4 FL (ref 80–99)
MONOCYTES # BLD: 0.4 K/UL (ref 0–1)
MONOCYTES NFR BLD: 7 % (ref 5–13)
NEUTS SEG # BLD: 2.7 K/UL (ref 1.8–8)
NEUTS SEG NFR BLD: 53 % (ref 32–75)
NRBC # BLD: 0 K/UL (ref 0–0.01)
NRBC BLD-RTO: 0 PER 100 WBC
PLATELET # BLD AUTO: 220 K/UL (ref 150–400)
PMV BLD AUTO: 10.9 FL (ref 8.9–12.9)
POTASSIUM SERPL-SCNC: 5.1 MMOL/L (ref 3.5–5.1)
PROT SERPL-MCNC: 6.4 G/DL (ref 6.4–8.2)
RBC # BLD AUTO: 4.32 M/UL (ref 4.1–5.7)
SODIUM SERPL-SCNC: 141 MMOL/L (ref 136–145)
TRIGL SERPL-MCNC: 115 MG/DL (ref ?–150)
VIT B12 SERPL-MCNC: 1216 PG/ML (ref 193–986)
VLDLC SERPL CALC-MCNC: 23 MG/DL
WBC # BLD AUTO: 5 K/UL (ref 4.1–11.1)

## 2022-08-02 PROCEDURE — 2022F DILAT RTA XM EVC RTNOPTHY: CPT | Performed by: FAMILY MEDICINE

## 2022-08-02 PROCEDURE — G8754 DIAS BP LESS 90: HCPCS | Performed by: FAMILY MEDICINE

## 2022-08-02 PROCEDURE — 3017F COLORECTAL CA SCREEN DOC REV: CPT | Performed by: FAMILY MEDICINE

## 2022-08-02 PROCEDURE — G8427 DOCREV CUR MEDS BY ELIG CLIN: HCPCS | Performed by: FAMILY MEDICINE

## 2022-08-02 PROCEDURE — G0463 HOSPITAL OUTPT CLINIC VISIT: HCPCS | Performed by: FAMILY MEDICINE

## 2022-08-02 PROCEDURE — G8752 SYS BP LESS 140: HCPCS | Performed by: FAMILY MEDICINE

## 2022-08-02 PROCEDURE — 99215 OFFICE O/P EST HI 40 MIN: CPT | Performed by: FAMILY MEDICINE

## 2022-08-02 PROCEDURE — 1101F PT FALLS ASSESS-DOCD LE1/YR: CPT | Performed by: FAMILY MEDICINE

## 2022-08-02 PROCEDURE — 3044F HG A1C LEVEL LT 7.0%: CPT | Performed by: FAMILY MEDICINE

## 2022-08-02 PROCEDURE — G8510 SCR DEP NEG, NO PLAN REQD: HCPCS | Performed by: FAMILY MEDICINE

## 2022-08-02 PROCEDURE — 1123F ACP DISCUSS/DSCN MKR DOCD: CPT | Performed by: FAMILY MEDICINE

## 2022-08-02 PROCEDURE — G8536 NO DOC ELDER MAL SCRN: HCPCS | Performed by: FAMILY MEDICINE

## 2022-08-02 PROCEDURE — G8417 CALC BMI ABV UP PARAM F/U: HCPCS | Performed by: FAMILY MEDICINE

## 2022-08-02 RX ORDER — OMEPRAZOLE 40 MG/1
40 CAPSULE, DELAYED RELEASE ORAL
Qty: 90 CAPSULE | Refills: 0 | Status: SHIPPED | OUTPATIENT
Start: 2022-08-02

## 2022-08-02 NOTE — PROGRESS NOTES
HISTORY OF PRESENT ILLNESS  Aubrey Crawley is a 67 y.o. male. Patient was seen today for several concerns including persistent low back pain, numbness in right foot and leg, persistent cough and acid reflux along with follow-up on significant medical condition including diabetes, hypertension, dyslipidemia, CAD, CKD, chronic anemia. HPI  Cardiovascular Review  The patient has hypertension, hyperlipidemia, coronary artery disease and status post coronary artery stenting. He reports taking medications as instructed, no medication side effects noted, home BP monitoring in range of 836'I systolic over 39'B diastolic, no chest pain on exertion, no dyspnea on exertion, no swelling of ankles, no orthostatic dizziness or lightheadedness, no orthopnea or paroxysmal nocturnal dyspnea, no palpitations, no muscle aches or pain. Diet and Lifestyle: generally follows a low fat low cholesterol diet, generally follows a low sodium diet, exercises sporadically, chews tobacco.  Lab review: labs reviewed and discussed with patient. He follows Dr Santo Trotter  Cardiac History:  PCI 7- prox 75% LAD, D1 50% EF 60%; NAN 3 x 15 mm Xience to LAD  Patient had nuclear stress test on 10/15/21 that was essentially normal and at low risk  medicines:  mg,  Metoprolol XL 25 mg, Losartan 25 mg,Lofibra 160 mg,and Lipitor 10 mg       Endocrine Review  He is seen for diabetes. Since last visit he reports: no polyuria or polydipsia, no significant changes. Home glucose monitoring: is performed regularly, fasting values range 100-120  He is checking his sugars one per day. He reports medication compliance: compliant all of the time. Medication side effects: none. Diabetic diet compliance: compliant most of the time. Lab review: labs reviewed and discussed with patient  On Metformin 850 mg bid. ( which he takes half bid). He stopped his gabapentin due to concern about dry mouth and dizzy spells.      Lab Results   Component Value Date/Time    Hemoglobin A1c 6.6 (H) 08/02/2022 10:24 AM        CKD:  Stage of Chronic Kidney Disease III exhibited by:  Lab Results   Component Value Date/Time    Sodium 141 08/02/2022 10:24 AM    Potassium 5.1 08/02/2022 10:24 AM    Chloride 111 (H) 08/02/2022 10:24 AM    CO2 24 08/02/2022 10:24 AM    Anion gap 6 08/02/2022 10:24 AM    Glucose 139 (H) 08/02/2022 10:24 AM    BUN 19 08/02/2022 10:24 AM    Creatinine 1.30 08/02/2022 10:24 AM    BUN/Creatinine ratio 15 08/02/2022 10:24 AM    GFR est AA >60 08/02/2022 10:24 AM    GFR est non-AA 54 (L) 08/02/2022 10:24 AM    Calcium 9.5 08/02/2022 10:24 AM       Following Dr. Sami Quintana, ENT and s/p rhinoplasty for his chronic maxillary sinusitis. insomnia:  ONSET: problem is longstanding  SEEN PREVIOUSLY: several months ago by me  DESCRIPTION OF SX:  patient estimates 4 hours of sleep per nite. difficulty initiating sleep.  frequent awakening  ASSOCIATED ISSUES: situational anxiety related to family issues  chronic pain involving the both knees is interfering with sleep  DENIES: depression and ETOH overuse  TREATMENT TO  DATE:  Zolpidem,  benzodiazepines and Gabapentin ,  Today he tells me that Ambien is also not effective but history getting refill on Ambien every month. He has history of chronic cough along with chronic acid reflux. Has been to GI and ENT and is taking PPI for several months. Today I recommended him to follow-up with GI for follow-up endoscopy that he had more than 4 years ago. He does not want to go for any procedure. He follows [facility] spine doctor and orthopedic specialist as well as neurosurgeon for his persistent low back pain and right knee pain. Referrals orthopedic surgery for chronic right knee pain after total knee replacement on the right side. He also saw Dr. Leanne Hoffmann recently who did a CT myelogram  CT myelogram of the lumbar spine taken on 04/25/2022 shows:  1. Previous L4 posterior decompression.    2. Disc bulging with right side disc herniation at L3-L4 which distorts the contour the thecal sac and effaces lateral recess and foraminal fat on the right and lateral recess fat on the left. 3. Other mild degenerative changes as described above. He was referred to neurosurgeon for date epidural shot that worked for 2 weeks as per patient. Anemia:  ANEMIA FIRST NOTED: Several years  PERTINENT LABS:  Lab Results   Component Value Date/Time    WBC 5.0 08/02/2022 10:24 AM    HGB 12.1 08/02/2022 10:24 AM    HCT 38.2 08/02/2022 10:24 AM    PLATELET 084 40/38/5119 10:24 AM    MCV 88.4 08/02/2022 10:24 AM     No results found for: IRON, FE, TIBC, IBCT, PSAT, FERR  Lab Results   Component Value Date/Time    Folate 5.0 06/21/2018 08:50 AM     Lab Results   Component Value Date/Time    Vitamin B12 1,216 (H) 08/02/2022 10:24 AM    Folate 5.0 06/21/2018 08:50 AM         DESCRIPTION OF Symptoms:fatigue  EVAL TO DATE: CBC  abnormal:   ETIOLOGY KNOWN?: yes, likely iron deficiency of chronic disease versus dietary deficiency      Review of Systems   Constitutional:  Negative for chills, fever and malaise/fatigue. HENT:  Positive for hearing loss. Negative for congestion, ear pain, sore throat and tinnitus. Eyes:  Negative for blurred vision, double vision, pain and discharge. Respiratory:  Positive for cough. Negative for shortness of breath and wheezing. Cardiovascular:  Negative for chest pain, palpitations and leg swelling. Gastrointestinal:  Positive for heartburn. Negative for abdominal pain, blood in stool, constipation, diarrhea, nausea and vomiting. Genitourinary:  Negative for dysuria, frequency, hematuria and urgency. Musculoskeletal:  Positive for back pain. Negative for joint pain and myalgias. Right knee pain   Skin:  Negative for rash. Neurological:  Negative for dizziness, tremors, seizures and headaches. Numbness in both feet more in right foot   Endo/Heme/Allergies:  Negative for polydipsia.  Does not bruise/bleed easily. Psychiatric/Behavioral:  Negative for depression and substance abuse. The patient is not nervous/anxious. Physical Exam  Vitals and nursing note reviewed. Constitutional:       Appearance: He is well-developed. He is not diaphoretic. HENT:      Head: Normocephalic and atraumatic. Right Ear: External ear normal.      Mouth/Throat:      Mouth: Mucous membranes are moist.      Pharynx: No oropharyngeal exudate. Eyes:      General: No scleral icterus. Conjunctiva/sclera: Conjunctivae normal.      Pupils: Pupils are equal, round, and reactive to light. Neck:      Thyroid: No thyromegaly. Vascular: No JVD. Cardiovascular:      Rate and Rhythm: Normal rate and regular rhythm. Heart sounds: Normal heart sounds. No murmur heard. Pulmonary:      Effort: Pulmonary effort is normal.      Breath sounds: Normal breath sounds. No wheezing. Abdominal:      General: Bowel sounds are normal. There is no distension. Palpations: Abdomen is soft. There is no mass. Musculoskeletal:         General: No tenderness. Normal range of motion. Cervical back: Normal range of motion and neck supple. Lymphadenopathy:      Cervical: No cervical adenopathy. Skin:     General: Skin is warm and dry. Findings: No rash. Neurological:      Mental Status: He is alert and oriented to person, place, and time. Cranial Nerves: No cranial nerve deficit. Deep Tendon Reflexes: Reflexes are normal and symmetric. Reflexes normal.   Psychiatric:         Mood and Affect: Mood normal.         Behavior: Behavior normal.       ASSESSMENT and PLAN  Diagnoses and all orders for this visit:    1.  Type 2 diabetes mellitus with diabetic neuropathy, without long-term current use of insulin ([facility] [address]  Assessment & Plan:   well controlled, continue current medications, continue current treatment plan, medication adherence emphasized, lifestyle modifications recommended    Orders:  - METABOLIC PANEL, COMPREHENSIVE; Future  -     HEMOGLOBIN A1C WITH EAG; Future    2. Stage 3a chronic kidney disease ([facility]  Assessment & Plan:   at goal, continue current medications, continue current treatment plan, medication adherence emphasized    Orders:  -     METABOLIC PANEL, COMPREHENSIVE; Future    3. Type 2 diabetes with nephropathy ([facility]  Assessment & Plan:   well controlled, continue current medications, continue current treatment plan, medication adherence emphasized, lifestyle modifications recommended    Orders:  -     METABOLIC PANEL, COMPREHENSIVE; Future  -     HEMOGLOBIN A1C WITH EAG; Future    4. Essential hypertension, benign  -     METABOLIC PANEL, COMPREHENSIVE; Future    5. Mixed dyslipidemia  -     METABOLIC PANEL, COMPREHENSIVE; Future  -     LIPID PANEL; Future    6. Atherosclerosis of native coronary artery of native heart without angina pectoris  -     LIPID PANEL; Future    7. S/P coronary artery stent placement  -     LIPID PANEL; Future    8. Chronic pansinusitis    9. Primary insomnia    10. Iron deficiency anemia secondary to inadequate dietary iron intake  -     CBC WITH AUTOMATED DIFF; Future    11. Vitamin B12 deficiency  -     VITAMIN B12; Future    12. Chronic superficial gastritis without bleeding  -     H PYLORI AG, STOOL; Future  -     omeprazole (PRILOSEC) 40 mg capsule; Take 1 Capsule by mouth nightly. Spent 40 minutes with patient and discussing all his concerns in detail along with previous work-up, results of imagings and procedures. Also discussed future work-up and possible change in medications. Discussed lifestyle issues and health guidance given  Patient was given an after visit summary which includes diagnoses, vital signs, current medications, instructions and references & authorized prescriptions . Results of labs will be conveyed to patient, once available.   Pt verbalized instructions I provided and expressed understanding of discussion that was held today.  Follow-up and Dispositions    Return in about 4 months (around 12/2/2022) for fasting, physical, medicare wellness. Please note that this dictation was completed with [employer], the computer voice recognition software. Quite often unanticipated grammatical, syntax, homophones, and other interpretive errors are inadvertently transcribed by the computer software. Please disregard these errors. Please excuse any errors that have escaped final proofreading. Thank you.

## 2022-08-02 NOTE — PROGRESS NOTES
Chief Complaint   Patient presents with    Hypertension     Follow up    Diabetes     Follow up    Cholesterol Problem     Follow up         1. \"Have you been to the ER, urgent care clinic since your last visit? Hospitalized since your last visit? \" No    2. \"Have you seen or consulted any other health care providers outside of the [address] [place] [address] since your last visit? \" No     3. For patients aged 39-70: Has the patient had a colonoscopy / FIT/ Cologuard? NA - based on age      If the patient is female:    4. For patients aged 41-77: Has the patient had a mammogram within the past 2 years? NA - based on age or sex      11. For patients aged 21-65: Has the patient had a pap smear?  NA - based on age or sex         3 most recent PHQ Screens 8/2/2022   Little interest or pleasure in doing things Not at all   Feeling down, depressed, irritable, or hopeless Not at all   Total Score PHQ 2 0       Health Maintenance Due   Topic Date Due    Shingrix Vaccine Age 49> (2 of 2) 12/18/2020    Eye Exam Retinal or Dilated  02/24/2022    COVID-19 Vaccine (4 - Booster for Leia Reas series) 04/06/2022

## 2022-08-04 NOTE — PROGRESS NOTES
Denise Sarkar,  I have reviewed your results. This time your kidney function is back  in the normal range. Results for cholesterol, diabetes, vitamin B12 and blood count are also normal.  Your hemoglobin is back to normal this time. Overall very reassuring results. Please continue with current medications and let me know if you have any questions, thanks.

## 2022-08-06 DIAGNOSIS — E78.2 MIXED DYSLIPIDEMIA: ICD-10-CM

## 2022-08-06 DIAGNOSIS — E11.40 TYPE 2 DIABETES MELLITUS WITH DIABETIC NEUROPATHY, WITHOUT LONG-TERM CURRENT USE OF INSULIN (HCC): ICD-10-CM

## 2022-08-06 DIAGNOSIS — E11.21 TYPE 2 DIABETES WITH NEPHROPATHY (HCC): ICD-10-CM

## 2022-08-06 DIAGNOSIS — I10 ESSENTIAL HYPERTENSION, BENIGN: ICD-10-CM

## 2022-08-06 DIAGNOSIS — Z95.5 S/P CORONARY ARTERY STENT PLACEMENT: ICD-10-CM

## 2022-08-06 DIAGNOSIS — E11.9 TYPE 2 DIABETES MELLITUS WITHOUT COMPLICATION, WITHOUT LONG-TERM CURRENT USE OF INSULIN (HCC): ICD-10-CM

## 2022-08-06 DIAGNOSIS — N18.31 STAGE 3A CHRONIC KIDNEY DISEASE (HCC): ICD-10-CM

## 2022-08-06 DIAGNOSIS — F51.01 PRIMARY INSOMNIA: ICD-10-CM

## 2022-08-06 DIAGNOSIS — I25.10 ATHEROSCLEROSIS OF NATIVE CORONARY ARTERY OF NATIVE HEART WITHOUT ANGINA PECTORIS: ICD-10-CM

## 2022-08-07 LAB
H PYLORI AG STL QL IA: NEGATIVE
SPECIMEN SOURCE: NORMAL

## 2022-08-07 RX ORDER — ZOLPIDEM TARTRATE 5 MG/1
TABLET ORAL
Qty: 30 TABLET | Refills: 0 | Status: SHIPPED | OUTPATIENT
Start: 2022-08-07 | End: 2022-09-06

## 2022-08-07 RX ORDER — FENOFIBRATE 160 MG/1
TABLET ORAL
Qty: 90 TABLET | Refills: 0 | Status: SHIPPED | OUTPATIENT
Start: 2022-08-07

## 2022-08-07 RX ORDER — LOSARTAN POTASSIUM 25 MG/1
TABLET ORAL
Qty: 90 TABLET | Refills: 0 | Status: SHIPPED | OUTPATIENT
Start: 2022-08-07

## 2022-08-07 RX ORDER — METOPROLOL SUCCINATE 25 MG/1
TABLET, EXTENDED RELEASE ORAL
Qty: 90 TABLET | Refills: 0 | Status: SHIPPED | OUTPATIENT
Start: 2022-08-07

## 2022-08-07 RX ORDER — METFORMIN HYDROCHLORIDE 850 MG/1
TABLET ORAL
Qty: 180 TABLET | Refills: 0 | Status: SHIPPED | OUTPATIENT
Start: 2022-08-07

## 2022-08-07 NOTE — PROGRESS NOTES
Doe Quintana,  I have received and reviewed result for H. pylori bacteria for stomach and result is negative. If your symptoms persist, recommend to follow-up with GI specialist for another endoscopy. Let me know if you have any questions, thanks.

## 2022-09-04 ENCOUNTER — OFFICE VISIT (OUTPATIENT)
Dept: URGENT CARE | Age: 72
End: 2022-09-04
Payer: MEDICARE

## 2022-09-04 VITALS
OXYGEN SATURATION: 97 % | RESPIRATION RATE: 20 BRPM | SYSTOLIC BLOOD PRESSURE: 161 MMHG | WEIGHT: 190 LBS | BODY MASS INDEX: 27.2 KG/M2 | HEART RATE: 89 BPM | DIASTOLIC BLOOD PRESSURE: 85 MMHG | TEMPERATURE: 100.2 F | HEIGHT: 70 IN

## 2022-09-04 DIAGNOSIS — M25.561 ACUTE PAIN OF RIGHT KNEE: ICD-10-CM

## 2022-09-04 DIAGNOSIS — J06.9 UPPER RESPIRATORY TRACT INFECTION, UNSPECIFIED TYPE: Primary | ICD-10-CM

## 2022-09-04 DIAGNOSIS — M54.50 LOW BACK PAIN WITHOUT SCIATICA, UNSPECIFIED BACK PAIN LATERALITY, UNSPECIFIED CHRONICITY: ICD-10-CM

## 2022-09-04 PROCEDURE — G8417 CALC BMI ABV UP PARAM F/U: HCPCS | Performed by: FAMILY MEDICINE

## 2022-09-04 PROCEDURE — 3017F COLORECTAL CA SCREEN DOC REV: CPT | Performed by: FAMILY MEDICINE

## 2022-09-04 PROCEDURE — G8536 NO DOC ELDER MAL SCRN: HCPCS | Performed by: FAMILY MEDICINE

## 2022-09-04 PROCEDURE — G8432 DEP SCR NOT DOC, RNG: HCPCS | Performed by: FAMILY MEDICINE

## 2022-09-04 PROCEDURE — 1101F PT FALLS ASSESS-DOCD LE1/YR: CPT | Performed by: FAMILY MEDICINE

## 2022-09-04 PROCEDURE — 99214 OFFICE O/P EST MOD 30 MIN: CPT | Performed by: FAMILY MEDICINE

## 2022-09-04 PROCEDURE — G8754 DIAS BP LESS 90: HCPCS | Performed by: FAMILY MEDICINE

## 2022-09-04 PROCEDURE — G8753 SYS BP > OR = 140: HCPCS | Performed by: FAMILY MEDICINE

## 2022-09-04 PROCEDURE — 1123F ACP DISCUSS/DSCN MKR DOCD: CPT | Performed by: FAMILY MEDICINE

## 2022-09-04 PROCEDURE — G8427 DOCREV CUR MEDS BY ELIG CLIN: HCPCS | Performed by: FAMILY MEDICINE

## 2022-09-04 RX ORDER — METHOCARBAMOL 750 MG/1
750 TABLET, FILM COATED ORAL
Qty: 20 TABLET | Refills: 0 | Status: SHIPPED | OUTPATIENT
Start: 2022-09-04

## 2022-09-04 RX ORDER — ACETAMINOPHEN AND CODEINE PHOSPHATE 300; 30 MG/1; MG/1
1 TABLET ORAL
Qty: 12 TABLET | Refills: 0 | Status: SHIPPED | OUTPATIENT
Start: 2022-09-04 | End: 2022-09-14

## 2022-09-04 RX ORDER — PREDNISONE 20 MG/1
20 TABLET ORAL 2 TIMES DAILY
Qty: 10 TABLET | Refills: 0 | Status: SHIPPED | OUTPATIENT
Start: 2022-09-04 | End: 2022-09-09

## 2022-09-04 NOTE — PROGRESS NOTES
Sore Throat   The history is provided by the Patient. This is a new problem. The current episode started more than 2 days ago. The problem has not changed since onset. There has been no fever. Associated symptoms include congestion and headaches. Pertinent negatives include no shortness of breath, no swollen glands and no trouble swallowing. He has had no exposure to strep or mono. He has tried nothing for the symptoms. Knee Pain  This is a new problem. The current episode started more than 2 days ago. The problem occurs constantly. Associated symptoms include headaches. Pertinent negatives include no shortness of breath. Associated symptoms comments: Started after waking a lot  Fees stiffness and swelling   Pain is 8/10  No injury and has h/o OA . The symptoms are aggravated by walking, standing and bending. Nothing relieves the symptoms. He has tried acetaminophen for the symptoms. The treatment provided mild relief. Past Medical History:   Diagnosis Date    Arthritis     KNEES & BACK.     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 NE CARDIAC SURG PROCEDURE UNLIST  2009    CATHERIZATION WITH STENT X 1 PLACEMENT         Family History   Problem Relation Age of Onset    Diabetes Mother     Heart Disease Mother     Diabetes Son         TYPE 1    Alcohol abuse Son     Anesth Problems Neg Hx         Social History     Socioeconomic History    Marital status:      Spouse name: Not on file    Number of children: 1    Years of education: Not on file    Highest education level: Not on file   Occupational History    Not on file   Tobacco Use    Smoking status: Never    Smokeless tobacco: Former    Tobacco comments:     Patient states on and off for chewing tobacco.     Vaping Use    Vaping Use: Never used   Substance and Sexual Activity    Alcohol use: Yes     Alcohol/week: 2.0 standard drinks     Types: 2 Cans of beer per week     Comment: NOT DRINKING AT THIS TIME    Drug use: No    Sexual activity: Yes     Partners: Female   Other Topics Concern    Not on file   Social History Narrative    Not on file     Social Determinants of Health     Financial Resource Strain: Not on file   Food Insecurity: Not on file   Transportation Needs: Not on file   Physical Activity: Not on file   Stress: Not on file   Social Connections: Not on file   Intimate Partner Violence: Not on file   Housing Stability: Not on file                ALLERGIES: Levofloxacin, Pcn [penicillins], Adhesive tape-silicones, and Tape [adhesive]    Review of Systems   Constitutional:  Negative for chills and fever. HENT:  Positive for congestion and sore throat. Negative for trouble swallowing. Respiratory:  Negative for shortness of breath. Musculoskeletal:  Positive for arthralgias (rt knee), back pain (diffuse - low back pain - DDD- , chronic back pain - flared up recently- 8/10- not able to sleep) and joint swelling (rt knee). Negative for neck pain. Neurological:  Positive for headaches.    All other systems reviewed and are negative. Vitals:    09/04/22 1143 09/04/22 1150   BP:  (!) 161/85   Pulse:  89   Resp:  20   Temp:  100.2 °F (37.9 °C)   SpO2:  97%   Weight: 190 lb (86.2 kg)    Height: 5' 10\" (1.778 m)        Physical Exam  Vitals and nursing note reviewed. Constitutional:       General: He is not in acute distress. HENT:      Right Ear: Tympanic membrane and ear canal normal.      Left Ear: Tympanic membrane and ear canal normal.      Nose: Nose normal.      Mouth/Throat:      Pharynx: No oropharyngeal exudate or posterior oropharyngeal erythema. Eyes:      General:         Right eye: No discharge. Left eye: No discharge. Conjunctiva/sclera: Conjunctivae normal.   Pulmonary:      Effort: Pulmonary effort is normal. No respiratory distress. Breath sounds: Normal breath sounds. No wheezing, rhonchi or rales. Musculoskeletal:      Cervical back: Neck supple. Thoracic back: Normal.      Lumbar back: Spasms and tenderness present. Decreased range of motion. Back:       Right knee: Swelling present. Decreased range of motion. Tenderness present over the lateral joint line. Legs:       Comments: Crepitation on movement   Lymphadenopathy:      Cervical: No cervical adenopathy. Skin:     Findings: No rash. MDM    Procedures        ICD-10-CM ICD-9-CM    1. Upper respiratory tract infection, unspecified type  J06.9 465.9       2. Low back pain without sciatica, unspecified back pain laterality, unspecified chronicity  M54.50 724.2 acetaminophen-codeine (Tylenol-Codeine #3) 300-30 mg per tablet      3. Acute pain of right knee  M25.561 719.46 acetaminophen-codeine (Tylenol-Codeine #3) 300-30 mg per tablet        Medications Ordered Today   Medications    methocarbamoL (ROBAXIN) 750 mg tablet     Sig: Take 1 Tablet by mouth two (2) times daily as needed for Pain.      Dispense:  20 Tablet     Refill:  0    predniSONE (DELTASONE) 20 mg tablet     Sig: Take 1 Tablet by mouth two (2) times a day for 5 days. With food     Dispense:  10 Tablet     Refill:  0    acetaminophen-codeine (Tylenol-Codeine #3) 300-30 mg per tablet     Sig: Take 1 Tablet by mouth every six (6) hours as needed for Pain for up to 10 days. Max Daily Amount: 4 Tablets. Dispense:  12 Tablet     Refill:  0     No results found for any visits on 09/04/22. The patients condition was discussed with the patient and they understand. The patient is to follow up with primary care doctor. If signs and symptoms become worse the pt is to go to the ER. The patient is to take medications as prescribed.

## 2022-09-04 NOTE — PATIENT INSTRUCTIONS
Take Allegra 180 mg daily  Flonase 2 spray each nostril once a day   Mucinex Sinus Max day/ night as needed

## 2022-09-05 DIAGNOSIS — F51.01 PRIMARY INSOMNIA: ICD-10-CM

## 2022-09-05 DIAGNOSIS — J32.4 CHRONIC PANSINUSITIS: ICD-10-CM

## 2022-09-06 RX ORDER — MONTELUKAST SODIUM 10 MG/1
TABLET ORAL
Qty: 90 TABLET | Refills: 0 | Status: SHIPPED | OUTPATIENT
Start: 2022-09-06

## 2022-09-06 RX ORDER — ZOLPIDEM TARTRATE 5 MG/1
TABLET ORAL
Qty: 30 TABLET | Refills: 0 | Status: SHIPPED | OUTPATIENT
Start: 2022-09-06

## 2022-09-08 DIAGNOSIS — E11.21 TYPE 2 DIABETES WITH NEPHROPATHY (HCC): ICD-10-CM

## 2022-09-08 DIAGNOSIS — E11.40 TYPE 2 DIABETES MELLITUS WITH DIABETIC NEUROPATHY, WITHOUT LONG-TERM CURRENT USE OF INSULIN (HCC): ICD-10-CM

## 2022-09-08 RX ORDER — BLOOD SUGAR DIAGNOSTIC
STRIP MISCELLANEOUS
Qty: 100 STRIP | Refills: 0 | Status: SHIPPED | OUTPATIENT
Start: 2022-09-08

## 2022-09-11 ENCOUNTER — OFFICE VISIT (OUTPATIENT)
Dept: URGENT CARE | Age: 72
End: 2022-09-11
Payer: MEDICARE

## 2022-09-11 VITALS
TEMPERATURE: 97.7 F | SYSTOLIC BLOOD PRESSURE: 165 MMHG | DIASTOLIC BLOOD PRESSURE: 82 MMHG | BODY MASS INDEX: 26.4 KG/M2 | WEIGHT: 184 LBS | RESPIRATION RATE: 15 BRPM | HEART RATE: 63 BPM | OXYGEN SATURATION: 98 %

## 2022-09-11 DIAGNOSIS — J32.9 SINUSITIS, UNSPECIFIED CHRONICITY, UNSPECIFIED LOCATION: Primary | ICD-10-CM

## 2022-09-11 PROCEDURE — 1123F ACP DISCUSS/DSCN MKR DOCD: CPT | Performed by: FAMILY MEDICINE

## 2022-09-11 PROCEDURE — G8753 SYS BP > OR = 140: HCPCS | Performed by: FAMILY MEDICINE

## 2022-09-11 PROCEDURE — 99213 OFFICE O/P EST LOW 20 MIN: CPT | Performed by: FAMILY MEDICINE

## 2022-09-11 PROCEDURE — G8754 DIAS BP LESS 90: HCPCS | Performed by: FAMILY MEDICINE

## 2022-09-11 PROCEDURE — G8536 NO DOC ELDER MAL SCRN: HCPCS | Performed by: FAMILY MEDICINE

## 2022-09-11 PROCEDURE — G8417 CALC BMI ABV UP PARAM F/U: HCPCS | Performed by: FAMILY MEDICINE

## 2022-09-11 PROCEDURE — G8427 DOCREV CUR MEDS BY ELIG CLIN: HCPCS | Performed by: FAMILY MEDICINE

## 2022-09-11 PROCEDURE — 1101F PT FALLS ASSESS-DOCD LE1/YR: CPT | Performed by: FAMILY MEDICINE

## 2022-09-11 PROCEDURE — 3017F COLORECTAL CA SCREEN DOC REV: CPT | Performed by: FAMILY MEDICINE

## 2022-09-11 PROCEDURE — G8432 DEP SCR NOT DOC, RNG: HCPCS | Performed by: FAMILY MEDICINE

## 2022-09-11 RX ORDER — METHYLPREDNISOLONE 4 MG/1
TABLET ORAL
Qty: 1 DOSE PACK | Refills: 0 | Status: SHIPPED | OUTPATIENT
Start: 2022-09-11 | End: 2022-09-16

## 2022-09-11 RX ORDER — FLUTICASONE PROPIONATE 50 MCG
2 SPRAY, SUSPENSION (ML) NASAL DAILY
Qty: 1 EACH | Refills: 0 | Status: SHIPPED | OUTPATIENT
Start: 2022-09-11

## 2022-09-11 RX ORDER — CEFDINIR 300 MG/1
300 CAPSULE ORAL 2 TIMES DAILY
Qty: 20 CAPSULE | Refills: 0 | Status: SHIPPED | OUTPATIENT
Start: 2022-09-11

## 2022-09-11 NOTE — PROGRESS NOTES
Headache   The history is provided by the Patient. This is a new problem. The current episode started more than 1 week ago. The problem occurs constantly. The problem has not changed since onset. Associated with: recent URI and sinus congestion. The pain is located in the Frontal region. The quality of the pain is described as dull. The pain is at a severity of 5/10. The pain is moderate. Associated symptoms include malaise/fatigue. Pertinent negatives include no fever, no palpitations, no shortness of breath, no dizziness, no nausea and no vomiting. Associated symptoms comments: Feels congested in head- no nasal congestion/ drainage   No cough  H/o sinus surgery and h/o frequent sinus infection . He has tried nothing (completed Doxycycline) for the symptoms. Past Medical History:   Diagnosis Date    Arthritis     KNEES & BACK. 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 ENDOSCOPY    HX GI  2010    COLONOSCOPY    HX GI  2010    ENDOSCOPY    HX HEENT      SEPTOPLASTY    HX HEENT      TONSILLECTOMY    HX KNEE REPLACEMENT Left 2008    DR ROSEN    HX ORTHOPAEDIC Right 2010, 2017    KNEE REPLACEMENT, SPACER 5/2017    HX ORTHOPAEDIC  2007    RIGHT SHOULDER ROTATOR CUFF REPAIR.     [place] CARDIAC SURG PROCEDURE UNLIST  2009    CATHERIZATION WITH STENT X 1 PLACEMENT         Family History   Problem Relation Age of Onset Diabetes Mother     Heart Disease Mother     Diabetes Son         TYPE 1    Alcohol abuse Son     Anesth Problems Neg Hx         Social History     Socioeconomic History    Marital status:      Spouse name: Not on file    Number of children: 1    Years of education: Not on file    Highest education level: Not on file   Occupational History    Not on file   Tobacco Use    Smoking status: Never    Smokeless tobacco: Former    Tobacco comments:     Patient states on and off for chewing tobacco.     Vaping Use    Vaping Use: Never used   Substance and Sexual Activity    Alcohol use: Yes     Alcohol/week: 2.0 standard drinks     Types: 2 Cans of beer per week     Comment: NOT DRINKING AT THIS TIME    Drug use: No    Sexual activity: Yes     Partners: Female   Other Topics Concern    Not on file   Social History Narrative    Not on file     Social Determinants of Health     Financial Resource Strain: Not on file   Food Insecurity: Not on file   Transportation Needs: Not on file   Physical Activity: Not on file   Stress: Not on file   Social Connections: Not on file   Intimate Partner Violence: Not on file   Housing Stability: Not on file                ALLERGIES: Levofloxacin, Pcn [penicillins], Adhesive tape-silicones, and Tape [adhesive]    Review of Systems   Constitutional:  Positive for malaise/fatigue. Negative for chills and fever. HENT:  Positive for congestion. Respiratory:  Negative for shortness of breath. Cardiovascular:  Negative for palpitations. Gastrointestinal:  Negative for nausea and vomiting. Neurological:  Positive for headaches. Negative for dizziness and light-headedness. All other systems reviewed and are negative. Vitals:    09/11/22 1529   BP: (!) 165/82   Pulse: 63   Resp: 15   Temp: 97.7 °F (36.5 °C)   SpO2: 98%   Weight: 184 lb (83.5 kg)       Physical Exam  Vitals and nursing note reviewed. Constitutional:       General: He is not in acute distress. Appearance: He is not ill-appearing. Pulmonary:      Effort: Pulmonary effort is normal. No respiratory distress. Breath sounds: Normal breath sounds. MDM    Procedures             ICD-10-CM ICD-9-CM    1. Sinusitis, unspecified chronicity, unspecified location  J32.9 473.9         Medications Ordered Today   Medications    cefdinir (OMNICEF) 300 mg capsule     Sig: Take 1 Capsule by mouth two (2) times a day. Dispense:  20 Capsule     Refill:  0    methylPREDNISolone (MEDROL DOSEPACK) 4 mg tablet     Sig: As directed     Dispense:  1 Dose Pack     Refill:  0    fluticasone propionate (FLONASE) 50 mcg/actuation nasal spray     Si Sprays by Both Nostrils route daily. Dispense:  1 Each     Refill:  0     No results found for any visits on 22. The patients condition was discussed with the patient and they understand. The patient is to follow up with primary care doctor. If signs and symptoms become worse the pt is to go to the ER. The patient is to take medications as prescribed.

## 2022-09-16 ENCOUNTER — VIRTUAL VISIT (OUTPATIENT)
Dept: FAMILY MEDICINE CLINIC | Age: 72
End: 2022-09-16
Payer: MEDICARE

## 2022-09-16 ENCOUNTER — TELEPHONE (OUTPATIENT)
Dept: FAMILY MEDICINE CLINIC | Age: 72
End: 2022-09-16

## 2022-09-16 DIAGNOSIS — R51.9 SINUS HEADACHE: Primary | ICD-10-CM

## 2022-09-16 PROCEDURE — 1123F ACP DISCUSS/DSCN MKR DOCD: CPT | Performed by: FAMILY MEDICINE

## 2022-09-16 PROCEDURE — G8428 CUR MEDS NOT DOCUMENT: HCPCS | Performed by: FAMILY MEDICINE

## 2022-09-16 PROCEDURE — 1101F PT FALLS ASSESS-DOCD LE1/YR: CPT | Performed by: FAMILY MEDICINE

## 2022-09-16 PROCEDURE — 99213 OFFICE O/P EST LOW 20 MIN: CPT | Performed by: FAMILY MEDICINE

## 2022-09-16 PROCEDURE — 3017F COLORECTAL CA SCREEN DOC REV: CPT | Performed by: FAMILY MEDICINE

## 2022-09-16 PROCEDURE — G8756 NO BP MEASURE DOC: HCPCS | Performed by: FAMILY MEDICINE

## 2022-09-16 PROCEDURE — G0463 HOSPITAL OUTPT CLINIC VISIT: HCPCS | Performed by: FAMILY MEDICINE

## 2022-09-16 PROCEDURE — G8432 DEP SCR NOT DOC, RNG: HCPCS | Performed by: FAMILY MEDICINE

## 2022-09-16 RX ORDER — PREDNISONE 20 MG/1
40 TABLET ORAL
Qty: 10 TABLET | Refills: 0 | Status: SHIPPED | OUTPATIENT
Start: 2022-09-16 | End: 2022-09-16 | Stop reason: DRUGHIGH

## 2022-09-16 RX ORDER — PREDNISONE 20 MG/1
40 TABLET ORAL
Qty: 6 TABLET | Refills: 0 | Status: SHIPPED | OUTPATIENT
Start: 2022-09-16 | End: 2022-09-19

## 2022-09-16 NOTE — TELEPHONE ENCOUNTER
----- Message from [facility] GONZALEZ Barerra sent at 9/16/2022  9:52 AM EDT -----  Subject: Message to Provider    QUESTIONS  Information for Provider? The pt stated that he was just seen in the ER   for sinus symptoms and stated that they gave him an antibiotic to treat   his symptoms but he is still feeling bad and experiencing body aches, his   headache is getting worse at this time. He only has relief when he goes to   sleep. The pt has not had a covid test in ten days. The pt would like to   know if he needs to be seen or prescribed something to help.  ---------------------------------------------------------------------------  --------------  [employer]  [phone]; OK to leave message on voicemail  ---------------------------------------------------------------------------  --------------  SCRIPT ANSWERS  Relationship to Patient? Self  Would you describe this as the worst headache of your life? No  Are you having fevers (100.4), chills or sweats? No  Are you having weakness on one side of your body, drooping on one side of   your face or difficult speaking? No  Have you had any injuries to your head? No  Have you recently (14 days) seen a provider for this issue?  Yes

## 2022-09-16 NOTE — TELEPHONE ENCOUNTER
Chief Complaint   Patient presents with    Headache    Generalized Body Aches     Spoke with patient 2 identifiers confirmed. Patient seen at Urgent Care on 9/11/2022 given medications , symptoms have not resolved.   Patient scheduled with Dr. Jose Coronel, nato 9/16/2022 at 1:30 pm.  Yfn Medina LPN

## 2022-09-16 NOTE — PROGRESS NOTES
Ly Silvestre, was evaluated through a synchronous (real-time) audio-video encounter. The patient (or guardian if applicable) is aware that this is a billable service, which includes applicable co-pays. This Virtual Visit was conducted with patient's (and/or legal guardian's) consent. The visit was conducted pursuant to the emergency declaration under the [facility], [address] [place] [address] and the [employer] and [employer] General Act. Patient identification was verified, and a caregiver was present when appropriate. The patient was located at: Home: [address] [place] [address]  The provider was located at: Facility (Appt Department): [address] [place] [address]       Missy Alfaro MD    Subjective:   Ly Silvestre is a 67 y.o. M who was seen for:    Reports 5 days of frontal headache and congestion. Was given Flonase and Cefdinir (was on doxycycline for a foot infection but stopped it once he was given Cefdinir). Feels fine other than an ongoing sinus headache. Hx of recurrent sinusitis s/ps sinus surgery. Tried to call Dr. Patria Ford office but cannot be seen until December. Hx of CAD s/p stent. Using Tylenol, Flonase, antihistamine, Netipot. Was given a Medrol dose pack last week but didn't take it due to concern of high sugars. Previously has take prednisone and it did not affect his sugars too much. Has taken COVID tests which have been negative. Current Outpatient Medications:     cefdinir (OMNICEF) 300 mg capsule, Take 1 Capsule by mouth two (2) times a day., Disp: 20 Capsule, Rfl: 0    methylPREDNISolone (MEDROL DOSEPACK) 4 mg tablet, As directed, Disp: 1 Dose Pack, Rfl: 0    fluticasone propionate (FLONASE) 50 mcg/actuation nasal spray, 2 Sprays by Both Nostrils route daily. , Disp: 1 Each, Rfl: 0    Accu-Chek Guide test strips strip, USE 1 STRIP TO CHECK GLUCOSE ONCE DAILY, Disp: 100 Strip, Rfl: 0    zolpidem (AMBIEN) 5 mg tablet, TAKE 1 TABLET BY MOUTH ONCE DAILY AT BEDTIME AS NEEDED FOR SLEEP, Disp: 30 Tablet, Rfl: 0    montelukast (SINGULAIR) 10 mg tablet, Take 1 tablet by mouth once daily, Disp: 90 Tablet, Rfl: 0    methocarbamoL (ROBAXIN) 750 mg tablet, Take 1 Tablet by mouth two (2) times daily as needed for Pain. (Patient not taking: Reported on 9/11/2022), Disp: 20 Tablet, Rfl: 0    DULoxetine (CYMBALTA) 20 mg capsule, TAKE 1 CAPSULE BY MOUTH NIGHTLY, Disp: 90 Capsule, Rfl: 0    fenofibrate (LOFIBRA) 160 mg tablet, Take 1 tablet by mouth once daily, Disp: 90 Tablet, Rfl: 0    metFORMIN (GLUCOPHAGE) 850 mg tablet, TAKE 1 TABLET BY MOUTH TWICE DAILY WITH MEALS, Disp: 180 Tablet, Rfl: 0    losartan (COZAAR) 25 mg tablet, Take 1 tablet by mouth once daily, Disp: 90 Tablet, Rfl: 0    metoprolol succinate (TOPROL-XL) 25 mg XL tablet, Take 1 tablet by mouth once daily, Disp: 90 Tablet, Rfl: 0    omeprazole (PRILOSEC) 40 mg capsule, Take 1 Capsule by mouth nightly., Disp: 90 Capsule, Rfl: 0    tamsulosin (FLOMAX) 0.4 mg capsule, Take 1 Capsule by mouth daily. , Disp: 90 Capsule, Rfl: 0    ferrous sulfate (IRON) 325 mg (65 mg iron) EC tablet, Take 1 Tablet by mouth Daily (before breakfast). , Disp: 90 Tablet, Rfl: 0    ergocalciferol (ERGOCALCIFEROL) 1,250 mcg (50,000 unit) capsule, Take 1 capsule by mouth once a week, Disp: 12 Capsule, Rfl: 0    esomeprazole (NexIUM) 20 mg capsule, Take 1 Capsule by mouth daily as needed for Gastroesophageal Reflux Disease (GERD) or Cough. , Disp: 90 Capsule, Rfl: 0    atorvastatin (LIPITOR) 10 mg tablet, Take 1 Tablet by mouth nightly., Disp: 90 Tablet, Rfl: 3    nitroglycerin (NITROSTAT) 0.4 mg SL tablet, DISSOLVE ONE TABLET UNDER THE TONGUE EVERY 5 MINUTES AS NEEDED FOR CHEST PAIN.   DO NOT EXCEED A TOTAL OF 3 DOSES IN 15 MINUTES (Patient not taking: No sig reported), Disp: 25 Tablet, Rfl: 3    glucose blood VI test strips (Accu-Chek Guide test strips) strip, Use one strip to check glucose once daily. Dx: E11.4, Disp: 100 Strip, Rfl: 5    glucose blood VI test strips (Accu-Chek Cece Plus test strp) strip, USE ONE STRIP TO CHECK GLUCOSE ONCE DAILY, Disp: 100 Strip, Rfl: 5    aspirin delayed-release 81 mg tablet, Take 162 mg by mouth daily. , Disp: , Rfl:     acetaminophen (TYLENOL) 500 mg tablet, Take 2 Tabs by mouth every six (6) hours. , Disp: 120 Tab, Rfl: 0    Allergies   Allergen Reactions    Levofloxacin Rash    Pcn [Penicillins] Rash    Adhesive Tape-Silicones Unknown (comments)     Other reaction(s): Rash  Medipore tape caused large blisters when removed. Tape [Adhesive] Rash     Medipore tape caused large blisters when removed. Review of Systems   Constitutional:  Negative for fever, malaise/fatigue and weight loss. HENT:  Positive for sinus pain. Negative for congestion, ear pain and sore throat. Respiratory:  Negative for cough, hemoptysis, shortness of breath and wheezing. Cardiovascular:  Negative for chest pain, palpitations, leg swelling and PND. Gastrointestinal:  Negative for abdominal pain, constipation, diarrhea, nausea and vomiting. Neurological:  Positive for headaches. Objective:   No flowsheet data found.      Constitutional: [x] Appears well-developed and well-nourished [x] No apparent distress      [] Abnormal -     Mental status: [x] Alert and awake  [x] Oriented to person/place/time [x] Able to follow commands    [] Abnormal -     Eyes:   EOM    [x]  Normal    [] Abnormal -   Sclera  [x]  Normal    [] Abnormal -          Discharge []  None visible   [] Abnormal -     HENT: [x] Normocephalic, atraumatic  [] Abnormal -   [] Mouth/Throat: Mucous membranes are moist    Neck: [x] No visualized mass [] Abnormal -     Pulmonary/Chest: [x] Respiratory effort normal   [x] No visualized signs of difficulty breathing or respiratory distress        [] Abnormal -         Skin:        [x] No significant exanthematous lesions or discoloration noted on facial skin         [] Abnormal -            Psychiatric:       [x] Normal Affect [] Abnormal -        [x] No Hallucinations    Other pertinent observable physical exam findings:    Assessment & Plan:   Teddy Rivas is a 67 y.o. male who presents today for:    1. Sinus headache  Does not seem to have ongoing infection signs so would withhold additional abx (is allergic to Levaquin and PCN; already did doxycycline and Cefdinir without improvement). Will do a short course of prednisone and continue sinus rinses, Flonase, and antihistamine. Pt to call back ENT clinic to see if he can be seen by another provider or follow up with PCP if symptoms do not improve. Could consider brain imaging at that point.  - predniSONE (DELTASONE) 20 mg tablet; Take 2 Tablets by mouth daily (with breakfast) for 3 days. Dispense: 6 Tablet; Refill: 0       Medications Discontinued During This Encounter   Medication Reason    methylPREDNISolone (MEDROL DOSEPACK) 4 mg tablet LIST CLEANUP    predniSONE (DELTASONE) 20 mg tablet DOSE ADJUSTMENT           Treatment risks/benefits/costs/interactions/alternatives discussed with patient. Advised patient to call back or return to office if symptoms worsen/change/persist. If patient cannot reach us or should anything more severe/urgent arise he/she should proceed directly to the nearest emergency department. Discussed expected course/resolution/complications of diagnosis in detail with patient. Patient expressed understanding with the diagnosis and plan. This dictation may have been completed with Dragon, the computer voice recognition software. Unanticipated grammatical, syntax, homophones, and other interpretive errors are sometimes inadvertently transcribed by the computer software. Please disregard any errors that have escaped final proofreading. Cy Timmons M.D.

## 2022-11-05 DIAGNOSIS — I25.10 ATHEROSCLEROSIS OF NATIVE CORONARY ARTERY OF NATIVE HEART WITHOUT ANGINA PECTORIS: ICD-10-CM

## 2022-11-05 DIAGNOSIS — E11.9 TYPE 2 DIABETES MELLITUS WITHOUT COMPLICATION, WITHOUT LONG-TERM CURRENT USE OF INSULIN (HCC): ICD-10-CM

## 2022-11-05 DIAGNOSIS — I10 ESSENTIAL HYPERTENSION, BENIGN: ICD-10-CM

## 2022-11-05 DIAGNOSIS — E78.2 MIXED DYSLIPIDEMIA: ICD-10-CM

## 2022-11-05 DIAGNOSIS — Z95.5 S/P CORONARY ARTERY STENT PLACEMENT: ICD-10-CM

## 2022-11-05 DIAGNOSIS — F51.01 PRIMARY INSOMNIA: ICD-10-CM

## 2022-11-05 DIAGNOSIS — K29.30 CHRONIC SUPERFICIAL GASTRITIS WITHOUT BLEEDING: ICD-10-CM

## 2022-11-06 RX ORDER — OMEPRAZOLE 40 MG/1
40 CAPSULE, DELAYED RELEASE ORAL
Qty: 90 CAPSULE | Refills: 0 | Status: SHIPPED | OUTPATIENT
Start: 2022-11-06

## 2022-11-06 RX ORDER — FENOFIBRATE 160 MG/1
TABLET ORAL
Qty: 90 TABLET | Refills: 0 | Status: SHIPPED | OUTPATIENT
Start: 2022-11-06

## 2022-11-06 RX ORDER — METFORMIN HYDROCHLORIDE 850 MG/1
TABLET ORAL
Qty: 180 TABLET | Refills: 0 | Status: SHIPPED | OUTPATIENT
Start: 2022-11-06

## 2022-11-06 RX ORDER — ZOLPIDEM TARTRATE 5 MG/1
TABLET ORAL
Qty: 30 TABLET | Refills: 0 | Status: SHIPPED | OUTPATIENT
Start: 2022-11-06

## 2022-11-06 RX ORDER — METOPROLOL SUCCINATE 25 MG/1
TABLET, EXTENDED RELEASE ORAL
Qty: 90 TABLET | Refills: 0 | Status: SHIPPED | OUTPATIENT
Start: 2022-11-06

## 2022-11-07 ENCOUNTER — OFFICE VISIT (OUTPATIENT)
Dept: CARDIOLOGY CLINIC | Age: 72
End: 2022-11-07
Payer: MEDICARE

## 2022-11-07 VITALS
WEIGHT: 182 LBS | BODY MASS INDEX: 26.05 KG/M2 | RESPIRATION RATE: 16 BRPM | OXYGEN SATURATION: 97 % | HEIGHT: 70 IN | HEART RATE: 82 BPM | DIASTOLIC BLOOD PRESSURE: 78 MMHG | SYSTOLIC BLOOD PRESSURE: 122 MMHG

## 2022-11-07 DIAGNOSIS — E11.21 TYPE 2 DIABETES WITH NEPHROPATHY (HCC): ICD-10-CM

## 2022-11-07 DIAGNOSIS — E78.2 MIXED DYSLIPIDEMIA: ICD-10-CM

## 2022-11-07 DIAGNOSIS — I25.10 ATHEROSCLEROSIS OF NATIVE CORONARY ARTERY OF NATIVE HEART WITHOUT ANGINA PECTORIS: Primary | ICD-10-CM

## 2022-11-07 DIAGNOSIS — I10 ESSENTIAL HYPERTENSION, BENIGN: ICD-10-CM

## 2022-11-07 PROCEDURE — 1123F ACP DISCUSS/DSCN MKR DOCD: CPT | Performed by: SPECIALIST

## 2022-11-07 PROCEDURE — G8417 CALC BMI ABV UP PARAM F/U: HCPCS | Performed by: SPECIALIST

## 2022-11-07 PROCEDURE — 2022F DILAT RTA XM EVC RTNOPTHY: CPT | Performed by: SPECIALIST

## 2022-11-07 PROCEDURE — 99214 OFFICE O/P EST MOD 30 MIN: CPT | Performed by: SPECIALIST

## 2022-11-07 PROCEDURE — 3078F DIAST BP <80 MM HG: CPT | Performed by: SPECIALIST

## 2022-11-07 PROCEDURE — G8754 DIAS BP LESS 90: HCPCS | Performed by: SPECIALIST

## 2022-11-07 PROCEDURE — G8510 SCR DEP NEG, NO PLAN REQD: HCPCS | Performed by: SPECIALIST

## 2022-11-07 PROCEDURE — 3044F HG A1C LEVEL LT 7.0%: CPT | Performed by: SPECIALIST

## 2022-11-07 PROCEDURE — G8427 DOCREV CUR MEDS BY ELIG CLIN: HCPCS | Performed by: SPECIALIST

## 2022-11-07 PROCEDURE — G8752 SYS BP LESS 140: HCPCS | Performed by: SPECIALIST

## 2022-11-07 PROCEDURE — G8536 NO DOC ELDER MAL SCRN: HCPCS | Performed by: SPECIALIST

## 2022-11-07 PROCEDURE — 1101F PT FALLS ASSESS-DOCD LE1/YR: CPT | Performed by: SPECIALIST

## 2022-11-07 PROCEDURE — 93000 ELECTROCARDIOGRAM COMPLETE: CPT | Performed by: SPECIALIST

## 2022-11-07 PROCEDURE — 3017F COLORECTAL CA SCREEN DOC REV: CPT | Performed by: SPECIALIST

## 2022-11-07 PROCEDURE — 3074F SYST BP LT 130 MM HG: CPT | Performed by: SPECIALIST

## 2022-11-07 RX ORDER — NITROGLYCERIN 0.4 MG/1
TABLET SUBLINGUAL
Qty: 25 TABLET | Refills: 8 | Status: SHIPPED | OUTPATIENT
Start: 2022-11-07

## 2022-11-07 NOTE — LETTER
11/7/2022    Patient: Mary Green   YOB: 1950   Date of Visit: 11/7/2022     John Hubbard MD  [facility]    Dear John Hubbard MD,    No changes he is doing well with no significant symptoms. Last stress test 2019 and 2021 were both normal stent was 2011. Stable follow-up in 1 year.   Did refill for nitro and other cardiac meds    Sincerely,    Kristen Gabriel MD

## 2022-11-07 NOTE — PROGRESS NOTES
Reji Connor     1950       Mary Jameson MD, [facility]  Date of Visit-11/7/2022   PCP is Victor M Mack MD   [facility] [place]  [facility]  HPI:  Reji Connor is a 67 y.o. male   1 year f/u of   CAD  PCI 7/29/2011-cath [place]-LAD 75% proximal.  D1 50% proximal.  PCI Dr. Acosta Shock NAN 3.0 x 15 Xience V NAN  Echo 10/20/16 Normal  May 2019 he had some chest pain and low BP. Amlodipine was stopped  Nuclear stress test done 6/10/19. Nuclear EF 74% perfusion normal  Nuclear stress test 10/15/21-normal Lexiscan EF 72%  HTN  Diabetes mellitus 2 with insulin use  Hypercholesteremia. Today  He is generally feeling pretty well with no symptoms able to do his usual activities. He gets a brief chest pain lasting seconds about 3-4 times a year. He gets occasionally short of breath. His exercise is limited by right knee pain. He is unable to walk very far. He wants refills for his medications including sublingual nitros. Denies edema, syncope or shortness of breath at rest   Has no tachycardia , palpitations or sense of arrythmia    Review of chart shows a hemoglobin of 12.1 in August, also at that time GFR is 54 with a creatinine of 1.3 chemistries and lipids are reviewed with an LDL of 50 and hemoglobin A1c of 6.6  EKG sinus rhythm within normal limits rate 69  QRS 90   Assessment/Plan:     Patient Instructions   We will see you back for an annual follow up. 1. Atherosclerosis of native coronary artery of native heart without angina pectoris  PCI to LAD with NAN in 2011. Main risk factor is DM  Has no angina prior stress test have been normal.  He has a reasonable level of activity mainly limited by his knee. We will continue with current medications and plans. Do refills    2. Essential hypertension, benign  Well controlled.    At goal , meds and possible side effects reviewed and patient denies  Key CAD CHF Meds nitroglycerin (NITROSTAT) 0.4 mg SL tablet (Taking) DISSOLVE ONE TABLET UNDER THE TONGUE EVERY 5 MINUTES AS NEEDED FOR CHEST PAIN. DO NOT EXCEED A TOTAL OF 3 DOSES IN 15 MINUTES    fenofibrate (LOFIBRA) 160 mg tablet (Taking) Take 1 tablet by mouth once daily    metoprolol succinate (TOPROL-XL) 25 mg XL tablet (Taking) Take 1 tablet by mouth once daily    losartan (COZAAR) 25 mg tablet (Taking) Take 1 tablet by mouth once daily    atorvastatin (LIPITOR) 10 mg tablet (Taking) Take 1 Tablet by mouth nightly. aspirin delayed-release 81 mg tablet (Taking) Take 162 mg by mouth daily. BP Readings from Last 6 Encounters:   11/07/22 122/78   09/11/22 (!) 165/82   09/04/22 (!) 161/85   08/02/22 121/71   07/13/22 120/70   03/29/22 134/77        3. Mixed dyslipidemia  Lipids on high potency statin as appropriate for secondary prevention. At goal , denies excess muscle aches or new liver issues  Key Antihyperlipidemia Meds               fenofibrate (LOFIBRA) 160 mg tablet (Taking) Take 1 tablet by mouth once daily    atorvastatin (LIPITOR) 10 mg tablet (Taking) Take 1 Tablet by mouth nightly. Lab Results   Component Value Date/Time    LDL, calculated 50 08/02/2022 10:24 AM       4. Type 2 diabetes with nephropathy (HCC)  Lab Results   Component Value Date/Time    Hemoglobin A1c 6.6 (H) 08/02/2022 10:24 AM            Impression:   1. Atherosclerosis of native coronary artery of native heart without angina pectoris    2. Essential hypertension, benign    3. Mixed dyslipidemia    4. Type 2 diabetes with nephropathy [facility])       Future Appointments   Date Time Provider Tima Greenberg   11/30/2022 11:20 AM MD MAYELIN Polanco BS AMB   11/7/2023  9:40 AM MD DAVE Hassan BS AMB      Cardiac History:   PCI 7- prox 75% LAD, D1 50% EF 60%; NAN 3 x 15 mm Xience to LAD  Stress echo 5-28-13=  Walked 3 minutes, Exercise stress echo is normal.      ROS-except as noted above. . A complete cardiac and respiratory are reviewed and negative except as above ; Resp-denies wheezing  or productive cough,. Const- No unusual weight loss or fever; Neuro-no recent seizure or CVA ; GI- No BRBPR, abdom pain, bloating ; - no  hematuria   see supplement sheet, initialed and to be scanned by staff  Past Medical History:   Diagnosis Date    Arthritis     KNEES & BACK. 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 He has quit using smokeless tobacco. He reports current alcohol use of about 4.0 standard drinks per week. He reports that he does not use drugs. Exam and Labs:  /78 (BP 1 Location: Left upper arm, BP Patient Position: Sitting, BP Cuff Size: Adult)   Pulse 82   Resp 16   Ht 5' 10\" (1.778 m)   Wt 182 lb (82.6 kg)   SpO2 97%   BMI 26.11 kg/m² Constitutional:  NAD, comfortable  Head: NC,AT. Eyes: No scleral icterus. Neck:  Neck supple. No JVD present. Throat: moist mucous membranes. Chest: Effort normal & normal respiratory excursion . Neurological: alert, conversant and oriented . Skin: Skin is not cold. No obvious systemic rash noted. Not diaphoretic. No erythema. Psychiatric:  Grossly normal mood and affect. Behavior appears normal. Extremities:  no clubbing or cyanosis. Abdomen: non distended    Lungs:breath sounds normal. No stridor. distress, wheezes or  Rales. Heart: normal rate, regular rhythm, normal S1, S2, no murmurs, rubs, clicks or gallops , PMI non displaced. Edema: Edema is none. Lab Results   Component Value Date/Time    Cholesterol, total 117 08/02/2022 10:24 AM    HDL Cholesterol 44 08/02/2022 10:24 AM    LDL, calculated 50 08/02/2022 10:24 AM    Triglyceride 115 08/02/2022 10:24 AM    CHOL/HDL Ratio 2.7 08/02/2022 10:24 AM     Lab Results   Component Value Date/Time    Sodium 141 08/02/2022 10:24 AM    Potassium 5.1 08/02/2022 10:24 AM    Chloride 111 (H) 08/02/2022 10:24 AM    CO2 24 08/02/2022 10:24 AM    Anion gap 6 08/02/2022 10:24 AM    Glucose 139 (H) 08/02/2022 10:24 AM    BUN 19 08/02/2022 10:24 AM    Creatinine 1.30 08/02/2022 10:24 AM    BUN/Creatinine ratio 15 08/02/2022 10:24 AM    GFR est AA >60 08/02/2022 10:24 AM    GFR est non-AA 54 (L) 08/02/2022 10:24 AM    Calcium 9.5 08/02/2022 10:24 AM      Wt Readings from Last 3 Encounters:   11/07/22 182 lb (82.6 kg)   09/11/22 184 lb (83.5 kg)   09/04/22 190 lb (86.2 kg)      BP Readings from Last 3 Encounters:   11/07/22 122/78   09/11/22 (!) 165/82   09/04/22 (!) 161/85      Current Outpatient Medications   Medication Sig    nitroglycerin (NITROSTAT) 0.4 mg SL tablet DISSOLVE ONE TABLET UNDER THE TONGUE EVERY 5 MINUTES AS NEEDED FOR CHEST PAIN. DO NOT EXCEED A TOTAL OF 3 DOSES IN 15 MINUTES    omeprazole (PRILOSEC) 40 mg capsule TAKE 1 CAPSULE BY MOUTH NIGHTLY    metFORMIN (GLUCOPHAGE) 850 mg tablet TAKE 1 TABLET BY MOUTH TWICE DAILY WITH MEALS    fenofibrate (LOFIBRA) 160 mg tablet Take 1 tablet by mouth once daily    metoprolol succinate (TOPROL-XL) 25 mg XL tablet Take 1 tablet by mouth once daily    zolpidem (AMBIEN) 5 mg tablet TAKE 1 TABLET BY MOUTH ONCE DAILY AT BEDTIME AS NEEDED FOR SLEEP    fluticasone propionate (FLONASE) 50 mcg/actuation nasal spray 2 Sprays by Both Nostrils route daily.     Accu-Chek Guide test strips strip USE 1 STRIP TO CHECK GLUCOSE ONCE DAILY    montelukast (SINGULAIR) 10 mg tablet Take 1 tablet by mouth once daily    methocarbamoL (ROBAXIN) 750 mg tablet Take 1 Tablet by mouth two (2) times daily as needed for Pain. DULoxetine (CYMBALTA) 20 mg capsule TAKE 1 CAPSULE BY MOUTH NIGHTLY    losartan (COZAAR) 25 mg tablet Take 1 tablet by mouth once daily    tamsulosin (FLOMAX) 0.4 mg capsule Take 1 Capsule by mouth daily. esomeprazole (NexIUM) 20 mg capsule Take 1 Capsule by mouth daily as needed for Gastroesophageal Reflux Disease (GERD) or Cough. atorvastatin (LIPITOR) 10 mg tablet Take 1 Tablet by mouth nightly. glucose blood VI test strips (Accu-Chek Guide test strips) strip Use one strip to check glucose once daily. Dx: E11.4    glucose blood VI test strips (Accu-Chek Cece Plus test strp) strip USE ONE STRIP TO CHECK GLUCOSE ONCE DAILY    aspirin delayed-release 81 mg tablet Take 162 mg by mouth daily. cefdinir (OMNICEF) 300 mg capsule Take 1 Capsule by mouth two (2) times a day. (Patient not taking: Reported on 11/7/2022)    ferrous sulfate (IRON) 325 mg (65 mg iron) EC tablet Take 1 Tablet by mouth Daily (before breakfast). (Patient not taking: Reported on 11/7/2022)    ergocalciferol (ERGOCALCIFEROL) 1,250 mcg (50,000 unit) capsule Take 1 capsule by mouth once a week (Patient not taking: Reported on 11/7/2022)    acetaminophen (TYLENOL) 500 mg tablet Take 2 Tabs by mouth every six (6) hours. (Patient not taking: Reported on 11/7/2022)     No current facility-administered medications for this visit. Impression see above.

## 2022-11-30 ENCOUNTER — OFFICE VISIT (OUTPATIENT)
Dept: FAMILY MEDICINE CLINIC | Age: 72
End: 2022-11-30
Payer: MEDICARE

## 2022-11-30 VITALS
WEIGHT: 184.4 LBS | HEART RATE: 68 BPM | SYSTOLIC BLOOD PRESSURE: 120 MMHG | TEMPERATURE: 97.6 F | OXYGEN SATURATION: 98 % | HEIGHT: 70 IN | DIASTOLIC BLOOD PRESSURE: 72 MMHG | BODY MASS INDEX: 26.4 KG/M2 | RESPIRATION RATE: 16 BRPM

## 2022-11-30 DIAGNOSIS — I10 ESSENTIAL HYPERTENSION, BENIGN: ICD-10-CM

## 2022-11-30 DIAGNOSIS — Z71.89 ADVANCED DIRECTIVES, COUNSELING/DISCUSSION: ICD-10-CM

## 2022-11-30 DIAGNOSIS — N18.31 STAGE 3A CHRONIC KIDNEY DISEASE (HCC): ICD-10-CM

## 2022-11-30 DIAGNOSIS — E53.8 VITAMIN B12 DEFICIENCY: ICD-10-CM

## 2022-11-30 DIAGNOSIS — E78.2 MIXED DYSLIPIDEMIA: ICD-10-CM

## 2022-11-30 DIAGNOSIS — Z00.00 MEDICARE ANNUAL WELLNESS VISIT, SUBSEQUENT: Primary | ICD-10-CM

## 2022-11-30 DIAGNOSIS — Z13.31 SCREENING FOR DEPRESSION: ICD-10-CM

## 2022-11-30 DIAGNOSIS — E11.21 TYPE 2 DIABETES WITH NEPHROPATHY (HCC): ICD-10-CM

## 2022-11-30 DIAGNOSIS — F51.01 PRIMARY INSOMNIA: ICD-10-CM

## 2022-11-30 DIAGNOSIS — J32.4 CHRONIC PANSINUSITIS: ICD-10-CM

## 2022-11-30 DIAGNOSIS — E11.40 TYPE 2 DIABETES MELLITUS WITH DIABETIC NEUROPATHY, WITHOUT LONG-TERM CURRENT USE OF INSULIN (HCC): ICD-10-CM

## 2022-11-30 DIAGNOSIS — Z23 ENCOUNTER FOR IMMUNIZATION: ICD-10-CM

## 2022-11-30 DIAGNOSIS — I25.10 ATHEROSCLEROSIS OF NATIVE CORONARY ARTERY OF NATIVE HEART WITHOUT ANGINA PECTORIS: ICD-10-CM

## 2022-11-30 DIAGNOSIS — D50.8 IRON DEFICIENCY ANEMIA SECONDARY TO INADEQUATE DIETARY IRON INTAKE: ICD-10-CM

## 2022-11-30 PROCEDURE — G0463 HOSPITAL OUTPT CLINIC VISIT: HCPCS | Performed by: FAMILY MEDICINE

## 2022-11-30 PROCEDURE — 99497 ADVNCD CARE PLAN 30 MIN: CPT | Performed by: FAMILY MEDICINE

## 2022-11-30 PROCEDURE — 90694 VACC AIIV4 NO PRSRV 0.5ML IM: CPT | Performed by: FAMILY MEDICINE

## 2022-11-30 RX ORDER — FLUTICASONE PROPIONATE 50 MCG
2 SPRAY, SUSPENSION (ML) NASAL DAILY
Qty: 1 EACH | Refills: 0 | Status: SHIPPED | OUTPATIENT
Start: 2022-11-30

## 2022-11-30 RX ORDER — CLOTRIMAZOLE AND BETAMETHASONE DIPROPIONATE 10; .64 MG/G; MG/G
CREAM TOPICAL
Qty: 45 G | Refills: 3 | Status: SHIPPED | OUTPATIENT
Start: 2022-11-30

## 2022-11-30 NOTE — PROGRESS NOTES
This is the Subsequent Medicare Annual Wellness Exam, performed 12 months or more after the Initial AWV or the last Subsequent AWV    I have reviewed the patient's medical history in detail and updated the computerized patient record. We did a Medicare Wellness evaluation including a review of past, family, and social histories with attention to age/sex appropriate screening, risk assessment, preventive care and counseling. Any cognitive, fall risk, or ADL issues identified are addressed separately. A patient specific preventive care plan was provided and appropriate screening tests were ordered as specified. Assessment/Plan   Education and counseling provided:  Are appropriate based on today's review and evaluation    1. Medicare annual wellness visit, subsequent  2. Type 2 diabetes with nephropathy ([facility]  Assessment & Plan:   well controlled, continue current medications, continue current treatment plan, medication adherence emphasized, lifestyle modifications recommended  Orders:  -     METABOLIC PANEL, COMPREHENSIVE; Future  -     LIPID PANEL; Future  -     HEMOGLOBIN A1C WITH EAG; Future  3. Stage 3a chronic kidney disease ([facility]  Assessment & Plan:   well controlled, continue current medications, continue current treatment plan, medication adherence emphasized  Orders:  -     CBC WITH AUTOMATED DIFF; Future  -     METABOLIC PANEL, COMPREHENSIVE; Future  4. Type 2 diabetes mellitus with diabetic neuropathy, without long-term current use of insulin ([facility]  Assessment & Plan:   well controlled, continue current medications, continue current treatment plan, medication adherence emphasized, lifestyle modifications recommended  Orders:  -     METABOLIC PANEL, COMPREHENSIVE; Future  -     LIPID PANEL; Future  -     HEMOGLOBIN A1C WITH EAG; Future  5. Essential hypertension, benign  -     METABOLIC PANEL, COMPREHENSIVE; Future  6.  Atherosclerosis of native coronary artery of native heart without angina pectoris  -     LIPID PANEL; Future  7. Vitamin B12 deficiency  -     VITAMIN B12; Future  8. Iron deficiency anemia secondary to inadequate dietary iron intake  -     CBC WITH AUTOMATED DIFF; Future  9. Primary insomnia  10. Chronic pansinusitis  -     fluticasone propionate (FLONASE) 50 mcg/actuation nasal spray; 2 Sprays by Both Nostrils route daily. , Normal, Disp-1 Each, R-0  11. Encounter for immunization  -     ADMIN INFLUENZA VIRUS VAC  -     INFLUENZA, FLUAD, (AGE 65 Y+), IM, PF, 0.5 ML  12. Mixed dyslipidemia  -     METABOLIC PANEL, COMPREHENSIVE; Future  -     LIPID PANEL; Future  -     TSH 3RD GENERATION; Future  13. Advanced directives, counseling/discussion  -     ADVANCE CARE PLANNING FIRST 30 MINS  -     FULL CODE  14. Screening for depression  -     DEPRESSION SCREEN ANNUAL       Depression Risk Factor Screening     3 most recent PHQ Screens 11/30/2022   Little interest or pleasure in doing things Not at all   Feeling down, depressed, irritable, or hopeless Not at all   Total Score PHQ 2 0   Trouble falling or staying asleep, or sleeping too much -   Feeling tired or having little energy -   Poor appetite, weight loss, or overeating -   Feeling bad about yourself - or that you are a failure or have let yourself or your family down -   Trouble concentrating on things such as school, work, reading, or watching TV -   Moving or speaking so slowly that other people could have noticed; or the opposite being so fidgety that others notice -   Thoughts of being better off dead, or hurting yourself in some way -   PHQ 9 Score -   How difficult have these problems made it for you to do your work, take care of your home and get along with others -     We spent 10 minutes on importance of depression screening . Depression screening is a way to see if you have depression symptoms. It may be done by a doctor or counselor. It's often part of a routine checkup.  That's because your mental health is just as important as your physical health. Depression is a medical illness that affects how you feel, think, and act. You may:  Have less energy. Lose interest in your daily activities. Feel sad and grouchy for a long time. Depression is very common. It affects men and women of all ages. Many things can trigger depression. Some people become depressed after they have a stroke or find out they have a major illness like cancer or heart disease. The death of a loved one, a breakup, or changes in the natural brain chemicals may lead to depression. It can run in families. Most experts believe that a combination of inherited genes and stressful life events can cause it. Alcohol & Drug Abuse Risk Screen   Do you average more than 1 drink per night or more than 7 drinks a week?: (P) No  In the past three months have you had more than 4 drinks containing alcohol on one occasion?: (P) No          Functional Ability and Level of Safety   Hearing:  Hearing: (P) Patient wears hearing aid    Activities of Daily Living: The home contains: (P) handrails  Functional ADLs: (P) Patient does total self care   Ambulation:  Patient ambulates: (P) with mild difficulty  How far the patient can walk with difficulty: (P) 2or3 block  Walking is difficult due to: (P) pain  Walking aids: (P) additional comments below  Additional comments : (P) None   Fall Risk:  Fall Risk Assessment, last 12 mths 11/30/2022   Able to walk? Yes   Fall in past 12 months? 0   Do you feel unsteady?  0   Are you worried about falling 0     Abuse Screen:  Do you ever feel afraid of your partner?: No  Are you in a relationship with someone who physically or mentally threatens you?: No  Is it safe for you to go home?: Yes      Cognitive Screening   Has your family/caregiver stated any concerns about your memory?: (P) No   Cognitive Screening: Normal - MMSE (Mini Mental Status Exam)  30/30  Health Maintenance Due     Health Maintenance Due   Topic Date Due    Shingrix Vaccine Age 50> (2 of 2) 12/18/2020    COVID-19 Vaccine (4 - Booster for Moderna series) 01/31/2022    Eye Exam Retinal or Dilated  02/24/2022       Patient Care Team   Patient Care Team:  Flores Cardoso MD as PCP - General (Family Medicine)  Flores Cardoso MD as PCP - REHABILITATION [facility] Empaneled Provider  Aleja Guerrero MD (Orthopedic Surgery)  Tigist Francis MD (Cardiovascular Disease Physician)  Talita Estrella MD (Urology)  Maria Ines Gant MD (Physical Medicine and Rehabilitation Physician)    History     Patient Active Problem List   Diagnosis Code    Essential hypertension, benign I10    Mixed dyslipidemia E78.2    Coronary atherosclerosis of native coronary artery I25.10    S/P coronary artery stent placement Z95.5    Right knee DJD M17.11    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 with presence of antibiotic-impregnated cement spacer Z89.529    History of non anemic vitamin B12 deficiency Z86.39    Vitamin B12 deficiency E53.8    Chronic pansinusitis J32.4    CKD (chronic kidney disease) stage 3, GFR 30-59 ml/min ([facility]) N18.30    Type 2 diabetes mellitus with diabetic neuropathy, without long-term current use of insulin ([facility]) E11.40    Type 2 diabetes with nephropathy ([facility]) E11.21    Iron deficiency anemia secondary to inadequate dietary iron intake D50.8    Primary insomnia F51.01     Past Medical History:   Diagnosis Date    Arthritis     KNEES & BACK.     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 HI CARDIAC SURG PROCEDURE UNLIST  2009    CATHERIZATION WITH STENT X 1 PLACEMENT     Current Outpatient Medications   Medication Sig Dispense Refill    clotrimazole-betamethasone (LOTRISONE) topical cream Apply two times daily 45 g 3    fluticasone propionate (FLONASE) 50 mcg/actuation nasal spray 2 Sprays by Both Nostrils route daily. 1 Each 0    nitroglycerin (NITROSTAT) 0.4 mg SL tablet DISSOLVE ONE TABLET UNDER THE TONGUE EVERY 5 MINUTES AS NEEDED FOR CHEST PAIN. DO NOT EXCEED A TOTAL OF 3 DOSES IN 15 MINUTES 25 Tablet 8    omeprazole (PRILOSEC) 40 mg capsule TAKE 1 CAPSULE BY MOUTH NIGHTLY 90 Capsule 0    metFORMIN (GLUCOPHAGE) 850 mg tablet TAKE 1 TABLET BY MOUTH TWICE DAILY WITH MEALS 180 Tablet 0    fenofibrate (LOFIBRA) 160 mg tablet Take 1 tablet by mouth once daily 90 Tablet 0    metoprolol succinate (TOPROL-XL) 25 mg XL tablet Take 1 tablet by mouth once daily 90 Tablet 0    zolpidem (AMBIEN) 5 mg tablet TAKE 1 TABLET BY MOUTH ONCE DAILY AT BEDTIME AS NEEDED FOR SLEEP 30 Tablet 0    Accu-Chek Guide test strips strip USE 1 STRIP TO CHECK GLUCOSE ONCE DAILY 100 Strip 0    montelukast (SINGULAIR) 10 mg tablet Take 1 tablet by mouth once daily 90 Tablet 0    DULoxetine (CYMBALTA) 20 mg capsule TAKE 1 CAPSULE BY MOUTH NIGHTLY 90 Capsule 0    losartan (COZAAR) 25 mg tablet Take 1 tablet by mouth once daily 90 Tablet 0    tamsulosin (FLOMAX) 0.4 mg capsule Take 1 Capsule by mouth daily. 90 Capsule 0    atorvastatin (LIPITOR) 10 mg tablet Take 1 Tablet by mouth nightly.  90 Tablet 3 glucose blood VI test strips (Accu-Chek Guide test strips) strip Use one strip to check glucose once daily. Dx: E11.4 100 Strip 5    glucose blood VI test strips (Accu-Chek Cece Plus test strp) strip USE ONE STRIP TO CHECK GLUCOSE ONCE DAILY 100 Strip 5    aspirin delayed-release 81 mg tablet Take 162 mg by mouth daily. acetaminophen (TYLENOL) 500 mg tablet Take 2 Tabs by mouth every six (6) hours. 120 Tab 0    ferrous sulfate (IRON) 325 mg (65 mg iron) EC tablet Take 1 Tablet by mouth Daily (before breakfast). (Patient not taking: No sig reported) 90 Tablet 0    esomeprazole (NexIUM) 20 mg capsule Take 1 Capsule by mouth daily as needed for Gastroesophageal Reflux Disease (GERD) or Cough. (Patient not taking: Reported on 11/30/2022) 90 Capsule 0     Allergies   Allergen Reactions    Levofloxacin Rash    Pcn [Penicillins] Rash    Adhesive Tape-Silicones Unknown (comments)     Other reaction(s): Rash  Medipore tape caused large blisters when removed. Tape [Adhesive] Rash     Medipore tape caused large blisters when removed. Family History   Problem Relation Age of Onset    Diabetes Mother     Heart Disease Mother     Diabetes Son         TYPE 1    Alcohol abuse Son     Anesth Problems Neg Hx      Social History     Tobacco Use    Smoking status: Never    Smokeless tobacco: Former    Tobacco comments:     Patient states on and off for chewing tobacco.     Substance Use Topics    Alcohol use:  Yes     Alcohol/week: 4.0 standard drinks     Types: 4 Cans of beer per week         Florina Amador MD

## 2022-11-30 NOTE — ASSESSMENT & PLAN NOTE
well controlled, continue current medications, continue current treatment plan, medication adherence emphasized

## 2022-11-30 NOTE — PATIENT INSTRUCTIONS
Medicare Wellness Visit, Male    The best way to live healthy is to have a lifestyle where you eat a well-balanced diet, exercise regularly, limit alcohol use, and quit all forms of tobacco/nicotine, if applicable. Regular preventive services are another way to keep healthy. Preventive services (vaccines, screening tests, monitoring & exams) can help personalize your care plan, which helps you manage your own care. Screening tests can find health problems at the earliest stages, when they are easiest to treat. Maria Isabeljanelle follows the current, evidence-based guidelines published by the [facility] Joshua Sayra ([facility]) when recommending preventive services for our patients. Because we follow these guidelines, sometimes recommendations change over time as research supports it. (For example, a prostate screening blood test is no longer routinely recommended for men with no symptoms). Of course, you and your doctor may decide to screen more often for some diseases, based on your risk and co-morbidities (chronic disease you are already diagnosed with). Preventive services for you include:  - Medicare offers their members a free annual wellness visit, which is time for you and your primary care provider to discuss and plan for your preventive service needs. Take advantage of this benefit every year!  -All adults over age 72 should receive the recommended pneumonia vaccines. Current USPSTF guidelines recommend a series of two vaccines for the best pneumonia protection.   -All adults should have a flu vaccine yearly and tetanus vaccine every 10 years.  -All adults age 48 and older should receive the shingles vaccines (series of two vaccines).        -All adults age 38-68 who are overweight should have a diabetes screening test once every three years.   -Other screening tests & preventive services for persons with diabetes include: an eye exam to screen for diabetic retinopathy, a kidney function test, a foot exam, and stricter control over your cholesterol.   -Cardiovascular screening for adults with routine risk involves an electrocardiogram (ECG) at intervals determined by the provider.   -Colorectal cancer screening should be done for adults age 54-65 with no increased risk factors for colorectal cancer. There are a number of acceptable methods of screening for this type of cancer. Each test has its own benefits and drawbacks. Discuss with your provider what is most appropriate for you during your annual wellness visit. The different tests include: colonoscopy (considered the best screening method), a fecal occult blood test, a fecal DNA test, and sigmoidoscopy.  -All adults born between [facility] should be screened once for Hepatitis C.  -An Abdominal Aortic Aneurysm (AAA) Screening is recommended for men age 73-68 who has ever smoked in their lifetime. Here is a list of your current Health Maintenance items (your personalized list of preventive services) with a due date:  Health Maintenance Due   Topic Date Due    Shingles Vaccine (2 of 2) 12/18/2020    COVID-19 Vaccine (4 - Booster for Moderna series) 01/31/2022    Eye Exam  02/24/2022    Yearly Flu Vaccine (1) 08/01/2022            Learning About Living Rocael Parker  What is a living will? A living will is a legal form you use to write down the kind of care you want at the end of your life. It is used by the health professionals who will treat you if you aren't able to decide for yourself. If you put your wishes in writing, your loved ones and others will know what kind of care you want. They won't need to guess. This can ease your mind and be helpful to others. A living will is not the same as an estate or property will. An estate will explains what you want to happen with your money and property after you die. Is a living will a legal document? A living will is a legal document.  Each state has its own laws about living el. If you move to another state, make sure that your living will is legal in the state where you now live. Or you might use a universal form that has been approved by many states. This kind of form can sometimes be completed and stored online. Your electronic copy will then be available wherever you have a connection to the Internet. In most cases, doctors will respect your wishes even if you have a form from a different state. You don't need an  to complete a living will. But legal advice can be helpful if your state's laws are unclear, your health history is complicated, or your family can't agree on what should be in your living will. You can change your living will at any time. Some people find that their wishes about end-of-life care change as their health changes. In addition to making a living will, think about completing a medical power of  form. This form lets you name the person you want to make end-of-life treatment decisions for you (your \"health care agent\") if you're not able to. Many hospitals and nursing homes will give you the forms you need to complete a living will and a medical power of . Your living will is used only if you can't make or communicate decisions for yourself anymore. If you become able to make decisions again, you can accept or refuse any treatment, no matter what you wrote in your living will. Your state may offer an online registry. This is a place where you can store your living will online so the doctors and nurses who need to treat you can find it right away. What should you think about when creating a living will? Talk about your end-of-life wishes with your family members and your doctor. Let them know what you want. That way the people making decisions for you won't be surprised by your choices. Think about these questions as you make your living will:  Do you know enough about life support methods that might be used?  If not, talk to your doctor so you know what might be done if you can't breathe on your own, your heart stops, or you're unable to swallow. What things would you still want to be able to do after you receive life-support methods? Would you want to be able to walk? To speak? To eat on your own? To live without the help of machines? If you have a choice, where do you want to be cared for? In your home? At a hospital or nursing home? Do you want certain [employer] practices performed if you become very ill? If you have a choice at the end of your life, where would you prefer to die? At home? In a hospital or nursing home? Somewhere else? Would you prefer to be buried or cremated? Do you want your organs to be donated after you die? What should you do with your living will? Make sure that your family members and your health care agent have copies of your living will. Give your doctor a copy of your living will to keep in your medical record. If you have more than one doctor, make sure that each one has a copy. You may want to put a copy of your living will where it can be easily found. Where can you learn more? Go to http://www.gray.com/. Enter V573 in the search box to learn more about \"Learning About Living [place]. \"  Current as of: August 8, 2016  Content Version: 11.3  © 4246-3156 [employer]. Care instructions adapted under license by [employer] (which disclaims liability or warranty for this information). If you have questions about a medical condition or this instruction, always ask your healthcare professional. [place] [address] any warranty or liability for your use of this information. Preventing Falls: Care Instructions  Your Care Instructions    Getting around your home safely can be a challenge if you have injuries or health problems that make it easy for you to fall.  Loose rugs and furniture in walkways are among the dangers for many older people who have problems walking or who have poor eyesight. People who have conditions such as arthritis, osteoporosis, or dementia also have to be careful not to fall. You can make your home safer with a few simple measures. Follow-up care is a key part of your treatment and safety. Be sure to make and go to all appointments, and call your doctor if you are having problems. It's also a good idea to know your test results and keep a list of the medicines you take. How can you care for yourself at home? Taking care of yourself  You may get dizzy if you do not drink enough water. To prevent dehydration, drink plenty of fluids, enough so that your urine is light yellow or clear like water. Choose water and other caffeine-free clear liquids. If you have kidney, heart, or liver disease and have to limit fluids, talk with your doctor before you increase the amount of fluids you drink. Exercise regularly to improve your strength, muscle tone, and balance. Walk if you can. Swimming may be a good choice if you cannot walk easily. Have your vision and hearing checked each year or any time you notice a change. If you have trouble seeing and hearing, you might not be able to avoid objects and could lose your balance. Know the side effects of the medicines you take. Ask your doctor or pharmacist whether the medicines you take can affect your balance. Sleeping pills or sedatives can affect your balance. Limit the amount of alcohol you drink. Alcohol can impair your balance and other senses. Ask your doctor whether calluses or corns on your feet need to be removed. If you wear loose-fitting shoes because of calluses or corns, you can lose your balance and fall. Talk to your doctor if you have numbness in your feet. Preventing falls at home  Remove raised doorway thresholds, throw rugs, and clutter. Repair loose carpet or raised areas in the floor.   Move furniture and electrical cords to keep them out of walking paths. Use nonskid floor wax, and wipe up spills right away, especially on ceramic tile floors. If you use a walker or cane, put rubber tips on it. If you use crutches, clean the bottoms of them regularly with an abrasive pad, such as steel wool. Keep your house well lit, especially stairways, porches, and outside walkways. Use night-lights in areas such as hallways and bathrooms. Add extra light switches or use remote switches (such as switches that go on or off when you clap your hands) to make it easier to turn lights on if you have to get up during the night. Install sturdy handrails on stairways. Move items in your cabinets so that the things you use a lot are on the lower shelves (about waist level). Keep a cordless phone and a flashlight with new batteries by your bed. If possible, put a phone in each of the main rooms of your house, or carry a cell phone in case you fall and cannot reach a phone. Or, you can wear a device around your neck or wrist. You push a button that sends a signal for help. Wear low-heeled shoes that fit well and give your feet good support. Use footwear with nonskid soles. Check the heels and soles of your shoes for wear. Repair or replace worn heels or soles. Do not wear socks without shoes on wood floors. Walk on the grass when the sidewalks are slippery. If you live in an area that gets snow and ice in the winter, sprinkle salt on slippery steps and sidewalks. Preventing falls in the bath  Install grab bars and nonskid mats inside and outside your shower or tub and near the toilet and sinks. Use shower chairs and bath benches. Use a hand-held shower head that will allow you to sit while showering. Get into a tub or shower by putting the weaker leg in first. Get out of a tub or shower with your strong side first.  Repair loose toilet seats and consider installing a raised toilet seat to make getting on and off the toilet easier.   Keep your bathroom door unlocked while you are in the shower. Where can you learn more? Go to http://www.Hyle[employer].com/. Enter [phone] [id number] [phone] in the search box to learn more about \"Preventing Falls: Care Instructions. \"  Current as of: March 16, 2018  Content Version: 11.8  © 9027-1210 [employer], [employer]. Care instructions adapted under license by [employer] (which disclaims liability or warranty for this information). If you have questions about a medical condition or this instruction, always ask your healthcare professional. [address] any warranty or liability for your use of this information.

## 2022-11-30 NOTE — ACP (ADVANCE CARE PLANNING)
Advance Care Planning     Advance Care Planning (ACP) Physician/NP/PA Conversation      Date of Conversation: 11/30/2022  Conducted with: Patient with Decision Making Capacity    Healthcare Decision Maker:     Primary Decision Maker: Parmjit [facility] - [phone]    Secondary Decision Maker: Hillary Lazcano - Daughter - [phone]  Click here to complete [address] including selection of the Healthcare Decision Maker Relationship (ie \"Primary\")      Today we documented Decision Maker(s). The patient will provide ACP documents. Care Preferences:    Hospitalization: \"If your health worsens and it becomes clear that your chance of recovery is unlikely, what would be your preference regarding hospitalization? \"  The patient would prefer hospitalization. Ventilation: \"If you were unable to breathe on your own and your chance of recovery was unlikely, what would be your preference about the use of a ventilator (breathing machine) if it was available to you? \"   The patient would desire the use of a ventilator. Resuscitation: \"In the event your heart stopped as a result of an underlying serious health condition, would you want attempts to be made to restart your heart, or would you prefer a natural death? \"   Yes, attempt to resuscitate.     Additional topics discussed: treatment goals, benefit/burden of treatment options, artificial nutrition, ventilation preferences, hospitalization preferences, resuscitation preferences, end of life care preferences (vegetative state/imminent death), and hospice care    Conversation Outcomes / Follow-Up Plan:   ACP in process - completing/providing documents   Reviewed DNR/DNI and patient elects Full Code (Attempt Resuscitation)     Length of Voluntary ACP Conversation in minutes:  16 minutes    Hilario Cha MD

## 2022-11-30 NOTE — PROGRESS NOTES
Chief Complaint   Patient presents with    Annual Wellness Visit     Follow up       1. \"Have you been to the ER, urgent care clinic since your last visit? Hospitalized since your last visit? \" No    2. \"Have you seen or consulted any other health care providers outside of the [address] [place] [address] since your last visit? \" Yes When: yesterday Where: [facility]      3. For patients aged 39-70: Has the patient had a colonoscopy / FIT/ Cologuard? No      If the patient is female:    4. For patients aged 41-77: Has the patient had a mammogram within the past 2 years? NA - based on age or sex      11. For patients aged 21-65: Has the patient had a pap smear? NA - based on age or sex         3 most recent PHQ Screens 11/30/2022   Little interest or pleasure in doing things Not at all   Feeling down, depressed, irritable, or hopeless Not at all   Total Score PHQ 2 0   Trouble falling or staying asleep, or sleeping too much -   Feeling tired or having little energy -   Poor appetite, weight loss, or overeating -   Feeling bad about yourself - or that you are a failure or have let yourself or your family down -   Trouble concentrating on things such as school, work, reading, or watching TV -   Moving or speaking so slowly that other people could have noticed; or the opposite being so fidgety that others notice -   Thoughts of being better off dead, or hurting yourself in some way -   PHQ 9 Score -   How difficult have these problems made it for you to do your work, take care of your home and get along with others -       Abuse Screening Questionnaire 11/30/2022   Do you ever feel afraid of your partner? N   Are you in a relationship with someone who physically or mentally threatens you? N   Is it safe for you to go home?  Y       ADL Assessment 11/30/2022   Feeding yourself No Help Needed   Getting from bed to chair No Help Needed   Getting dressed No Help Needed   Bathing or showering No Help Needed   Walk across the room (includes cane/walker) No Help Needed   Using the telphone No Help Needed   Taking your medications No Help Needed   Preparing meals No Help Needed   Managing money (expenses/bills) No Help Needed   Moderately strenuous housework (laundry) No Help Needed   Shopping for personal items (toiletries/medicines) No Help Needed   Shopping for groceries No Help Needed   Driving No Help Needed   Climbing a flight of stairs No Help Needed   Getting to places beyond walking distances No Help Needed       Fall Risk Assessment, last 12 mths 11/30/2022   Able to walk? Yes   Fall in past 12 months? 0   Do you feel unsteady?  0   Are you worried about falling 0         Health Maintenance Due   Topic Date Due    Shingrix Vaccine Age 49> (2 of 2) 12/18/2020    COVID-19 Vaccine (4 - Booster for Alicia Florencia series) 01/31/2022    Eye Exam Retinal or Dilated  02/24/2022    Flu Vaccine (1) 08/01/2022    Medicare Yearly Exam  10/08/2022

## 2022-11-30 NOTE — PROGRESS NOTES
HISTORY OF PRESENT ILLNESS  Mireille Mcfarlane is a 67 y.o. male. Patient was seen today for annual wellness exam and to update health maintenance. He has significant underlying medical conditions including diabetes, CAD, CKD, chronic pain syndrome, chronic sinusitis, insomnia, and he was also seen for follow-up as well as appropriate blood work as he is going to [facility] for few months. He was evaluated by cardiology 2 weeks ago and all the records were reviewed and discussed. His visits with pain management including medial branch block and transforaminal epidural steroid injections. HPI  Cardiovascular Review  The patient has hypertension, hyperlipidemia, coronary artery disease and status post coronary artery stenting. He reports taking medications as instructed, no medication side effects noted, home BP monitoring in range of 262'T systolic over 56'A diastolic, no chest pain on exertion, no dyspnea on exertion, no swelling of ankles, no orthostatic dizziness or lightheadedness, no orthopnea or paroxysmal nocturnal dyspnea, no palpitations, no muscle aches or pain. Diet and Lifestyle: generally follows a low fat low cholesterol diet, generally follows a low sodium diet, exercises sporadically, chews tobacco.  Lab review: labs reviewed and discussed with patient. He follows Dr Lee Carrera  Cardiac History:  PCI 7- prox 75% LAD, D1 50% EF 60%; NAN 3 x 15 mm Xience to LAD  Patient had nuclear stress test on 10/15/21 that was essentially normal and at low risk  medicines:  mg,  Metoprolol XL 25 mg, Losartan 25 mg,Lofibra 160 mg,and Lipitor 10 mg        Endocrine Review  He is seen for diabetes. Since last visit he reports: no polyuria or polydipsia, no significant changes. Home glucose monitoring: is performed regularly, fasting values range 100-120  He is checking his sugars one per day. He reports medication compliance: compliant all of the time. Medication side effects: none.   Diabetic diet compliance: compliant most of the time. Lab review: labs reviewed and discussed with patient  On Metformin 850 mg bid.,  Cymbalta 20 mg was started for diabetic neuropathy that is helping. Lab Results   Component Value Date/Time    Hemoglobin A1c 6.6 (H) 08/02/2022 10:24 AM        CKD:  Stage of Chronic Kidney Disease III exhibited by:  Lab Results   Component Value Date/Time    Sodium 141 08/02/2022 10:24 AM    Potassium 5.1 08/02/2022 10:24 AM    Chloride 111 (H) 08/02/2022 10:24 AM    CO2 24 08/02/2022 10:24 AM    Anion gap 6 08/02/2022 10:24 AM    Glucose 139 (H) 08/02/2022 10:24 AM    BUN 19 08/02/2022 10:24 AM    Creatinine 1.30 08/02/2022 10:24 AM    BUN/Creatinine ratio 15 08/02/2022 10:24 AM    GFR est AA >60 08/02/2022 10:24 AM    GFR est non-AA 54 (L) 08/02/2022 10:24 AM    Calcium 9.5 08/02/2022 10:24 AM       Following Dr. Shu Del Rosario, ENT and s/p rhinoplasty for his chronic maxillary sinusitis. insomnia:  ONSET: problem is longstanding  SEEN PREVIOUSLY: several months ago by me  DESCRIPTION OF SX:  patient estimates 4 hours of sleep per nite. difficulty initiating sleep.  frequent awakening  ASSOCIATED ISSUES: situational anxiety related to family issues  chronic pain involving the both knees is interfering with sleep  DENIES: depression and ETOH overuse  TREATMENT TO  DATE:  Zolpidem,  benzodiazepines and Gabapentin ,  Today he tells me that Ambien is also not effective but getting refill on Ambien every month. He has history of chronic cough along with chronic acid reflux. Has been to GI and ENT and is taking PPI for several months. Today I recommended him to follow-up with GI for follow-up endoscopy that he had more than 4 years ago. He does not want to go for any procedure    Review of Systems   Constitutional:  Negative for chills, fever and malaise/fatigue. HENT:  Positive for congestion and sinus pain. Negative for ear pain, sore throat and tinnitus.     Eyes:  Negative for blurred vision, double vision, pain and discharge. Respiratory:  Positive for cough. Negative for shortness of breath and wheezing. Cardiovascular:  Negative for chest pain, palpitations and leg swelling. Gastrointestinal:  Negative for abdominal pain, blood in stool, constipation, diarrhea, nausea and vomiting. Genitourinary:  Negative for dysuria, frequency, hematuria and urgency. Musculoskeletal:  Negative for back pain, joint pain and myalgias. Skin:  Negative for rash. Neurological:  Negative for dizziness, tremors, seizures and headaches. Endo/Heme/Allergies:  Negative for polydipsia. Does not bruise/bleed easily. Psychiatric/Behavioral:  Negative for depression and substance abuse. The patient is not nervous/anxious. Physical Exam  Vitals and nursing note reviewed. Constitutional:       Appearance: He is well-developed. He is not diaphoretic. HENT:      Head: Normocephalic and atraumatic. Right Ear: External ear normal.      Mouth/Throat:      Mouth: Mucous membranes are moist.      Pharynx: No oropharyngeal exudate. Eyes:      General: No scleral icterus. Conjunctiva/sclera: Conjunctivae normal.      Pupils: Pupils are equal, round, and reactive to light. Neck:      Thyroid: No thyromegaly. Vascular: No JVD. Cardiovascular:      Rate and Rhythm: Normal rate and regular rhythm. Heart sounds: Normal heart sounds. No murmur heard. Pulmonary:      Effort: Pulmonary effort is normal.      Breath sounds: Normal breath sounds. No wheezing. Abdominal:      General: Bowel sounds are normal. There is no distension. Palpations: Abdomen is soft. There is no mass. Musculoskeletal:         General: No tenderness. Normal range of motion. Cervical back: Normal range of motion and neck supple. Lymphadenopathy:      Cervical: No cervical adenopathy. Skin:     General: Skin is warm and dry. Findings: No rash.    Neurological:      Mental Status: He is alert and oriented to person, place, and time. Cranial Nerves: No cranial nerve deficit. Deep Tendon Reflexes: Reflexes are normal and symmetric. Reflexes normal.   Psychiatric:         Mood and Affect: Mood normal.         Behavior: Behavior normal.       ASSESSMENT and PLAN  Diagnoses and all orders for this visit:    1. Medicare annual wellness visit, subsequent    2. Type 2 diabetes with nephropathy ([facility]  Assessment & Plan:   well controlled, continue current medications, continue current treatment plan, medication adherence emphasized, lifestyle modifications recommended    Orders:  -     METABOLIC PANEL, COMPREHENSIVE; Future  -     LIPID PANEL; Future  -     HEMOGLOBIN A1C WITH EAG; Future    3. Stage 3a chronic kidney disease ([facility]  Assessment & Plan:   well controlled, continue current medications, continue current treatment plan, medication adherence emphasized    Orders:  -     CBC WITH AUTOMATED DIFF; Future  -     METABOLIC PANEL, COMPREHENSIVE; Future    4. Type 2 diabetes mellitus with diabetic neuropathy, without long-term current use of insulin ([place] [address].)  Assessment & Plan:   well controlled, continue current medications, continue current treatment plan, medication adherence emphasized, lifestyle modifications recommended    Orders:  -     METABOLIC PANEL, COMPREHENSIVE; Future  -     LIPID PANEL; Future  -     HEMOGLOBIN A1C WITH EAG; Future    5. Essential hypertension, benign  -     METABOLIC PANEL, COMPREHENSIVE; Future    6. Atherosclerosis of native coronary artery of native heart without angina pectoris  -     LIPID PANEL; Future    7. Vitamin B12 deficiency  -     VITAMIN B12; Future    8. Iron deficiency anemia secondary to inadequate dietary iron intake  -     CBC WITH AUTOMATED DIFF; Future    9. Primary insomnia    10. Chronic pansinusitis  -     fluticasone propionate (FLONASE) 50 mcg/actuation nasal spray; 2 Sprays by Both Nostrils route daily.     11. Encounter for immunization  - ADMIN INFLUENZA VIRUS VAC  -     INFLUENZA, FLUAD, (AGE 72 Y+), IM, PF, 0.5 ML    12. Mixed dyslipidemia  -     METABOLIC PANEL, COMPREHENSIVE; Future  -     LIPID PANEL; Future  -     TSH 3RD GENERATION; Future    13. Advanced directives, counseling/discussion  -     ADVANCE CARE PLANNING FIRST 30 MINS  -     FULL CODE    14. Screening for depression  -     DEPRESSION SCREEN ANNUAL    Other orders  -     clotrimazole-betamethasone (LOTRISONE) topical cream; Apply two times daily  Discussed lifestyle issues and health guidance given  Patient was given an after visit summary which includes diagnoses, vital signs, current medications, instructions and references & authorized prescriptions . Results of labs will be conveyed to patient, once available. Pt verbalized instructions I provided and expressed understanding of discussion that was held today. Follow-up and Dispositions    Return in about 4 months (around 3/30/2023) for follow up, fasting. Please note that this dictation was completed with ViS, the computer voice recognition software. Quite often unanticipated grammatical, syntax, homophones, and other interpretive errors are inadvertently transcribed by the computer software. Please disregard these errors. Please excuse any errors that have escaped final proofreading. Thank you.

## 2022-12-01 LAB
ALBUMIN SERPL-MCNC: 3.8 G/DL (ref 3.5–5)
ALBUMIN/GLOB SERPL: 1.1 {RATIO} (ref 1.1–2.2)
ALP SERPL-CCNC: 47 U/L (ref 45–117)
ALT SERPL-CCNC: 24 U/L (ref 12–78)
ANION GAP SERPL CALC-SCNC: 5 MMOL/L (ref 5–15)
AST SERPL-CCNC: 17 U/L (ref 15–37)
BASOPHILS # BLD: 0 K/UL (ref 0–0.1)
BASOPHILS NFR BLD: 1 % (ref 0–1)
BILIRUB SERPL-MCNC: 0.5 MG/DL (ref 0.2–1)
BUN SERPL-MCNC: 14 MG/DL (ref 6–20)
BUN/CREAT SERPL: 10 (ref 12–20)
CALCIUM SERPL-MCNC: 9.8 MG/DL (ref 8.5–10.1)
CHLORIDE SERPL-SCNC: 109 MMOL/L (ref 97–108)
CHOLEST SERPL-MCNC: 113 MG/DL
CO2 SERPL-SCNC: 27 MMOL/L (ref 21–32)
CREAT SERPL-MCNC: 1.46 MG/DL (ref 0.7–1.3)
DIFFERENTIAL METHOD BLD: NORMAL
EOSINOPHIL # BLD: 0.1 K/UL (ref 0–0.4)
EOSINOPHIL NFR BLD: 2 % (ref 0–7)
ERYTHROCYTE [DISTWIDTH] IN BLOOD BY AUTOMATED COUNT: 13.8 % (ref 11.5–14.5)
EST. AVERAGE GLUCOSE BLD GHB EST-MCNC: 140 MG/DL
GLOBULIN SER CALC-MCNC: 3.6 G/DL (ref 2–4)
GLUCOSE SERPL-MCNC: 140 MG/DL (ref 65–100)
HBA1C MFR BLD: 6.5 % (ref 4–5.6)
HCT VFR BLD AUTO: 40.4 % (ref 36.6–50.3)
HDLC SERPL-MCNC: 40 MG/DL
HDLC SERPL: 2.8 {RATIO} (ref 0–5)
HGB BLD-MCNC: 12.3 G/DL (ref 12.1–17)
IMM GRANULOCYTES # BLD AUTO: 0 K/UL (ref 0–0.04)
IMM GRANULOCYTES NFR BLD AUTO: 0 % (ref 0–0.5)
LDLC SERPL CALC-MCNC: 48.8 MG/DL (ref 0–100)
LYMPHOCYTES # BLD: 2 K/UL (ref 0.8–3.5)
LYMPHOCYTES NFR BLD: 33 % (ref 12–49)
MCH RBC QN AUTO: 27.6 PG (ref 26–34)
MCHC RBC AUTO-ENTMCNC: 30.4 G/DL (ref 30–36.5)
MCV RBC AUTO: 90.8 FL (ref 80–99)
MONOCYTES # BLD: 0.4 K/UL (ref 0–1)
MONOCYTES NFR BLD: 6 % (ref 5–13)
NEUTS SEG # BLD: 3.5 K/UL (ref 1.8–8)
NEUTS SEG NFR BLD: 58 % (ref 32–75)
NRBC # BLD: 0 K/UL (ref 0–0.01)
NRBC BLD-RTO: 0 PER 100 WBC
PLATELET # BLD AUTO: 271 K/UL (ref 150–400)
PMV BLD AUTO: 11.1 FL (ref 8.9–12.9)
POTASSIUM SERPL-SCNC: 5.7 MMOL/L (ref 3.5–5.1)
PROT SERPL-MCNC: 7.4 G/DL (ref 6.4–8.2)
RBC # BLD AUTO: 4.45 M/UL (ref 4.1–5.7)
SODIUM SERPL-SCNC: 141 MMOL/L (ref 136–145)
TRIGL SERPL-MCNC: 121 MG/DL (ref ?–150)
TSH SERPL DL<=0.05 MIU/L-ACNC: 1.96 UIU/ML (ref 0.36–3.74)
VIT B12 SERPL-MCNC: 731 PG/ML (ref 193–986)
VLDLC SERPL CALC-MCNC: 24.2 MG/DL
WBC # BLD AUTO: 6.1 K/UL (ref 4.1–11.1)

## 2022-12-02 DIAGNOSIS — I25.10 ATHEROSCLEROSIS OF NATIVE CORONARY ARTERY OF NATIVE HEART WITHOUT ANGINA PECTORIS: ICD-10-CM

## 2022-12-02 DIAGNOSIS — N18.31 STAGE 3A CHRONIC KIDNEY DISEASE (HCC): ICD-10-CM

## 2022-12-02 DIAGNOSIS — F51.01 PRIMARY INSOMNIA: ICD-10-CM

## 2022-12-02 DIAGNOSIS — Z95.5 S/P CORONARY ARTERY STENT PLACEMENT: ICD-10-CM

## 2022-12-02 DIAGNOSIS — E11.40 TYPE 2 DIABETES MELLITUS WITH DIABETIC NEUROPATHY, WITHOUT LONG-TERM CURRENT USE OF INSULIN (HCC): ICD-10-CM

## 2022-12-02 DIAGNOSIS — I10 ESSENTIAL HYPERTENSION, BENIGN: ICD-10-CM

## 2022-12-02 DIAGNOSIS — E11.21 TYPE 2 DIABETES WITH NEPHROPATHY (HCC): ICD-10-CM

## 2022-12-02 RX ORDER — ZOLPIDEM TARTRATE 5 MG/1
TABLET ORAL
Qty: 30 TABLET | Refills: 0 | Status: SHIPPED | OUTPATIENT
Start: 2022-12-02

## 2022-12-02 RX ORDER — LOSARTAN POTASSIUM 25 MG/1
TABLET ORAL
Qty: 90 TABLET | Refills: 0 | Status: SHIPPED | OUTPATIENT
Start: 2022-12-02

## 2022-12-02 RX ORDER — DULOXETIN HYDROCHLORIDE 20 MG/1
20 CAPSULE, DELAYED RELEASE ORAL
Qty: 90 CAPSULE | Refills: 0 | Status: SHIPPED | OUTPATIENT
Start: 2022-12-02

## 2022-12-02 NOTE — PROGRESS NOTES
Tamica Riojascherylliz Ramirez,  I have reviewed your results. All results including cholesterol profile, thyroid function, vitamin B12 level, blood count, average sugar for diabetes are very stable. Your kidney function is again borderline abnormal.  Please make sure you stay hydrated, take all your medications correctly and avoid any over-the-counter ibuprofen or Aleve. Let me know if you have any questions, thanks.

## 2022-12-07 DIAGNOSIS — J32.4 CHRONIC PANSINUSITIS: ICD-10-CM

## 2022-12-07 DIAGNOSIS — E11.40 TYPE 2 DIABETES MELLITUS WITH DIABETIC NEUROPATHY, WITHOUT LONG-TERM CURRENT USE OF INSULIN (HCC): ICD-10-CM

## 2022-12-07 DIAGNOSIS — E11.21 TYPE 2 DIABETES WITH NEPHROPATHY (HCC): ICD-10-CM

## 2022-12-08 RX ORDER — BLOOD SUGAR DIAGNOSTIC
STRIP MISCELLANEOUS
Qty: 100 STRIP | Refills: 0 | Status: SHIPPED | OUTPATIENT
Start: 2022-12-08

## 2022-12-08 RX ORDER — MONTELUKAST SODIUM 10 MG/1
TABLET ORAL
Qty: 90 TABLET | Refills: 0 | Status: SHIPPED | OUTPATIENT
Start: 2022-12-08

## 2023-02-09 DIAGNOSIS — I25.10 ATHEROSCLEROSIS OF NATIVE CORONARY ARTERY OF NATIVE HEART WITHOUT ANGINA PECTORIS: ICD-10-CM

## 2023-02-09 DIAGNOSIS — E78.2 MIXED DYSLIPIDEMIA: ICD-10-CM

## 2023-02-09 DIAGNOSIS — Z95.5 S/P CORONARY ARTERY STENT PLACEMENT: ICD-10-CM

## 2023-02-09 DIAGNOSIS — I10 ESSENTIAL HYPERTENSION, BENIGN: ICD-10-CM

## 2023-02-09 DIAGNOSIS — E11.40 TYPE 2 DIABETES MELLITUS WITH DIABETIC NEUROPATHY, WITHOUT LONG-TERM CURRENT USE OF INSULIN (HCC): ICD-10-CM

## 2023-02-09 DIAGNOSIS — K29.30 CHRONIC SUPERFICIAL GASTRITIS WITHOUT BLEEDING: ICD-10-CM

## 2023-02-09 RX ORDER — METOPROLOL SUCCINATE 25 MG/1
TABLET, EXTENDED RELEASE ORAL
Qty: 90 TABLET | Refills: 0 | Status: SHIPPED | OUTPATIENT
Start: 2023-02-09

## 2023-02-09 RX ORDER — DULOXETIN HYDROCHLORIDE 20 MG/1
20 CAPSULE, DELAYED RELEASE ORAL
Qty: 90 CAPSULE | Refills: 0 | Status: SHIPPED | OUTPATIENT
Start: 2023-02-09

## 2023-02-09 RX ORDER — OMEPRAZOLE 40 MG/1
40 CAPSULE, DELAYED RELEASE ORAL
Qty: 90 CAPSULE | Refills: 0 | Status: SHIPPED | OUTPATIENT
Start: 2023-02-09

## 2023-02-09 RX ORDER — FENOFIBRATE 160 MG/1
TABLET ORAL
Qty: 90 TABLET | Refills: 0 | Status: SHIPPED | OUTPATIENT
Start: 2023-02-09

## 2023-02-14 ENCOUNTER — OFFICE VISIT (OUTPATIENT)
Dept: URGENT CARE | Age: 73
End: 2023-02-14
Payer: MEDICARE

## 2023-02-14 VITALS
SYSTOLIC BLOOD PRESSURE: 160 MMHG | WEIGHT: 184 LBS | TEMPERATURE: 99.6 F | DIASTOLIC BLOOD PRESSURE: 101 MMHG | HEIGHT: 70 IN | BODY MASS INDEX: 26.34 KG/M2 | RESPIRATION RATE: 18 BRPM | HEART RATE: 87 BPM | OXYGEN SATURATION: 96 %

## 2023-02-14 DIAGNOSIS — U07.1 COVID-19: Primary | ICD-10-CM

## 2023-02-14 LAB
EXP DATE SOLUTION: ABNORMAL
EXP DATE SWAB: ABNORMAL
LOT NUMBER SOLUTION: ABNORMAL
LOT NUMBER SWAB: ABNORMAL
SARS-COV-2 RNA POC: POSITIVE

## 2023-02-14 PROCEDURE — 1123F ACP DISCUSS/DSCN MKR DOCD: CPT | Performed by: NURSE PRACTITIONER

## 2023-02-14 PROCEDURE — G8536 NO DOC ELDER MAL SCRN: HCPCS | Performed by: NURSE PRACTITIONER

## 2023-02-14 PROCEDURE — 1101F PT FALLS ASSESS-DOCD LE1/YR: CPT | Performed by: NURSE PRACTITIONER

## 2023-02-14 PROCEDURE — 3017F COLORECTAL CA SCREEN DOC REV: CPT | Performed by: NURSE PRACTITIONER

## 2023-02-14 PROCEDURE — G8417 CALC BMI ABV UP PARAM F/U: HCPCS | Performed by: NURSE PRACTITIONER

## 2023-02-14 PROCEDURE — 99213 OFFICE O/P EST LOW 20 MIN: CPT | Performed by: NURSE PRACTITIONER

## 2023-02-14 PROCEDURE — G8432 DEP SCR NOT DOC, RNG: HCPCS | Performed by: NURSE PRACTITIONER

## 2023-02-14 PROCEDURE — 3077F SYST BP >= 140 MM HG: CPT | Performed by: NURSE PRACTITIONER

## 2023-02-14 PROCEDURE — G8427 DOCREV CUR MEDS BY ELIG CLIN: HCPCS | Performed by: NURSE PRACTITIONER

## 2023-02-14 PROCEDURE — 87635 SARS-COV-2 COVID-19 AMP PRB: CPT | Performed by: NURSE PRACTITIONER

## 2023-02-14 PROCEDURE — 3080F DIAST BP >= 90 MM HG: CPT | Performed by: NURSE PRACTITIONER

## 2023-02-14 RX ORDER — NIRMATRELVIR AND RITONAVIR 150-100 MG
2 KIT ORAL EVERY 12 HOURS
Qty: 1 BOX | Refills: 0 | Status: SHIPPED | OUTPATIENT
Start: 2023-02-14 | End: 2023-02-19

## 2023-02-14 NOTE — PROGRESS NOTES
HPI     Pt presents with complaints of sinus congestion, headache, myalgias, and fatigue for 2 days. Denies fevers, chills, cough, CP, SOB, N/V/D. Recently traveled internationally. No known sick contacts. Past Medical History:   Diagnosis Date    Arthritis     KNEES & BACK. 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     ID UNLISTED PROCEDURE CARDIAC SURGERY  2009    CATHERIZATION WITH STENT X 1 PLACEMENT         Family History   Problem Relation Age of Onset    Diabetes Mother     Heart Disease Mother     Diabetes Son         TYPE 1    Alcohol abuse Son     Anesth Problems Neg Hx         Social History     Socioeconomic History    Marital status:      Spouse name: Not on file    Number of children: 1    Years of education: Not on file    Highest education level: Not on file   Occupational History    Not on file   Tobacco Use    Smoking status: Never    Smokeless tobacco: Former    Tobacco comments:     Patient states on and off for chewing tobacco.     Vaping Use    Vaping Use: Never used   Substance and Sexual Activity    Alcohol use: Yes     Alcohol/week: 4.0 standard drinks     Types: 4 Cans of beer per week    Drug use: No    Sexual activity: Yes     Partners: Female   Other Topics Concern    Not on file   Social History Narrative    Not on file     Social Determinants of Health     Financial Resource Strain: Not on file   Food Insecurity: Not on file   Transportation Needs: Not on file   Physical Activity: Not on file   Stress: Not on file   Social Connections: Not on file   Intimate Partner Violence: Not on file   Housing Stability: Not on file                ALLERGIES: Levofloxacin, Pcn [penicillins], Adhesive tape-silicones, and Tape [adhesive]    Review of Systems   Constitutional:  Positive for fatigue. Negative for chills and fever. HENT:  Positive for congestion, postnasal drip, sinus pressure and sinus pain. Negative for ear pain and sore throat. Respiratory:  Negative for cough, shortness of breath and wheezing. Cardiovascular:  Negative for chest pain. Gastrointestinal:  Negative for abdominal pain, diarrhea, nausea and vomiting. Musculoskeletal:  Positive for myalgias. Neurological:  Positive for headaches. Vitals:    02/14/23 1703 02/14/23 1754   BP: (!) 156/90 (!) 160/101   Pulse: 87    Resp: 18    Temp: 99.6 °F (37.6 °C)    SpO2: 96%    Weight: 184 lb (83.5 kg)    Height: 5' 10\" (1.778 m)        Physical Exam  Constitutional:       General: He is not in acute distress. Appearance: Normal appearance. He is not ill-appearing or toxic-appearing. HENT:      Head: Normocephalic and atraumatic. Right Ear: Tympanic membrane, ear canal and external ear normal.      Left Ear: Tympanic membrane, ear canal and external ear normal.      Nose: Congestion and rhinorrhea present. Mouth/Throat:      Mouth: Mucous membranes are moist.      Pharynx: Oropharynx is clear. Eyes:      Extraocular Movements: Extraocular movements intact.       Conjunctiva/sclera: Conjunctivae normal.      Pupils: Pupils are equal, round, and reactive to light. Cardiovascular:      Rate and Rhythm: Normal rate and regular rhythm. Pulmonary:      Effort: Pulmonary effort is normal.      Breath sounds: Normal breath sounds. Musculoskeletal:      Cervical back: Normal range of motion and neck supple. Lymphadenopathy:      Cervical: No cervical adenopathy. Skin:     General: Skin is warm and dry. Neurological:      Mental Status: He is alert. Results for orders placed or performed in visit on 02/14/23   AMB POC COVID-19 COV   Result Value Ref Range    SARS-COV-2 RNA POC Positive (A) Negative    LOT NUMBER SWAB [id number]     EXP DATE SWAB 08/01/2027     LOT NUMBER SOLUTION [id number]     EXP DATE SOLUTION 02/22/2024        ICD-10-CM ICD-9-CM   1. COVID-19  U07.1 079.89       Orders Placed This Encounter    AMB POC COVID-19 COV     Order Specific Question:   Is this test for diagnosis or screening? Answer:   Diagnosis of ill patient     Order Specific Question:   Symptomatic for COVID-19 as defined by CDC? Answer:   Yes     Order Specific Question:   Date of Symptom Onset     Answer:   2/12/2023     Order Specific Question:   Hospitalized for COVID-19? Answer:   No     Order Specific Question:   Admitted to ICU for COVID-19? Answer:   No     Order Specific Question:   Employed in healthcare setting? Answer:   No     Order Specific Question:   Resident in a congregate (group) care setting? Answer:   No     Order Specific Question:   Previously tested for COVID-19? Answer:   Unknown    nirmatrelvir-ritonavir (Paxlovid, EUA,) 150-100 mg     Sig: Take 2 Tablets by mouth every twelve (12) hours for 5 days. Indications: COVID-19 (emergency use authorization)     Dispense:  1 Box     Refill:  0     Order Specific Question:   Does this patient qualify for COVID-19 antiviral treatment based on criteria for treatment?      Answer:   Yes      The patient was counseled regarding a POSITIVE test result for COVID-19. The following information was given to the patient:    The COVID-19 test result was positive. Mild and stable symptoms are managed at home. Day 0 is your first day of symptoms or a positive test.  Day 1 is the first day after your symptoms started or your test specimen was collected. If you have COVID-19 or have symptoms  Stay home for at least 5 days and isolate from others in your home. Wear a well-fitted mask if you must be around others in your home. End isolation after 5 full days if you are fever-free for 24 hours (without the use of fever-reducing medication) and your symptoms are improving. If you did NOT have symptoms  End isolation after at least 5 full days after your positive test.  Wear a well-fitted mask for 10 full days any time you are around others inside your home or in public. Do not go to places where you are unable to wear a mask. If you were severely ill with COVID-19, you should isolate for at least 10 days. Consult your doctor before ending isolation. Contact your medical provider if symptoms are worsening, such as difficulty breathing. To prevent spreading COVID  Avoid travel and avoid being around people who are at high risk  Wash hands often with soap and water for at least 20 seconds or alternatively use hand  with at least 60% alcohol content  Cover coughs and sneezes  Wear a mask when around others    For more information visit the CDC website: DotProtection.gl     The patient is to follow up with PCP [facility]. If signs and symptoms become worse the pt is to go to the ER.      Laurel Álvarez NP       MDM    Procedures

## 2023-03-06 DIAGNOSIS — N18.31 STAGE 3A CHRONIC KIDNEY DISEASE (HCC): ICD-10-CM

## 2023-03-06 DIAGNOSIS — J32.4 CHRONIC PANSINUSITIS: ICD-10-CM

## 2023-03-06 DIAGNOSIS — I25.10 ATHEROSCLEROSIS OF NATIVE CORONARY ARTERY OF NATIVE HEART WITHOUT ANGINA PECTORIS: ICD-10-CM

## 2023-03-06 DIAGNOSIS — I25.10 ATHEROSCLEROSIS OF NATIVE CORONARY ARTERY OF NATIVE HEART WITHOUT ANGINA PECTORIS: Primary | ICD-10-CM

## 2023-03-06 DIAGNOSIS — I10 ESSENTIAL HYPERTENSION, BENIGN: ICD-10-CM

## 2023-03-06 DIAGNOSIS — Z95.5 S/P CORONARY ARTERY STENT PLACEMENT: ICD-10-CM

## 2023-03-06 DIAGNOSIS — E11.40 TYPE 2 DIABETES MELLITUS WITH DIABETIC NEUROPATHY, WITHOUT LONG-TERM CURRENT USE OF INSULIN (HCC): ICD-10-CM

## 2023-03-06 DIAGNOSIS — E78.2 MIXED DYSLIPIDEMIA: ICD-10-CM

## 2023-03-06 DIAGNOSIS — E11.21 TYPE 2 DIABETES WITH NEPHROPATHY (HCC): ICD-10-CM

## 2023-03-06 RX ORDER — MONTELUKAST SODIUM 10 MG/1
TABLET ORAL
Qty: 90 TABLET | Refills: 0 | Status: SHIPPED | OUTPATIENT
Start: 2023-03-06

## 2023-03-06 RX ORDER — BLOOD SUGAR DIAGNOSTIC
STRIP MISCELLANEOUS
Qty: 100 STRIP | Refills: 0 | Status: SHIPPED | OUTPATIENT
Start: 2023-03-06

## 2023-03-06 RX ORDER — LOSARTAN POTASSIUM 25 MG/1
TABLET ORAL
Qty: 90 TABLET | Refills: 0 | Status: SHIPPED | OUTPATIENT
Start: 2023-03-06

## 2023-03-07 DIAGNOSIS — E11.9 TYPE 2 DIABETES MELLITUS WITHOUT COMPLICATION, WITHOUT LONG-TERM CURRENT USE OF INSULIN (HCC): ICD-10-CM

## 2023-03-07 RX ORDER — METFORMIN HYDROCHLORIDE 850 MG/1
TABLET ORAL
Qty: 180 TABLET | Refills: 0 | Status: SHIPPED | OUTPATIENT
Start: 2023-03-07

## 2023-03-12 RX ORDER — ATORVASTATIN CALCIUM 10 MG/1
10 TABLET, FILM COATED ORAL
Qty: 90 TABLET | Refills: 3 | Status: SHIPPED | OUTPATIENT
Start: 2023-03-12

## 2023-03-12 NOTE — TELEPHONE ENCOUNTER
Per VO by MD.    Future Appointments   Date Time Provider [facility]   4/6/2023 10:20 AM MD MAYELIN cMlean BS AMB   11/7/2023  9:40 AM MD DAVE Cassidy BS AMB

## 2023-04-06 ENCOUNTER — OFFICE VISIT (OUTPATIENT)
Dept: FAMILY MEDICINE CLINIC | Age: 73
End: 2023-04-06
Payer: MEDICARE

## 2023-04-06 PROCEDURE — 3017F COLORECTAL CA SCREEN DOC REV: CPT | Performed by: FAMILY MEDICINE

## 2023-04-06 PROCEDURE — G8510 SCR DEP NEG, NO PLAN REQD: HCPCS | Performed by: FAMILY MEDICINE

## 2023-04-06 PROCEDURE — 3075F SYST BP GE 130 - 139MM HG: CPT | Performed by: FAMILY MEDICINE

## 2023-04-06 PROCEDURE — 3046F HEMOGLOBIN A1C LEVEL >9.0%: CPT | Performed by: FAMILY MEDICINE

## 2023-04-06 PROCEDURE — G8536 NO DOC ELDER MAL SCRN: HCPCS | Performed by: FAMILY MEDICINE

## 2023-04-06 PROCEDURE — 1101F PT FALLS ASSESS-DOCD LE1/YR: CPT | Performed by: FAMILY MEDICINE

## 2023-04-06 PROCEDURE — G8417 CALC BMI ABV UP PARAM F/U: HCPCS | Performed by: FAMILY MEDICINE

## 2023-04-06 PROCEDURE — 99214 OFFICE O/P EST MOD 30 MIN: CPT | Performed by: FAMILY MEDICINE

## 2023-04-06 PROCEDURE — 3078F DIAST BP <80 MM HG: CPT | Performed by: FAMILY MEDICINE

## 2023-04-06 PROCEDURE — 2022F DILAT RTA XM EVC RTNOPTHY: CPT | Performed by: FAMILY MEDICINE

## 2023-04-06 PROCEDURE — 1123F ACP DISCUSS/DSCN MKR DOCD: CPT | Performed by: FAMILY MEDICINE

## 2023-04-06 PROCEDURE — G8427 DOCREV CUR MEDS BY ELIG CLIN: HCPCS | Performed by: FAMILY MEDICINE

## 2023-04-06 NOTE — PROGRESS NOTES
Reviewed record in preparation for visit and have obtained necessary documentation. Identified pt with two pt identifiers(name and ). Chief Complaint   Patient presents with    Diabetes     Follow up        Visit Vitals  BP (!) 152/85 (BP 1 Location: Left upper arm, BP Patient Position: Sitting, BP Cuff Size: Adult)   Pulse 65   Temp 97.7 °F (36.5 °C) (Temporal)   Resp 16   Ht 5' 10\" (1.778 m)   Wt 190 lb (86.2 kg)   SpO2 99%   BMI 27.26 kg/m²        Health Maintenance Due   Topic Date Due    Shingles Vaccine (2 of 2) 2020    COVID-19 Vaccine (4 - Booster for Moderna series) 2022    Eye Exam  2022    Diabetic Foot Care  2023    URINE CHECK FOR KIDNEY PROBLEMS  2023       Mr. Russ Benavides has a reminder for a \"due or due soon\" health maintenance. I have asked that he discuss this further with his primary care provider for follow-up on this health maintenance. 1. \"Have you been to the ER, urgent care clinic since your last visit? Hospitalized since your last visit? \" No    2. \"Have you seen or consulted any other health care providers outside of the [address] [place] [address] since your last visit? \" No     3. For patients aged 39-70: Has the patient had a colonoscopy / FIT/ Cologuard? Yes - no Care Gap present      If the patient is female:    4. For patients aged 41-77: Has the patient had a mammogram within the past 2 years? NA - based on age or sex      11. For patients aged 21-65: Has the patient had a pap smear?  NA - based on age or sex

## 2023-04-06 NOTE — ASSESSMENT & PLAN NOTE
at goal, continue current medications, continue current treatment plan, medication adherence emphasized

## 2023-04-06 NOTE — PROGRESS NOTES
HISTORY OF PRESENT ILLNESS  Verma Aschoff is a 67 y.o. male. Patient was seen today for 4 months follow-up appointment on multiple medical issues including diabetes with neuropathy, CKD, hypertension, dyslipidemia, chronic pain syndrome, CAD as well as insomnia. He has been to several orthopedic specialist as well as pain management specialist for his chronic pain in back and knees and no improvement so far. He also complains of feeling sleepy after each meals and takes 30 minutes nap after breakfast, lunch and dinner and asking if it is from any deficiencies. HPI  Cardiovascular Review  The patient has hypertension, hyperlipidemia, coronary artery disease and status post coronary artery stenting. He reports. Medications as instructed, no medication side effects noted, home BP monitoring in range of 372'H systolic over 76'M diastolic, no chest pain on exertion, no dyspnea on exertion, no swelling of ankles, no orthostatic dizziness or lightheadedness, no orthopnea or paroxysmal nocturnal dyspnea, no palpitations, no muscle aches or pain. Diet and Lifestyle: generally follows a low fat low cholesterol diet, generally follows a low sodium diet, exercises sporadically, chews tobacco.  Lab review: labs reviewed and discussed with patient. He follows Dr Kathy Reed  Cardiac History:  PCI 7- prox 75% LAD, D1 50% EF 60%; NAN 3 x 15 mm Xience to LAD  Patient had nuclear stress test on 10/15/21 that was essentially normal and at low risk  medicines:  mg,  Metoprolol XL 25 mg, Losartan 25 mg,Lofibra 160 mg,and Lipitor 10 mg        Endocrine Review  He is seen for diabetes. Since last visit he reports: no polyuria or polydipsia, no significant changes. Home glucose monitoring: is performed regularly, fasting values range 100-120  He is checking his sugars one per day. He reports medication compliance: compliant all of the time. Medication side effects: none.   Diabetic diet compliance: compliant most of the time. Lab review: labs reviewed and discussed with patient  On Metformin 850 mg bid.,  Cymbalta 20 mg was started for diabetic neuropathy that is not much helping       CKD:  Stage of Chronic Kidney Disease III exhibited by:  Lab Results   Component Value Date/Time    Sodium 141 11/30/2022 12:02 PM    Potassium 5.7 (H) 11/30/2022 12:02 PM    Chloride 109 (H) 11/30/2022 12:02 PM    CO2 27 11/30/2022 12:02 PM    Anion gap 5 11/30/2022 12:02 PM    Glucose 140 (H) 11/30/2022 12:02 PM    BUN 14 11/30/2022 12:02 PM    Creatinine 1.46 (H) 11/30/2022 12:02 PM    BUN/Creatinine ratio 10 (L) 11/30/2022 12:02 PM    GFR est AA >60 08/02/2022 10:24 AM    GFR est non-AA 54 (L) 08/02/2022 10:24 AM    eGFR 50 (L) 11/30/2022 12:02 PM    Calcium 9.8 11/30/2022 12:02 PM         insomnia:  ONSET: problem is longstanding  SEEN PREVIOUSLY: several months ago by me  DESCRIPTION OF SX:  patient estimates 4 hours of sleep per nite. difficulty initiating sleep.  frequent awakening  ASSOCIATED ISSUES: situational anxiety related to family issues  chronic pain involving the both knees is interfering with sleep  DENIES: depression and ETOH overuse  TREATMENT TO  DATE:  Zolpidem,  benzodiazepines and Gabapentin ,  Today he tells me that Ambien is also not effective but getting refill on Ambien every month. She has brought alprazolam from [facility] and informed me that it is working very well for him. Asking me to refill on alprazolam     He has history of chronic cough along with chronic acid reflux. Has been to GI and ENT and is taking PPI for several months. Today I recommended him to follow-up with GI for follow-up endoscopy that he had more than 4 years ago. He does not want to go for any procedure    Following Dr. Anthony Shaikh, ENT and s/p rhinoplasty for his chronic maxillary sinusitis. Review of Systems   Constitutional:  Positive for malaise/fatigue. Negative for chills and fever.    HENT:  Negative for congestion, ear pain, sore throat and tinnitus. Eyes:  Negative for blurred vision, double vision, pain and discharge. Respiratory:  Negative for cough, shortness of breath and wheezing. Cardiovascular:  Negative for chest pain, palpitations and leg swelling. Gastrointestinal:  Negative for abdominal pain, blood in stool, constipation, diarrhea, nausea and vomiting. Genitourinary:  Negative for dysuria, frequency, hematuria and urgency. Musculoskeletal:  Negative for back pain, joint pain and myalgias. Skin:  Negative for rash. Neurological:  Negative for dizziness, tremors, seizures and headaches. Burning in both feet   Endo/Heme/Allergies:  Negative for polydipsia. Does not bruise/bleed easily. Psychiatric/Behavioral:  Negative for depression and substance abuse. The patient has insomnia. The patient is not nervous/anxious. Physical Exam  Vitals and nursing note reviewed. Constitutional:       Appearance: He is well-developed. He is not diaphoretic. HENT:      Head: Normocephalic and atraumatic. Right Ear: External ear normal.      Mouth/Throat:      Pharynx: No oropharyngeal exudate. Eyes:      General: No scleral icterus. Conjunctiva/sclera: Conjunctivae normal.      Pupils: Pupils are equal, round, and reactive to light. Neck:      Thyroid: No thyromegaly. Vascular: No JVD. Cardiovascular:      Rate and Rhythm: Normal rate and regular rhythm. Pulses:           Dorsalis pedis pulses are 2+ on the right side and 2+ on the left side. Posterior tibial pulses are 2+ on the right side and 2+ on the left side. Heart sounds: Normal heart sounds. No murmur heard. Pulmonary:      Effort: Pulmonary effort is normal.      Breath sounds: Normal breath sounds. No wheezing. Abdominal:      General: Bowel sounds are normal. There is no distension. Palpations: Abdomen is soft. There is no mass. Musculoskeletal:         General: No tenderness. Normal range of motion. Cervical back: Normal range of motion and neck supple. Feet:      Right foot:      Protective Sensation: 10 sites tested. 8 sites sensed. Skin integrity: Callus present. Toenail Condition: Right toenails are normal.      Left foot:      Protective Sensation: 10 sites tested. 9 sites sensed. Skin integrity: Callus present. Toenail Condition: Left toenails are normal.      Comments: Decreased sensation on both toes, more on plantar aspect  Lymphadenopathy:      Cervical: No cervical adenopathy. Skin:     General: Skin is warm and dry. Findings: No rash. Neurological:      Mental Status: He is alert and oriented to person, place, and time. Cranial Nerves: No cranial nerve deficit. Deep Tendon Reflexes: Reflexes are normal and symmetric. Reflexes normal.       ASSESSMENT and PLAN  Diagnoses and all orders for this visit:    1. Stage 3a chronic kidney disease ([facility]  Assessment & Plan:   at goal, continue current medications, continue current treatment plan, medication adherence emphasized    Orders:  -     METABOLIC PANEL, COMPREHENSIVE; Future  -     CBC WITH AUTOMATED DIFF; Future    2. Type 2 diabetes with nephropathy ([facility]  Assessment & Plan:   well controlled, continue current medications, continue current treatment plan, medication adherence emphasized, lifestyle modifications recommended    Orders:  -      DIABETES FOOT EXAM  -     MICROALBUMIN, UR, RAND W/ MICROALB/CREAT RATIO; Future  -     METABOLIC PANEL, COMPREHENSIVE; Future  -     LIPID PANEL; Future  -     HEMOGLOBIN A1C WITH EAG; Future    3.  Type 2 diabetes mellitus with diabetic neuropathy, without long-term current use of insulin (HCC)  Assessment & Plan:   borderline controlled, continue current medications, continue current treatment plan, medication adherence emphasized    Orders:  -      DIABETES FOOT EXAM  -     MICROALBUMIN, UR, RAND W/ MICROALB/CREAT RATIO; Future  -     METABOLIC PANEL, COMPREHENSIVE; Future  -     LIPID PANEL; Future  -     HEMOGLOBIN A1C WITH EAG; Future    4. Essential hypertension, benign  -     METABOLIC PANEL, COMPREHENSIVE; Future    5. Mixed dyslipidemia  -     METABOLIC PANEL, COMPREHENSIVE; Future  -     LIPID PANEL; Future    6. Atherosclerosis of native coronary artery of native heart without angina pectoris  -     LIPID PANEL; Future    7. Primary insomnia    8. Vitamin B12 deficiency  -     VITAMIN B12; Future    9. Chronic superficial gastritis without bleeding  -     CBC WITH AUTOMATED DIFF; Future  -     REFERRAL TO GASTROENTEROLOGY    10. Iron deficiency anemia secondary to inadequate dietary iron intake  -     CBC WITH AUTOMATED DIFF; Future  -     IRON PROFILE; Future    11. Vitamin D deficiency  -     VITAMIN D, 25 HYDROXY; Future    Discussed with patient that I will not give him prescription for alprazolam as there is not a prescription medication. Discussed lifestyle issues and health guidance given  Patient was given an after visit summary which includes diagnoses, vital signs, current medications, instructions and references & authorized prescriptions . Results of labs will be conveyed to patient, once available. Pt verbalized instructions I provided and expressed understanding of discussion that was held today. Follow-up and Dispositions    Return in about 4 months (around 8/6/2023) for fasting, follow up. Please note that this dictation was completed with [email or web address], the [employer] voice recognition software. Quite often unanticipated grammatical, syntax, homophones, and other interpretive errors are inadvertently transcribed by the computer software. Please disregard these errors. Please excuse any errors that have escaped final proofreading. Thank you.

## 2023-05-15 RX ORDER — METOPROLOL SUCCINATE 25 MG/1
TABLET, EXTENDED RELEASE ORAL
Qty: 90 TABLET | Refills: 0 | Status: SHIPPED | OUTPATIENT
Start: 2023-05-15

## 2023-05-15 RX ORDER — FENOFIBRATE 160 MG/1
TABLET ORAL
Qty: 90 TABLET | Refills: 0 | Status: SHIPPED | OUTPATIENT
Start: 2023-05-15

## 2023-05-15 RX ORDER — OMEPRAZOLE 40 MG/1
CAPSULE, DELAYED RELEASE ORAL NIGHTLY
Qty: 90 CAPSULE | Refills: 0 | Status: SHIPPED | OUTPATIENT
Start: 2023-05-15

## 2023-05-24 ENCOUNTER — OFFICE VISIT (OUTPATIENT)
Age: 73
End: 2023-05-24
Payer: MEDICARE

## 2023-05-24 VITALS
BODY MASS INDEX: 26.77 KG/M2 | HEIGHT: 70 IN | RESPIRATION RATE: 16 BRPM | SYSTOLIC BLOOD PRESSURE: 110 MMHG | WEIGHT: 187 LBS | DIASTOLIC BLOOD PRESSURE: 66 MMHG | TEMPERATURE: 97.8 F | OXYGEN SATURATION: 98 % | HEART RATE: 69 BPM

## 2023-05-24 DIAGNOSIS — H25.013 CORTICAL AGE-RELATED CATARACT OF BOTH EYES: ICD-10-CM

## 2023-05-24 DIAGNOSIS — Z01.818 PREOP GENERAL PHYSICAL EXAM: Primary | ICD-10-CM

## 2023-05-24 PROCEDURE — 99214 OFFICE O/P EST MOD 30 MIN: CPT | Performed by: FAMILY MEDICINE

## 2023-05-24 PROCEDURE — 3017F COLORECTAL CA SCREEN DOC REV: CPT | Performed by: FAMILY MEDICINE

## 2023-05-24 PROCEDURE — G8419 CALC BMI OUT NRM PARAM NOF/U: HCPCS | Performed by: FAMILY MEDICINE

## 2023-05-24 PROCEDURE — 1123F ACP DISCUSS/DSCN MKR DOCD: CPT | Performed by: FAMILY MEDICINE

## 2023-05-24 PROCEDURE — 3074F SYST BP LT 130 MM HG: CPT | Performed by: FAMILY MEDICINE

## 2023-05-24 PROCEDURE — 1036F TOBACCO NON-USER: CPT | Performed by: FAMILY MEDICINE

## 2023-05-24 PROCEDURE — G8427 DOCREV CUR MEDS BY ELIG CLIN: HCPCS | Performed by: FAMILY MEDICINE

## 2023-05-24 PROCEDURE — 3078F DIAST BP <80 MM HG: CPT | Performed by: FAMILY MEDICINE

## 2023-05-24 SDOH — ECONOMIC STABILITY: FOOD INSECURITY: WITHIN THE PAST 12 MONTHS, YOU WORRIED THAT YOUR FOOD WOULD RUN OUT BEFORE YOU GOT MONEY TO BUY MORE.: NEVER TRUE

## 2023-05-24 SDOH — ECONOMIC STABILITY: FOOD INSECURITY: WITHIN THE PAST 12 MONTHS, THE FOOD YOU BOUGHT JUST DIDN'T LAST AND YOU DIDN'T HAVE MONEY TO GET MORE.: NEVER TRUE

## 2023-05-24 SDOH — ECONOMIC STABILITY: INCOME INSECURITY: HOW HARD IS IT FOR YOU TO PAY FOR THE VERY BASICS LIKE FOOD, HOUSING, MEDICAL CARE, AND HEATING?: NOT HARD AT ALL

## 2023-05-24 SDOH — ECONOMIC STABILITY: HOUSING INSECURITY
IN THE LAST 12 MONTHS, WAS THERE A TIME WHEN YOU DID NOT HAVE A STEADY PLACE TO SLEEP OR SLEPT IN A SHELTER (INCLUDING NOW)?: NO

## 2023-05-24 ASSESSMENT — PATIENT HEALTH QUESTIONNAIRE - PHQ9
1. LITTLE INTEREST OR PLEASURE IN DOING THINGS: 0
2. FEELING DOWN, DEPRESSED OR HOPELESS: 0
SUM OF ALL RESPONSES TO PHQ QUESTIONS 1-9: 0
SUM OF ALL RESPONSES TO PHQ9 QUESTIONS 1 & 2: 0
SUM OF ALL RESPONSES TO PHQ QUESTIONS 1-9: 0

## 2023-05-24 ASSESSMENT — ENCOUNTER SYMPTOMS
WHEEZING: 0
SHORTNESS OF BREATH: 0
DIARRHEA: 0
COUGH: 0
CONSTIPATION: 0
BACK PAIN: 0
SORE THROAT: 0
ABDOMINAL PAIN: 0
NAUSEA: 0

## 2023-06-04 RX ORDER — LOSARTAN POTASSIUM 25 MG/1
TABLET ORAL
Qty: 90 TABLET | Refills: 0 | Status: SHIPPED | OUTPATIENT
Start: 2023-06-04

## 2023-06-04 RX ORDER — BLOOD SUGAR DIAGNOSTIC
STRIP MISCELLANEOUS
Qty: 100 EACH | Refills: 0 | Status: SHIPPED | OUTPATIENT
Start: 2023-06-04

## 2023-06-05 RX ORDER — DULOXETIN HYDROCHLORIDE 20 MG/1
CAPSULE, DELAYED RELEASE ORAL NIGHTLY
Qty: 90 CAPSULE | Refills: 0 | Status: SHIPPED | OUTPATIENT
Start: 2023-06-05

## 2023-06-06 RX ORDER — BLOOD SUGAR DIAGNOSTIC
STRIP MISCELLANEOUS
Qty: 100 EACH | Refills: 1 | Status: SHIPPED | OUTPATIENT
Start: 2023-06-06

## 2023-06-06 NOTE — TELEPHONE ENCOUNTER
[facility] stating needs DX codes for Medicare Billing. Added E11.21 and E11.40 if appropriate. Jyoti Rea    Previous refill encounter(s): 6/4/23 100 (needs DX code for medicare)    Requested Prescriptions     Pending Prescriptions Disp Refills    blood glucose test strips (ACCU-CHEK GUIDE) strip 100 each 1     Sig: Use 1 strip to check blood sugars once daily.   Dx: E11.21, E11.40     For Pharmacy Admin Tracking Only    Program: Medication Refill  Intervention Detail: New Rx: 1, reason: Patient Preference  Time Spent (min): 5

## 2023-06-09 RX ORDER — MONTELUKAST SODIUM 10 MG/1
TABLET ORAL
Qty: 90 TABLET | Refills: 0 | Status: SHIPPED | OUTPATIENT
Start: 2023-06-09

## 2023-06-09 NOTE — TELEPHONE ENCOUNTER
Chief Complaint   Patient presents with    Medication Refill     Last Office visit 05/24/2023  Next follow Up 08/08/2023

## 2023-08-05 RX ORDER — MONTELUKAST SODIUM 10 MG/1
TABLET ORAL
Qty: 90 TABLET | Refills: 0 | Status: SHIPPED | OUTPATIENT
Start: 2023-08-05

## 2023-08-05 RX ORDER — FENOFIBRATE 160 MG/1
TABLET ORAL
Qty: 90 TABLET | Refills: 0 | Status: SHIPPED | OUTPATIENT
Start: 2023-08-05

## 2023-08-06 RX ORDER — OMEPRAZOLE 40 MG/1
CAPSULE, DELAYED RELEASE ORAL NIGHTLY
Qty: 90 CAPSULE | Refills: 0 | Status: SHIPPED | OUTPATIENT
Start: 2023-08-06

## 2023-08-07 RX ORDER — MONTELUKAST SODIUM 10 MG/1
10 TABLET ORAL DAILY
Qty: 90 TABLET | Refills: 0 | Status: SHIPPED | OUTPATIENT
Start: 2023-08-07

## 2023-08-07 RX ORDER — METOPROLOL SUCCINATE 25 MG/1
25 TABLET, EXTENDED RELEASE ORAL DAILY
Qty: 90 TABLET | Refills: 0 | Status: SHIPPED | OUTPATIENT
Start: 2023-08-07

## 2023-08-07 RX ORDER — DULOXETIN HYDROCHLORIDE 20 MG/1
20 CAPSULE, DELAYED RELEASE ORAL DAILY
Qty: 90 CAPSULE | Refills: 0 | Status: SHIPPED | OUTPATIENT
Start: 2023-08-07

## 2023-08-07 RX ORDER — FENOFIBRATE 160 MG/1
160 TABLET ORAL DAILY
Qty: 90 TABLET | Refills: 0 | Status: SHIPPED | OUTPATIENT
Start: 2023-08-07

## 2023-08-07 RX ORDER — LOSARTAN POTASSIUM 25 MG/1
25 TABLET ORAL DAILY
Qty: 90 TABLET | Refills: 0 | Status: SHIPPED | OUTPATIENT
Start: 2023-08-07

## 2023-08-07 NOTE — TELEPHONE ENCOUNTER
----- Message from Manzama sent at 8/7/2023  2:49 PM EDT -----  Subject: Refill Request    QUESTIONS  Name of Medication? montelukast (SINGULAIR) 10 MG tablet  Patient-reported dosage and instructions? 10mg tab  How many days do you have left? 3  Preferred Pharmacy? [email or web address]  Pharmacy phone number (if available)? [phone]  ---------------------------------------------------------------------------  --------------,  Name of Medication? fenofibrate (TRIGLIDE) 160 MG tablet  Patient-reported dosage and instructions? 160mg tab once daily  How many days do you have left? 3  Preferred Pharmacy? [address] [place] [address]  Pharmacy phone number (if available)? [phone]  ---------------------------------------------------------------------------  --------------,  Name of Medication? DULoxetine (CYMBALTA) 20 MG extended release capsule  Patient-reported dosage and instructions? 20mg ext rel capsule  How many days do you have left? 3  Preferred Pharmacy? [email or web address]  Pharmacy phone number (if available)? [phone]  ---------------------------------------------------------------------------  --------------,  Name of Medication? losartan (COZAAR) 25 MG tablet  Patient-reported dosage and instructions? 25mg tab once daily  How many days do you have left? 3  Preferred Pharmacy? [email or web address]  Pharmacy phone number (if available)? [phone]  ---------------------------------------------------------------------------  --------------,  Name of Medication? metoprolol succinate (TOPROL XL) 25 MG extended   release tablet  Patient-reported dosage and instructions? 25mg ext rel tab once daily  How many days do you have left? 3  Preferred Pharmacy?  [facility] phone number (if available)? [phone]  Additional Information for Provider? 90 day supply  ---------------------------------------------------------------------------  --------------  CALL BACK INFO  What is the

## 2023-08-17 ENCOUNTER — OFFICE VISIT (OUTPATIENT)
Age: 73
End: 2023-08-17
Payer: MEDICARE

## 2023-08-17 VITALS
RESPIRATION RATE: 16 BRPM | OXYGEN SATURATION: 98 % | BODY MASS INDEX: 27.72 KG/M2 | WEIGHT: 193.6 LBS | TEMPERATURE: 97.7 F | HEIGHT: 70 IN | DIASTOLIC BLOOD PRESSURE: 77 MMHG | SYSTOLIC BLOOD PRESSURE: 133 MMHG | HEART RATE: 70 BPM

## 2023-08-17 DIAGNOSIS — E61.1 IRON DEFICIENCY: ICD-10-CM

## 2023-08-17 DIAGNOSIS — E11.21 TYPE 2 DIABETES WITH NEPHROPATHY (HCC): Primary | ICD-10-CM

## 2023-08-17 DIAGNOSIS — I10 ESSENTIAL HYPERTENSION, BENIGN: ICD-10-CM

## 2023-08-17 DIAGNOSIS — N18.31 STAGE 3A CHRONIC KIDNEY DISEASE (HCC): ICD-10-CM

## 2023-08-17 DIAGNOSIS — E55.9 VITAMIN D DEFICIENCY: ICD-10-CM

## 2023-08-17 DIAGNOSIS — I25.10 ATHEROSCLEROSIS OF NATIVE CORONARY ARTERY OF NATIVE HEART WITHOUT ANGINA PECTORIS: ICD-10-CM

## 2023-08-17 DIAGNOSIS — F51.01 PRIMARY INSOMNIA: ICD-10-CM

## 2023-08-17 DIAGNOSIS — E78.2 MIXED DYSLIPIDEMIA: ICD-10-CM

## 2023-08-17 DIAGNOSIS — Z95.5 S/P CORONARY ARTERY STENT PLACEMENT: ICD-10-CM

## 2023-08-17 DIAGNOSIS — E11.40 TYPE 2 DIABETES MELLITUS WITH DIABETIC NEUROPATHY, WITHOUT LONG-TERM CURRENT USE OF INSULIN (HCC): ICD-10-CM

## 2023-08-17 DIAGNOSIS — J32.4 CHRONIC PANSINUSITIS: ICD-10-CM

## 2023-08-17 LAB
ALBUMIN SERPL-MCNC: 3.6 G/DL (ref 3.5–5)
ALBUMIN/GLOB SERPL: 1.1 (ref 1.1–2.2)
ALP SERPL-CCNC: 56 U/L (ref 45–117)
ALT SERPL-CCNC: 31 U/L (ref 12–78)
ANION GAP SERPL CALC-SCNC: 7 MMOL/L (ref 5–15)
AST SERPL-CCNC: 25 U/L (ref 15–37)
BILIRUB SERPL-MCNC: 0.5 MG/DL (ref 0.2–1)
BUN SERPL-MCNC: 16 MG/DL (ref 6–20)
BUN/CREAT SERPL: 12 (ref 12–20)
CALCIUM SERPL-MCNC: 9.6 MG/DL (ref 8.5–10.1)
CHLORIDE SERPL-SCNC: 108 MMOL/L (ref 97–108)
CHOLEST SERPL-MCNC: 111 MG/DL
CO2 SERPL-SCNC: 24 MMOL/L (ref 21–32)
CREAT SERPL-MCNC: 1.38 MG/DL (ref 0.7–1.3)
GLOBULIN SER CALC-MCNC: 3.3 G/DL (ref 2–4)
GLUCOSE SERPL-MCNC: 135 MG/DL (ref 65–100)
HDLC SERPL-MCNC: 31 MG/DL
HDLC SERPL: 3.6 (ref 0–5)
IRON SATN MFR SERPL: 14 % (ref 20–50)
IRON SERPL-MCNC: 61 UG/DL (ref 35–150)
LDLC SERPL CALC-MCNC: 33.2 MG/DL (ref 0–100)
POTASSIUM SERPL-SCNC: 5 MMOL/L (ref 3.5–5.1)
PROT SERPL-MCNC: 6.9 G/DL (ref 6.4–8.2)
SODIUM SERPL-SCNC: 139 MMOL/L (ref 136–145)
TIBC SERPL-MCNC: 427 UG/DL (ref 250–450)
TRIGL SERPL-MCNC: 234 MG/DL
VLDLC SERPL CALC-MCNC: 46.8 MG/DL

## 2023-08-17 PROCEDURE — 3017F COLORECTAL CA SCREEN DOC REV: CPT | Performed by: FAMILY MEDICINE

## 2023-08-17 PROCEDURE — 3075F SYST BP GE 130 - 139MM HG: CPT | Performed by: FAMILY MEDICINE

## 2023-08-17 PROCEDURE — 99214 OFFICE O/P EST MOD 30 MIN: CPT | Performed by: FAMILY MEDICINE

## 2023-08-17 PROCEDURE — 2022F DILAT RTA XM EVC RTNOPTHY: CPT | Performed by: FAMILY MEDICINE

## 2023-08-17 PROCEDURE — 1123F ACP DISCUSS/DSCN MKR DOCD: CPT | Performed by: FAMILY MEDICINE

## 2023-08-17 PROCEDURE — 3051F HG A1C>EQUAL 7.0%<8.0%: CPT | Performed by: FAMILY MEDICINE

## 2023-08-17 PROCEDURE — 3078F DIAST BP <80 MM HG: CPT | Performed by: FAMILY MEDICINE

## 2023-08-17 PROCEDURE — G8419 CALC BMI OUT NRM PARAM NOF/U: HCPCS | Performed by: FAMILY MEDICINE

## 2023-08-17 PROCEDURE — G8427 DOCREV CUR MEDS BY ELIG CLIN: HCPCS | Performed by: FAMILY MEDICINE

## 2023-08-17 PROCEDURE — 1036F TOBACCO NON-USER: CPT | Performed by: FAMILY MEDICINE

## 2023-08-17 RX ORDER — OFLOXACIN 3 MG/ML
SOLUTION/ DROPS OPHTHALMIC
COMMUNITY
Start: 2023-06-06 | End: 2023-08-17 | Stop reason: ALTCHOICE

## 2023-08-17 RX ORDER — KETOROLAC TROMETHAMINE 5 MG/ML
SOLUTION OPHTHALMIC
COMMUNITY
Start: 2023-06-05 | End: 2023-08-17 | Stop reason: ALTCHOICE

## 2023-08-17 RX ORDER — PREDNISOLONE ACETATE 10 MG/ML
SUSPENSION/ DROPS OPHTHALMIC
COMMUNITY
Start: 2023-06-05 | End: 2023-08-17 | Stop reason: ALTCHOICE

## 2023-08-17 ASSESSMENT — ENCOUNTER SYMPTOMS
COUGH: 0
CONSTIPATION: 0
NAUSEA: 0
DIARRHEA: 0
WHEEZING: 0
SHORTNESS OF BREATH: 0
ABDOMINAL PAIN: 0
SORE THROAT: 0
BACK PAIN: 0

## 2023-08-17 NOTE — ASSESSMENT & PLAN NOTE
At goal, continue current medications, continue current treatment plan and medication adherence emphasized

## 2023-08-18 LAB
25(OH)D3 SERPL-MCNC: 33.1 NG/ML (ref 30–100)
BASOPHILS # BLD: 0 K/UL (ref 0–0.1)
BASOPHILS NFR BLD: 1 % (ref 0–1)
DIFFERENTIAL METHOD BLD: NORMAL
EOSINOPHIL # BLD: 0.2 K/UL (ref 0–0.4)
EOSINOPHIL NFR BLD: 3 % (ref 0–7)
ERYTHROCYTE [DISTWIDTH] IN BLOOD BY AUTOMATED COUNT: 14.1 % (ref 11.5–14.5)
EST. AVERAGE GLUCOSE BLD GHB EST-MCNC: 148 MG/DL
HBA1C MFR BLD: 6.8 % (ref 4–5.6)
HCT VFR BLD AUTO: 40 % (ref 36.6–50.3)
HGB BLD-MCNC: 12.4 G/DL (ref 12.1–17)
IMM GRANULOCYTES # BLD AUTO: 0 K/UL (ref 0–0.04)
IMM GRANULOCYTES NFR BLD AUTO: 0 % (ref 0–0.5)
LYMPHOCYTES # BLD: 2.3 K/UL (ref 0.8–3.5)
LYMPHOCYTES NFR BLD: 34 % (ref 12–49)
MCH RBC QN AUTO: 27.7 PG (ref 26–34)
MCHC RBC AUTO-ENTMCNC: 31 G/DL (ref 30–36.5)
MCV RBC AUTO: 89.5 FL (ref 80–99)
MONOCYTES # BLD: 0.5 K/UL (ref 0–1)
MONOCYTES NFR BLD: 7 % (ref 5–13)
NEUTS SEG # BLD: 3.7 K/UL (ref 1.8–8)
NEUTS SEG NFR BLD: 55 % (ref 32–75)
NRBC # BLD: 0 K/UL (ref 0–0.01)
NRBC BLD-RTO: 0 PER 100 WBC
PLATELET # BLD AUTO: 254 K/UL (ref 150–400)
PMV BLD AUTO: 11.4 FL (ref 8.9–12.9)
RBC # BLD AUTO: 4.47 M/UL (ref 4.1–5.7)
WBC # BLD AUTO: 6.8 K/UL (ref 4.1–11.1)

## 2023-08-18 NOTE — RESULT ENCOUNTER NOTE
Please inform patient:  Results for blood count is very normal.  Iron saturation is low but improved from last time and vitamin D is very normal.  To continue with iron and vitamin D. A1c stays in very good range, and at goal.  No change in current metformin. Cholesterol results are showing total and bad cholesterol at goal but good cholesterol is low and triglycerides are elevated, he needs to be more active and exercise regularly as well as watching diet for fried food.   Thanks

## 2023-08-25 ENCOUNTER — TELEPHONE (OUTPATIENT)
Age: 73
End: 2023-08-25

## 2023-08-26 NOTE — TELEPHONE ENCOUNTER
Patient has reviewed his results yesterday night and nurse has already discussed results previously. Results are stable. If he wants to discuss we can schedule virtual visit. Please check with him.   Thanks

## 2023-08-28 NOTE — TELEPHONE ENCOUNTER
Outbound call to patient. Name and  verified and informed as per provider's note that For endoscopy, he does not need to do major medication changes. Not to take his metformin as he will be fasting. To take all other medications on previous day and on day of procedure just to take his blood pressure and heart medications. He needs to stop his omeprazole, 2 weeks before  if he is also going for upper endoscopy. For colonoscopy alone , he does not need to stop omeprazole.  He stated understanding

## 2023-08-28 NOTE — TELEPHONE ENCOUNTER
Outbound call to patient. Name and  verified. Pt was asking about the letter. Informed that letter is placed in the mail and pt will receive it rowan week or two.   Pt stated that he has endoscopy scheduled for 23 and wants to know what are the medications he should stop taking

## 2023-08-28 NOTE — TELEPHONE ENCOUNTER
For endoscopy, he does not need to do major medication changes. Not to take his metformin as he will be fasting. To take all other medications on previous day and on day of procedure just to take his blood pressure and heart medications. He needs to stop his omeprazole, 2 weeks before  if he is also going for upper endoscopy. For colonoscopy alone , he does not need to stop omeprazole.   Thanks

## 2023-09-28 ENCOUNTER — HOSPITAL ENCOUNTER (OUTPATIENT)
Facility: HOSPITAL | Age: 73
Setting detail: OUTPATIENT SURGERY
Discharge: HOME | End: 2023-09-28
Attending: INTERNAL MEDICINE | Admitting: INTERNAL MEDICINE
Payer: MEDICARE

## 2023-09-28 ENCOUNTER — ANESTHESIA (OUTPATIENT)
Facility: HOSPITAL | Age: 73
End: 2023-09-28
Payer: MEDICARE

## 2023-09-28 ENCOUNTER — ANESTHESIA EVENT (OUTPATIENT)
Facility: HOSPITAL | Age: 73
End: 2023-09-28
Payer: MEDICARE

## 2023-09-28 VITALS
BODY MASS INDEX: 26.63 KG/M2 | TEMPERATURE: 98.7 F | OXYGEN SATURATION: 100 % | WEIGHT: 186 LBS | DIASTOLIC BLOOD PRESSURE: 71 MMHG | SYSTOLIC BLOOD PRESSURE: 141 MMHG | RESPIRATION RATE: 21 BRPM | HEART RATE: 52 BPM | HEIGHT: 70 IN

## 2023-09-28 PROCEDURE — 2580000003 HC RX 258: Performed by: INTERNAL MEDICINE

## 2023-09-28 PROCEDURE — 7100000011 HC PHASE II RECOVERY - ADDTL 15 MIN: Performed by: INTERNAL MEDICINE

## 2023-09-28 PROCEDURE — 3600007512: Performed by: INTERNAL MEDICINE

## 2023-09-28 PROCEDURE — 7100000010 HC PHASE II RECOVERY - FIRST 15 MIN: Performed by: INTERNAL MEDICINE

## 2023-09-28 PROCEDURE — 3700000000 HC ANESTHESIA ATTENDED CARE: Performed by: INTERNAL MEDICINE

## 2023-09-28 PROCEDURE — 88342 IMHCHEM/IMCYTCHM 1ST ANTB: CPT

## 2023-09-28 PROCEDURE — 3700000001 HC ADD 15 MINUTES (ANESTHESIA): Performed by: INTERNAL MEDICINE

## 2023-09-28 PROCEDURE — 2709999900 HC NON-CHARGEABLE SUPPLY: Performed by: INTERNAL MEDICINE

## 2023-09-28 PROCEDURE — 3600007502: Performed by: INTERNAL MEDICINE

## 2023-09-28 PROCEDURE — 88305 TISSUE EXAM BY PATHOLOGIST: CPT

## 2023-09-28 PROCEDURE — 6360000002 HC RX W HCPCS: Performed by: NURSE ANESTHETIST, CERTIFIED REGISTERED

## 2023-09-28 PROCEDURE — 6370000000 HC RX 637 (ALT 250 FOR IP): Performed by: INTERNAL MEDICINE

## 2023-09-28 RX ORDER — SODIUM CHLORIDE 9 MG/ML
INJECTION, SOLUTION INTRAVENOUS CONTINUOUS
Status: DISCONTINUED | OUTPATIENT
Start: 2023-09-28 | End: 2023-09-28 | Stop reason: HOSPADM

## 2023-09-28 RX ORDER — SIMETHICONE 20 MG/.3ML
EMULSION ORAL
Status: DISCONTINUED
Start: 2023-09-28 | End: 2023-09-28 | Stop reason: HOSPADM

## 2023-09-28 RX ORDER — SIMETHICONE 20 MG/.3ML
EMULSION ORAL PRN
Status: DISCONTINUED | OUTPATIENT
Start: 2023-09-28 | End: 2023-09-28 | Stop reason: ALTCHOICE

## 2023-09-28 RX ORDER — SODIUM CHLORIDE 0.9 % (FLUSH) 0.9 %
5-40 SYRINGE (ML) INJECTION EVERY 12 HOURS SCHEDULED
Status: DISCONTINUED | OUTPATIENT
Start: 2023-09-28 | End: 2023-09-28 | Stop reason: HOSPADM

## 2023-09-28 RX ORDER — PROPOFOL 10 MG/ML
INJECTION, EMULSION INTRAVENOUS
Status: COMPLETED
Start: 2023-09-28 | End: 2023-09-28

## 2023-09-28 RX ORDER — SODIUM CHLORIDE 9 MG/ML
25 INJECTION, SOLUTION INTRAVENOUS PRN
Status: DISCONTINUED | OUTPATIENT
Start: 2023-09-28 | End: 2023-09-28 | Stop reason: HOSPADM

## 2023-09-28 RX ORDER — SODIUM CHLORIDE 0.9 % (FLUSH) 0.9 %
5-40 SYRINGE (ML) INJECTION PRN
Status: DISCONTINUED | OUTPATIENT
Start: 2023-09-28 | End: 2023-09-28 | Stop reason: HOSPADM

## 2023-09-28 RX ADMIN — PROPOFOL 50 MG: 10 INJECTION, EMULSION INTRAVENOUS at 12:21

## 2023-09-28 RX ADMIN — PROPOFOL 50 MG: 10 INJECTION, EMULSION INTRAVENOUS at 12:29

## 2023-09-28 RX ADMIN — SODIUM CHLORIDE: 9 INJECTION, SOLUTION INTRAVENOUS at 12:16

## 2023-09-28 RX ADMIN — PROPOFOL 50 MG: 10 INJECTION, EMULSION INTRAVENOUS at 12:32

## 2023-09-28 RX ADMIN — PROPOFOL 50 MG: 10 INJECTION, EMULSION INTRAVENOUS at 12:23

## 2023-09-28 RX ADMIN — PROPOFOL 50 MG: 10 INJECTION, EMULSION INTRAVENOUS at 12:20

## 2023-09-28 ASSESSMENT — PAIN - FUNCTIONAL ASSESSMENT: PAIN_FUNCTIONAL_ASSESSMENT: NONE - DENIES PAIN

## 2023-09-28 ASSESSMENT — PAIN SCALES - GENERAL: PAINLEVEL_OUTOF10: 0

## 2023-09-28 NOTE — ANESTHESIA POSTPROCEDURE EVALUATION
Department of Anesthesiology  Postprocedure Note    Patient: Raudel Harris  MRN: [id number]  YOB: 1950  Date of evaluation: 9/28/2023      Procedure Summary     Date: 09/28/23 Room / Location: [facility] ENDO [address] [place] [address] ENDOSCOPY    Anesthesia Start: 1216 Anesthesia Stop: 1237    Procedures:       EGD ESOPHAGOGASTRODUODENOSCOPY (Upper GI Region)      COLONOSCOPY DIAGNOSTIC (Lower GI Region) Diagnosis:       Gastroesophageal reflux disease, unspecified whether esophagitis present      History of colon polyps      (Gastroesophageal reflux disease, unspecified whether esophagitis present [K21.9])      (History of colon polyps [Z86.010])    Surgeons: Sathya Jarrell MD Responsible Provider: Margarita Kyle MD    Anesthesia Type: MAC ASA Status: 3          Anesthesia Type: MAC    Paulo Phase I: Paulo Score: 10    Paulo Phase II: Paulo Score: 10      Anesthesia Post Evaluation    Patient location during evaluation: PACU  Patient participation: complete - patient participated  Level of consciousness: responsive to painful stimuli  Pain score: 2  Airway patency: patent  Nausea & Vomiting: no nausea  Complications: no  Cardiovascular status: blood pressure returned to baseline  Respiratory status: acceptable  Hydration status: euvolemic  Pain management: adequate

## 2023-09-28 NOTE — PERIOP NOTE

## 2023-09-28 NOTE — H&P
[address]  [phone]                                History and Physical     NAME: Gerson Smith   :  1950   MRN:  [id number]     HPI:  The patient was seen and examined. Past Surgical History:   Procedure Laterality Date    COLONOSCOPY N/A 2018    COLONOSCOPY performed by Bernarda Garcia MD at [facility] ENDOSCOPY    GI      COLONOSCOPY    GI      ENDOSCOPY    HEENT      SEPTOPLASTY    HEENT      TONSILLECTOMY    ORTHOPEDIC SURGERY  2007    RIGHT SHOULDER ROTATOR CUFF REPAIR. ORTHOPEDIC SURGERY Right ,     KNEE REPLACEMENT, SPACER 2017    MS UNLISTED PROCEDURE CARDIAC SURGERY  2009    CATHERIZATION WITH STENT X 1 PLACEMENT    TOTAL KNEE ARTHROPLASTY Left     DR QUARLES     Past Medical History:   Diagnosis Date    Arthritis     KNEES & BACK. 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 Other reaction(s): Rash  Medipore tape caused large blisters when removed.        Family History   Problem Relation Age of Onset    Alcohol Abuse Son     Diabetes Son

## 2023-09-28 NOTE — ANESTHESIA PRE PROCEDURE
lb)     There is no height or weight on file to calculate BMI.    CBC:   Lab Results   Component Value Date/Time    WBC 6.8 08/17/2023 12:13 PM    RBC 4.47 08/17/2023 12:13 PM    HGB 12.4 08/17/2023 12:13 PM    HCT 40.0 08/17/2023 12:13 PM    MCV 89.5 08/17/2023 12:13 PM    RDW 14.1 08/17/2023 12:13 PM     08/17/2023 12:13 PM       CMP:   Lab Results   Component Value Date/Time     08/17/2023 12:13 PM    K 5.0 08/17/2023 12:13 PM     08/17/2023 12:13 PM    CO2 24 08/17/2023 12:13 PM    BUN 16 08/17/2023 12:13 PM    CREATININE 1.38 08/17/2023 12:13 PM    GFRAA >60 08/02/2022 10:24 AM    AGRATIO 1.1 04/06/2023 12:00 PM    LABGLOM 54 08/17/2023 12:13 PM    GLUCOSE 135 08/17/2023 12:13 PM    PROT 6.9 08/17/2023 12:13 PM    CALCIUM 9.6 08/17/2023 12:13 PM    BILITOT 0.5 08/17/2023 12:13 PM    ALKPHOS 56 08/17/2023 12:13 PM    ALKPHOS 58 04/06/2023 12:00 PM    AST 25 08/17/2023 12:13 PM    ALT 31 08/17/2023 12:13 PM       POC Tests: No results for input(s): \"POCGLU\", \"POCNA\", \"POCK\", \"POCCL\", \"POCBUN\", \"POCHEMO\", \"POCHCT\" in the last 72 hours.     Coags: No results found for: \"PROTIME\", \"INR\", \"APTT\"    HCG (If Applicable): No results found for: \"PREGTESTUR\", \"PREGSERUM\", \"HCG\", \"HCGQUANT\"     ABGs: No results found for: \"PHART\", \"PO2ART\", \"TOA8UMS\", \"CJN5LPN\", \"BEART\", \"X3WPNVWG\"     Type & Screen (If Applicable):  No results found for: \"LABABO\", \"LABRH\"    Drug/Infectious Status (If Applicable):  Lab Results   Component Value Date/Time    HEPCAB <0.1 02/13/2018 10:10 AM       COVID-19 Screening (If Applicable): No results found for: \"COVID19\"        Anesthesia Evaluation  Patient summary reviewed and Nursing notes reviewed  Airway: Mallampati: II  TM distance: >3 FB   Neck ROM: full  Mouth opening: > = 3 FB   Dental:          Pulmonary:Negative Pulmonary ROS breath sounds clear to auscultation                             Cardiovascular:Negative CV ROS    (+) hypertension:, CAD:, CABG/stent:,

## 2023-09-28 NOTE — OP NOTE
[address] [place] [address], [address]  [phone]                           Colonoscopy and EGD Procedure Note      Indications:    Personal history of colon polyps (screening only), GERD      :  Kali Drake MD    Staff: Circulator: Gio Conway RN  Endoscopy Technician: Iza Ortiz     Implants: none    Referring Provider: Yves Avendaño MD    Sedation:  MAC anesthesia    Procedure Details:  After informed consent was obtained with all risks and benefits of procedure explained and preoperative exam completed, the patient was taken to the endoscopy suite and placed in the left lateral decubitus position. Upon sequential sedation as per above, a digital rectal exam was performed  And was normal.  The Olympus videocolonoscope  was inserted in the rectum and carefully advanced to the cecum, which was identified by the ileocecal valve and appendiceal orifice. The quality of preparation was good. The colonoscope was slowly withdrawn with careful evaluation between folds. Retroflexion in the rectum was performed and was normal..     Colon Findings:   Rectum: no mucosal lesion appreciated  Grade 1 internal hemorrhoid(s); Sigmoid: no mucosal lesion appreciated  Descending Colon: no mucosal lesion appreciated  Transverse Colon: no mucosal lesion appreciated  Ascending Colon: 1  Sessile polyp(s), the largest 6 mm in size;  Cecum: no mucosal lesion appreciated  Terminal Ileum: not intubated      Following sequential administration of sedation as per above, the [id number] gastroscope was inserted into the mouth and advanced under direct vision to second portion of the duodenum. A careful inspection was made as the gastroscope was withdrawn, including a retroflexed view of the proximal stomach; findings and interventions are described below. EGD Findings:  Esophagus:Irregular Z line noted at 39 cm. LA grade B esophagitis was noted.    Stomach:mild erythema was noted

## 2023-12-02 RX ORDER — LOSARTAN POTASSIUM 25 MG/1
25 TABLET ORAL DAILY
Qty: 90 TABLET | Refills: 0 | Status: SHIPPED | OUTPATIENT
Start: 2023-12-02

## 2023-12-02 RX ORDER — BLOOD SUGAR DIAGNOSTIC
STRIP MISCELLANEOUS
Qty: 100 EACH | Refills: 0 | Status: SHIPPED | OUTPATIENT
Start: 2023-12-02

## 2023-12-02 RX ORDER — METOPROLOL SUCCINATE 25 MG/1
25 TABLET, EXTENDED RELEASE ORAL DAILY
Qty: 90 TABLET | Refills: 0 | Status: SHIPPED | OUTPATIENT
Start: 2023-12-02

## 2023-12-04 RX ORDER — BLOOD SUGAR DIAGNOSTIC
STRIP MISCELLANEOUS
Qty: 100 EACH | Refills: 0 | Status: SHIPPED | OUTPATIENT
Start: 2023-12-04 | End: 2023-12-06 | Stop reason: SDUPTHER

## 2023-12-04 NOTE — TELEPHONE ENCOUNTER
MD Enzo Reddy requesting Diagnosis code added to RX for Medicare. Added E11.21 and E11.40 to RX if appropriate. Please resend once approved. ThanksJyoti    Previous refill encounter(s): 12/2/23 100    Requested Prescriptions     Pending Prescriptions Disp Refills    blood glucose test strips (ACCU-CHEK GUIDE) strip 100 each 0     Sig: Use 1 strip once daily to check blood sugar. DX: E11.21, E11.40     For Pharmacy Admin Tracking Only    Program: Medication Refill  CPA in place:    Recommendation Provided To:    Intervention Detail: New Rx: 1, reason: Patient Preference  Intervention Accepted By:   Lillian Escalante Closed?:    Time Spent (min): 5

## 2023-12-06 RX ORDER — BLOOD SUGAR DIAGNOSTIC
STRIP MISCELLANEOUS
Qty: 100 EACH | Refills: 0 | Status: SHIPPED | OUTPATIENT
Start: 2023-12-06 | End: 2023-12-09 | Stop reason: SDUPTHER

## 2023-12-06 NOTE — TELEPHONE ENCOUNTER
PCP: Alfonso Mesa MD      Future Appointments   Date Time Provider [address]   2/6/2024  8:40 AM Alfonso Mesa MD [facility] BS AMB   2/19/2024 10:20 AM MD MARK Weber AMB       Requested Prescriptions     Pending Prescriptions Disp Refills    blood glucose test strips (ACCU-CHEK GUIDE) strip 100 each 0     Sig: Use 1 strip once daily to check blood sugar.   DX: E11.21, E11.40

## 2023-12-08 ENCOUNTER — TELEPHONE (OUTPATIENT)
Age: 73
End: 2023-12-08

## 2023-12-08 NOTE — TELEPHONE ENCOUNTER
MD Melia Mcqueen stating the diagnosis codes for the test strips are not accepted: E11.21 and E11.40. Asking for another diagnosis code? Should be E11.9? Added E11.9 if appropriate or advise. Jyoti Rea    Previous refill encounter(s): 12/6/23 100    Requested Prescriptions     Pending Prescriptions Disp Refills    blood glucose test strips (ACCU-CHEK GUIDE) strip 100 each 1     Sig: Use 1 strip once daily to check blood sugar. DX: E11.9     For Pharmacy Admin Tracking Only    Program: Medication Refill  CPA in place:    Recommendation Provided To:    Intervention Detail: New Rx: 1, reason: Patient Preference  Intervention Accepted By:   Sudhakar Biswas Closed?:    Time Spent (min): 5

## 2023-12-08 NOTE — TELEPHONE ENCOUNTER
Spoke with Citlali at [facility] requesting the escript to be sent over again for blood glucose test strips (ACCU-CHEK GUIDE) strip  and dx codes to be place on front of the face sheet. Best call back number [phone]

## 2023-12-09 RX ORDER — BLOOD SUGAR DIAGNOSTIC
STRIP MISCELLANEOUS
Qty: 100 EACH | Refills: 1 | Status: SHIPPED | OUTPATIENT
Start: 2023-12-09

## 2024-01-29 ENCOUNTER — OFFICE VISIT (OUTPATIENT)
Age: 74
End: 2024-01-29
Payer: MEDICARE

## 2024-01-29 VITALS
SYSTOLIC BLOOD PRESSURE: 120 MMHG | HEART RATE: 60 BPM | OXYGEN SATURATION: 97 % | DIASTOLIC BLOOD PRESSURE: 80 MMHG | BODY MASS INDEX: 26.48 KG/M2 | HEIGHT: 70 IN | WEIGHT: 185 LBS | RESPIRATION RATE: 16 BRPM

## 2024-01-29 DIAGNOSIS — E78.2 MIXED DYSLIPIDEMIA: ICD-10-CM

## 2024-01-29 DIAGNOSIS — E11.21 TYPE 2 DIABETES WITH NEPHROPATHY (HCC): ICD-10-CM

## 2024-01-29 DIAGNOSIS — N18.31 STAGE 3A CHRONIC KIDNEY DISEASE (HCC): ICD-10-CM

## 2024-01-29 DIAGNOSIS — D50.8 IRON DEFICIENCY ANEMIA SECONDARY TO INADEQUATE DIETARY IRON INTAKE: ICD-10-CM

## 2024-01-29 DIAGNOSIS — I25.110 ATHEROSCLEROSIS OF NATIVE CORONARY ARTERY OF NATIVE HEART WITH UNSTABLE ANGINA PECTORIS (HCC): Primary | ICD-10-CM

## 2024-01-29 DIAGNOSIS — I10 ESSENTIAL HYPERTENSION, BENIGN: ICD-10-CM

## 2024-01-29 DIAGNOSIS — Z95.5 S/P CORONARY ARTERY STENT PLACEMENT: ICD-10-CM

## 2024-01-29 PROCEDURE — 2022F DILAT RTA XM EVC RTNOPTHY: CPT | Performed by: SPECIALIST

## 2024-01-29 PROCEDURE — 3074F SYST BP LT 130 MM HG: CPT | Performed by: SPECIALIST

## 2024-01-29 PROCEDURE — 99214 OFFICE O/P EST MOD 30 MIN: CPT | Performed by: SPECIALIST

## 2024-01-29 PROCEDURE — 1123F ACP DISCUSS/DSCN MKR DOCD: CPT | Performed by: SPECIALIST

## 2024-01-29 PROCEDURE — 93005 ELECTROCARDIOGRAM TRACING: CPT | Performed by: SPECIALIST

## 2024-01-29 PROCEDURE — 3046F HEMOGLOBIN A1C LEVEL >9.0%: CPT | Performed by: SPECIALIST

## 2024-01-29 PROCEDURE — 93010 ELECTROCARDIOGRAM REPORT: CPT | Performed by: SPECIALIST

## 2024-01-29 PROCEDURE — G8419 CALC BMI OUT NRM PARAM NOF/U: HCPCS | Performed by: SPECIALIST

## 2024-01-29 PROCEDURE — 3017F COLORECTAL CA SCREEN DOC REV: CPT | Performed by: SPECIALIST

## 2024-01-29 PROCEDURE — G8428 CUR MEDS NOT DOCUMENT: HCPCS | Performed by: SPECIALIST

## 2024-01-29 PROCEDURE — 1036F TOBACCO NON-USER: CPT | Performed by: SPECIALIST

## 2024-01-29 PROCEDURE — 3079F DIAST BP 80-89 MM HG: CPT | Performed by: SPECIALIST

## 2024-01-29 PROCEDURE — G8484 FLU IMMUNIZE NO ADMIN: HCPCS | Performed by: SPECIALIST

## 2024-01-29 RX ORDER — GABAPENTIN 300 MG/1
CAPSULE ORAL
COMMUNITY

## 2024-01-29 RX ORDER — FENOFIBRATE 160 MG/1
160 TABLET ORAL DAILY
Qty: 90 TABLET | Refills: 3 | Status: SHIPPED | OUTPATIENT
Start: 2024-01-29

## 2024-01-29 RX ORDER — ATORVASTATIN CALCIUM 10 MG/1
10 TABLET, FILM COATED ORAL NIGHTLY
Qty: 90 TABLET | Refills: 3 | Status: SHIPPED | OUTPATIENT
Start: 2024-01-29

## 2024-01-29 RX ORDER — PANTOPRAZOLE SODIUM 40 MG/1
TABLET, DELAYED RELEASE ORAL
COMMUNITY

## 2024-01-29 RX ORDER — METOPROLOL SUCCINATE 25 MG/1
25 TABLET, EXTENDED RELEASE ORAL DAILY
Qty: 90 TABLET | Refills: 3 | Status: SHIPPED | OUTPATIENT
Start: 2024-01-29

## 2024-01-29 RX ORDER — NITROGLYCERIN 0.4 MG/1
TABLET SUBLINGUAL
Qty: 25 TABLET | Refills: 5 | Status: SHIPPED | OUTPATIENT
Start: 2024-01-29

## 2024-01-29 RX ORDER — LOSARTAN POTASSIUM 25 MG/1
25 TABLET ORAL DAILY
Qty: 90 TABLET | Refills: 3 | Status: SHIPPED | OUTPATIENT
Start: 2024-01-29

## 2024-01-29 ASSESSMENT — PATIENT HEALTH QUESTIONNAIRE - PHQ9
SUM OF ALL RESPONSES TO PHQ QUESTIONS 1-9: 0
1. LITTLE INTEREST OR PLEASURE IN DOING THINGS: 0
SUM OF ALL RESPONSES TO PHQ QUESTIONS 1-9: 0
2. FEELING DOWN, DEPRESSED OR HOPELESS: 0
SUM OF ALL RESPONSES TO PHQ9 QUESTIONS 1 & 2: 0

## 2024-01-29 NOTE — PATIENT INSTRUCTIONS
You have been scheduled for a nuclear stress test and an echo. We will send results on [employer].     Nuclear stress testing evaluates blood flow to your heart muscle and assesses cardiac function. There are 2 parts (Rest/Stress) to this procedure and will include an IV administration of a stressing medication called Lexiscan.      *Please arrive 15 minutes prior to your appointment time    Test Duration:    -One day testing will take 4 hours    Day of testing instructions:    NO CAFFEINE (not even decaffeinated products) 24 HOURS PRIOR TO TESTING. This includes coffee, soda, tea, chocolate, multivitamins, and migraine medication, like Excedrin or Fioricet that contains caffeine.  Nothing to eat or drink 4 HOURS prior to testing  NO NICOTINE 12 hours prior to testing  Take medications as directed with a few sips of water. If you are unsure you may bring your medications with you to take after instructed by your stressing nurse. DIABETIC PATIENTS: Take half of your insulin with a light meal 4 hours before your test.  Wear comfortable clothes and shoes (Shirts with no metal, shorts or pants, tennis shoes, no heels or flip flops).

## 2024-01-29 NOTE — PROGRESS NOTES
Alonso Kyle     1950       Diallo Ballard MD, [facility]  Date of Visit-1/29/2024   PCP is Ruth Ann Aquino MD   [facility] [place]  [facility]  HPI:  Alonso Kyle is a 73 y.o. male     Has had Cp about 30 seconds comes and goes  Also some dizzy  No sob, edema  EKG NSR at 60 but no acute changes  Some swelling on plane back from [place] and took one dose of diuretic  Seems more tired or down today   No radiation of cp, no jaw pain, no sob, no reflux association, no exertion  Has HA all the time and has to take nap for 30 minutes 2 x day   Seems tired or at least distracted  Can't walk well due to knee pain     complaint is dizziness now but appears tired  Denies  edema, syncope or shortness of breath at rest   Has no tachycardia , palpitations or sense of arrythmia       CAD  PCI 7/29/2011-cath Elio-LAD 75% proximal.  D1 50% proximal.  PCI Dr. Avila PASQUALE 3.0 x 15 Xience V PASQUALE  Echo 10/20/16 Normal  May 2019 he had some chest pain and low BP. Amlodipine was stopped  Nuclear stress test done 6/10/19.  Nuclear EF 74% perfusion normal  Nuclear stress test 10/15/21-normal Lexiscan EF 72%  HTN  Diabetes mellitus 2 with insulin use  Hypercholesteremia.     EKG- NSR non-specific ST-T segment/T wave findings heart rate 60, QRS 80 ms    Assessment/Plan:     Patient Instructions   You have been scheduled for a nuclear stress test and an echo. We will send results on [employer].     Nuclear stress testing evaluates blood flow to your heart muscle and assesses cardiac function. There are 2 parts (Rest/Stress) to this procedure and will include an IV administration of a stressing medication called Lexiscan.      *Please arrive 15 minutes prior to your appointment time    Test Duration:    -One day testing will take 4 hours    Day of testing instructions:    NO CAFFEINE (not even decaffeinated products) 24 HOURS PRIOR TO TESTING. This includes coffee, soda, tea,

## 2024-02-03 SDOH — HEALTH STABILITY: PHYSICAL HEALTH
ON AVERAGE, HOW MANY DAYS PER WEEK DO YOU ENGAGE IN MODERATE TO STRENUOUS EXERCISE (LIKE A BRISK WALK)?: PATIENT DECLINED

## 2024-02-03 ASSESSMENT — LIFESTYLE VARIABLES
HOW OFTEN DURING THE LAST YEAR HAVE YOU NEEDED AN ALCOHOLIC DRINK FIRST THING IN THE MORNING TO GET YOURSELF GOING AFTER A NIGHT OF HEAVY DRINKING: 0
HOW OFTEN DO YOU HAVE A DRINK CONTAINING ALCOHOL: 2-3 TIMES A WEEK
HOW OFTEN DO YOU HAVE SIX OR MORE DRINKS ON ONE OCCASION: 1
HAS A RELATIVE, FRIEND, DOCTOR, OR ANOTHER HEALTH PROFESSIONAL EXPRESSED CONCERN ABOUT YOUR DRINKING OR SUGGESTED YOU CUT DOWN: 0
HAS A RELATIVE, FRIEND, DOCTOR, OR ANOTHER HEALTH PROFESSIONAL EXPRESSED CONCERN ABOUT YOUR DRINKING OR SUGGESTED YOU CUT DOWN: NO
HOW OFTEN DURING THE LAST YEAR HAVE YOU HAD A FEELING OF GUILT OR REMORSE AFTER DRINKING: NEVER
HOW OFTEN DURING THE LAST YEAR HAVE YOU FOUND THAT YOU WERE NOT ABLE TO STOP DRINKING ONCE YOU HAD STARTED: 0
HOW OFTEN DURING THE LAST YEAR HAVE YOU FAILED TO DO WHAT WAS NORMALLY EXPECTED FROM YOU BECAUSE OF DRINKING: 0
HOW OFTEN DURING THE LAST YEAR HAVE YOU NEEDED AN ALCOHOLIC DRINK FIRST THING IN THE MORNING TO GET YOURSELF GOING AFTER A NIGHT OF HEAVY DRINKING: NEVER
HOW OFTEN DURING THE LAST YEAR HAVE YOU BEEN UNABLE TO REMEMBER WHAT HAPPENED THE NIGHT BEFORE BECAUSE YOU HAD BEEN DRINKING: NEVER
HOW OFTEN DURING THE LAST YEAR HAVE YOU FAILED TO DO WHAT WAS NORMALLY EXPECTED FROM YOU BECAUSE OF DRINKING: NEVER
HAVE YOU OR SOMEONE ELSE BEEN INJURED AS A RESULT OF YOUR DRINKING: NO
HOW OFTEN DURING THE LAST YEAR HAVE YOU FOUND THAT YOU WERE NOT ABLE TO STOP DRINKING ONCE YOU HAD STARTED: NEVER
HAVE YOU OR SOMEONE ELSE BEEN INJURED AS A RESULT OF YOUR DRINKING: 0
HOW OFTEN DO YOU HAVE A DRINK CONTAINING ALCOHOL: 4
HOW MANY STANDARD DRINKS CONTAINING ALCOHOL DO YOU HAVE ON A TYPICAL DAY: 2
HOW OFTEN DURING THE LAST YEAR HAVE YOU BEEN UNABLE TO REMEMBER WHAT HAPPENED THE NIGHT BEFORE BECAUSE YOU HAD BEEN DRINKING: 0

## 2024-02-03 ASSESSMENT — PATIENT HEALTH QUESTIONNAIRE - PHQ9
SUM OF ALL RESPONSES TO PHQ9 QUESTIONS 1 & 2: 1
SUM OF ALL RESPONSES TO PHQ QUESTIONS 1-9: 1
1. LITTLE INTEREST OR PLEASURE IN DOING THINGS: 0
2. FEELING DOWN, DEPRESSED OR HOPELESS: 1

## 2024-02-06 ENCOUNTER — OFFICE VISIT (OUTPATIENT)
Age: 74
End: 2024-02-06
Payer: MEDICARE

## 2024-02-06 VITALS
DIASTOLIC BLOOD PRESSURE: 81 MMHG | RESPIRATION RATE: 14 BRPM | HEIGHT: 70 IN | TEMPERATURE: 96 F | WEIGHT: 188.6 LBS | OXYGEN SATURATION: 97 % | HEART RATE: 91 BPM | BODY MASS INDEX: 27 KG/M2 | SYSTOLIC BLOOD PRESSURE: 139 MMHG

## 2024-02-06 DIAGNOSIS — D50.8 IRON DEFICIENCY ANEMIA SECONDARY TO INADEQUATE DIETARY IRON INTAKE: ICD-10-CM

## 2024-02-06 DIAGNOSIS — E78.2 MIXED DYSLIPIDEMIA: ICD-10-CM

## 2024-02-06 DIAGNOSIS — E53.8 VITAMIN B12 DEFICIENCY: ICD-10-CM

## 2024-02-06 DIAGNOSIS — Z71.89 ACP (ADVANCE CARE PLANNING): ICD-10-CM

## 2024-02-06 DIAGNOSIS — N18.31 STAGE 3A CHRONIC KIDNEY DISEASE (HCC): ICD-10-CM

## 2024-02-06 DIAGNOSIS — E11.21 TYPE 2 DIABETES WITH NEPHROPATHY (HCC): ICD-10-CM

## 2024-02-06 DIAGNOSIS — E11.40 TYPE 2 DIABETES MELLITUS WITH DIABETIC NEUROPATHY, WITHOUT LONG-TERM CURRENT USE OF INSULIN (HCC): ICD-10-CM

## 2024-02-06 DIAGNOSIS — Z00.00 MEDICARE ANNUAL WELLNESS VISIT, SUBSEQUENT: Primary | ICD-10-CM

## 2024-02-06 DIAGNOSIS — J32.4 CHRONIC PANSINUSITIS: ICD-10-CM

## 2024-02-06 DIAGNOSIS — G89.4 CHRONIC PAIN DISORDER: ICD-10-CM

## 2024-02-06 DIAGNOSIS — I10 ESSENTIAL HYPERTENSION, BENIGN: ICD-10-CM

## 2024-02-06 LAB
ALBUMIN SERPL-MCNC: 3.8 G/DL (ref 3.5–5)
ALBUMIN/GLOB SERPL: 1.1 (ref 1.1–2.2)
ALP SERPL-CCNC: 54 U/L (ref 45–117)
ALT SERPL-CCNC: 21 U/L (ref 12–78)
ANION GAP SERPL CALC-SCNC: 5 MMOL/L (ref 5–15)
AST SERPL-CCNC: 15 U/L (ref 15–37)
BASOPHILS # BLD: 0 K/UL (ref 0–0.1)
BASOPHILS NFR BLD: 0 % (ref 0–1)
BILIRUB SERPL-MCNC: 1.6 MG/DL (ref 0.2–1)
BUN SERPL-MCNC: 12 MG/DL (ref 6–20)
BUN/CREAT SERPL: 8 (ref 12–20)
CALCIUM SERPL-MCNC: 9.4 MG/DL (ref 8.5–10.1)
CHLORIDE SERPL-SCNC: 107 MMOL/L (ref 97–108)
CHOLEST SERPL-MCNC: 115 MG/DL
CO2 SERPL-SCNC: 26 MMOL/L (ref 21–32)
CREAT SERPL-MCNC: 1.5 MG/DL (ref 0.7–1.3)
CREAT UR-MCNC: 171 MG/DL
DIFFERENTIAL METHOD BLD: NORMAL
EOSINOPHIL # BLD: 0.3 K/UL (ref 0–0.4)
EOSINOPHIL NFR BLD: 3 % (ref 0–7)
ERYTHROCYTE [DISTWIDTH] IN BLOOD BY AUTOMATED COUNT: 14.1 % (ref 11.5–14.5)
EST. AVERAGE GLUCOSE BLD GHB EST-MCNC: 160 MG/DL
GLOBULIN SER CALC-MCNC: 3.4 G/DL (ref 2–4)
GLUCOSE SERPL-MCNC: 163 MG/DL (ref 65–100)
HBA1C MFR BLD: 7.2 % (ref 4–5.6)
HCT VFR BLD AUTO: 41.5 % (ref 36.6–50.3)
HDLC SERPL-MCNC: 36 MG/DL
HDLC SERPL: 3.2 (ref 0–5)
HGB BLD-MCNC: 12.9 G/DL (ref 12.1–17)
IMM GRANULOCYTES # BLD AUTO: 0 K/UL (ref 0–0.04)
IMM GRANULOCYTES NFR BLD AUTO: 0 % (ref 0–0.5)
LDLC SERPL CALC-MCNC: 50.6 MG/DL (ref 0–100)
LYMPHOCYTES # BLD: 1.7 K/UL (ref 0.8–3.5)
LYMPHOCYTES NFR BLD: 16 % (ref 12–49)
MCH RBC QN AUTO: 27.6 PG (ref 26–34)
MCHC RBC AUTO-ENTMCNC: 31.1 G/DL (ref 30–36.5)
MCV RBC AUTO: 88.7 FL (ref 80–99)
MICROALBUMIN UR-MCNC: 19 MG/DL
MICROALBUMIN/CREAT UR-RTO: 111 MG/G (ref 0–30)
MONOCYTES # BLD: 0.8 K/UL (ref 0–1)
MONOCYTES NFR BLD: 8 % (ref 5–13)
NEUTS SEG # BLD: 8 K/UL (ref 1.8–8)
NEUTS SEG NFR BLD: 73 % (ref 32–75)
NRBC # BLD: 0 K/UL (ref 0–0.01)
NRBC BLD-RTO: 0 PER 100 WBC
PLATELET # BLD AUTO: 244 K/UL (ref 150–400)
PMV BLD AUTO: 11.5 FL (ref 8.9–12.9)
POTASSIUM SERPL-SCNC: 4.5 MMOL/L (ref 3.5–5.1)
PROT SERPL-MCNC: 7.2 G/DL (ref 6.4–8.2)
RBC # BLD AUTO: 4.68 M/UL (ref 4.1–5.7)
SODIUM SERPL-SCNC: 138 MMOL/L (ref 136–145)
TRIGL SERPL-MCNC: 142 MG/DL
TSH SERPL DL<=0.05 MIU/L-ACNC: 1.94 UIU/ML (ref 0.36–3.74)
VIT B12 SERPL-MCNC: 1440 PG/ML (ref 193–986)
VLDLC SERPL CALC-MCNC: 28.4 MG/DL
WBC # BLD AUTO: 10.8 K/UL (ref 4.1–11.1)

## 2024-02-06 PROCEDURE — 99497 ADVNCD CARE PLAN 30 MIN: CPT | Performed by: FAMILY MEDICINE

## 2024-02-06 PROCEDURE — 3017F COLORECTAL CA SCREEN DOC REV: CPT | Performed by: FAMILY MEDICINE

## 2024-02-06 PROCEDURE — G0439 PPPS, SUBSEQ VISIT: HCPCS | Performed by: FAMILY MEDICINE

## 2024-02-06 PROCEDURE — 2022F DILAT RTA XM EVC RTNOPTHY: CPT | Performed by: FAMILY MEDICINE

## 2024-02-06 PROCEDURE — 1123F ACP DISCUSS/DSCN MKR DOCD: CPT | Performed by: FAMILY MEDICINE

## 2024-02-06 PROCEDURE — G0446 INTENS BEHAVE THER CARDIO DX: HCPCS | Performed by: FAMILY MEDICINE

## 2024-02-06 PROCEDURE — G8427 DOCREV CUR MEDS BY ELIG CLIN: HCPCS | Performed by: FAMILY MEDICINE

## 2024-02-06 PROCEDURE — 99214 OFFICE O/P EST MOD 30 MIN: CPT | Performed by: FAMILY MEDICINE

## 2024-02-06 PROCEDURE — 3046F HEMOGLOBIN A1C LEVEL >9.0%: CPT | Performed by: FAMILY MEDICINE

## 2024-02-06 PROCEDURE — G8484 FLU IMMUNIZE NO ADMIN: HCPCS | Performed by: FAMILY MEDICINE

## 2024-02-06 PROCEDURE — 3075F SYST BP GE 130 - 139MM HG: CPT | Performed by: FAMILY MEDICINE

## 2024-02-06 PROCEDURE — G8419 CALC BMI OUT NRM PARAM NOF/U: HCPCS | Performed by: FAMILY MEDICINE

## 2024-02-06 PROCEDURE — 1036F TOBACCO NON-USER: CPT | Performed by: FAMILY MEDICINE

## 2024-02-06 PROCEDURE — 3079F DIAST BP 80-89 MM HG: CPT | Performed by: FAMILY MEDICINE

## 2024-02-06 RX ORDER — AZELASTINE 1 MG/ML
1 SPRAY, METERED NASAL 2 TIMES DAILY
Qty: 60 ML | Refills: 1 | Status: SHIPPED | OUTPATIENT
Start: 2024-02-06

## 2024-02-06 RX ORDER — LOSARTAN POTASSIUM 50 MG/1
50 TABLET ORAL DAILY
Qty: 90 TABLET | Refills: 1 | Status: SHIPPED | OUTPATIENT
Start: 2024-02-06

## 2024-02-06 ASSESSMENT — ENCOUNTER SYMPTOMS
NAUSEA: 0
WHEEZING: 0
COUGH: 0
SHORTNESS OF BREATH: 0
SORE THROAT: 0
CONSTIPATION: 0
ABDOMINAL PAIN: 0
DIARRHEA: 0
BACK PAIN: 1

## 2024-02-06 NOTE — PROGRESS NOTES
Advance Care Planning     Advance Care Planning (ACP) Physician/NP/PA Conversation    Date of Conversation: 2/6/2024  Conducted with: Patient with Decision Making Capacity    Healthcare Decision Maker:      Primary Decision Maker: Merissa Kyle - Spouse - [phone]    Secondary Decision Maker: Mishel Kyle - Child - [phone]    Click here to complete Healthcare Decision Makers including selection of the Healthcare Decision Maker Relationship (ie \"Primary\")  Today we documented Decision Maker(s) consistent with Legal Next of Kin hierarchy.    Care Preferences:    Hospitalization:  \"If your health worsens and it becomes clear that your chance of recovery is unlikely, what would be your preference regarding hospitalization?\"  The patient would prefer hospitalization.    Ventilation:  \"If you were unable to breath on your own and your chance of recovery was unlikely, what would be your preference about the use of a ventilator (breathing machine) if it was available to you?\"  The patient would NOT desire the use of a ventilator.    Resuscitation:  \"In the event your heart stopped as a result of an underlying serious health condition, would you want attempts made to restart your heart, or would you prefer a natural death?\"  Yes, attempt to resuscitate.    treatment goals, benefit/burden of treatment options, artificial nutrition, ventilation preferences, hospitalization preferences, resuscitation preferences, end of life care preferences (vegetative state/imminent death), and hospice care    Conversation Outcomes / Follow-Up Plan:  ACP in process - information provided, considering goals and options  Strongly recommended to complete advanced directive paperwork  Reviewed DNR/DNI and patient elects Full Code (Attempt Resuscitation)    Length of Voluntary ACP Conversation in minutes:  16 minutes    Ruth Ann Aquino MD                       
Chief Complaint   Patient presents with    Medicare AWV     Follow up     \"Have you been to the ER, urgent care clinic since your last visit?  Hospitalized since your last visit?\"    NO    “Have you seen or consulted any other health care providers outside of [facility] since your last visit?”    YES - When: approximately 2  weeks ago.  Where and Why: Dr. Don.                   2/3/2024    10:07 AM   PHQ-9    Little interest or pleasure in doing things 0   Feeling down, depressed, or hopeless 1   PHQ-2 Score 1   PHQ-9 Total Score 1           Financial Resource Strain: Low Risk  (5/24/2023)    Overall Financial Resource Strain (CARDIA)     Difficulty of Paying Living Expenses: Not hard at all      Food Insecurity: Not on file (5/24/2023)          Health Maintenance Due   Topic Date Due    Respiratory Syncytial Virus (RSV) Pregnant or age 60 yrs+ (1 - 1-dose 60+ series) Never done    Shingles vaccine (2 of 2) 12/18/2020    Diabetic retinal exam  02/24/2021    Flu vaccine (1) 08/01/2023    COVID-19 Vaccine (4 - 2023-24 season) 09/01/2023    Annual Wellness Visit (Medicare)  11/27/2023        
Date:2/6/2024        Patient Name:Alonso Kyle     YOB: 1950     Age:73 y.o.    Seen today for   Chief Complaint   Patient presents with    Medicare AWV     Follow up   Patient was seen today for Medicare wellness visit as well as follow-up on multiple medical issues.  He follows several specialist including ENT, orthopedic specialist, pain management, urology, cardiology.  Today he is complaining of worsening pain in both knees, back as well as complaining of headache in the morning  Please schedule for nuclear stress test due to his symptoms of fatigue and dizziness.  Cardiovascular Review  The patient has hypertension, hyperlipidemia, coronary artery disease and status post coronary artery stenting.  He reports.  Medications as instructed, no medication side effects noted, home BP monitoring in range of 100's systolic over 70's diastolic, no chest pain on exertion, no dyspnea on exertion, no swelling of ankles, no orthostatic dizziness or lightheadedness, no orthopnea or paroxysmal nocturnal dyspnea, no palpitations, no muscle aches or pain.  Diet and Lifestyle: generally follows a low fat low cholesterol diet, generally follows a low sodium diet, exercises sporadically, chews tobacco.  Lab review: labs reviewed and discussed with patient.  He follows Dr Ballard  Cardiac History:  PCI 7- prox 75% LAD, D1 50% EF 60%; PASQUALE 3 x 15 mm Xience to LAD  Patient had nuclear stress test on 10/15/21 that was essentially normal and at low risk  medicines:  mg,  Metoprolol XL 25 mg, Losartan 25 mg,Lofibra 160 mg,and Lipitor 10 mg        Endocrine Review  He is seen for diabetes.  Since last visit he reports: no polyuria or polydipsia, no significant changes.  Home glucose monitoring: is performed regularly, fasting values range 100-120  He is checking his sugars one per day.  He reports medication compliance: compliant all of the time.  Medication side effects: none.  Diabetic diet compliance: 
E11.9 Yes Ruth Ann Aquino MD   metFORMIN (GLUCOPHAGE) 850 MG tablet TAKE 1 TABLET BY MOUTH TWICE DAILY WITH MEALS Yes Ruth Ann Aquino MD   montelukast (SINGULAIR) 10 MG tablet Take 1 tablet by mouth daily Yes Ruth Ann Aquino MD   acetaminophen (TYLENOL) 500 MG tablet Take 2 tablets by mouth every 6 hours Yes Automatic Reconciliation, Ar   aspirin 81 MG EC tablet Take 2 tablets by mouth daily Yes Automatic Reconciliation, Ar   tamsulosin (FLOMAX) 0.4 MG capsule Take 1 capsule by mouth daily Yes Automatic Reconciliation, Ar       CareTeam (Including outside providers/suppliers regularly involved in providing care):   Patient Care Team:  Ruth Ann Aquino MD as PCP - General  Ruth Ann Aquino MD as PCP - Empaneled Provider  Diallo Ballard MD as Consulting Physician (Cardiology)  Tee Celestin OD (Optometry)     Reviewed and updated this visit:  Tobacco  Allergies  Meds  Problems  Med Hx  Surg Hx  Soc Hx  Fam Hx

## 2024-02-28 DIAGNOSIS — I10 ESSENTIAL HYPERTENSION, BENIGN: ICD-10-CM

## 2024-02-28 DIAGNOSIS — E11.21 TYPE 2 DIABETES WITH NEPHROPATHY (HCC): ICD-10-CM

## 2024-02-28 RX ORDER — LOSARTAN POTASSIUM 50 MG/1
50 TABLET ORAL DAILY
Qty: 90 TABLET | Refills: 1 | Status: SHIPPED | OUTPATIENT
Start: 2024-02-28

## 2024-02-28 RX ORDER — LOSARTAN POTASSIUM 25 MG/1
25 TABLET ORAL DAILY
Qty: 90 TABLET | Refills: 0 | OUTPATIENT
Start: 2024-02-28

## 2024-02-29 ENCOUNTER — ANCILLARY PROCEDURE (OUTPATIENT)
Age: 74
End: 2024-02-29
Payer: MEDICARE

## 2024-02-29 VITALS
WEIGHT: 188 LBS | HEART RATE: 53 BPM | DIASTOLIC BLOOD PRESSURE: 78 MMHG | SYSTOLIC BLOOD PRESSURE: 130 MMHG | HEIGHT: 70 IN | BODY MASS INDEX: 26.92 KG/M2

## 2024-02-29 VITALS
SYSTOLIC BLOOD PRESSURE: 130 MMHG | WEIGHT: 188 LBS | HEIGHT: 70 IN | DIASTOLIC BLOOD PRESSURE: 78 MMHG | BODY MASS INDEX: 26.92 KG/M2

## 2024-02-29 DIAGNOSIS — I25.110 ATHEROSCLEROSIS OF NATIVE CORONARY ARTERY OF NATIVE HEART WITH UNSTABLE ANGINA PECTORIS (HCC): ICD-10-CM

## 2024-02-29 DIAGNOSIS — Z95.5 S/P CORONARY ARTERY STENT PLACEMENT: ICD-10-CM

## 2024-02-29 DIAGNOSIS — I10 ESSENTIAL HYPERTENSION, BENIGN: ICD-10-CM

## 2024-02-29 LAB
ECHO AO ASC DIAM: 4 CM
ECHO AO ASCENDING AORTA INDEX: 1.97 CM/M2
ECHO AO ROOT DIAM: 3.6 CM
ECHO AO ROOT INDEX: 1.77 CM/M2
ECHO AR MAX VEL PISA: 5 M/S
ECHO AV AREA PEAK VELOCITY: 2.7 CM2
ECHO AV AREA VTI: 3.2 CM2
ECHO AV AREA/BSA PEAK VELOCITY: 1.3 CM2/M2
ECHO AV AREA/BSA VTI: 1.6 CM2/M2
ECHO AV MEAN GRADIENT: 5 MMHG
ECHO AV MEAN VELOCITY: 1 M/S
ECHO AV PEAK GRADIENT: 9 MMHG
ECHO AV PEAK VELOCITY: 1.5 M/S
ECHO AV REGURGITANT PHT: 552.5 MILLISECOND
ECHO AV VELOCITY RATIO: 0.73
ECHO AV VTI: 31.1 CM
ECHO BSA: 2.05 M2
ECHO LA DIAMETER INDEX: 2.17 CM/M2
ECHO LA DIAMETER: 4.4 CM
ECHO LA TO AORTIC ROOT RATIO: 1.22
ECHO LA VOL A-L A2C: 60 ML (ref 18–58)
ECHO LA VOL A-L A4C: 53 ML (ref 18–58)
ECHO LA VOL BP: 56 ML (ref 18–58)
ECHO LA VOL MOD A2C: 59 ML (ref 18–58)
ECHO LA VOL MOD A4C: 51 ML (ref 18–58)
ECHO LA VOL/BSA BIPLANE: 28 ML/M2 (ref 16–34)
ECHO LA VOLUME AREA LENGTH: 58 ML
ECHO LA VOLUME INDEX A-L A2C: 30 ML/M2 (ref 16–34)
ECHO LA VOLUME INDEX A-L A4C: 26 ML/M2 (ref 16–34)
ECHO LA VOLUME INDEX AREA LENGTH: 29 ML/M2 (ref 16–34)
ECHO LA VOLUME INDEX MOD A2C: 29 ML/M2 (ref 16–34)
ECHO LA VOLUME INDEX MOD A4C: 25 ML/M2 (ref 16–34)
ECHO LV E' LATERAL VELOCITY: 10 CM/S
ECHO LV E' SEPTAL VELOCITY: 9 CM/S
ECHO LV EDV A2C: 55 ML
ECHO LV EDV A4C: 65 ML
ECHO LV EDV BP: 63 ML (ref 67–155)
ECHO LV EDV INDEX A4C: 32 ML/M2
ECHO LV EDV INDEX BP: 31 ML/M2
ECHO LV EDV NDEX A2C: 27 ML/M2
ECHO LV EJECTION FRACTION A2C: 60 %
ECHO LV EJECTION FRACTION A4C: 58 %
ECHO LV EJECTION FRACTION BIPLANE: 60 % (ref 55–100)
ECHO LV ESV A2C: 22 ML
ECHO LV ESV A4C: 27 ML
ECHO LV ESV BP: 26 ML (ref 22–58)
ECHO LV ESV INDEX A2C: 11 ML/M2
ECHO LV ESV INDEX A4C: 13 ML/M2
ECHO LV ESV INDEX BP: 13 ML/M2
ECHO LV FRACTIONAL SHORTENING: 29 % (ref 28–44)
ECHO LV INTERNAL DIMENSION DIASTOLE INDEX: 2.51 CM/M2
ECHO LV INTERNAL DIMENSION DIASTOLIC: 5.1 CM (ref 4.2–5.9)
ECHO LV INTERNAL DIMENSION SYSTOLIC INDEX: 1.77 CM/M2
ECHO LV INTERNAL DIMENSION SYSTOLIC: 3.6 CM
ECHO LV IVSD: 1.2 CM (ref 0.6–1)
ECHO LV MASS 2D: 241.2 G (ref 88–224)
ECHO LV MASS INDEX 2D: 118.8 G/M2 (ref 49–115)
ECHO LV POSTERIOR WALL DIASTOLIC: 1.2 CM (ref 0.6–1)
ECHO LV RELATIVE WALL THICKNESS RATIO: 0.47
ECHO LVOT AREA: 3.8 CM2
ECHO LVOT AV VTI INDEX: 0.83
ECHO LVOT DIAM: 2.2 CM
ECHO LVOT MEAN GRADIENT: 3 MMHG
ECHO LVOT PEAK GRADIENT: 4 MMHG
ECHO LVOT PEAK VELOCITY: 1.1 M/S
ECHO LVOT STROKE VOLUME INDEX: 48.3 ML/M2
ECHO LVOT SV: 98 ML
ECHO LVOT VTI: 25.8 CM
ECHO MV A VELOCITY: 0.84 M/S
ECHO MV E DECELERATION TIME (DT): 205.5 MS
ECHO MV E VELOCITY: 0.77 M/S
ECHO MV E/A RATIO: 0.92
ECHO MV E/E' LATERAL: 7.7
ECHO MV E/E' RATIO (AVERAGED): 8.13
ECHO RV BASAL DIMENSION: 3.6 CM
ECHO RV FREE WALL PEAK S': 17 CM/S
ECHO RV TAPSE: 2.3 CM (ref 1.7–?)

## 2024-02-29 PROCEDURE — PBSHW PBB SHADOW CHARGE: Performed by: SPECIALIST

## 2024-02-29 PROCEDURE — 93306 TTE W/DOPPLER COMPLETE: CPT | Performed by: SPECIALIST

## 2024-02-29 PROCEDURE — A9500 TC99M SESTAMIBI: HCPCS | Performed by: SPECIALIST

## 2024-02-29 PROCEDURE — 78452 HT MUSCLE IMAGE SPECT MULT: CPT | Performed by: SPECIALIST

## 2024-02-29 RX ORDER — TETRAKIS(2-METHOXYISOBUTYLISOCYANIDE)COPPER(I) TETRAFLUOROBORATE 1 MG/ML
26.8 INJECTION, POWDER, LYOPHILIZED, FOR SOLUTION INTRAVENOUS
Status: COMPLETED | OUTPATIENT
Start: 2024-02-29 | End: 2024-02-29

## 2024-02-29 RX ORDER — TETRAKIS(2-METHOXYISOBUTYLISOCYANIDE)COPPER(I) TETRAFLUOROBORATE 1 MG/ML
8.8 INJECTION, POWDER, LYOPHILIZED, FOR SOLUTION INTRAVENOUS
Status: COMPLETED | OUTPATIENT
Start: 2024-02-29 | End: 2024-02-29

## 2024-02-29 RX ORDER — REGADENOSON 0.08 MG/ML
0.4 INJECTION, SOLUTION INTRAVENOUS
Status: COMPLETED | OUTPATIENT
Start: 2024-02-29 | End: 2024-02-29

## 2024-02-29 RX ADMIN — TECHNETIUM TC 99M SESTAMIBI 26.8 MILLICURIE: 1 INJECTION, POWDER, FOR SOLUTION INTRAVENOUS at 10:35

## 2024-02-29 RX ADMIN — TECHNETIUM TC 99M SESTAMIBI 8.8 MILLICURIE: 1 INJECTION, POWDER, FOR SOLUTION INTRAVENOUS at 09:05

## 2024-02-29 RX ADMIN — REGADENOSON 0.4 MG: 0.08 INJECTION, SOLUTION INTRAVENOUS at 10:35

## 2024-03-07 LAB
ECHO BSA: 2.05 M2
NUC STRESS EJECTION FRACTION: 65 %
STRESS BASELINE DIAS BP: 80 MMHG
STRESS BASELINE HR: 52 BPM
STRESS BASELINE ST DEPRESSION: 0 MM
STRESS BASELINE SYS BP: 140 MMHG
STRESS ESTIMATED WORKLOAD: 1 METS
STRESS O2 SAT PEAK: 99 %
STRESS O2 SAT REST: 99 %
STRESS PEAK DIAS BP: 60 MMHG
STRESS PEAK SYS BP: 130 MMHG
STRESS PERCENT HR ACHIEVED: 50 %
STRESS POST PEAK HR: 73 BPM
STRESS RATE PRESSURE PRODUCT: 9490 BPM*MMHG
STRESS ST DEPRESSION: 0 MM
STRESS TARGET HR: 146 BPM
TID: 0.99

## 2024-03-07 PROCEDURE — 78452 HT MUSCLE IMAGE SPECT MULT: CPT | Performed by: SPECIALIST

## 2024-03-07 PROCEDURE — 93018 CV STRESS TEST I&R ONLY: CPT | Performed by: SPECIALIST

## 2024-03-07 PROCEDURE — 93016 CV STRESS TEST SUPVJ ONLY: CPT | Performed by: SPECIALIST

## 2024-03-12 NOTE — RESULT ENCOUNTER NOTE
Nuclear stress test is normal  Echo fine  Mychart sent to pt  Future Appointments  6/11/2024  11:00 AM   Ruth Ann Aquino MD [facility]                [facility]  7/31/2024  2:20 PM    Diallo Ballard MD [facility]              [facility]   Keep same plans for follow up and medications

## 2024-06-11 ENCOUNTER — OFFICE VISIT (OUTPATIENT)
Age: 74
End: 2024-06-11
Payer: MEDICARE

## 2024-06-11 VITALS
RESPIRATION RATE: 14 BRPM | HEART RATE: 59 BPM | SYSTOLIC BLOOD PRESSURE: 109 MMHG | BODY MASS INDEX: 26.74 KG/M2 | TEMPERATURE: 97.5 F | WEIGHT: 186.8 LBS | OXYGEN SATURATION: 99 % | HEIGHT: 70 IN | DIASTOLIC BLOOD PRESSURE: 66 MMHG

## 2024-06-11 DIAGNOSIS — I10 ESSENTIAL HYPERTENSION, BENIGN: ICD-10-CM

## 2024-06-11 DIAGNOSIS — D50.8 IRON DEFICIENCY ANEMIA SECONDARY TO INADEQUATE DIETARY IRON INTAKE: ICD-10-CM

## 2024-06-11 DIAGNOSIS — N18.31 STAGE 3A CHRONIC KIDNEY DISEASE (HCC): ICD-10-CM

## 2024-06-11 DIAGNOSIS — M47.26 OSTEOARTHRITIS OF SPINE WITH RADICULOPATHY, LUMBAR REGION: ICD-10-CM

## 2024-06-11 DIAGNOSIS — G89.4 CHRONIC PAIN DISORDER: ICD-10-CM

## 2024-06-11 DIAGNOSIS — E11.21 TYPE 2 DIABETES WITH NEPHROPATHY (HCC): ICD-10-CM

## 2024-06-11 DIAGNOSIS — E78.2 MIXED DYSLIPIDEMIA: ICD-10-CM

## 2024-06-11 DIAGNOSIS — E55.9 VITAMIN D DEFICIENCY, UNSPECIFIED: ICD-10-CM

## 2024-06-11 DIAGNOSIS — E11.40 TYPE 2 DIABETES MELLITUS WITH DIABETIC NEUROPATHY, WITHOUT LONG-TERM CURRENT USE OF INSULIN (HCC): Primary | ICD-10-CM

## 2024-06-11 DIAGNOSIS — I25.10 ATHEROSCLEROSIS OF NATIVE CORONARY ARTERY OF NATIVE HEART WITHOUT ANGINA PECTORIS: ICD-10-CM

## 2024-06-11 LAB
25(OH)D3 SERPL-MCNC: 14.4 NG/ML (ref 30–100)
ALBUMIN SERPL-MCNC: 3.9 G/DL (ref 3.5–5)
ALBUMIN/GLOB SERPL: 1.2 (ref 1.1–2.2)
ALP SERPL-CCNC: 51 U/L (ref 45–117)
ANION GAP SERPL CALC-SCNC: 5 MMOL/L (ref 5–15)
AST SERPL-CCNC: 26 U/L (ref 15–37)
BASOPHILS # BLD: 0 K/UL (ref 0–0.1)
BASOPHILS NFR BLD: 1 % (ref 0–1)
BILIRUB SERPL-MCNC: 0.6 MG/DL (ref 0.2–1)
BUN/CREAT SERPL: 11 (ref 12–20)
CALCIUM SERPL-MCNC: 10.1 MG/DL (ref 8.5–10.1)
CHLORIDE SERPL-SCNC: 109 MMOL/L (ref 97–108)
CHOLEST SERPL-MCNC: 138 MG/DL
CO2 SERPL-SCNC: 25 MMOL/L (ref 21–32)
CREAT SERPL-MCNC: 1.61 MG/DL (ref 0.7–1.3)
CREAT UR-MCNC: 164 MG/DL
DIFFERENTIAL METHOD BLD: NORMAL
EOSINOPHIL # BLD: 0.1 K/UL (ref 0–0.4)
EOSINOPHIL NFR BLD: 2 % (ref 0–7)
ERYTHROCYTE [DISTWIDTH] IN BLOOD BY AUTOMATED COUNT: 13.7 % (ref 11.5–14.5)
EST. AVERAGE GLUCOSE BLD GHB EST-MCNC: 146 MG/DL
GLOBULIN SER CALC-MCNC: 3.2 G/DL (ref 2–4)
GLUCOSE SERPL-MCNC: 168 MG/DL (ref 65–100)
HBA1C MFR BLD: 6.7 % (ref 4–5.6)
HCT VFR BLD AUTO: 41 % (ref 36.6–50.3)
HDLC SERPL-MCNC: 38 MG/DL
HDLC SERPL: 3.6 (ref 0–5)
HGB BLD-MCNC: 13 G/DL (ref 12.1–17)
IMM GRANULOCYTES # BLD AUTO: 0 K/UL (ref 0–0.04)
IMM GRANULOCYTES NFR BLD AUTO: 0 % (ref 0–0.5)
IRON SATN MFR SERPL: 14 % (ref 20–50)
IRON SERPL-MCNC: 61 UG/DL (ref 35–150)
LDLC SERPL CALC-MCNC: 69.6 MG/DL (ref 0–100)
LYMPHOCYTES # BLD: 2 K/UL (ref 0.8–3.5)
LYMPHOCYTES NFR BLD: 36 % (ref 12–49)
MCH RBC QN AUTO: 28.4 PG (ref 26–34)
MCHC RBC AUTO-ENTMCNC: 31.7 G/DL (ref 30–36.5)
MCV RBC AUTO: 89.5 FL (ref 80–99)
MICROALBUMIN UR-MCNC: 3.72 MG/DL
MICROALBUMIN/CREAT UR-RTO: 23 MG/G (ref 0–30)
MONOCYTES # BLD: 0.3 K/UL (ref 0–1)
MONOCYTES NFR BLD: 6 % (ref 5–13)
NEUTS SEG # BLD: 3.1 K/UL (ref 1.8–8)
NEUTS SEG NFR BLD: 55 % (ref 32–75)
NRBC # BLD: 0 K/UL (ref 0–0.01)
NRBC BLD-RTO: 0 PER 100 WBC
PLATELET # BLD AUTO: 266 K/UL (ref 150–400)
PMV BLD AUTO: 11.4 FL (ref 8.9–12.9)
POTASSIUM SERPL-SCNC: 5.4 MMOL/L (ref 3.5–5.1)
PROT SERPL-MCNC: 7.1 G/DL (ref 6.4–8.2)
RBC # BLD AUTO: 4.58 M/UL (ref 4.1–5.7)
SODIUM SERPL-SCNC: 139 MMOL/L (ref 136–145)
TIBC SERPL-MCNC: 426 UG/DL (ref 250–450)
TRIGL SERPL-MCNC: 152 MG/DL
VLDLC SERPL CALC-MCNC: 30.4 MG/DL
WBC # BLD AUTO: 5.6 K/UL (ref 4.1–11.1)

## 2024-06-11 PROCEDURE — 3074F SYST BP LT 130 MM HG: CPT | Performed by: FAMILY MEDICINE

## 2024-06-11 PROCEDURE — 99214 OFFICE O/P EST MOD 30 MIN: CPT | Performed by: FAMILY MEDICINE

## 2024-06-11 PROCEDURE — G8419 CALC BMI OUT NRM PARAM NOF/U: HCPCS | Performed by: FAMILY MEDICINE

## 2024-06-11 PROCEDURE — 1123F ACP DISCUSS/DSCN MKR DOCD: CPT | Performed by: FAMILY MEDICINE

## 2024-06-11 PROCEDURE — 1036F TOBACCO NON-USER: CPT | Performed by: FAMILY MEDICINE

## 2024-06-11 PROCEDURE — 3078F DIAST BP <80 MM HG: CPT | Performed by: FAMILY MEDICINE

## 2024-06-11 PROCEDURE — G2211 COMPLEX E/M VISIT ADD ON: HCPCS | Performed by: FAMILY MEDICINE

## 2024-06-11 PROCEDURE — 3051F HG A1C>EQUAL 7.0%<8.0%: CPT | Performed by: FAMILY MEDICINE

## 2024-06-11 PROCEDURE — G8427 DOCREV CUR MEDS BY ELIG CLIN: HCPCS | Performed by: FAMILY MEDICINE

## 2024-06-11 PROCEDURE — 2022F DILAT RTA XM EVC RTNOPTHY: CPT | Performed by: FAMILY MEDICINE

## 2024-06-11 PROCEDURE — 3017F COLORECTAL CA SCREEN DOC REV: CPT | Performed by: FAMILY MEDICINE

## 2024-06-11 RX ORDER — TIZANIDINE 2 MG/1
2 TABLET ORAL NIGHTLY PRN
Qty: 30 TABLET | Refills: 0 | Status: SHIPPED | OUTPATIENT
Start: 2024-06-11

## 2024-06-11 RX ORDER — PREGABALIN 75 MG/1
75 CAPSULE ORAL 2 TIMES DAILY
Qty: 60 CAPSULE | Refills: 0 | Status: SHIPPED | OUTPATIENT
Start: 2024-06-11 | End: 2024-07-11

## 2024-06-11 SDOH — ECONOMIC STABILITY: FOOD INSECURITY: WITHIN THE PAST 12 MONTHS, YOU WORRIED THAT YOUR FOOD WOULD RUN OUT BEFORE YOU GOT MONEY TO BUY MORE.: NEVER TRUE

## 2024-06-11 SDOH — ECONOMIC STABILITY: FOOD INSECURITY: WITHIN THE PAST 12 MONTHS, THE FOOD YOU BOUGHT JUST DIDN'T LAST AND YOU DIDN'T HAVE MONEY TO GET MORE.: NEVER TRUE

## 2024-06-11 SDOH — ECONOMIC STABILITY: INCOME INSECURITY: HOW HARD IS IT FOR YOU TO PAY FOR THE VERY BASICS LIKE FOOD, HOUSING, MEDICAL CARE, AND HEATING?: NOT HARD AT ALL

## 2024-06-11 ASSESSMENT — ENCOUNTER SYMPTOMS
WHEEZING: 0
ABDOMINAL PAIN: 0
DIARRHEA: 0
CONSTIPATION: 0
BACK PAIN: 1
NAUSEA: 0
COUGH: 0
SORE THROAT: 0
SHORTNESS OF BREATH: 0

## 2024-06-11 NOTE — ASSESSMENT & PLAN NOTE
Well-controlled, changes made today: Added Januvia elevated home readings recommended to continue current metformin, medication adherence emphasized, and lifestyle modifications recommended  Added Lyrica for worsening neuropathy

## 2024-06-11 NOTE — PROGRESS NOTES
Chief Complaint   Patient presents with    Diabetes     Fasting follow up     \"Have you been to the ER, urgent care clinic since your last visit?  Hospitalized since your last visit?\"    NO    “Have you seen or consulted any other health care providers outside of [facility] since your last visit?”    NO                   2/3/2024    10:07 AM   PHQ-9    Little interest or pleasure in doing things 0   Feeling down, depressed, or hopeless 1   PHQ-2 Score 1   PHQ-9 Total Score 1           Financial Resource Strain: Low Risk  (6/11/2024)    Overall Financial Resource Strain (CARDIA)     Difficulty of Paying Living Expenses: Not hard at all      Food Insecurity: No Food Insecurity (6/11/2024)    Hunger Vital Sign     Worried About Running Out of Food in the Last Year: Never true     Ran Out of Food in the Last Year: Never true          Health Maintenance Due   Topic Date Due    Shingles vaccine (2 of 2) 12/18/2020    Diabetic retinal exam  02/24/2021    COVID-19 Vaccine (4 - 2023-24 season) 09/01/2023    Diabetic foot exam  04/06/2024      
Negative for neck pain.   Skin:  Negative for rash.   Neurological:  Negative for dizziness and headaches.        Worsening burning pain in both feet   Psychiatric/Behavioral:  Negative for behavioral problems. The patient is not nervous/anxious.        Medications     Current Outpatient Medications   Medication Sig Dispense Refill    tiZANidine (ZANAFLEX) 2 MG tablet Take 1 tablet by mouth nightly as needed (muscle spasm in back) 30 tablet 0    SITagliptin (JANUVIA) 50 MG tablet Take 1 tablet by mouth daily 90 tablet 1    pregabalin (LYRICA) 75 MG capsule Take 1 capsule by mouth 2 times daily for 30 days. Max Daily Amount: 150 mg 60 capsule 0    metFORMIN (GLUCOPHAGE) 850 MG tablet TAKE 1 TABLET BY MOUTH TWICE DAILY WITH MEALS 180 tablet 0    losartan (COZAAR) 50 MG tablet Take 1 tablet by mouth daily 90 tablet 1    azelastine (ASTELIN) 0.1 % nasal spray 1 spray by Nasal route 2 times daily Use in each nostril as directed 60 mL 1    pantoprazole (PROTONIX) 40 MG tablet TAKE 1 TABLET BY MOUTH ONCE DAILY 1/2 HOUR BEFORE BREAKFAST      atorvastatin (LIPITOR) 10 MG tablet Take 1 tablet by mouth nightly 90 tablet 3    fenofibrate (TRIGLIDE) 160 MG tablet Take 1 tablet by mouth daily 90 tablet 3    metoprolol succinate (TOPROL XL) 25 MG extended release tablet Take 1 tablet by mouth daily 90 tablet 3    nitroGLYCERIN (NITROSTAT) 0.4 MG SL tablet DISSOLVE ONE TABLET UNDER THE TONGUE EVERY 5 MINUTES AS NEEDED FOR CHEST PAIN.  DO NOT EXCEED A TOTAL OF 3 DOSES IN 15 MINUTES 25 tablet 5    blood glucose test strips (ACCU-CHEK GUIDE) strip Use 1 strip once daily to check blood sugar.  DX: E11.9 100 each 1    montelukast (SINGULAIR) 10 MG tablet Take 1 tablet by mouth daily 90 tablet 0    acetaminophen (TYLENOL) 500 MG tablet Take 2 tablets by mouth every 6 hours      aspirin 81 MG EC tablet Take 2 tablets by mouth daily      tamsulosin (FLOMAX) 0.4 MG capsule Take 1 capsule by mouth daily       No current facility-administered

## 2024-06-12 RX ORDER — ERGOCALCIFEROL 1.25 MG/1
50000 CAPSULE ORAL WEEKLY
Qty: 12 CAPSULE | Refills: 1 | Status: SHIPPED | OUTPATIENT
Start: 2024-06-12

## 2024-06-26 ENCOUNTER — TELEPHONE (OUTPATIENT)
Age: 74
End: 2024-06-26

## 2024-06-26 NOTE — TELEPHONE ENCOUNTER
Received a call from pt. Name and  verified. Pt is asking about jardiance. What is the dose he can get from [facility]

## 2024-07-09 RX ORDER — BLOOD SUGAR DIAGNOSTIC
STRIP MISCELLANEOUS
Qty: 100 EACH | Refills: 0 | Status: SHIPPED | OUTPATIENT
Start: 2024-07-09

## 2024-07-15 DIAGNOSIS — E11.40 TYPE 2 DIABETES MELLITUS WITH DIABETIC NEUROPATHY, WITHOUT LONG-TERM CURRENT USE OF INSULIN (HCC): ICD-10-CM

## 2024-07-15 DIAGNOSIS — M47.26 OSTEOARTHRITIS OF SPINE WITH RADICULOPATHY, LUMBAR REGION: ICD-10-CM

## 2024-07-15 RX ORDER — PREGABALIN 75 MG/1
CAPSULE ORAL
Qty: 60 CAPSULE | Refills: 0 | Status: SHIPPED | OUTPATIENT
Start: 2024-07-15 | End: 2024-08-14

## 2024-07-15 RX ORDER — TIZANIDINE 2 MG/1
TABLET ORAL
Qty: 30 TABLET | Refills: 0 | Status: SHIPPED | OUTPATIENT
Start: 2024-07-15

## 2024-07-31 ENCOUNTER — OFFICE VISIT (OUTPATIENT)
Age: 74
End: 2024-07-31
Payer: MEDICARE

## 2024-07-31 VITALS
OXYGEN SATURATION: 97 % | WEIGHT: 186.4 LBS | SYSTOLIC BLOOD PRESSURE: 138 MMHG | HEART RATE: 63 BPM | BODY MASS INDEX: 26.69 KG/M2 | HEIGHT: 70 IN | DIASTOLIC BLOOD PRESSURE: 78 MMHG

## 2024-07-31 DIAGNOSIS — E11.21 TYPE 2 DIABETES WITH NEPHROPATHY (HCC): ICD-10-CM

## 2024-07-31 DIAGNOSIS — I25.110 ATHEROSCLEROSIS OF NATIVE CORONARY ARTERY OF NATIVE HEART WITH UNSTABLE ANGINA PECTORIS (HCC): Primary | ICD-10-CM

## 2024-07-31 DIAGNOSIS — N18.31 STAGE 3A CHRONIC KIDNEY DISEASE (HCC): ICD-10-CM

## 2024-07-31 DIAGNOSIS — E78.2 MIXED DYSLIPIDEMIA: ICD-10-CM

## 2024-07-31 DIAGNOSIS — Z95.5 S/P CORONARY ARTERY STENT PLACEMENT: ICD-10-CM

## 2024-07-31 DIAGNOSIS — I10 ESSENTIAL HYPERTENSION, BENIGN: ICD-10-CM

## 2024-07-31 DIAGNOSIS — D50.8 IRON DEFICIENCY ANEMIA SECONDARY TO INADEQUATE DIETARY IRON INTAKE: ICD-10-CM

## 2024-07-31 PROCEDURE — 2022F DILAT RTA XM EVC RTNOPTHY: CPT | Performed by: SPECIALIST

## 2024-07-31 PROCEDURE — 1036F TOBACCO NON-USER: CPT | Performed by: SPECIALIST

## 2024-07-31 PROCEDURE — 99214 OFFICE O/P EST MOD 30 MIN: CPT | Performed by: SPECIALIST

## 2024-07-31 PROCEDURE — G8419 CALC BMI OUT NRM PARAM NOF/U: HCPCS | Performed by: SPECIALIST

## 2024-07-31 PROCEDURE — 3044F HG A1C LEVEL LT 7.0%: CPT | Performed by: SPECIALIST

## 2024-07-31 PROCEDURE — 3075F SYST BP GE 130 - 139MM HG: CPT | Performed by: SPECIALIST

## 2024-07-31 PROCEDURE — 1123F ACP DISCUSS/DSCN MKR DOCD: CPT | Performed by: SPECIALIST

## 2024-07-31 PROCEDURE — 3017F COLORECTAL CA SCREEN DOC REV: CPT | Performed by: SPECIALIST

## 2024-07-31 PROCEDURE — 3078F DIAST BP <80 MM HG: CPT | Performed by: SPECIALIST

## 2024-07-31 PROCEDURE — G8427 DOCREV CUR MEDS BY ELIG CLIN: HCPCS | Performed by: SPECIALIST

## 2024-07-31 RX ORDER — SODIUM BICARBONATE 650 MG/1
650 TABLET ORAL 4 TIMES DAILY
COMMUNITY

## 2024-07-31 ASSESSMENT — PATIENT HEALTH QUESTIONNAIRE - PHQ9
2. FEELING DOWN, DEPRESSED OR HOPELESS: NOT AT ALL
SUM OF ALL RESPONSES TO PHQ QUESTIONS 1-9: 0
SUM OF ALL RESPONSES TO PHQ9 QUESTIONS 1 & 2: 0
1. LITTLE INTEREST OR PLEASURE IN DOING THINGS: NOT AT ALL

## 2024-07-31 NOTE — PROGRESS NOTES
Alonso Kyle     1950       Diallo Ballard MD, [facility]  Date of Visit-7/31/2024   PCP is Ruth Ann Aquino MD   [facility] [place]  [facility]  HPI:  Alonso Kyle is a 74 y.o. male     6 month follow up visit   Had some med changes for non cardiac reasons- Na bicarb, Zanaflex  Doing well heart wise  Is on statin with excellent LDL and is Jardiance  Had echo and nuclear stress test in February that looked very good, see below  No complaints  Denies  edema, syncope or shortness of breath at rest   Has no tachycardia , palpitations or sense of arrythmia       CAD  PCI 7/29/2011-cath Elio-LAD 75% proximal.  D1 50% proximal.  PCI Dr. Avila PASQUALE 3.0 x 15 Xience V PASQUALE  Echo 10/20/16 Normal  May 2019 he had some chest pain and low BP. Amlodipine was stopped  Nuclear stress test done 6/10/19.  Nuclear EF 74% perfusion normal  Nuclear stress test 10/15/21-normal Lexiscan EF 72%  HTN  Diabetes mellitus 2 with insulin use  Hypercholesteremia.     EKG- NSR non-specific ST-T segment/T wave findings heart rate 60, QRS 80 ms    Assessment/Plan:     Fu Feb 2025  No change in cardiac meds plans  Went over echo nuclear and lipids with reports shown to him on ipad  Watch kidney fx , Creat noted to be up a bit-continue losartan for renal benefit in diabetic  thanks    1. Atherosclerosis of native coronary artery of native heart with unstable angina pectoris (HCC)  CAD with PCI to LAD and PASQUALE 2011.  Main risk factor is diabetes mellitus  Resolved chest pain  Normal stress nuclear 2/2024  Cardiac Medications       Nitrates       nitroGLYCERIN (NITROSTAT) 0.4 MG SL tablet DISSOLVE ONE TABLET UNDER THE TONGUE EVERY 5 MINUTES AS NEEDED FOR CHEST PAIN.  DO NOT EXCEED A TOTAL OF 3 DOSES IN 15 MINUTES       Beta Blockers Cardio-Selective       metoprolol succinate (TOPROL XL) 25 MG extended release tablet Take 1 tablet by mouth daily       Fibric Acid Derivatives

## 2024-08-13 DIAGNOSIS — M47.26 OSTEOARTHRITIS OF SPINE WITH RADICULOPATHY, LUMBAR REGION: ICD-10-CM

## 2024-08-13 DIAGNOSIS — E11.40 TYPE 2 DIABETES MELLITUS WITH DIABETIC NEUROPATHY, WITHOUT LONG-TERM CURRENT USE OF INSULIN (HCC): ICD-10-CM

## 2024-08-13 RX ORDER — TIZANIDINE 2 MG/1
TABLET ORAL
Qty: 30 TABLET | Refills: 0 | Status: SHIPPED | OUTPATIENT
Start: 2024-08-13

## 2024-08-13 RX ORDER — PREGABALIN 75 MG/1
CAPSULE ORAL
Qty: 60 CAPSULE | Refills: 0 | Status: SHIPPED | OUTPATIENT
Start: 2024-08-13 | End: 2024-09-12

## 2024-08-13 NOTE — TELEPHONE ENCOUNTER
PCP: Ruth Ann Aquino MD    Last appt: 6/11/2024   Future Appointments   Date Time Provider Department Center   10/15/2024 10:20 AM Ruth Ann Aquino MD [facility]   2/13/2025 10:20 AM Diallo Ballard MD [facility]       Requested Prescriptions     Pending Prescriptions Disp Refills    pregabalin (LYRICA) 75 MG capsule [Pharmacy Med Name: Pregabalin 75 MG Oral Capsule] 60 capsule 0     Sig: TAKE 1 CAPSULE BY MOUTH TWICE DAILY MAX  DAILY  2  CAPSULES    tiZANidine (ZANAFLEX) 2 MG tablet [Pharmacy Med Name: tiZANidine HCl 2 MG Oral Tablet] 30 tablet 0     Sig: TAKE 1 TABLET BY MOUTH AT BEDTIME AS NEEDED FOR MUSCLE SPASM IN  BACK         Prior labs and Blood pressures:  BP Readings from Last 3 Encounters:   07/31/24 138/78   06/11/24 109/66   02/29/24 130/78     Lab Results   Component Value Date/Time     06/11/2024 11:50 AM    K 5.4 06/11/2024 11:50 AM     06/11/2024 11:50 AM    CO2 25 06/11/2024 11:50 AM    BUN 18 06/11/2024 11:50 AM    GFRAA >60 08/02/2022 10:24 AM     No results found for: \"HBA1C\", \"[id number]\"  Lab Results   Component Value Date/Time    CHOL 138 06/11/2024 11:50 AM    HDL 38 06/11/2024 11:50 AM    LDL 69.6 06/11/2024 11:50 AM    LDL 50.6 02/06/2024 09:52 AM    VLDL 30.4 06/11/2024 11:50 AM    VLDL 25 07/01/2021 09:05 AM     No results found for: \"VITD3\"    Lab Results   Component Value Date/Time    TSH 1.94 02/06/2024 09:52 AM       1

## 2024-08-21 NOTE — TELEPHONE ENCOUNTER
PCP: Ruth Ann Aquino MD    Last appt: 6/11/2024   Future Appointments   Date Time Provider Department Center   10/15/2024 10:20 AM Ruth Ann Aquino MD [facility]   2/13/2025 10:20 AM Diallo Ballard MD [facility]       Requested Prescriptions     Pending Prescriptions Disp Refills    metFORMIN (GLUCOPHAGE) 850 MG tablet [Pharmacy Med Name: metFORMIN HCl 850 MG Oral Tablet] 180 tablet 0     Sig: TAKE 1 TABLET BY MOUTH TWICE DAILY WITH MEALS

## 2024-09-10 RX ORDER — BLOOD SUGAR DIAGNOSTIC
STRIP MISCELLANEOUS
Qty: 100 EACH | Refills: 0 | Status: SHIPPED | OUTPATIENT
Start: 2024-09-10

## 2024-09-11 NOTE — TELEPHONE ENCOUNTER
Patient call for refill test strips.  Stated went yesterday and nothing there.    Contacted patient to advise new rx was sent yesterday afternoon and to check with [facility] again.  ThanksJyoti    For Pharmacy Admin Tracking Only    Program: Medication Refill  CPA in place:    Recommendation Provided To:   Intervention Detail: Discontinued Rx: 1, reason: Duplicate TherapyIntervention Accepted By:   Gap Closed?:    Time Spent (min): 5

## 2024-09-16 RX ORDER — BLOOD SUGAR DIAGNOSTIC
STRIP MISCELLANEOUS
Qty: 100 EACH | Refills: 3 | Status: SHIPPED | OUTPATIENT
Start: 2024-09-16 | End: 2024-09-20 | Stop reason: SDUPTHER

## 2024-09-17 ENCOUNTER — HOSPITAL ENCOUNTER (OUTPATIENT)
Facility: HOSPITAL | Age: 74
Discharge: HOME | End: 2024-09-20
Attending: OTOLARYNGOLOGY
Payer: MEDICARE

## 2024-09-17 DIAGNOSIS — H71.22 CHOLESTEATOMA OF LEFT MASTOID: ICD-10-CM

## 2024-09-17 PROCEDURE — 70480 CT ORBIT/EAR/FOSSA W/O DYE: CPT

## 2024-09-20 DIAGNOSIS — E11.40 TYPE 2 DIABETES MELLITUS WITH DIABETIC NEUROPATHY, WITHOUT LONG-TERM CURRENT USE OF INSULIN (HCC): ICD-10-CM

## 2024-09-20 DIAGNOSIS — E11.21 TYPE 2 DIABETES WITH NEPHROPATHY (HCC): Primary | ICD-10-CM

## 2024-09-20 RX ORDER — BLOOD SUGAR DIAGNOSTIC
STRIP MISCELLANEOUS
Qty: 100 EACH | Refills: 3 | Status: SHIPPED | OUTPATIENT
Start: 2024-09-20

## 2024-09-25 ENCOUNTER — TELEPHONE (OUTPATIENT)
Age: 74
End: 2024-09-25

## 2024-10-01 DIAGNOSIS — M47.26 OSTEOARTHRITIS OF SPINE WITH RADICULOPATHY, LUMBAR REGION: ICD-10-CM

## 2024-10-01 DIAGNOSIS — E11.40 TYPE 2 DIABETES MELLITUS WITH DIABETIC NEUROPATHY, WITHOUT LONG-TERM CURRENT USE OF INSULIN (HCC): ICD-10-CM

## 2024-10-01 RX ORDER — PREGABALIN 75 MG/1
CAPSULE ORAL
Qty: 60 CAPSULE | Refills: 0 | Status: SHIPPED | OUTPATIENT
Start: 2024-10-01 | End: 2024-10-31

## 2024-10-14 ENCOUNTER — OFFICE VISIT (OUTPATIENT)
Age: 74
End: 2024-10-14
Payer: MEDICARE

## 2024-10-14 VITALS
DIASTOLIC BLOOD PRESSURE: 73 MMHG | RESPIRATION RATE: 16 BRPM | HEIGHT: 70 IN | TEMPERATURE: 97.1 F | WEIGHT: 185 LBS | HEART RATE: 63 BPM | BODY MASS INDEX: 26.48 KG/M2 | SYSTOLIC BLOOD PRESSURE: 124 MMHG | OXYGEN SATURATION: 98 %

## 2024-10-14 DIAGNOSIS — E78.2 MIXED DYSLIPIDEMIA: ICD-10-CM

## 2024-10-14 DIAGNOSIS — I25.10 ATHEROSCLEROSIS OF NATIVE CORONARY ARTERY OF NATIVE HEART WITHOUT ANGINA PECTORIS: ICD-10-CM

## 2024-10-14 DIAGNOSIS — Z86.39 HISTORY OF NON ANEMIC VITAMIN B12 DEFICIENCY: ICD-10-CM

## 2024-10-14 DIAGNOSIS — I10 ESSENTIAL HYPERTENSION, BENIGN: ICD-10-CM

## 2024-10-14 DIAGNOSIS — E53.8 VITAMIN B12 DEFICIENCY: ICD-10-CM

## 2024-10-14 DIAGNOSIS — N18.31 STAGE 3A CHRONIC KIDNEY DISEASE (HCC): ICD-10-CM

## 2024-10-14 DIAGNOSIS — E11.21 TYPE 2 DIABETES WITH NEPHROPATHY (HCC): Primary | ICD-10-CM

## 2024-10-14 DIAGNOSIS — Z23 NEEDS FLU SHOT: ICD-10-CM

## 2024-10-14 DIAGNOSIS — E11.40 TYPE 2 DIABETES MELLITUS WITH DIABETIC NEUROPATHY, WITHOUT LONG-TERM CURRENT USE OF INSULIN (HCC): ICD-10-CM

## 2024-10-14 DIAGNOSIS — D50.8 IRON DEFICIENCY ANEMIA SECONDARY TO INADEQUATE DIETARY IRON INTAKE: ICD-10-CM

## 2024-10-14 DIAGNOSIS — E55.9 VITAMIN D DEFICIENCY, UNSPECIFIED: ICD-10-CM

## 2024-10-14 PROCEDURE — G8419 CALC BMI OUT NRM PARAM NOF/U: HCPCS | Performed by: FAMILY MEDICINE

## 2024-10-14 PROCEDURE — 3017F COLORECTAL CA SCREEN DOC REV: CPT | Performed by: FAMILY MEDICINE

## 2024-10-14 PROCEDURE — G8427 DOCREV CUR MEDS BY ELIG CLIN: HCPCS | Performed by: FAMILY MEDICINE

## 2024-10-14 PROCEDURE — 2022F DILAT RTA XM EVC RTNOPTHY: CPT | Performed by: FAMILY MEDICINE

## 2024-10-14 PROCEDURE — 1123F ACP DISCUSS/DSCN MKR DOCD: CPT | Performed by: FAMILY MEDICINE

## 2024-10-14 PROCEDURE — 1036F TOBACCO NON-USER: CPT | Performed by: FAMILY MEDICINE

## 2024-10-14 PROCEDURE — PBSHW INFLUENZA, FLUAD TRIVALENT, (AGE 65 Y+), IM, PRESERVATIVE FREE, 0.5ML: Performed by: FAMILY MEDICINE

## 2024-10-14 PROCEDURE — PBSHW PBB SHADOW CHARGE: Performed by: FAMILY MEDICINE

## 2024-10-14 PROCEDURE — 90653 IIV ADJUVANT VACCINE IM: CPT | Performed by: FAMILY MEDICINE

## 2024-10-14 PROCEDURE — 99214 OFFICE O/P EST MOD 30 MIN: CPT | Performed by: FAMILY MEDICINE

## 2024-10-14 PROCEDURE — 3078F DIAST BP <80 MM HG: CPT | Performed by: FAMILY MEDICINE

## 2024-10-14 PROCEDURE — 3044F HG A1C LEVEL LT 7.0%: CPT | Performed by: FAMILY MEDICINE

## 2024-10-14 PROCEDURE — 3074F SYST BP LT 130 MM HG: CPT | Performed by: FAMILY MEDICINE

## 2024-10-14 PROCEDURE — G8482 FLU IMMUNIZE ORDER/ADMIN: HCPCS | Performed by: FAMILY MEDICINE

## 2024-10-14 ASSESSMENT — PATIENT HEALTH QUESTIONNAIRE - PHQ9
SUM OF ALL RESPONSES TO PHQ QUESTIONS 1-9: 0
2. FEELING DOWN, DEPRESSED OR HOPELESS: NOT AT ALL
1. LITTLE INTEREST OR PLEASURE IN DOING THINGS: NOT AT ALL
SUM OF ALL RESPONSES TO PHQ9 QUESTIONS 1 & 2: 0
SUM OF ALL RESPONSES TO PHQ QUESTIONS 1-9: 0

## 2024-10-14 ASSESSMENT — ENCOUNTER SYMPTOMS
COUGH: 0
DIARRHEA: 0
NAUSEA: 0
SHORTNESS OF BREATH: 0
WHEEZING: 0
ABDOMINAL PAIN: 0
CONSTIPATION: 0
SORE THROAT: 0
BACK PAIN: 0

## 2024-10-14 NOTE — ASSESSMENT & PLAN NOTE
Chronic, at goal (stable), continue current treatment plan, medication adherence emphasized, and lifestyle modifications recommended currently on metformin and Jardiance

## 2024-10-14 NOTE — ASSESSMENT & PLAN NOTE
Chronic, at goal (stable), continue current treatment plan, medication adherence emphasized, and lifestyle modifications recommended currently on Jardiance and metformin

## 2024-10-14 NOTE — PROGRESS NOTES
Chief Complaint   Patient presents with    Pre-op Exam     Cataract surgery both eyes right and left         1. \"Have you been to the ER, urgent care clinic since your last visit? Hospitalized since your last visit? \"     no       2. \"Have you seen or consulted any other health care providers outside of the [address] [place] [address] since your last visit? \"       no     3. For patients aged 39-70: Has the patient had a colonoscopy / FIT/ Cologuard? No      If the patient is female:    4. For patients aged 41-77: Has the patient had a mammogram within the past 2 years? N/a      5. For patients aged 21-65: Has the patient had a pap smear? N/a    PHQ-9  5/24/2023   Little interest or pleasure in doing things 0   Little interest or pleasure in doing things -   Feeling down, depressed, or hopeless 0   Trouble falling or staying asleep, or sleeping too much -   Feeling tired or having little energy -   Poor appetite or overeating -   Feeling bad about yourself - or that you are a failure or have let yourself or your family down -   Trouble concentrating on things, such as reading the newspaper or watching television -   Moving or speaking so slowly that other people could have noticed.  Or the opposite - being so fidgety or restless that you have been moving around a lot more than usual -   PHQ-2 Score 0   Total Score PHQ 2 -   PHQ-9 Total Score 0   How difficult have these problems made it for you to do your work, take care of your home and get along with others -           Financial Resource Strain: Low Risk     Difficulty of Paying Living Expenses: Not hard at all      Food Insecurity: No Food Insecurity    Worried About Running Out of Food in the Last Year: Never true    [address] in the Last Year: Never true          Health Maintenance Due   Topic Date Due    Shingles vaccine (2 of 2) 12/18/2020    Diabetic retinal exam  02/24/2021    COVID-19 Vaccine (4 - Booster for [employer] series) 01/31/2022
Inf daughter that the dc planner should take care of those needs for him  
instructions and references & authorized prescriptions . Results of labs will be conveyed to patient, once available. Pt verbalized instructions I provided and expressed understanding of discussion that was held today. Please note that this dictation was completed with [employer], the computer voice recognition software. Quite often unanticipated grammatical, syntax, homophones, and other interpretive errors are inadvertently transcribed by the computer software. Please disregard these errors. Please excuse any errors that have escaped final proofreading. Thank you. No follow-ups on file.      Electronically signed by Jaron Dean MD on 5/24/23 at 12:10 PM EDT

## 2024-10-14 NOTE — ASSESSMENT & PLAN NOTE
Chronic, not at goal (unstable), continue current treatment plan, medication adherence emphasized, and lifestyle modifications recommended patient was referred to nephrology and was started on sodium bicarb.  Patient is also started on Jardiance from his last office visit

## 2024-10-15 LAB
25(OH)D3 SERPL-MCNC: 51.7 NG/ML (ref 30–100)
ALBUMIN SERPL-MCNC: 3.9 G/DL (ref 3.5–5)
ALBUMIN/GLOB SERPL: 1.2 (ref 1.1–2.2)
ALP SERPL-CCNC: 53 U/L (ref 45–117)
ALT SERPL-CCNC: 28 U/L (ref 12–78)
ANION GAP SERPL CALC-SCNC: 5 MMOL/L (ref 2–12)
AST SERPL-CCNC: 20 U/L (ref 15–37)
BASOPHILS # BLD: 0.1 K/UL (ref 0–0.1)
BASOPHILS NFR BLD: 1 % (ref 0–1)
BILIRUB SERPL-MCNC: 0.5 MG/DL (ref 0.2–1)
BUN SERPL-MCNC: 20 MG/DL (ref 6–20)
BUN/CREAT SERPL: 12 (ref 12–20)
CALCIUM SERPL-MCNC: 9.8 MG/DL (ref 8.5–10.1)
CHLORIDE SERPL-SCNC: 108 MMOL/L (ref 97–108)
CHOLEST SERPL-MCNC: 134 MG/DL
CO2 SERPL-SCNC: 25 MMOL/L (ref 21–32)
CREAT SERPL-MCNC: 1.62 MG/DL (ref 0.7–1.3)
DIFFERENTIAL METHOD BLD: NORMAL
EOSINOPHIL # BLD: 0.2 K/UL (ref 0–0.4)
EOSINOPHIL NFR BLD: 3 % (ref 0–7)
ERYTHROCYTE [DISTWIDTH] IN BLOOD BY AUTOMATED COUNT: 13.6 % (ref 11.5–14.5)
EST. AVERAGE GLUCOSE BLD GHB EST-MCNC: 146 MG/DL
GLOBULIN SER CALC-MCNC: 3.2 G/DL (ref 2–4)
GLUCOSE SERPL-MCNC: 124 MG/DL (ref 65–100)
HBA1C MFR BLD: 6.7 % (ref 4–5.6)
HCT VFR BLD AUTO: 45 % (ref 36.6–50.3)
HDLC SERPL-MCNC: 37 MG/DL
HDLC SERPL: 3.6 (ref 0–5)
HGB BLD-MCNC: 14.2 G/DL (ref 12.1–17)
IMM GRANULOCYTES # BLD AUTO: 0 K/UL (ref 0–0.04)
IMM GRANULOCYTES NFR BLD AUTO: 0 % (ref 0–0.5)
IRON SATN MFR SERPL: 11 % (ref 20–50)
IRON SERPL-MCNC: 47 UG/DL (ref 35–150)
LDLC SERPL CALC-MCNC: 57.2 MG/DL (ref 0–100)
LYMPHOCYTES # BLD: 2.3 K/UL (ref 0.8–3.5)
LYMPHOCYTES NFR BLD: 27 % (ref 12–49)
MCH RBC QN AUTO: 29.2 PG (ref 26–34)
MCHC RBC AUTO-ENTMCNC: 31.6 G/DL (ref 30–36.5)
MCV RBC AUTO: 92.6 FL (ref 80–99)
MONOCYTES # BLD: 0.5 K/UL (ref 0–1)
MONOCYTES NFR BLD: 6 % (ref 5–13)
NEUTS SEG # BLD: 5.3 K/UL (ref 1.8–8)
NEUTS SEG NFR BLD: 63 % (ref 32–75)
NRBC # BLD: 0 K/UL (ref 0–0.01)
NRBC BLD-RTO: 0 PER 100 WBC
PLATELET # BLD AUTO: 229 K/UL (ref 150–400)
PMV BLD AUTO: 12 FL (ref 8.9–12.9)
POTASSIUM SERPL-SCNC: 5 MMOL/L (ref 3.5–5.1)
PROT SERPL-MCNC: 7.1 G/DL (ref 6.4–8.2)
RBC # BLD AUTO: 4.86 M/UL (ref 4.1–5.7)
SODIUM SERPL-SCNC: 138 MMOL/L (ref 136–145)
TIBC SERPL-MCNC: 444 UG/DL (ref 250–450)
TRIGL SERPL-MCNC: 199 MG/DL
VIT B12 SERPL-MCNC: 302 PG/ML (ref 193–986)
VLDLC SERPL CALC-MCNC: 39.8 MG/DL
WBC # BLD AUTO: 8.3 K/UL (ref 4.1–11.1)

## 2024-10-28 RX ORDER — CLOTRIMAZOLE AND BETAMETHASONE DIPROPIONATE 10; .64 MG/G; MG/G
CREAM TOPICAL
Qty: 45 G | Refills: 0 | Status: SHIPPED | OUTPATIENT
Start: 2024-10-28

## 2024-10-30 ENCOUNTER — TELEPHONE (OUTPATIENT)
Age: 74
End: 2024-10-30

## 2024-10-30 NOTE — TELEPHONE ENCOUNTER
Received fax from [facility] Dr. Guillermo's office requesting cardiac and medication clearance for Aspirin prior to and after cystoscopy under General.    Fax: [phone]

## 2024-10-31 NOTE — TELEPHONE ENCOUNTER
Faxed cardiac clearance to [facility] Dr. Burton Guillermo at fax number [phone].  Fax confirmation received.

## 2024-10-31 NOTE — TELEPHONE ENCOUNTER
Form received from [facility] for plans with cystoscopy with [facility]ro[facility].  Date of procedure 2/17/2025.  Last visit with patient 7/31/2024 was doing well with history of CAD.  The request is for last office visit note EKG cardiac testing and directions on aspirin.    Patient may hold aspirin for 7 days prior to procedure and and resume taking 2 days post procedure if no major bleeding.  Patient is approved for cardiac clearance with low risk for procedure from a cardiovascular point of view.  Note patient has an appointment 4 days prior to procedure which can be kept to be seen just prior to procedure.      Future Appointments   Date Time Provider Department Center   2/13/2025 10:20 AM Diallo Ballard MD [facility]   3/4/2025 11:20 AM Ruth Ann Aquino MD [facility]

## 2024-11-01 NOTE — ANESTHESIA PREPROCEDURE EVALUATION
Anesthetic History   No history of anesthetic complications            Review of Systems / Medical History  Patient summary reviewed, nursing notes reviewed and pertinent labs reviewed    Pulmonary  Within defined limits                 Neuro/Psych   Within defined limits           Cardiovascular  Within defined limits  Hypertension          CAD and cardiac stents         GI/Hepatic/Renal  Within defined limits   GERD           Endo/Other  Within defined limits  Diabetes         Other Findings              Physical Exam    Airway  Mallampati: II  TM Distance: > 6 cm  Neck ROM: normal range of motion   Mouth opening: Normal     Cardiovascular  Regular rate and rhythm,  S1 and S2 normal,  no murmur, click, rub, or gallop             Dental  No notable dental hx       Pulmonary  Breath sounds clear to auscultation               Abdominal  GI exam deferred       Other Findings            Anesthetic Plan    ASA: 3  Anesthesia type: MAC          Induction: Intravenous  Anesthetic plan and risks discussed with: Patient Repeat Non-Stress Testing:    Patient verbalizes +FM. Pt denies ALL:               Leaking of fluid   Contractions   Vaginal bleeding   Decreased fetal movement    Patient is performing daily kick counts. Patient has no questions or concerns.   NST strip reviewed by Dr. Lyn.

## 2024-11-19 DIAGNOSIS — M47.26 OSTEOARTHRITIS OF SPINE WITH RADICULOPATHY, LUMBAR REGION: ICD-10-CM

## 2024-11-19 DIAGNOSIS — E11.40 TYPE 2 DIABETES MELLITUS WITH DIABETIC NEUROPATHY, WITHOUT LONG-TERM CURRENT USE OF INSULIN (HCC): ICD-10-CM

## 2024-11-19 RX ORDER — PREGABALIN 75 MG/1
CAPSULE ORAL
Qty: 180 CAPSULE | Refills: 0 | Status: SHIPPED | OUTPATIENT
Start: 2024-11-19 | End: 2025-02-17

## 2024-12-08 DIAGNOSIS — M47.26 OSTEOARTHRITIS OF SPINE WITH RADICULOPATHY, LUMBAR REGION: ICD-10-CM

## 2024-12-08 DIAGNOSIS — E11.40 TYPE 2 DIABETES MELLITUS WITH DIABETIC NEUROPATHY, WITHOUT LONG-TERM CURRENT USE OF INSULIN (HCC): ICD-10-CM

## 2024-12-08 RX ORDER — PREGABALIN 75 MG/1
CAPSULE ORAL
Qty: 60 CAPSULE | Refills: 0 | OUTPATIENT
Start: 2024-12-08 | End: 2025-03-08

## 2025-01-21 DIAGNOSIS — I10 ESSENTIAL HYPERTENSION, BENIGN: ICD-10-CM

## 2025-01-21 DIAGNOSIS — E11.21 TYPE 2 DIABETES WITH NEPHROPATHY (HCC): ICD-10-CM

## 2025-01-21 RX ORDER — LOSARTAN POTASSIUM 50 MG/1
50 TABLET ORAL DAILY
Qty: 90 TABLET | Refills: 0 | Status: SHIPPED | OUTPATIENT
Start: 2025-01-21

## 2025-01-21 NOTE — TELEPHONE ENCOUNTER
PCP: Ruth Ann Aquino MD    Last appt: 10/14/2024   Future Appointments   Date Time Provider Department Center   2/13/2025 10:20 AM [facility]   3/4/2025 11:20 AM Ruth Ann Aquino MD [facility] DEP       Requested Prescriptions     Pending Prescriptions Disp Refills    losartan (COZAAR) 50 MG tablet [Pharmacy Med Name: Losartan Potassium 50 MG Oral Tablet] 90 tablet 0     Sig: Take 1 tablet by mouth once daily         Prior labs and Blood pressures:  BP Readings from Last 3 Encounters:   10/14/24 124/73   07/31/24 138/78   06/11/24 109/66     Lab Results   Component Value Date/Time     10/14/2024 11:38 AM    K 5.0 10/14/2024 11:38 AM     10/14/2024 11:38 AM    CO2 25 10/14/2024 11:38 AM    BUN 20 10/14/2024 11:38 AM    GFRAA >60 08/02/2022 10:24 AM     No results found for: \"HBA1C\", \"KNH7ZEAE\"  Lab Results   Component Value Date/Time    CHOL 134 10/14/2024 11:38 AM    HDL 37 10/14/2024 11:38 AM    LDL 57.2 10/14/2024 11:38 AM    LDL 50.6 02/06/2024 09:52 AM    VLDL 39.8 10/14/2024 11:38 AM    VLDL 25 07/01/2021 09:05 AM     No results found for: \"VITD3\"    Lab Results   Component Value Date/Time    TSH 1.94 02/06/2024 09:52 AM

## 2025-02-03 RX ORDER — ATORVASTATIN CALCIUM 10 MG/1
10 TABLET, FILM COATED ORAL NIGHTLY
Qty: 90 TABLET | Refills: 3 | Status: SHIPPED | OUTPATIENT
Start: 2025-02-03

## 2025-02-03 NOTE — TELEPHONE ENCOUNTER
Per VO by MD.    Future Appointments   Date Time Provider Department Center   2/13/2025 10:20 AM Diallo Ballard MD [facility]   3/4/2025 11:20 AM [facility]

## 2025-02-10 RX ORDER — METOPROLOL SUCCINATE 25 MG/1
25 TABLET, EXTENDED RELEASE ORAL DAILY
Qty: 90 TABLET | Refills: 3 | Status: SHIPPED | OUTPATIENT
Start: 2025-02-10

## 2025-02-10 NOTE — TELEPHONE ENCOUNTER
Per VO by MD.    Future Appointments   Date Time Provider Department Center   2/13/2025 10:20 AM Diallo Ballard MD [facility]   3/4/2025 11:20 AM [facility]

## 2025-02-10 NOTE — TELEPHONE ENCOUNTER
PCP: Ruth Ann Aquino MD    Last appt: 10/14/2024     Future Appointments   Date Time Provider Department Center   2/13/2025 10:20 [facility]   3/4/2025 11:20 AM Ruth Ann Aquino MD [facility] DEP       Requested Prescriptions     Pending Prescriptions Disp Refills    metFORMIN (GLUCOPHAGE) 850 MG tablet [Pharmacy Med Name: metFORMIN HCl 850 MG Oral Tablet] 180 tablet 0     Sig: TAKE 1 TABLET BY MOUTH TWICE DAILY WITH MEALS         Prior labs and Blood pressures:  BP Readings from Last 3 Encounters:   10/14/24 124/73   07/31/24 138/78   06/11/24 109/66     Lab Results   Component Value Date/Time     10/14/2024 11:38 AM    K 5.0 10/14/2024 11:38 AM     10/14/2024 11:38 AM    CO2 25 10/14/2024 11:38 AM    BUN 20 10/14/2024 11:38 AM    GFRAA >60 08/02/2022 10:24 AM     Lab Results   Component Value Date/Time    CHOL 134 10/14/2024 11:38 AM    HDL 37 10/14/2024 11:38 AM    LDL 57.2 10/14/2024 11:38 AM    LDL 50.6 02/06/2024 09:52 AM    VLDL 39.8 10/14/2024 11:38 AM    VLDL 25 07/01/2021 09:05 AM     Lab Results   Component Value Date/Time    TSH 1.94 02/06/2024 09:52 AM

## 2025-02-12 DIAGNOSIS — E55.9 VITAMIN D DEFICIENCY, UNSPECIFIED: ICD-10-CM

## 2025-02-12 RX ORDER — ERGOCALCIFEROL 1.25 MG/1
50000 CAPSULE, LIQUID FILLED ORAL WEEKLY
Qty: 12 CAPSULE | Refills: 0 | Status: SHIPPED | OUTPATIENT
Start: 2025-02-12

## 2025-02-12 NOTE — TELEPHONE ENCOUNTER
PCP: Ruth Ann Aquino MD    Last appt: 10/14/2024     Future Appointments   Date Time Provider Department Center   2/13[facility]2025 10:20 [facility]   3/4/2025 11:20 AM Ruth Ann Aquino MD [facility] DEP       Requested Prescriptions     Pending Prescriptions Disp Refills    vitamin D (ERGOCALCIFEROL) 1.25 MG (92930 UT) CAPS capsule [Pharmacy Med Name: Vitamin D (Ergocalciferol) 1.25 MG (12352 UT) Oral Capsule] 12 capsule 0     Sig: Take 1 capsule by mouth once a week         Prior labs and Blood pressures:  BP Readings from Last 3 Encounters:   10/14/24 124/73   07/31/24 138/78   06/11/24 109/66     Lab Results   Component Value Date/Time     10/14/2024 11:38 AM    K 5.0 10/14/2024 11:38 AM     10/14/2024 11:38 AM    CO2 25 10/14/2024 11:38 AM    BUN 20 10/14/2024 11:38 AM    GFRAA >60 08/02/2022 10:24 AM     Lab Results   Component Value Date/Time    CHOL 134 10/14/2024 11:38 AM    HDL 37 10/14/2024 11:38 AM    LDL 57.2 10/14/2024 11:38 AM    LDL 50.6 02/06/2024 09:52 AM    VLDL 39.8 10/14/2024 11:38 AM    VLDL 25 07/01/2021 09:05 AM     Lab Results   Component Value Date/Time    TSH 1.94 02/06/2024 09:52 AM

## 2025-02-13 ENCOUNTER — OFFICE VISIT (OUTPATIENT)
Age: 75
End: 2025-02-13
Payer: MEDICARE

## 2025-02-13 VITALS
WEIGHT: 185.6 LBS | DIASTOLIC BLOOD PRESSURE: 60 MMHG | HEART RATE: 66 BPM | BODY MASS INDEX: 26.57 KG/M2 | HEIGHT: 70 IN | SYSTOLIC BLOOD PRESSURE: 130 MMHG | OXYGEN SATURATION: 97 %

## 2025-02-13 DIAGNOSIS — N18.31 STAGE 3A CHRONIC KIDNEY DISEASE (HCC): ICD-10-CM

## 2025-02-13 DIAGNOSIS — D50.8 IRON DEFICIENCY ANEMIA SECONDARY TO INADEQUATE DIETARY IRON INTAKE: ICD-10-CM

## 2025-02-13 DIAGNOSIS — E78.2 MIXED DYSLIPIDEMIA: ICD-10-CM

## 2025-02-13 DIAGNOSIS — E11.21 TYPE 2 DIABETES WITH NEPHROPATHY (HCC): ICD-10-CM

## 2025-02-13 DIAGNOSIS — I25.110 ATHEROSCLEROSIS OF NATIVE CORONARY ARTERY OF NATIVE HEART WITH UNSTABLE ANGINA PECTORIS (HCC): Primary | ICD-10-CM

## 2025-02-13 DIAGNOSIS — Z95.5 S/P CORONARY ARTERY STENT PLACEMENT: ICD-10-CM

## 2025-02-13 DIAGNOSIS — I10 ESSENTIAL HYPERTENSION, BENIGN: ICD-10-CM

## 2025-02-13 PROCEDURE — 3075F SYST BP GE 130 - 139MM HG: CPT | Performed by: SPECIALIST

## 2025-02-13 PROCEDURE — 99214 OFFICE O/P EST MOD 30 MIN: CPT | Performed by: SPECIALIST

## 2025-02-13 PROCEDURE — G8428 CUR MEDS NOT DOCUMENT: HCPCS | Performed by: SPECIALIST

## 2025-02-13 PROCEDURE — 3046F HEMOGLOBIN A1C LEVEL >9.0%: CPT | Performed by: SPECIALIST

## 2025-02-13 PROCEDURE — 1126F AMNT PAIN NOTED NONE PRSNT: CPT | Performed by: SPECIALIST

## 2025-02-13 PROCEDURE — 2022F DILAT RTA XM EVC RTNOPTHY: CPT | Performed by: SPECIALIST

## 2025-02-13 PROCEDURE — G8419 CALC BMI OUT NRM PARAM NOF/U: HCPCS | Performed by: SPECIALIST

## 2025-02-13 PROCEDURE — 1123F ACP DISCUSS/DSCN MKR DOCD: CPT | Performed by: SPECIALIST

## 2025-02-13 PROCEDURE — 3078F DIAST BP <80 MM HG: CPT | Performed by: SPECIALIST

## 2025-02-13 PROCEDURE — 3017F COLORECTAL CA SCREEN DOC REV: CPT | Performed by: SPECIALIST

## 2025-02-13 PROCEDURE — 93005 ELECTROCARDIOGRAM TRACING: CPT | Performed by: SPECIALIST

## 2025-02-13 PROCEDURE — 1036F TOBACCO NON-USER: CPT | Performed by: SPECIALIST

## 2025-02-13 PROCEDURE — 93010 ELECTROCARDIOGRAM REPORT: CPT | Performed by: SPECIALIST

## 2025-02-13 ASSESSMENT — PATIENT HEALTH QUESTIONNAIRE - PHQ9
SUM OF ALL RESPONSES TO PHQ QUESTIONS 1-9: 0
1. LITTLE INTEREST OR PLEASURE IN DOING THINGS: NOT AT ALL
2. FEELING DOWN, DEPRESSED OR HOPELESS: NOT AT ALL
SUM OF ALL RESPONSES TO PHQ QUESTIONS 1-9: 0
SUM OF ALL RESPONSES TO PHQ9 QUESTIONS 1 & 2: 0

## 2025-02-13 NOTE — PROGRESS NOTES
Alonso Kyle     1950       Diallo Ballard MD, [facility]  Date of Visit-2/13/2025   PCP is Ruth Ann Aquino MD   [facility]  [facility]  HPI:  Alonso Kyle is a 74 y.o. male     6 month follow up visit   Had some med changes for non cardiac reasons- Na bicarb, Zanaflex    today  Doing well heart wise  Is on statin with excellent LDL and is Jardiance  Had echo and nuclear stress test in February 2024 that looked very good, see below    No CV complaints  Denies  edema, syncope or shortness of breath at rest   Has no tachycardia , palpitations or sense of arrythmia     Has joint pain and DJD    CAD  PCI 7/29/2011-cath Elio-LAD 75% proximal.  D1 50% proximal.  PCI Dr. Avila PASQUALE 3.0 x 15 Xience V PASQUALE  Echo 10/20/16 Normal  May 2019 he had some chest pain and low BP. Amlodipine was stopped  Nuclear stress test done 6/10/19.  Nuclear EF 74% perfusion normal  Nuclear stress test 10/15/21-normal Lexiscan EF 72%  HTN  Diabetes mellitus 2 with insulin use  Hypercholesteremia.     EKG- sinus bradycardia WNL     Assessment/Plan:     Patient Instructions   We will see you back for an annual follow up with an echo.    1. Atherosclerosis of native coronary artery of native heart with unstable angina pectoris (HCC)  CAD with PCI to LAD and PASQUALE 2011.  Main risk factor is diabetes mellitus  Resolved chest pain  Normal stress nuclear 2/2024  No angina  Good meds, no changes, see back one year   Cardiac Medications       Nitrates       nitroGLYCERIN (NITROSTAT) 0.4 MG SL tablet DISSOLVE ONE TABLET UNDER THE TONGUE EVERY 5 MINUTES AS NEEDED FOR CHEST PAIN.  DO NOT EXCEED A TOTAL OF 3 DOSES IN 15 MINUTES       Beta Blockers Cardio-Selective       metoprolol succinate (TOPROL XL) 25 MG extended release tablet Take 1 tablet by mouth once daily       Fibric Acid Derivatives       fenofibrate (TRIGLIDE) 160 MG tablet Take 1 tablet by mouth daily       HMG CoA

## 2025-02-26 RX ORDER — FENOFIBRATE 160 MG/1
160 TABLET ORAL DAILY
Qty: 90 TABLET | Refills: 3 | Status: SHIPPED | OUTPATIENT
Start: 2025-02-26

## 2025-02-26 NOTE — TELEPHONE ENCOUNTER
Per VO by MD.    Future Appointments   Date Time Provider Department Center   3/4/2025 11:20 AM [facility]   3/4/2026 10:00 AM [facility] AMB   3/4/2026 11:00 AM Diallo Ballard MD [facility] AMB

## 2025-03-04 ENCOUNTER — OFFICE VISIT (OUTPATIENT)
Age: 75
End: 2025-03-04
Payer: MEDICARE

## 2025-03-04 VITALS
OXYGEN SATURATION: 97 % | HEIGHT: 70 IN | WEIGHT: 182 LBS | RESPIRATION RATE: 16 BRPM | HEART RATE: 59 BPM | TEMPERATURE: 97 F | BODY MASS INDEX: 26.05 KG/M2 | DIASTOLIC BLOOD PRESSURE: 66 MMHG | SYSTOLIC BLOOD PRESSURE: 101 MMHG

## 2025-03-04 DIAGNOSIS — Z00.00 MEDICARE ANNUAL WELLNESS VISIT, SUBSEQUENT: Primary | ICD-10-CM

## 2025-03-04 DIAGNOSIS — I25.10 ATHEROSCLEROSIS OF NATIVE CORONARY ARTERY OF NATIVE HEART WITHOUT ANGINA PECTORIS: ICD-10-CM

## 2025-03-04 DIAGNOSIS — E55.9 VITAMIN D DEFICIENCY: ICD-10-CM

## 2025-03-04 DIAGNOSIS — Z86.39 HISTORY OF NON ANEMIC VITAMIN B12 DEFICIENCY: ICD-10-CM

## 2025-03-04 DIAGNOSIS — E11.40 TYPE 2 DIABETES MELLITUS WITH DIABETIC NEUROPATHY, WITHOUT LONG-TERM CURRENT USE OF INSULIN (HCC): ICD-10-CM

## 2025-03-04 DIAGNOSIS — E11.21 TYPE 2 DIABETES WITH NEPHROPATHY (HCC): ICD-10-CM

## 2025-03-04 DIAGNOSIS — B37.42 CANDIDAL BALANITIS: ICD-10-CM

## 2025-03-04 DIAGNOSIS — M47.26 OSTEOARTHRITIS OF SPINE WITH RADICULOPATHY, LUMBAR REGION: ICD-10-CM

## 2025-03-04 DIAGNOSIS — D50.8 IRON DEFICIENCY ANEMIA SECONDARY TO INADEQUATE DIETARY IRON INTAKE: ICD-10-CM

## 2025-03-04 DIAGNOSIS — Z71.89 ACP (ADVANCE CARE PLANNING): ICD-10-CM

## 2025-03-04 DIAGNOSIS — I10 ESSENTIAL HYPERTENSION, BENIGN: ICD-10-CM

## 2025-03-04 DIAGNOSIS — J32.4 CHRONIC PANSINUSITIS: ICD-10-CM

## 2025-03-04 DIAGNOSIS — N18.31 STAGE 3A CHRONIC KIDNEY DISEASE (HCC): ICD-10-CM

## 2025-03-04 DIAGNOSIS — E78.2 MIXED DYSLIPIDEMIA: ICD-10-CM

## 2025-03-04 PROCEDURE — 99214 OFFICE O/P EST MOD 30 MIN: CPT | Performed by: FAMILY MEDICINE

## 2025-03-04 PROCEDURE — 99497 ADVNCD CARE PLAN 30 MIN: CPT | Performed by: FAMILY MEDICINE

## 2025-03-04 RX ORDER — PREGABALIN 75 MG/1
75 CAPSULE ORAL 2 TIMES DAILY
Qty: 180 CAPSULE | Refills: 0 | Status: SHIPPED | OUTPATIENT
Start: 2025-03-04 | End: 2025-06-02

## 2025-03-04 RX ORDER — FLUCONAZOLE 150 MG/1
150 TABLET ORAL WEEKLY
Qty: 4 TABLET | Refills: 2 | Status: SHIPPED | OUTPATIENT
Start: 2025-03-04 | End: 2025-03-26

## 2025-03-04 RX ORDER — CLOTRIMAZOLE AND BETAMETHASONE DIPROPIONATE 10; .64 MG/G; MG/G
CREAM TOPICAL
Qty: 45 G | Refills: 0 | Status: SHIPPED | OUTPATIENT
Start: 2025-03-04

## 2025-03-04 SDOH — ECONOMIC STABILITY: FOOD INSECURITY: WITHIN THE PAST 12 MONTHS, THE FOOD YOU BOUGHT JUST DIDN'T LAST AND YOU DIDN'T HAVE MONEY TO GET MORE.: NEVER TRUE

## 2025-03-04 SDOH — ECONOMIC STABILITY: FOOD INSECURITY: WITHIN THE PAST 12 MONTHS, YOU WORRIED THAT YOUR FOOD WOULD RUN OUT BEFORE YOU GOT MONEY TO BUY MORE.: NEVER TRUE

## 2025-03-04 ASSESSMENT — ENCOUNTER SYMPTOMS
CONSTIPATION: 0
WHEEZING: 0
BACK PAIN: 0
ABDOMINAL PAIN: 0
COUGH: 0
NAUSEA: 0
SORE THROAT: 0
DIARRHEA: 0
SHORTNESS OF BREATH: 0

## 2025-03-04 ASSESSMENT — LIFESTYLE VARIABLES
HOW OFTEN DURING THE LAST YEAR HAVE YOU FAILED TO DO WHAT WAS NORMALLY EXPECTED FROM YOU BECAUSE OF DRINKING: NEVER
HOW OFTEN DURING THE LAST YEAR HAVE YOU BEEN UNABLE TO REMEMBER WHAT HAPPENED THE NIGHT BEFORE BECAUSE YOU HAD BEEN DRINKING: NEVER
HOW OFTEN DURING THE LAST YEAR HAVE YOU NEEDED AN ALCOHOLIC DRINK FIRST THING IN THE MORNING TO GET YOURSELF GOING AFTER A NIGHT OF HEAVY DRINKING: NEVER
HOW OFTEN DO YOU HAVE A DRINK CONTAINING ALCOHOL: 2-3 TIMES A WEEK
HAS A RELATIVE, FRIEND, DOCTOR, OR ANOTHER HEALTH PROFESSIONAL EXPRESSED CONCERN ABOUT YOUR DRINKING OR SUGGESTED YOU CUT DOWN: NO
HOW OFTEN DURING THE LAST YEAR HAVE YOU FOUND THAT YOU WERE NOT ABLE TO STOP DRINKING ONCE YOU HAD STARTED: NEVER
HOW MANY STANDARD DRINKS CONTAINING ALCOHOL DO YOU HAVE ON A TYPICAL DAY: 3 OR 4
HAVE YOU OR SOMEONE ELSE BEEN INJURED AS A RESULT OF YOUR DRINKING: NO
HOW OFTEN DURING THE LAST YEAR HAVE YOU HAD A FEELING OF GUILT OR REMORSE AFTER DRINKING: NEVER

## 2025-03-04 ASSESSMENT — PATIENT HEALTH QUESTIONNAIRE - PHQ9
2. FEELING DOWN, DEPRESSED OR HOPELESS: NOT AT ALL
SUM OF ALL RESPONSES TO PHQ QUESTIONS 1-9: 0
1. LITTLE INTEREST OR PLEASURE IN DOING THINGS: NOT AT ALL
SUM OF ALL RESPONSES TO PHQ QUESTIONS 1-9: 0

## 2025-03-04 NOTE — PATIENT INSTRUCTIONS
people who care for you. If you have one, they won't have to make tough decisions by themselves.  For more information, including forms for your state, see the [employer] website (www.caringinfo.org/planning/advance-directives/).  Follow-up care is a key part of your treatment and safety. Be sure to make and go to all appointments, and call your doctor if you are having problems. It's also a good idea to know your test results and keep a list of the medicines you take.  What should you include in an advance directive?  Many states have a unique advance directive form. (It may ask you to address specific issues.) Or you might use a universal form that's approved by many states.  If your form doesn't tell you what to address, it may be hard to know what to include in your advance directive. Use the questions below to help you get started.  Who do you want to make decisions about your medical care if you are not able to?  What life-support measures do you want if you have a serious illness that gets worse over time or can't be cured?  What are you most afraid of that might happen? (Maybe you're afraid of having pain, losing your independence, or being kept alive by machines.)  Where would you prefer to die? (Your home? A hospital? A nursing home?)  Do you want to donate your organs when you die?  Do you want certain Yarsanism practices performed before you die?  When should you call for help?  Be sure to contact your doctor if you have any questions.  Where can you learn more?  Go to https://www.TOBESO[employer].net/patientEd and enter R264 to learn more about \"Advance Directives: Care Instructions.\"  Current as of: November 16, 2023  Content Version: 14.3  © 2024 [employer].   Care instructions adapted under license by [employer]. If you have questions about a medical condition or this instruction, always ask your healthcare professional. [employer] disclaims any warranty or liability for your use

## 2025-03-04 NOTE — ASSESSMENT & PLAN NOTE
Monitored by specialist- no acute findings meriting change in the plan  Is on soda bicarb from nephrology

## 2025-03-04 NOTE — ASSESSMENT & PLAN NOTE
Chronic, at goal (stable), continue current treatment plan, medication adherence emphasized, and lifestyle modifications recommended  Stable on current Jardiance 10 mg and metformin 850 mg ER, neuropathy symptoms have improved significantly since that he is on Lyrica

## 2025-03-04 NOTE — ASSESSMENT & PLAN NOTE
Chronic, at goal (stable), continue current treatment plan, medication adherence emphasized, and lifestyle modifications recommended  On Jardiance for CKD , CAD and diabetes

## 2025-03-04 NOTE — PROGRESS NOTES
Advance Care Planning     Advance Care Planning (ACP) Physician/NP/PA Conversation    Date of Conversation: 3/4/2025  Conducted with: Patient with Decision Making Capacity    Healthcare Decision Maker:      Primary Decision Maker: Merissa Kyle - Spouse - [phone]    Secondary Decision Maker: Mishel Kyle - Child - [phone]    Secondary Decision Maker: Miguel Kyle - Child - [phone]    Click here to complete Healthcare Decision Makers including selection of the Healthcare Decision Maker Relationship (ie \"Primary\")  Today we documented Decision Maker(s) consistent with Legal Next of Kin hierarchy.    Care Preferences:    Hospitalization:  \"If your health worsens and it becomes clear that your chance of recovery is unlikely, what would be your preference regarding hospitalization?\"  The patient would prefer hospitalization.    Ventilation:  \"If you were unable to breath on your own and your chance of recovery was unlikely, what would be your preference about the use of a ventilator (breathing machine) if it was available to you?\"  The patient would NOT desire the use of a ventilator.    Resuscitation:  \"In the event your heart stopped as a result of an underlying serious health condition, would you want attempts made to restart your heart, or would you prefer a natural death?\"  Yes, attempt to resuscitate.    treatment goals, benefit/burden of treatment options, artificial nutrition, ventilation preferences, hospitalization preferences, resuscitation preferences, end of life care preferences (vegetative state/imminent death), and hospice care    Conversation Outcomes / Follow-Up Plan:  ACP in process - information provided, considering goals and options  Strongly recommended to complete directive  Reviewed DNR/DNI and patient elects Full Code (Attempt Resuscitation)    Length of Voluntary ACP Conversation in minutes:  16 minutes    Ruth Ann Aquino MD                       
Chief Complaint   Patient presents with    Medicare AWV         \"Have you been to the ER, urgent care clinic since your last visit?  Hospitalized since your last visit?\"    NO    “Have you seen or consulted any other health care providers outside of [facility] since your last visit?”    NO            Click Here for Release of Records Request           3/4/2025    10:06 AM   PHQ-9    Little interest or pleasure in doing things 0   Feeling down, depressed, or hopeless 0   PHQ-2 Score 0   PHQ-9 Total Score 0           Financial Resource Strain: Low Risk  (6/11/2024)    Overall Financial Resource Strain (CARDIA)     Difficulty of Paying Living Expenses: Not hard at all      Food Insecurity: No Food Insecurity (3/4/2025)    Hunger Vital Sign     Worried About Running Out of Food in the Last Year: Never true     Ran Out of Food in the Last Year: Never true          Health Maintenance Due   Topic Date Due    Shingles vaccine (2 of 2) 12/18/2020    Diabetic retinal exam  02/24/2021    Respiratory Syncytial Virus (RSV) Pregnant or age 60 yrs+ (1 - 1-dose 75+ series) Never done    COVID-19 Vaccine (4 - 2024-25 season) 09/01/2024    Annual Wellness Visit (Medicare)  02/06/2025       
Affect: Mood normal.         Behavior: Behavior normal.         Labs/Imaging/Diagnostics   Labs:  Lab Results   Component Value Date    WBC 8.3 10/14/2024    HGB 14.2 10/14/2024    HCT 45.0 10/14/2024    MCV 92.6 10/14/2024     10/14/2024     Lab Results   Component Value Date     10/14/2024    K 5.0 10/14/2024     10/14/2024    CO2 25 10/14/2024    BUN 20 10/14/2024    CREATININE 1.62 (H) 10/14/2024    GLUCOSE 124 (H) 10/14/2024    CALCIUM 9.8 10/14/2024    BILITOT 0.5 10/14/2024    ALKPHOS 53 10/14/2024    AST 20 10/14/2024    ALT 28 10/14/2024    LABGLOM 44 (L) 10/14/2024    GFRAA >60 08/02/2022    AGRATIO 1.1 04/06/2023    GLOB 3.2 10/14/2024     Hemoglobin A1C   Date Value Ref Range Status   10/14/2024 6.7 (H) 4.0 - 5.6 % Final     Comment:     (NOTE)  HbA1C Interpretive Ranges  <5.7              Normal  5.7 - 6.4         Consider Prediabetes  >6.5              Consider Diabetes            Assessment & Plan      1. Medicare annual wellness visit, subsequent  2. ACP (advance care planning)  -     OR Advanced Care Planning (16-30 minutes) [85985]  -     Full code  3. Candidal balanitis  -     clotrimazole-betamethasone (LOTRISONE) 1-0.05 % cream; Apply topically 2 times daily., Disp-45 g, R-0, Normal  -     fluconazole (DIFLUCAN) 150 MG tablet; Take 1 tablet by mouth once a week for 4 doses, Disp-4 tablet, R-2Normal  4. Stage 3a chronic kidney disease (HCC)  Assessment & Plan:   Monitored by specialist- no acute findings meriting change in the plan  Is on soda bicarb from nephrology  Orders:  -     CBC with Auto Differential; Future  -     Comprehensive Metabolic Panel; Future  5. Type 2 diabetes mellitus with diabetic neuropathy, without long-term current use of insulin ([facility])  Assessment & Plan:   Chronic, at goal (stable), continue current treatment plan, medication adherence emphasized, and lifestyle modifications recommended  Stable on current Jardiance 10 mg and metformin 850 mg ER, 
completed with Dragon, the [employer] voice recognition software.  Quite often unanticipated grammatical, syntax, homophones, and other interpretive errors are inadvertently transcribed by the computer software.  Please disregard these errors.  Please excuse any errors that have escaped final proofreading.  Thank you.     Return in 6 months (on 9/4/2025).

## 2025-03-05 LAB
25(OH)D3 SERPL-MCNC: 42.8 NG/ML (ref 30–100)
ALBUMIN SERPL-MCNC: 4 G/DL (ref 3.5–5)
ALBUMIN/GLOB SERPL: 1.1 (ref 1.1–2.2)
ALP SERPL-CCNC: 68 U/L (ref 45–117)
ALT SERPL-CCNC: 30 U/L (ref 12–78)
ANION GAP SERPL CALC-SCNC: 8 MMOL/L (ref 2–12)
AST SERPL-CCNC: 19 U/L (ref 15–37)
BASOPHILS # BLD: 0.03 K/UL (ref 0–0.1)
BASOPHILS NFR BLD: 0.5 % (ref 0–1)
BILIRUB SERPL-MCNC: 1.2 MG/DL (ref 0.2–1)
BUN SERPL-MCNC: 18 MG/DL (ref 6–20)
BUN/CREAT SERPL: 12 (ref 12–20)
CALCIUM SERPL-MCNC: 10.9 MG/DL (ref 8.5–10.1)
CHLORIDE SERPL-SCNC: 101 MMOL/L (ref 97–108)
CHOLEST SERPL-MCNC: 132 MG/DL
CO2 SERPL-SCNC: 27 MMOL/L (ref 21–32)
CREAT SERPL-MCNC: 1.45 MG/DL (ref 0.7–1.3)
CREAT UR-MCNC: 81.6 MG/DL
DIFFERENTIAL METHOD BLD: NORMAL
EOSINOPHIL # BLD: 0.15 K/UL (ref 0–0.4)
EOSINOPHIL NFR BLD: 2.4 % (ref 0–7)
ERYTHROCYTE [DISTWIDTH] IN BLOOD BY AUTOMATED COUNT: 13.7 % (ref 11.5–14.5)
EST. AVERAGE GLUCOSE BLD GHB EST-MCNC: 137 MG/DL
GLOBULIN SER CALC-MCNC: 3.6 G/DL (ref 2–4)
GLUCOSE SERPL-MCNC: 153 MG/DL (ref 65–100)
HBA1C MFR BLD: 6.4 % (ref 4–5.6)
HCT VFR BLD AUTO: 44.8 % (ref 36.6–50.3)
HDLC SERPL-MCNC: 43 MG/DL
HDLC SERPL: 3.1 (ref 0–5)
HGB BLD-MCNC: 15.1 G/DL (ref 12.1–17)
IMM GRANULOCYTES # BLD AUTO: 0.01 K/UL (ref 0–0.04)
IMM GRANULOCYTES NFR BLD AUTO: 0.2 % (ref 0–0.5)
IRON SATN MFR SERPL: 25 % (ref 20–50)
IRON SERPL-MCNC: 108 UG/DL (ref 35–150)
LDLC SERPL CALC-MCNC: 51.8 MG/DL (ref 0–100)
LYMPHOCYTES # BLD: 2.13 K/UL (ref 0.8–3.5)
LYMPHOCYTES NFR BLD: 33.8 % (ref 12–49)
MCH RBC QN AUTO: 30.3 PG (ref 26–34)
MCHC RBC AUTO-ENTMCNC: 33.7 G/DL (ref 30–36.5)
MCV RBC AUTO: 89.8 FL (ref 80–99)
MICROALBUMIN UR-MCNC: 2.13 MG/DL
MICROALBUMIN/CREAT UR-RTO: 26 MG/G (ref 0–30)
MONOCYTES # BLD: 0.36 K/UL (ref 0–1)
MONOCYTES NFR BLD: 5.7 % (ref 5–13)
NEUTS SEG # BLD: 3.63 K/UL (ref 1.8–8)
NEUTS SEG NFR BLD: 57.4 % (ref 32–75)
NRBC # BLD: 0 K/UL (ref 0–0.01)
NRBC BLD-RTO: 0 PER 100 WBC
PLATELET # BLD AUTO: 215 K/UL (ref 150–400)
PMV BLD AUTO: 12.3 FL (ref 8.9–12.9)
POTASSIUM SERPL-SCNC: 5.1 MMOL/L (ref 3.5–5.1)
PROT SERPL-MCNC: 7.6 G/DL (ref 6.4–8.2)
RBC # BLD AUTO: 4.99 M/UL (ref 4.1–5.7)
SODIUM SERPL-SCNC: 136 MMOL/L (ref 136–145)
TIBC SERPL-MCNC: 426 UG/DL (ref 250–450)
TRIGL SERPL-MCNC: 186 MG/DL
TSH SERPL DL<=0.05 MIU/L-ACNC: 2.6 UIU/ML (ref 0.36–3.74)
VIT B12 SERPL-MCNC: >2000 PG/ML (ref 193–986)
VLDLC SERPL CALC-MCNC: 37.2 MG/DL
WBC # BLD AUTO: 6.3 K/UL (ref 4.1–11.1)

## 2025-03-06 NOTE — RESULT ENCOUNTER NOTE
Gaby Lilly,  I have reviewed your results.  Results for vitamin D and vitamin B12 levels as well as iron profile are very normal.  CBC, blood count also shows very normal hemoglobin and other results.  A1c, average sugar has improved and in excellent range since you are on a Jardiance.  Cholesterol results are at goal with very normal total, bad and good cholesterol.  Triglycerides are borderline up but also improved from previous results.  Since you are on Jardiance, kidney function has also improved from your last few results.  Urine is negative for protein  Results for thyroid function is normal.  Overall all results are very normal and at goal.  Please continue with your current medications and let me know if any questions, thanks.

## 2025-04-20 DIAGNOSIS — I10 ESSENTIAL HYPERTENSION, BENIGN: ICD-10-CM

## 2025-04-20 DIAGNOSIS — E11.21 TYPE 2 DIABETES WITH NEPHROPATHY (HCC): ICD-10-CM

## 2025-04-20 RX ORDER — LOSARTAN POTASSIUM 50 MG/1
50 TABLET ORAL DAILY
Qty: 90 TABLET | Refills: 0 | Status: SHIPPED | OUTPATIENT
Start: 2025-04-20

## 2025-05-27 DIAGNOSIS — M47.26 OSTEOARTHRITIS OF SPINE WITH RADICULOPATHY, LUMBAR REGION: ICD-10-CM

## 2025-05-27 DIAGNOSIS — E11.40 TYPE 2 DIABETES MELLITUS WITH DIABETIC NEUROPATHY, WITHOUT LONG-TERM CURRENT USE OF INSULIN (HCC): ICD-10-CM

## 2025-05-27 RX ORDER — PREGABALIN 75 MG/1
CAPSULE ORAL
Qty: 60 CAPSULE | Refills: 0 | Status: SHIPPED | OUTPATIENT
Start: 2025-05-27 | End: 2025-06-26

## 2025-05-27 NOTE — TELEPHONE ENCOUNTER
PCP: Ruth Ann Aquino MD    Last appt: 3/4/2025       Future Appointments   Date Time Provider Department Center   7/7/2025 10:20 AM Ruth Ann Aquino MD PAFP [facility] DEP   3/4/2026 10:00 AM [facility] AMB   3/4/2026 11:00 AM Diallo Ballard MD [facility] AMB       Requested Prescriptions     Pending Prescriptions Disp Refills    pregabalin (LYRICA) 75 MG capsule [Pharmacy Med Name: Pregabalin 75 MG Oral Capsule] 60 capsule 0     Sig: TAKE 1 CAPSULE BY MOUTH TWICE DAILY **MAX  2  CAPSULES  DAILY**       Prior labs and Blood pressures:  BP Readings from Last 3 Encounters:   03/04/25 101/66   02/13/25 130/60   10/14/24 124/73     Lab Results   Component Value Date/Time     03/04/2025 11:07 AM    K 5.1 03/04/2025 11:07 AM     03/04/2025 11:07 AM    CO2 27 03/04/2025 11:07 AM    BUN 18 03/04/2025 11:07 AM    GFRAA >60 08/02/2022 10:24 AM     No results found for: \"HBA1C\", \"VPP3HMHB\"  Lab Results   Component Value Date/Time    CHOL 132 03/04/2025 11:07 AM    HDL 43 03/04/2025 11:07 AM    LDL 51.8 03/04/2025 11:07 AM    LDL 50.6 02/06/2024 09:52 AM    VLDL 37.2 03/04/2025 11:07 AM    VLDL 25 07/01/2021 09:05 AM     No results found for: \"VITD3\"    Lab Results   Component Value Date/Time    TSH 2.60 03/04/2025 11:07 AM

## 2025-07-07 ENCOUNTER — OFFICE VISIT (OUTPATIENT)
Age: 75
End: 2025-07-07
Payer: MEDICARE

## 2025-07-07 VITALS
OXYGEN SATURATION: 97 % | HEART RATE: 55 BPM | SYSTOLIC BLOOD PRESSURE: 134 MMHG | RESPIRATION RATE: 16 BRPM | HEIGHT: 70 IN | DIASTOLIC BLOOD PRESSURE: 77 MMHG | TEMPERATURE: 96.9 F | WEIGHT: 183 LBS | BODY MASS INDEX: 26.2 KG/M2

## 2025-07-07 DIAGNOSIS — I25.10 ATHEROSCLEROSIS OF NATIVE CORONARY ARTERY OF NATIVE HEART WITHOUT ANGINA PECTORIS: ICD-10-CM

## 2025-07-07 DIAGNOSIS — Z86.39 HISTORY OF NON ANEMIC VITAMIN B12 DEFICIENCY: ICD-10-CM

## 2025-07-07 DIAGNOSIS — D50.8 IRON DEFICIENCY ANEMIA SECONDARY TO INADEQUATE DIETARY IRON INTAKE: ICD-10-CM

## 2025-07-07 DIAGNOSIS — J32.4 CHRONIC PANSINUSITIS: ICD-10-CM

## 2025-07-07 DIAGNOSIS — E11.21 TYPE 2 DIABETES WITH NEPHROPATHY (HCC): ICD-10-CM

## 2025-07-07 DIAGNOSIS — I10 ESSENTIAL HYPERTENSION, BENIGN: ICD-10-CM

## 2025-07-07 DIAGNOSIS — K21.9 CHRONIC GERD: ICD-10-CM

## 2025-07-07 DIAGNOSIS — E78.2 MIXED DYSLIPIDEMIA: ICD-10-CM

## 2025-07-07 DIAGNOSIS — E11.40 TYPE 2 DIABETES MELLITUS WITH DIABETIC NEUROPATHY, WITHOUT LONG-TERM CURRENT USE OF INSULIN (HCC): Primary | ICD-10-CM

## 2025-07-07 DIAGNOSIS — M47.26 OSTEOARTHRITIS OF SPINE WITH RADICULOPATHY, LUMBAR REGION: ICD-10-CM

## 2025-07-07 DIAGNOSIS — N18.31 STAGE 3A CHRONIC KIDNEY DISEASE (HCC): ICD-10-CM

## 2025-07-07 PROCEDURE — 1123F ACP DISCUSS/DSCN MKR DOCD: CPT | Performed by: FAMILY MEDICINE

## 2025-07-07 PROCEDURE — 3075F SYST BP GE 130 - 139MM HG: CPT | Performed by: FAMILY MEDICINE

## 2025-07-07 PROCEDURE — 99214 OFFICE O/P EST MOD 30 MIN: CPT | Performed by: FAMILY MEDICINE

## 2025-07-07 PROCEDURE — 1159F MED LIST DOCD IN RCRD: CPT | Performed by: FAMILY MEDICINE

## 2025-07-07 PROCEDURE — 3078F DIAST BP <80 MM HG: CPT | Performed by: FAMILY MEDICINE

## 2025-07-07 PROCEDURE — 3044F HG A1C LEVEL LT 7.0%: CPT | Performed by: FAMILY MEDICINE

## 2025-07-07 PROCEDURE — G2211 COMPLEX E/M VISIT ADD ON: HCPCS | Performed by: FAMILY MEDICINE

## 2025-07-07 PROCEDURE — 1036F TOBACCO NON-USER: CPT | Performed by: FAMILY MEDICINE

## 2025-07-07 PROCEDURE — G8427 DOCREV CUR MEDS BY ELIG CLIN: HCPCS | Performed by: FAMILY MEDICINE

## 2025-07-07 PROCEDURE — 1125F AMNT PAIN NOTED PAIN PRSNT: CPT | Performed by: FAMILY MEDICINE

## 2025-07-07 PROCEDURE — G8419 CALC BMI OUT NRM PARAM NOF/U: HCPCS | Performed by: FAMILY MEDICINE

## 2025-07-07 RX ORDER — PANTOPRAZOLE SODIUM 40 MG/1
40 TABLET, DELAYED RELEASE ORAL DAILY
Qty: 30 TABLET | Refills: 3 | Status: SHIPPED | OUTPATIENT
Start: 2025-07-07

## 2025-07-07 RX ORDER — ONDANSETRON 4 MG/1
4 TABLET, ORALLY DISINTEGRATING ORAL 3 TIMES DAILY PRN
Qty: 21 TABLET | Refills: 0 | Status: SHIPPED | OUTPATIENT
Start: 2025-07-07

## 2025-07-07 RX ORDER — DULOXETIN HYDROCHLORIDE 20 MG/1
20 CAPSULE, DELAYED RELEASE ORAL
Qty: 90 CAPSULE | Refills: 0 | Status: SHIPPED | OUTPATIENT
Start: 2025-07-07

## 2025-07-07 ASSESSMENT — ENCOUNTER SYMPTOMS
NAUSEA: 0
CONSTIPATION: 0
COUGH: 0
ABDOMINAL PAIN: 0
SORE THROAT: 0
BACK PAIN: 1
DIARRHEA: 0
SHORTNESS OF BREATH: 0
WHEEZING: 0

## 2025-07-07 NOTE — PROGRESS NOTES
Chief Complaint   Patient presents with    Diabetes     Follow up    Cholesterol Problem     Follow up         \"Have you been to the ER, urgent care clinic since your last visit?  Hospitalized since your last visit?\"    NO    “Have you seen or consulted any other health care providers outside of [facility] since your last visit?”    YES - When: approximately 1  weeks ago.  Where and Why: DR. Diaz.            Click Here for Release of Records Request           3/4/2025    10:06 AM   PHQ-9    Little interest or pleasure in doing things 0   Feeling down, depressed, or hopeless 0   PHQ-2 Score 0   PHQ-9 Total Score 0           Financial Resource Strain: Low Risk  (6/11/2024)    Overall Financial Resource Strain (CARDIA)     Difficulty of Paying Living Expenses: Not hard at all      Food Insecurity: No Food Insecurity (3/4/2025)    Hunger Vital Sign     Worried About Running Out of Food in the Last Year: Never true     Ran Out of Food in the Last Year: Never true          Health Maintenance Due   Topic Date Due    Shingles vaccine (2 of 2) 12/18/2020

## 2025-07-07 NOTE — ASSESSMENT & PLAN NOTE
Chronic, at goal (stable), continue current treatment plan, medication adherence emphasized, and lifestyle modifications recommended follows [facility] nephrology, renal function very stable as per last blood work, not taking soda bicarb

## 2025-07-07 NOTE — PROGRESS NOTES
Date:7/7/2025        Patient Name:Alonso Kyle     YOB: 1950     Age:75 y.o.  The patient (or guardian, if applicable) and other individuals in attendance with the patient were advised that Artificial Intelligence will be utilized during this visit to record, process the conversation to generate a clinical note, and support improvement of the AI technology. The patient (or guardian, if applicable) and other individuals in attendance at the appointment consented to the use of AI, including the recording.                  Seen today for   Chief Complaint   Patient presents with    Diabetes     Follow up    Cholesterol Problem     Follow up     History of Present Illness  The patient is a 75-year-old male who presents today for follow-up on hypertension, dyslipidemia, CKD, diabetes, CAD, chronic back pain, and chronic knee pain.    He reports persistent high blood sugar levels over the past 2.5 months, regardless of the time of day. His fasting blood sugar was 160 this morning and 180 last night at 11:00 PM. He also notes that his blood sugar rises to nearly 280 if he consumes more than two rotis. He has been on metformin for several years and is considering a change in medication. He is also taking Jardiance. He has been dividing his metformin 850 mg tablets into four pieces and taking approximately 1000 to 1200 mg since starting Jardiance. His A1c was 6.8 and prior to that it was 6.7. He mentions that his insurance may cover Jardiance and requests a prescription for it. He anticipates needing a refill of his test strips next month.    He continues to see Dr. Gonzalez for ENT issues. He went to see him about his ear after he came back from [facility] and everything is fine now. He does not need to go back to him unless he has a problem. He is experiencing sinus drainage and a cough, which he attributes to the drainage. He is not currently taking any medication for this. He has to get up four or five times

## 2025-07-07 NOTE — ASSESSMENT & PLAN NOTE
Chronic, at goal (stable), continue current plan pending work up below, medication adherence emphasized, and lifestyle modifications recommended he is on Jardiance 10 mg as well as metformin 1000 mg daily, fasting sugar and postprandial sugar both staying on the higher side as per patient

## 2025-07-07 NOTE — ASSESSMENT & PLAN NOTE
Chronic, at goal (stable), continue current plan pending work up below, medication adherence emphasized, and lifestyle modifications recommended on Jardiance for CKD, CAD and diabetes along with metformin 1000 mg daily

## 2025-07-08 DIAGNOSIS — E11.21 TYPE 2 DIABETES WITH NEPHROPATHY (HCC): ICD-10-CM

## 2025-07-08 DIAGNOSIS — E11.40 TYPE 2 DIABETES MELLITUS WITH DIABETIC NEUROPATHY, WITHOUT LONG-TERM CURRENT USE OF INSULIN (HCC): ICD-10-CM

## 2025-07-08 LAB
ALBUMIN SERPL-MCNC: 4 G/DL (ref 3.5–5)
ALBUMIN/GLOB SERPL: 1.3 (ref 1.1–2.2)
ALP SERPL-CCNC: 56 U/L (ref 45–117)
ALT SERPL-CCNC: 27 U/L (ref 12–78)
ANION GAP SERPL CALC-SCNC: 6 MMOL/L (ref 2–12)
AST SERPL-CCNC: 20 U/L (ref 15–37)
BASOPHILS # BLD: 0.03 K/UL (ref 0–0.1)
BASOPHILS NFR BLD: 0.5 % (ref 0–1)
BILIRUB SERPL-MCNC: 0.9 MG/DL (ref 0.2–1)
BUN SERPL-MCNC: 18 MG/DL (ref 6–20)
BUN/CREAT SERPL: 11 (ref 12–20)
CALCIUM SERPL-MCNC: 9.9 MG/DL (ref 8.5–10.1)
CHLORIDE SERPL-SCNC: 107 MMOL/L (ref 97–108)
CHOLEST SERPL-MCNC: 134 MG/DL
CO2 SERPL-SCNC: 27 MMOL/L (ref 21–32)
CREAT SERPL-MCNC: 1.68 MG/DL (ref 0.7–1.3)
DIFFERENTIAL METHOD BLD: NORMAL
EOSINOPHIL # BLD: 0.15 K/UL (ref 0–0.4)
EOSINOPHIL NFR BLD: 2.5 % (ref 0–7)
ERYTHROCYTE [DISTWIDTH] IN BLOOD BY AUTOMATED COUNT: 14.2 % (ref 11.5–14.5)
EST. AVERAGE GLUCOSE BLD GHB EST-MCNC: 171 MG/DL
GLOBULIN SER CALC-MCNC: 3.2 G/DL (ref 2–4)
GLUCOSE SERPL-MCNC: 141 MG/DL (ref 65–100)
HBA1C MFR BLD: 7.6 % (ref 4–5.6)
HCT VFR BLD AUTO: 44.7 % (ref 36.6–50.3)
HDLC SERPL-MCNC: 36 MG/DL
HDLC SERPL: 3.7 (ref 0–5)
HGB BLD-MCNC: 14.3 G/DL (ref 12.1–17)
IMM GRANULOCYTES # BLD AUTO: 0.01 K/UL (ref 0–0.04)
IMM GRANULOCYTES NFR BLD AUTO: 0.2 % (ref 0–0.5)
LDLC SERPL CALC-MCNC: 54.4 MG/DL (ref 0–100)
LYMPHOCYTES # BLD: 2.39 K/UL (ref 0.8–3.5)
LYMPHOCYTES NFR BLD: 40.5 % (ref 12–49)
MCH RBC QN AUTO: 29.9 PG (ref 26–34)
MCHC RBC AUTO-ENTMCNC: 32 G/DL (ref 30–36.5)
MCV RBC AUTO: 93.3 FL (ref 80–99)
MONOCYTES # BLD: 0.37 K/UL (ref 0–1)
MONOCYTES NFR BLD: 6.3 % (ref 5–13)
NEUTS SEG # BLD: 2.95 K/UL (ref 1.8–8)
NEUTS SEG NFR BLD: 50 % (ref 32–75)
NRBC # BLD: 0 K/UL (ref 0–0.01)
NRBC BLD-RTO: 0 PER 100 WBC
PLATELET # BLD AUTO: 217 K/UL (ref 150–400)
PMV BLD AUTO: 11.7 FL (ref 8.9–12.9)
POTASSIUM SERPL-SCNC: 5.2 MMOL/L (ref 3.5–5.1)
PROT SERPL-MCNC: 7.2 G/DL (ref 6.4–8.2)
RBC # BLD AUTO: 4.79 M/UL (ref 4.1–5.7)
SODIUM SERPL-SCNC: 140 MMOL/L (ref 136–145)
TRIGL SERPL-MCNC: 218 MG/DL
VLDLC SERPL CALC-MCNC: 43.6 MG/DL
WBC # BLD AUTO: 5.9 K/UL (ref 4.1–11.1)

## 2025-07-08 RX ORDER — BLOOD SUGAR DIAGNOSTIC
STRIP MISCELLANEOUS
Qty: 100 EACH | Refills: 3 | Status: SHIPPED | OUTPATIENT
Start: 2025-07-08

## 2025-07-08 NOTE — TELEPHONE ENCOUNTER
Last appointment: 7/7/25 Kenia  Next appointment: 11/11/25 Kenia  Previous refill encounter(s): 9/20/24 100 + 3     Requested Prescriptions     Pending Prescriptions Disp Refills    blood glucose test strips (ACCU-CHEK GUIDE) strip 100 each 3     Sig: Use to check sugar 1 time daily before breakfast.  DX: E11.40, E11.21     For Pharmacy Admin Tracking Only    Program: Medication Refill  CPA in place:    Recommendation Provided To:   Intervention Detail: New Rx: 1, reason: Patient Preference  Intervention Accepted By:   Gap Closed?:    Time Spent (min): 5

## 2025-07-12 DIAGNOSIS — E11.21 TYPE 2 DIABETES WITH NEPHROPATHY (HCC): ICD-10-CM

## 2025-07-12 DIAGNOSIS — I10 ESSENTIAL HYPERTENSION, BENIGN: ICD-10-CM

## 2025-07-13 RX ORDER — LOSARTAN POTASSIUM 50 MG/1
50 TABLET ORAL DAILY
Qty: 90 TABLET | Refills: 0 | Status: SHIPPED | OUTPATIENT
Start: 2025-07-13

## 2025-08-08 DIAGNOSIS — E55.9 VITAMIN D DEFICIENCY, UNSPECIFIED: ICD-10-CM

## 2025-08-08 RX ORDER — ERGOCALCIFEROL 1.25 MG/1
50000 CAPSULE, LIQUID FILLED ORAL WEEKLY
Qty: 12 CAPSULE | Refills: 0 | Status: SHIPPED | OUTPATIENT
Start: 2025-08-08

## (undated) DEVICE — WRISTBAND ID AD W1XL11.5IN RED POLY ALRG PREPRINTED PERM

## (undated) DEVICE — SYRINGE MED 20ML STD CLR PLAS LUERLOCK TIP N CTRL DISP

## (undated) DEVICE — STERILE POLYISOPRENE POWDER-FREE SURGICAL GLOVES WITH EMOLLIENT COATING: Brand: PROTEXIS

## (undated) DEVICE — FORCEPS BX L240CM JAW DIA2.8MM L CAP W/ NDL MIC MESH TOOTH

## (undated) DEVICE — Device

## (undated) DEVICE — T4 HOOD

## (undated) DEVICE — SCRUB DRY SURG EZ SCRUB BRUSH PREOPERATIVE GRN

## (undated) DEVICE — DRAIN KT WND 10FR RND 400ML --

## (undated) DEVICE — NEEDLE HYPO 22GA L1.5IN BLK S STL HUB POLYPR SHLD REG BVL

## (undated) DEVICE — SOLIDIFIER FLUID 3000 CC ABSORB

## (undated) DEVICE — SOLUTION IRRIG 3000ML 0.9% SOD CHL FLX CONT 0797208] ICU MEDICAL INC]

## (undated) DEVICE — BOWL BNE CEM MIX SPAT CURET SMARTMIX CTS

## (undated) DEVICE — SYR LR LCK 1ML GRAD NSAF 30ML --

## (undated) DEVICE — NEONATAL-ADULT SPO2 SENSOR: Brand: NELLCOR

## (undated) DEVICE — SOLUTION IRRIG 1000ML H2O STRL BLT

## (undated) DEVICE — (D)PREP SKN CHLRAPRP APPL 26ML -- CONVERT TO ITEM 371833

## (undated) DEVICE — KENDALL RADIOLUCENT FOAM MONITORING ELECTRODE -RECTANGULAR SHAPE: Brand: KENDALL

## (undated) DEVICE — 1200 GUARD II KIT W/5MM TUBE W/O VAC TUBE: Brand: GUARDIAN

## (undated) DEVICE — BLADE SAW W083XL354IN THK0047IN CUT THK0047IN SAG FLR

## (undated) DEVICE — SLIM BODY SKIN STAPLER: Brand: APPOSE ULC

## (undated) DEVICE — BLADE SAW W051XL276IN THK005IN CUT THK005IN REPL SAG FLR

## (undated) DEVICE — HANDPIECE SET WITH BONE CLEANING TIP AND SUCTION TUBE: Brand: INTERPULSE

## (undated) DEVICE — REM POLYHESIVE ADULT PATIENT RETURN ELECTRODE: Brand: VALLEYLAB

## (undated) DEVICE — HOOK LOCK LATEX FREE ELASTIC BANDAGE D/L 6INX10YD

## (undated) DEVICE — INFECTION CONTROL KIT SYS

## (undated) DEVICE — NEEDLE HYPO 18GA L1.5IN PNK S STL HUB POLYPR SHLD REG BVL

## (undated) DEVICE — KIT IV STRT W CHLORAPREP PD 1ML

## (undated) DEVICE — CARTRIDGE BNE CEM MIX UNIV TWR VAC ROTOR BRK OFF NOZ W/O

## (undated) DEVICE — QUILTED PREMIUM COMFORT UNDERPAD,EXTRA HEAVY: Brand: WINGS

## (undated) DEVICE — DEVON™ KNEE AND BODY STRAP 60" X 3" (1.5 M X 7.6 CM): Brand: DEVON

## (undated) DEVICE — SUT ETHLN 2-0 18IN FS BLK --

## (undated) DEVICE — Z CONVERTED USE 2271043 CONTAINER SPEC COLL 4OZ SCR ON LID PEEL PCH

## (undated) DEVICE — Z DISCONTINUED NO SUB IDED SET EXTN W/ 4 W STPCOCK M SPIN LOK 36IN

## (undated) DEVICE — TRAY CATH 16F URIN MTR LTX -- CONVERT TO ITEM 363111

## (undated) DEVICE — CONTAINER SPEC 20 ML LID NEUT BUFF FORMALIN 10 % POLYPR STS

## (undated) DEVICE — AIRLIFE™ U/CONNECT-IT OXYGEN TUBING 7 FEET (2.1 M) CRUSH-RESISTANT OXYGEN TUBING, VINYL TIPPED: Brand: AIRLIFE™

## (undated) DEVICE — 4-PORT MANIFOLD: Brand: NEPTUNE 2

## (undated) DEVICE — SUTURE STRATAFIX SYMMETRIC PDS + SZ 1 L18IN ABSRB VLT L48MM SXPP1A400

## (undated) DEVICE — Z DISCONTINUED USE 2751540 TUBING IRRIG L10IN DISP PMP ENDOGATOR

## (undated) DEVICE — CUSTOM CAST PD STR

## (undated) DEVICE — BLADE SAW W073XL276IN THK0031IN CUT THK0036IN REPL SAG

## (undated) DEVICE — BAG BELONG PT PERS CLEAR HANDL

## (undated) DEVICE — CATH IV AUTOGRD BC BLU 22GA 25 -- INSYTE

## (undated) DEVICE — SYR 50ML SLIP TIP NSAF LF STRL --

## (undated) DEVICE — BW-412T DISP COMBO CLEANING BRUSH: Brand: SINGLE USE COMBINATION CLEANING BRUSH

## (undated) DEVICE — BAG SPEC BIOHZD LF 2MIL 6X10IN -- CONVERT TO ITEM 357326

## (undated) DEVICE — ENDO CARRY-ON PROCEDURE KIT INCLUDES ENZYMATIC SPONGE, GAUZE, BIOHAZARD LABEL, TRAY, LUBRICANT, DIRTY SCOPE LABEL, WATER LABEL, TRAY, DRAWSTRING PAD, AND DEFENDO 4-PIECE KIT.: Brand: ENDO CARRY-ON PROCEDURE KIT

## (undated) DEVICE — SET ADMIN 16ML TBNG L100IN 2 Y INJ SITE IV PIGGY BK DISP

## (undated) DEVICE — NDL PRT INJ NSAF BLNT 18GX1.5 --

## (undated) DEVICE — CANN NASAL O2 CAPNOGRAPHY AD -- FILTERLINE

## (undated) DEVICE — SINGLE-USE BIOPSY FORCEPS: Brand: RADIAL JAW 4

## (undated) DEVICE — SNARE ENDOSCP POLYP MED STD AD 2.4X27X240 CM 2.8 MM OVL SENS

## (undated) DEVICE — IMMOBILIZER KNEE 3 PNL 19 IN

## (undated) DEVICE — CONNECTOR TBNG AUX H2O JET DISP FOR OLY 160/180 SER